# Patient Record
Sex: FEMALE | Race: WHITE | NOT HISPANIC OR LATINO | Employment: OTHER | URBAN - METROPOLITAN AREA
[De-identification: names, ages, dates, MRNs, and addresses within clinical notes are randomized per-mention and may not be internally consistent; named-entity substitution may affect disease eponyms.]

---

## 2017-01-04 ENCOUNTER — LAB REQUISITION (OUTPATIENT)
Dept: LAB | Facility: HOSPITAL | Age: 67
End: 2017-01-04

## 2017-01-04 DIAGNOSIS — R56.9 CONVULSIONS (HCC): ICD-10-CM

## 2017-01-04 DIAGNOSIS — Z79.899 OTHER LONG TERM (CURRENT) DRUG THERAPY: ICD-10-CM

## 2017-01-04 PROCEDURE — 36415 COLL VENOUS BLD VENIPUNCTURE: CPT | Performed by: FAMILY MEDICINE

## 2017-01-04 PROCEDURE — 80177 DRUG SCRN QUAN LEVETIRACETAM: CPT | Performed by: FAMILY MEDICINE

## 2017-01-06 LAB — LEVETIRACETAM SERPL-MCNC: 19.3 UG/ML (ref 10–40)

## 2017-01-12 ENCOUNTER — TELEPHONE (OUTPATIENT)
Dept: NEUROLOGY | Facility: CLINIC | Age: 67
End: 2017-01-12

## 2017-01-12 DIAGNOSIS — G40.209 PARTIAL SYMPTOMATIC EPILEPSY WITH COMPLEX PARTIAL SEIZURES, NOT INTRACTABLE, WITHOUT STATUS EPILEPTICUS (HCC): Primary | ICD-10-CM

## 2017-01-12 NOTE — TELEPHONE ENCOUNTER
Patients insurance will not pay for Keppra XR, wanting to see about switching to immediate release       #[phone] Vee  Ext [phone]

## 2017-01-13 NOTE — TELEPHONE ENCOUNTER
Okay then discontinue the Keppra xr order and changed to a new medicine    Keppra 500 mg: One at bedtime for 4 nights then one twice a day

## 2017-01-16 RX ORDER — LEVETIRACETAM 500 MG/1
500 TABLET ORAL 2 TIMES DAILY
Qty: 90 TABLET | Refills: 3 | Status: SHIPPED | OUTPATIENT
Start: 2017-01-16 | End: 2017-02-20

## 2017-01-16 NOTE — TELEPHONE ENCOUNTER
levETIRAcetam (KEPPRA) 500 MG tablet  Take 1 tablet by mouth 2 (Two) Times a Day. One tablet po qhs for 4 nights to start medication, then 1 tablet po bid.   Normal, Disp-90 tablet, R-3

## 2017-02-08 ENCOUNTER — OFFICE (OUTPATIENT)
Dept: URBAN - METROPOLITAN AREA CLINIC 6 | Facility: CLINIC | Age: 67
End: 2017-02-08

## 2017-02-08 VITALS
RESPIRATION RATE: 22 BRPM | WEIGHT: 140 LBS | HEART RATE: 76 BPM | DIASTOLIC BLOOD PRESSURE: 66 MMHG | HEIGHT: 65 IN | SYSTOLIC BLOOD PRESSURE: 116 MMHG

## 2017-02-08 DIAGNOSIS — K31.1 ADULT HYPERTROPHIC PYLORIC STENOSIS: ICD-10-CM

## 2017-02-08 DIAGNOSIS — K43.2 INCISIONAL HERNIA WITHOUT OBSTRUCTION OR GANGRENE: ICD-10-CM

## 2017-02-08 PROCEDURE — 99213 OFFICE O/P EST LOW 20 MIN: CPT | Performed by: INTERNAL MEDICINE

## 2017-02-20 ENCOUNTER — OFFICE VISIT (OUTPATIENT)
Dept: SURGERY | Facility: CLINIC | Age: 67
End: 2017-02-20

## 2017-02-20 VITALS
WEIGHT: 144 LBS | HEART RATE: 86 BPM | TEMPERATURE: 97.7 F | BODY MASS INDEX: 25.52 KG/M2 | HEIGHT: 63 IN | OXYGEN SATURATION: 95 % | SYSTOLIC BLOOD PRESSURE: 158 MMHG | RESPIRATION RATE: 16 BRPM | DIASTOLIC BLOOD PRESSURE: 98 MMHG

## 2017-02-20 DIAGNOSIS — K43.2 INCISIONAL HERNIA, WITHOUT OBSTRUCTION OR GANGRENE: ICD-10-CM

## 2017-02-20 DIAGNOSIS — R10.84 GENERALIZED ABDOMINAL PAIN: Primary | ICD-10-CM

## 2017-02-20 DIAGNOSIS — R11.0 NAUSEA: ICD-10-CM

## 2017-02-20 PROCEDURE — 99203 OFFICE O/P NEW LOW 30 MIN: CPT | Performed by: SURGERY

## 2017-02-20 RX ORDER — LEVETIRACETAM 500 MG/1
500 TABLET ORAL 2 TIMES DAILY
COMMUNITY

## 2017-02-20 RX ORDER — PHENOL 1.4 %
600 AEROSOL, SPRAY (ML) MUCOUS MEMBRANE
COMMUNITY

## 2017-02-20 RX ORDER — PANTOPRAZOLE SODIUM 40 MG/1
40 TABLET, DELAYED RELEASE ORAL 2 TIMES DAILY
COMMUNITY

## 2017-02-20 RX ORDER — BUSPIRONE HYDROCHLORIDE 10 MG/1
10 TABLET ORAL 2 TIMES DAILY
COMMUNITY

## 2017-02-20 RX ORDER — ATORVASTATIN CALCIUM 20 MG/1
20 TABLET, FILM COATED ORAL NIGHTLY
COMMUNITY

## 2017-02-20 RX ORDER — PAROXETINE HYDROCHLORIDE 40 MG/1
40 TABLET, FILM COATED ORAL EVERY MORNING
COMMUNITY
End: 2021-01-12 | Stop reason: SDDI

## 2017-02-20 RX ORDER — PHENYTOIN SODIUM 100 MG/1
CAPSULE, EXTENDED RELEASE ORAL 2 TIMES DAILY
COMMUNITY
End: 2017-08-10

## 2017-02-20 RX ORDER — BRIMONIDINE TARTRATE AND TIMOLOL MALEATE 2; 5 MG/ML; MG/ML
1 SOLUTION OPHTHALMIC EVERY 12 HOURS
COMMUNITY

## 2017-02-20 RX ORDER — LEVOTHYROXINE SODIUM 0.05 MG/1
50 TABLET ORAL DAILY
COMMUNITY

## 2017-02-20 RX ORDER — GUAIFENESIN 600 MG/1
1200 TABLET, EXTENDED RELEASE ORAL 2 TIMES DAILY PRN
COMMUNITY

## 2017-02-20 RX ORDER — CARVEDILOL 6.25 MG/1
6.25 TABLET ORAL 2 TIMES DAILY WITH MEALS
COMMUNITY

## 2017-02-20 RX ORDER — QUETIAPINE FUMARATE 100 MG/1
100 TABLET, FILM COATED ORAL NIGHTLY
COMMUNITY
End: 2021-01-12 | Stop reason: SDDI

## 2017-02-20 RX ORDER — ACETAMINOPHEN 500 MG
1000 TABLET ORAL EVERY 6 HOURS PRN
COMMUNITY

## 2017-02-20 RX ORDER — SOLIFENACIN SUCCINATE 10 MG/1
10 TABLET, FILM COATED ORAL DAILY
COMMUNITY
End: 2021-01-12 | Stop reason: SDDI

## 2017-02-20 RX ORDER — CETIRIZINE HYDROCHLORIDE 10 MG/1
10 TABLET ORAL DAILY
COMMUNITY

## 2017-02-20 NOTE — PROGRESS NOTES
PATIENT INFORMATION  Caitlyn Camacho   - 1950    CHIEF COMPLAINT  INCISIONAL HERNIA, PT STATES IT IS PAINFUL  Referral from Dr. Khang Merino    HISTORY OF PRESENT ILLNESS  HPI  Patient is a very pleasant 66-year-old female who is a resident of [facility].  History was obtained from review of chart, patient's nurse and Dr. Merino.  She has a chronic history of epilepsy but has been seizure free for many years.  She follows with Dr. Leavitt neurology.  She also has history of anxiety disorder, major depressive disorder and dementia without behavioral disturbance.  She was referred to me for an incisional hernia -- of unknown duration, she has been at [facility] for the last 2-3 months and the staff has noted it since then.  Patient does very clearly complain of pain.  She gestures to me that when she lies down this hernia is reducible.  According to the nursing staff she has had ongoing pain associated with the hernia.  There has been no complaints of nausea, vomiting, change in bowel movements, melena or hematochezia.  She does also follow with GI.  Her past surgical history is significant for surgery for peptic ulcer disease, gastrojejunostomy, hysterectomy.   I have reviewed some of her old records including labs obtained in 2017 that is an elevation of alkaline phosphatase.  Upon talking to nursing staff there was concern for possible cholecystitis but no further workup was done.  Patient does not complain of any epigastric or right upper quadrant abdominal pain.    REVIEW OF SYSTEMS  Review of Systems  Unobtainable    ACTIVE PROBLEMS  Patient Active Problem List    Diagnosis   • Adverse drug effect [T88.7XXA]   • Partial symptomatic epilepsy with complex partial seizures, not intractable, without status epilepticus [G40.209]         PAST MEDICAL HISTORY  Past Medical History   Diagnosis Date   • Anxiety    • Dysphagia    • Hypercholesteremia    • Hypothyroidism    • Major  depression    • OCD (obsessive compulsive disorder)    • Osteopenia    • Seizure disorder          SURGICAL HISTORY  Past Surgical History   Procedure Laterality Date   • Cataract extraction     • Hysterectomy           FAMILY HISTORY  Family History   Problem Relation Age of Onset   • Family history unknown: Yes         SOCIAL HISTORY  Social History     Occupational History   • Not on file.     Social History Main Topics   • Smoking status: Never Smoker   • Smokeless tobacco: Not on file   • Alcohol use No   • Drug use: Not on file   • Sexual activity: Not on file         CURRENT MEDICATIONS    Current Outpatient Prescriptions:   •  acetaminophen (TYLENOL) 500 MG tablet, Take 500 mg by mouth Every 6 (Six) Hours As Needed for mild pain (1-3)., Disp: , Rfl:   •  Albuterol (VENTOLIN IN), Inhale., Disp: , Rfl:   •  atorvastatin (LIPITOR) 20 MG tablet, Take 20 mg by mouth Daily., Disp: , Rfl:   •  brimonidine-timolol (COMBIGAN) 0.2-0.5 % ophthalmic solution, Every 12 (Twelve) Hours., Disp: , Rfl:   •  busPIRone (BUSPAR) 10 MG tablet, Take 10 mg by mouth 3 (Three) Times a Day., Disp: , Rfl:   •  calcium carbonate (OS-KATY) 600 MG tablet, Take 600 mg by mouth Daily., Disp: , Rfl:   •  carvedilol (COREG) 6.25 MG tablet, Take 6.25 mg by mouth 2 (Two) Times a Day With Meals., Disp: , Rfl:   •  cetirizine (zyrTEC) 10 MG tablet, Take 10 mg by mouth Daily., Disp: , Rfl:   •  denosumab (PROLIA) 60 MG/ML solution syringe, Inject  under the skin 1 (One) Time., Disp: , Rfl:   •  Dextromethorphan-Menthol (DELSYM COUGH RELIEF MT), Apply  to the mouth or throat As Needed., Disp: , Rfl:   •  Ergocalciferol (VITAMIN D2 PO), Take  by mouth., Disp: , Rfl:   •  fluticasone-salmeterol (ADVAIR) 500-50 MCG/DOSE DISKUS, Inhale 2 (Two) Times a Day., Disp: , Rfl:   •  guaiFENesin (MUCINEX) 600 MG 12 hr tablet, Take 1,200 mg by mouth 2 (Two) Times a Day., Disp: , Rfl:   •  levETIRAcetam (KEPPRA) 500 MG tablet, Take 500 mg by mouth 2 (Two) Times a  "Day., Disp: , Rfl:   •  levothyroxine (SYNTHROID, LEVOTHROID) 50 MCG tablet, Take 50 mcg by mouth Daily., Disp: , Rfl:   •  Multiple Vitamins-Minerals (MULTIVITAMIN ADULT PO), Take  by mouth., Disp: , Rfl:   •  pantoprazole (PROTONIX) 40 MG EC tablet, Take 40 mg by mouth Daily., Disp: , Rfl:   •  PARoxetine (PAXIL) 40 MG tablet, Take 40 mg by mouth Every Morning., Disp: , Rfl:   •  phenytoin (DILANTIN) 100 MG ER capsule, Take  by mouth 2 (Two) Times a Day., Disp: , Rfl:   •  Probiotic Product (PROBIOTIC ADVANCED PO), Take  by mouth., Disp: , Rfl:   •  QUEtiapine (SEROquel) 100 MG tablet, Take 100 mg by mouth Every Night., Disp: , Rfl:   •  solifenacin (VESICARE) 10 MG tablet, Take 10 mg by mouth Daily., Disp: , Rfl:     ALLERGIES  Codeine    VITALS  Vitals:    02/20/17 0857   BP: 158/98   Pulse: 86   Resp: 16   Temp: 97.7 °F (36.5 °C)   SpO2: 95%   Weight: 144 lb (65.3 kg)   Height: 63\" (160 cm)       LAST RESULTS   Lab Requisition on 01/04/2017   Component Date Value Ref Range Status   • Levetiracetam 01/04/2017 19.3  10.0 - 40.0 ug/mL Final     No results found.    PHYSICAL EXAM  Physical Exam   Constitutional: She appears well-developed and well-nourished.   Eyes: No scleral icterus.   Cardiovascular: Normal rate, regular rhythm and normal heart sounds.    Pulmonary/Chest: Breath sounds normal.   Abdominal:   Soft, nondistended, nontender, reducible incisional hernia.  Fascial defect 5 cm x 5 cm in greatest dimension.   Musculoskeletal: She exhibits no edema.   Neurological: She is alert.   Skin: Skin is warm and dry.   Nursing note and vitals reviewed.      ASSESSMENT    Incisional hernia  Abdominal pain    Will obtain CT scan abdomen and pelvis with oral and IV contrast.  We'll obtain labs i.e. CBC CMP    Diagnoses and all orders for this visit:    Generalized abdominal pain  -     CT Abdomen Pelvis With Contrast  -     CBC & Differential  -     Comprehensive Metabolic Panel    Nausea  -     CT Abdomen Pelvis " With Contrast  -     CBC & Differential  -     Comprehensive Metabolic Panel    Incisional hernia, without obstruction or gangrene  -     CT Abdomen Pelvis With Contrast  -     CBC & Differential  -     Comprehensive Metabolic Panel    Other orders  -     Ergocalciferol (VITAMIN D2 PO); Take  by mouth.  -     Probiotic Product (PROBIOTIC ADVANCED PO); Take  by mouth.  -     cetirizine (zyrTEC) 10 MG tablet; Take 10 mg by mouth Daily.  -     levothyroxine (SYNTHROID, LEVOTHROID) 50 MCG tablet; Take 50 mcg by mouth Daily.  -     Multiple Vitamins-Minerals (MULTIVITAMIN ADULT PO); Take  by mouth.  -     pantoprazole (PROTONIX) 40 MG EC tablet; Take 40 mg by mouth Daily.  -     PARoxetine (PAXIL) 40 MG tablet; Take 40 mg by mouth Every Morning.  -     phenytoin (DILANTIN) 100 MG ER capsule; Take  by mouth 2 (Two) Times a Day.  -     denosumab (PROLIA) 60 MG/ML solution syringe; Inject  under the skin 1 (One) Time.  -     QUEtiapine (SEROquel) 100 MG tablet; Take 100 mg by mouth Every Night.  -     solifenacin (VESICARE) 10 MG tablet; Take 10 mg by mouth Daily.  -     acetaminophen (TYLENOL) 500 MG tablet; Take 500 mg by mouth Every 6 (Six) Hours As Needed for mild pain (1-3).  -     Albuterol (VENTOLIN IN); Inhale.  -     Dextromethorphan-Menthol (DELSYM COUGH RELIEF MT); Apply  to the mouth or throat As Needed.  -     guaiFENesin (MUCINEX) 600 MG 12 hr tablet; Take 1,200 mg by mouth 2 (Two) Times a Day.  -     atorvastatin (LIPITOR) 20 MG tablet; Take 20 mg by mouth Daily.  -     busPIRone (BUSPAR) 10 MG tablet; Take 10 mg by mouth 3 (Three) Times a Day.  -     calcium carbonate (OS-KATY) 600 MG tablet; Take 600 mg by mouth Daily.  -     carvedilol (COREG) 6.25 MG tablet; Take 6.25 mg by mouth 2 (Two) Times a Day With Meals.  -     brimonidine-timolol (COMBIGAN) 0.2-0.5 % ophthalmic solution; Every 12 (Twelve) Hours.  -     fluticasone-salmeterol (ADVAIR) 500-50 MCG/DOSE DISKUS; Inhale 2 (Two) Times a Day.  -      levETIRAcetam (KEPPRA) 500 MG tablet; Take 500 mg by mouth 2 (Two) Times a Day.          PLAN  Warning signs and symptoms of hernia incarceration/strangulation discussed with her caregiver and nursing staff.  They are to call my office immediately or come to the nearest emergency room if Caitlyn would've experience those symptoms.  We'll follow-up as soon as testing is completed.  Patient's caregivers and nursing staff for advised to call the office sooner should she have worsening symptoms or go to the nearest emergency room.    Plan of care was discussed with Dr. Merino we have the phone.

## 2017-02-22 ENCOUNTER — OUTSIDE FACILITY SERVICE (OUTPATIENT)
Dept: NEUROLOGY | Facility: CLINIC | Age: 67
End: 2017-02-22

## 2017-03-01 ENCOUNTER — LAB REQUISITION (OUTPATIENT)
Dept: LAB | Facility: HOSPITAL | Age: 67
End: 2017-03-01

## 2017-03-01 DIAGNOSIS — R53.83 OTHER FATIGUE: ICD-10-CM

## 2017-03-01 DIAGNOSIS — R93.3 ABNORMAL FINDINGS ON DIAGNOSTIC IMAGING OF OTHER PARTS OF DIGESTIVE TRACT: ICD-10-CM

## 2017-03-01 DIAGNOSIS — D72.819 DECREASED WHITE BLOOD CELL COUNT: ICD-10-CM

## 2017-03-01 DIAGNOSIS — Z79.899 OTHER LONG TERM (CURRENT) DRUG THERAPY: ICD-10-CM

## 2017-03-01 LAB
ALBUMIN SERPL-MCNC: 3.2 G/DL (ref 3.5–5.2)
ALBUMIN/GLOB SERPL: 0.9 G/DL
ALP SERPL-CCNC: 105 U/L (ref 40–129)
ALT SERPL W P-5'-P-CCNC: 11 U/L (ref 5–33)
ANION GAP SERPL CALCULATED.3IONS-SCNC: 10.6 MMOL/L
AST SERPL-CCNC: 13 U/L (ref 5–32)
BASOPHILS # BLD AUTO: 0.11 10*3/MM3 (ref 0–0.2)
BASOPHILS NFR BLD AUTO: 0.9 % (ref 0–2)
BILIRUB SERPL-MCNC: <0.2 MG/DL (ref 0.2–1.2)
BUN BLD-MCNC: 23 MG/DL (ref 8–23)
BUN/CREAT SERPL: 39.7 (ref 7–25)
CALCIUM SPEC-SCNC: 9.4 MG/DL (ref 8.8–10.5)
CHLORIDE SERPL-SCNC: 100 MMOL/L (ref 98–107)
CO2 SERPL-SCNC: 28.4 MMOL/L (ref 22–29)
CREAT BLD-MCNC: 0.58 MG/DL (ref 0.57–1)
DEPRECATED RDW RBC AUTO: 47.7 FL (ref 37–54)
EOSINOPHIL # BLD AUTO: 0.35 10*3/MM3 (ref 0.1–0.3)
EOSINOPHIL NFR BLD AUTO: 2.9 % (ref 0–4)
ERYTHROCYTE [DISTWIDTH] IN BLOOD BY AUTOMATED COUNT: 14.8 % (ref 11.5–14.5)
GFR SERPL CREATININE-BSD FRML MDRD: 104 ML/MIN/1.73
GLOBULIN UR ELPH-MCNC: 3.6 GM/DL
GLUCOSE BLD-MCNC: 91 MG/DL (ref 65–99)
HCT VFR BLD AUTO: 36.7 % (ref 37–47)
HGB BLD-MCNC: 11.5 G/DL (ref 12–16)
IMM GRANULOCYTES # BLD: 0.07 10*3/MM3 (ref 0–0.03)
IMM GRANULOCYTES NFR BLD: 0.6 % (ref 0–0.5)
LYMPHOCYTES # BLD AUTO: 3.91 10*3/MM3 (ref 0.6–4.8)
LYMPHOCYTES NFR BLD AUTO: 32.2 % (ref 20–45)
MCH RBC QN AUTO: 27.6 PG (ref 27–31)
MCHC RBC AUTO-ENTMCNC: 31.3 G/DL (ref 31–37)
MCV RBC AUTO: 88 FL (ref 81–99)
MONOCYTES # BLD AUTO: 1.04 10*3/MM3 (ref 0–1)
MONOCYTES NFR BLD AUTO: 8.6 % (ref 3–8)
NEUTROPHILS # BLD AUTO: 6.67 10*3/MM3 (ref 1.5–8.3)
NEUTROPHILS NFR BLD AUTO: 54.8 % (ref 45–70)
NRBC BLD MANUAL-RTO: 0 /100 WBC (ref 0–0)
PLATELET # BLD AUTO: 331 10*3/MM3 (ref 140–500)
PMV BLD AUTO: 10.3 FL (ref 7.4–10.4)
POTASSIUM BLD-SCNC: 4.7 MMOL/L (ref 3.5–5.2)
PROT SERPL-MCNC: 6.8 G/DL (ref 6–8.5)
RBC # BLD AUTO: 4.17 10*6/MM3 (ref 4.2–5.4)
SODIUM BLD-SCNC: 139 MMOL/L (ref 136–145)
WBC NRBC COR # BLD: 12.15 10*3/MM3 (ref 4.8–10.8)

## 2017-03-01 PROCEDURE — 80053 COMPREHEN METABOLIC PANEL: CPT | Performed by: FAMILY MEDICINE

## 2017-03-01 PROCEDURE — 36415 COLL VENOUS BLD VENIPUNCTURE: CPT | Performed by: FAMILY MEDICINE

## 2017-03-01 PROCEDURE — 85025 COMPLETE CBC W/AUTO DIFF WBC: CPT | Performed by: FAMILY MEDICINE

## 2017-03-03 ENCOUNTER — HOSPITAL ENCOUNTER (OUTPATIENT)
Dept: CT IMAGING | Facility: HOSPITAL | Age: 67
Discharge: HOME | End: 2017-03-03
Attending: SURGERY | Admitting: SURGERY

## 2017-03-03 PROCEDURE — 74177 CT ABD & PELVIS W/CONTRAST: CPT

## 2017-03-03 PROCEDURE — 0 IOPAMIDOL PER 1 ML: Performed by: SURGERY

## 2017-03-03 RX ADMIN — IOPAMIDOL 100 ML: 755 INJECTION, SOLUTION INTRAVENOUS at 14:17

## 2017-03-07 ENCOUNTER — TELEPHONE (OUTPATIENT)
Dept: SURGERY | Facility: CLINIC | Age: 67
End: 2017-03-07

## 2017-03-07 NOTE — TELEPHONE ENCOUNTER
FYI: Caitlyn's nurse called stating they did a CXR on Caitlyn and they had found some scarring, they are going to schedule a Pulmonology visit to get clearance. She is still coming in to f/u with Dr. Ivy tomorrow.

## 2017-03-08 ENCOUNTER — LAB REQUISITION (OUTPATIENT)
Dept: LAB | Facility: HOSPITAL | Age: 67
End: 2017-03-08

## 2017-03-08 ENCOUNTER — OFFICE VISIT (OUTPATIENT)
Dept: SURGERY | Facility: CLINIC | Age: 67
End: 2017-03-08

## 2017-03-08 VITALS
BODY MASS INDEX: 25.52 KG/M2 | SYSTOLIC BLOOD PRESSURE: 150 MMHG | HEIGHT: 63 IN | DIASTOLIC BLOOD PRESSURE: 92 MMHG | WEIGHT: 144 LBS

## 2017-03-08 DIAGNOSIS — R10.10 PAIN OF UPPER ABDOMEN: Primary | ICD-10-CM

## 2017-03-08 DIAGNOSIS — R79.89 OTHER SPECIFIED ABNORMAL FINDINGS OF BLOOD CHEMISTRY: ICD-10-CM

## 2017-03-08 DIAGNOSIS — K80.50 CHOLEDOCHOLITHIASIS: ICD-10-CM

## 2017-03-08 DIAGNOSIS — R53.83 OTHER FATIGUE: ICD-10-CM

## 2017-03-08 DIAGNOSIS — Z98.0 HX OF BYPASS GASTROJEJUNOSTOMY: ICD-10-CM

## 2017-03-08 DIAGNOSIS — D72.819 DECREASED WHITE BLOOD CELL COUNT: ICD-10-CM

## 2017-03-08 DIAGNOSIS — Z79.899 OTHER LONG TERM (CURRENT) DRUG THERAPY: ICD-10-CM

## 2017-03-08 LAB
BASOPHILS # BLD AUTO: 0.1 10*3/MM3 (ref 0–0.2)
BASOPHILS NFR BLD AUTO: 0.7 % (ref 0–2)
DEPRECATED RDW RBC AUTO: 47.3 FL (ref 37–54)
EOSINOPHIL # BLD AUTO: 0.3 10*3/MM3 (ref 0.1–0.3)
EOSINOPHIL NFR BLD AUTO: 2 % (ref 0–4)
ERYTHROCYTE [DISTWIDTH] IN BLOOD BY AUTOMATED COUNT: 14.7 % (ref 11.5–14.5)
FERRITIN SERPL-MCNC: 13.17 NG/ML (ref 13–150)
HCT VFR BLD AUTO: 36.1 % (ref 37–47)
HGB BLD-MCNC: 11.5 G/DL (ref 12–16)
IMM GRANULOCYTES # BLD: 0.05 10*3/MM3 (ref 0–0.03)
IMM GRANULOCYTES NFR BLD: 0.3 % (ref 0–0.5)
LYMPHOCYTES # BLD AUTO: 3.44 10*3/MM3 (ref 0.6–4.8)
LYMPHOCYTES NFR BLD AUTO: 23.2 % (ref 20–45)
MCH RBC QN AUTO: 28 PG (ref 27–31)
MCHC RBC AUTO-ENTMCNC: 31.9 G/DL (ref 31–37)
MCV RBC AUTO: 87.8 FL (ref 81–99)
MONOCYTES # BLD AUTO: 1.2 10*3/MM3 (ref 0–1)
MONOCYTES NFR BLD AUTO: 8.1 % (ref 3–8)
NEUTROPHILS # BLD AUTO: 9.72 10*3/MM3 (ref 1.5–8.3)
NEUTROPHILS NFR BLD AUTO: 65.7 % (ref 45–70)
NRBC BLD MANUAL-RTO: 0 /100 WBC (ref 0–0)
PLATELET # BLD AUTO: 334 10*3/MM3 (ref 140–500)
PMV BLD AUTO: 10.3 FL (ref 7.4–10.4)
RBC # BLD AUTO: 4.11 10*6/MM3 (ref 4.2–5.4)
WBC NRBC COR # BLD: 14.81 10*3/MM3 (ref 4.8–10.8)

## 2017-03-08 PROCEDURE — 82728 ASSAY OF FERRITIN: CPT | Performed by: FAMILY MEDICINE

## 2017-03-08 PROCEDURE — 99213 OFFICE O/P EST LOW 20 MIN: CPT | Performed by: SURGERY

## 2017-03-08 PROCEDURE — 85025 COMPLETE CBC W/AUTO DIFF WBC: CPT | Performed by: FAMILY MEDICINE

## 2017-03-08 PROCEDURE — 36415 COLL VENOUS BLD VENIPUNCTURE: CPT | Performed by: FAMILY MEDICINE

## 2017-03-08 RX ORDER — CEPHALEXIN 500 MG/1
500 CAPSULE ORAL 3 TIMES DAILY
Qty: 21 CAPSULE | Refills: 0 | Status: SHIPPED | OUTPATIENT
Start: 2017-03-08 | End: 2017-03-15

## 2017-03-08 NOTE — PROGRESS NOTES
PATIENT INFORMATION  Caitlyn Camacho   - 1950    CHIEF COMPLAINT  Chief Complaint   Patient presents with   • Follow-up   F/U HERNIA, CT DONE, PT HAD CXR DONE AT [place] WHICH SHOWED SCARRING ON THE LUNGS, SHE IS SCHEDULED FOR PULMONARY CLEARANCE ON     HISTORY OF PRESENT ILLNESS  HPI  Patient presents to the office today for follow-up.  She had CT scan abdomen and pelvis done.  According to the [place] staff that is accompanying the patient she is complaining of abdominal pain this is mostly at the right upper quadrant and her midline abdominal hernia.  There have been no reports of nausea, vomiting or change in bowel movements.  CT scan abdomen and pelvis revealed multiple issues at this time cholelithiasis, mild gallbladder wall thickening, choledocholithiasis and multiple ventral incisional hernias - fat-containing but no evidence of bowel incarceration or strangulation.  She did have CBC and CMP done on 3/1/17 is here to [facility].  Her LFTs are normal.  White cell, was up at 12,000.  On review of CT scan there appears to be a gastrojejunostomy.  A bandage on it had surgery for peptic ulcer disease several years ago.  No records are available at this time.    REVIEW OF SYSTEMS  Review of Systems      ACTIVE PROBLEMS  Patient Active Problem List    Diagnosis   • Adverse drug effect [T88.7XXA]   • Partial symptomatic epilepsy with complex partial seizures, not intractable, without status epilepticus [G40.209]         PAST MEDICAL HISTORY  Past Medical History   Diagnosis Date   • Anxiety    • Dysphagia    • Hypercholesteremia    • Hypothyroidism    • Major depression    • OCD (obsessive compulsive disorder)    • Osteopenia    • Seizure disorder          SURGICAL HISTORY  Past Surgical History   Procedure Laterality Date   • Cataract extraction     • Hysterectomy           FAMILY HISTORY  Family History   Problem Relation Age of Onset   • Family history unknown: Yes         SOCIAL  HISTORY  Social History     Occupational History   • Not on file.     Social History Main Topics   • Smoking status: Never Smoker   • Smokeless tobacco: Not on file   • Alcohol use No   • Drug use: Not on file   • Sexual activity: Not on file         CURRENT MEDICATIONS    Current Outpatient Prescriptions:   •  acetaminophen (TYLENOL) 500 MG tablet, Take 500 mg by mouth Every 6 (Six) Hours As Needed for mild pain (1-3)., Disp: , Rfl:   •  Albuterol (VENTOLIN IN), Inhale., Disp: , Rfl:   •  atorvastatin (LIPITOR) 20 MG tablet, Take 20 mg by mouth Daily., Disp: , Rfl:   •  brimonidine-timolol (COMBIGAN) 0.2-0.5 % ophthalmic solution, Every 12 (Twelve) Hours., Disp: , Rfl:   •  busPIRone (BUSPAR) 10 MG tablet, Take 10 mg by mouth 3 (Three) Times a Day., Disp: , Rfl:   •  calcium carbonate (OS-KATY) 600 MG tablet, Take 600 mg by mouth Daily., Disp: , Rfl:   •  carvedilol (COREG) 6.25 MG tablet, Take 6.25 mg by mouth 2 (Two) Times a Day With Meals., Disp: , Rfl:   •  cephalexin (KEFLEX) 500 MG capsule, Take 1 capsule by mouth 3 (Three) Times a Day for 7 days., Disp: 21 capsule, Rfl: 0  •  cetirizine (zyrTEC) 10 MG tablet, Take 10 mg by mouth Daily., Disp: , Rfl:   •  denosumab (PROLIA) 60 MG/ML solution syringe, Inject  under the skin 1 (One) Time., Disp: , Rfl:   •  Dextromethorphan-Menthol (DELSYM COUGH RELIEF MT), Apply  to the mouth or throat As Needed., Disp: , Rfl:   •  Ergocalciferol (VITAMIN D2 PO), Take  by mouth., Disp: , Rfl:   •  fluticasone-salmeterol (ADVAIR) 500-50 MCG/DOSE DISKUS, Inhale 2 (Two) Times a Day., Disp: , Rfl:   •  guaiFENesin (MUCINEX) 600 MG 12 hr tablet, Take 1,200 mg by mouth 2 (Two) Times a Day., Disp: , Rfl:   •  levETIRAcetam (KEPPRA) 500 MG tablet, Take 500 mg by mouth 2 (Two) Times a Day., Disp: , Rfl:   •  levothyroxine (SYNTHROID, LEVOTHROID) 50 MCG tablet, Take 50 mcg by mouth Daily., Disp: , Rfl:   •  Multiple Vitamins-Minerals (MULTIVITAMIN ADULT PO), Take  by mouth., Disp: , Rfl:  "  •  pantoprazole (PROTONIX) 40 MG EC tablet, Take 40 mg by mouth Daily., Disp: , Rfl:   •  PARoxetine (PAXIL) 40 MG tablet, Take 40 mg by mouth Every Morning., Disp: , Rfl:   •  phenytoin (DILANTIN) 100 MG ER capsule, Take  by mouth 2 (Two) Times a Day., Disp: , Rfl:   •  Probiotic Product (PROBIOTIC ADVANCED PO), Take  by mouth., Disp: , Rfl:   •  QUEtiapine (SEROquel) 100 MG tablet, Take 100 mg by mouth Every Night., Disp: , Rfl:   •  solifenacin (VESICARE) 10 MG tablet, Take 10 mg by mouth Daily., Disp: , Rfl:     ALLERGIES  Codeine    VITALS  Vitals:    03/08/17 0837   BP: 150/92   Weight: 144 lb (65.3 kg)   Height: 63\" (160 cm)       LAST RESULTS   Lab Requisition on 03/08/2017   Component Date Value Ref Range Status   • Ferritin 03/08/2017 13.17  13.00 - 150.00 ng/mL Final   • WBC 03/08/2017 14.81* 4.80 - 10.80 10*3/mm3 Final   • RBC 03/08/2017 4.11* 4.20 - 5.40 10*6/mm3 Final   • Hemoglobin 03/08/2017 11.5* 12.0 - 16.0 g/dL Final   • Hematocrit 03/08/2017 36.1* 37.0 - 47.0 % Final   • MCV 03/08/2017 87.8  81.0 - 99.0 fL Final   • MCH 03/08/2017 28.0  27.0 - 31.0 pg Final   • MCHC 03/08/2017 31.9  31.0 - 37.0 g/dL Final   • RDW 03/08/2017 14.7* 11.5 - 14.5 % Final   • RDW-SD 03/08/2017 47.3  37.0 - 54.0 fl Final   • MPV 03/08/2017 10.3  7.4 - 10.4 fL Final   • Platelets 03/08/2017 334  140 - 500 10*3/mm3 Final   • Neutrophil % 03/08/2017 65.7  45.0 - 70.0 % Final   • Lymphocyte % 03/08/2017 23.2  20.0 - 45.0 % Final   • Monocyte % 03/08/2017 8.1* 3.0 - 8.0 % Final   • Eosinophil % 03/08/2017 2.0  0.0 - 4.0 % Final   • Basophil % 03/08/2017 0.7  0.0 - 2.0 % Final   • Immature Grans % 03/08/2017 0.3  0.0 - 0.5 % Final   • Neutrophils, Absolute 03/08/2017 9.72* 1.50 - 8.30 10*3/mm3 Final   • Lymphocytes, Absolute 03/08/2017 3.44  0.60 - 4.80 10*3/mm3 Final   • Monocytes, Absolute 03/08/2017 1.20* 0.00 - 1.00 10*3/mm3 Final   • Eosinophils, Absolute 03/08/2017 0.30  0.10 - 0.30 10*3/mm3 Final   • Basophils, " Absolute 03/08/2017 0.10  0.00 - 0.20 10*3/mm3 Final   • Immature Grans, Absolute 03/08/2017 0.05* 0.00 - 0.03 10*3/mm3 Final   • nRBC 03/08/2017 0.0  0.0 - 0.0 /100 WBC Final     Ct Abdomen Pelvis With Contrast    Result Date: 3/3/2017  Narrative: CT ABDOMEN AND PELVIS WITH CONTRAST, 03/03/2017:  HISTORY: 66-year-old female [facility] resident with clinical evidence of a ventral incisional hernia. Past history of gastrojejunostomy surgery for peptic ulcer disease. Hysterectomy.  TECHNIQUE: CT examination of the abdomen and pelvis with oral and IV contrast. Radiation dose reduction techniques were utilized, including automated exposure control and exposure modulation based on body size.  ABDOMEN FINDINGS: There is a large, broad-based midline supraumbilical hernia containing omental fat. There are least two small hernias along the left side margin of the main hernia sac. There are also two additional small left upper quadrant hernias to the left of midline above the level of the main hernia sac, each containing abdominal fat. None of the identified hernias contain bowel or fluid.  Multiple large gallstones are present within a mildly thick-walled gallbladder. There is also evidence of a small stone in the distal common bile duct above the ampulla. There is very little dilatation of the intrahepatic or extrahepatic bile ducts at this time. The pancreas is normal. Liver and spleen are normal in size and appearance.  Mildly distended, fluid-filled stomach. Small bowel and colon are normal in caliber. Both kidneys are negative with no evidence of urinary obstruction. Normal caliber abdominal aorta.  PELVIS FINDINGS: Hysterectomy. Bladder and rectum are negative. No inguinal hernia.  Limited lung base images show mild patchy infiltrate and/or scarring in the left posterior lung base. Moderate cardiomegaly. Scoliosis.      Impression: 1. Cholelithiasis. Gallbladder wall thickening. 2. Small calculus in the distal  common bile duct, but very little bile duct dilatation at this time. 3. Multifocal upper abdominal wall hernias containing abdominal fat as described. 4. Infiltrate or scarring in the left lung base. Cardiomegaly. Scoliosis.  This report was finalized on 3/3/2017 2:59 PM by Dr. Bean Garcia MD.        PHYSICAL EXAM  Physical Exam   Constitutional: She is oriented to person, place, and time. She appears well-developed and well-nourished.   HENT:   Head: Normocephalic and atraumatic.   Cardiovascular: Normal rate, regular rhythm and normal heart sounds.    Pulmonary/Chest: Breath sounds normal.   Abdominal:   Soft nondistended nontender positive bowel sounds in all 4 quadrants.  Large reducible ventral incisional hernia   Musculoskeletal: She exhibits no edema.   Neurological: She is alert and oriented to person, place, and time.   Skin: Skin is warm and dry.   Nursing note and vitals reviewed.      ASSESSMENT  Cholelithiasis-possible gallbladder wall inflammation -  cholecystitis  Choledocholithiasis  Multiple ventral and incisional hernias  Probable gastrojejunostomy for peptic ulcer surgery several years ago    We'll obtain a upper GI barium swallow to better delineate the upper GI anatomy.  She will need an ERCP.  If she does have a gastrojejunostomy she will need to see Dr. Dsouza for the ERCP.  I will arrange for referral to see Dr. Roshan Dsouza.  We'll start Keflex empirically.  Keflex 500 mg by mouth 4 times a day ×7 days was e- scribed    She is also getting pulmonary and cardiac workup and clearance as per Dr. Merino.    PLAN  Follow-up after testing and consultation with Dr. Dsouza.  [facility] staff was advised to call the office sooner should the patient have worsening symptoms or go to the nearest emergency room.  I also called patient's PCP Dr. Merino and discussed the case with him.

## 2017-03-14 ENCOUNTER — APPOINTMENT (OUTPATIENT)
Dept: GENERAL RADIOLOGY | Facility: HOSPITAL | Age: 67
End: 2017-03-14
Attending: SURGERY

## 2017-03-15 ENCOUNTER — HOSPITAL ENCOUNTER (OUTPATIENT)
Dept: GENERAL RADIOLOGY | Facility: HOSPITAL | Age: 67
Discharge: HOME | End: 2017-03-15
Attending: FAMILY MEDICINE | Admitting: FAMILY MEDICINE

## 2017-03-15 ENCOUNTER — TRANSCRIBE ORDERS (OUTPATIENT)
Dept: ADMINISTRATIVE | Facility: HOSPITAL | Age: 67
End: 2017-03-15

## 2017-03-15 ENCOUNTER — LAB REQUISITION (OUTPATIENT)
Dept: LAB | Facility: HOSPITAL | Age: 67
End: 2017-03-15

## 2017-03-15 DIAGNOSIS — R06.02 SHORTNESS OF BREATH: ICD-10-CM

## 2017-03-15 DIAGNOSIS — R09.89 CHEST CONGESTION: Primary | ICD-10-CM

## 2017-03-15 DIAGNOSIS — E53.8 DEFICIENCY OF OTHER SPECIFIED B GROUP VITAMINS: ICD-10-CM

## 2017-03-15 DIAGNOSIS — R79.89 OTHER SPECIFIED ABNORMAL FINDINGS OF BLOOD CHEMISTRY: ICD-10-CM

## 2017-03-15 DIAGNOSIS — Z79.899 OTHER LONG TERM (CURRENT) DRUG THERAPY: ICD-10-CM

## 2017-03-15 DIAGNOSIS — R09.89 CHEST CONGESTION: ICD-10-CM

## 2017-03-15 LAB
BASOPHILS # BLD AUTO: 0.09 10*3/MM3 (ref 0–0.2)
BASOPHILS NFR BLD AUTO: 0.9 % (ref 0–2)
DEPRECATED RDW RBC AUTO: 45.9 FL (ref 37–54)
EOSINOPHIL # BLD AUTO: 0.34 10*3/MM3 (ref 0.1–0.3)
EOSINOPHIL NFR BLD AUTO: 3.3 % (ref 0–4)
ERYTHROCYTE [DISTWIDTH] IN BLOOD BY AUTOMATED COUNT: 14.4 % (ref 11.5–14.5)
FOLATE SERPL-MCNC: >20 NG/ML (ref 4.78–24.2)
HCT VFR BLD AUTO: 35.4 % (ref 37–47)
HGB BLD-MCNC: 11.5 G/DL (ref 12–16)
IMM GRANULOCYTES # BLD: 0.09 10*3/MM3 (ref 0–0.03)
IMM GRANULOCYTES NFR BLD: 0.9 % (ref 0–0.5)
LYMPHOCYTES # BLD AUTO: 2.99 10*3/MM3 (ref 0.6–4.8)
LYMPHOCYTES NFR BLD AUTO: 28.7 % (ref 20–45)
MCH RBC QN AUTO: 28.5 PG (ref 27–31)
MCHC RBC AUTO-ENTMCNC: 32.5 G/DL (ref 31–37)
MCV RBC AUTO: 87.6 FL (ref 81–99)
MONOCYTES # BLD AUTO: 1.43 10*3/MM3 (ref 0–1)
MONOCYTES NFR BLD AUTO: 13.7 % (ref 3–8)
NEUTROPHILS # BLD AUTO: 5.49 10*3/MM3 (ref 1.5–8.3)
NEUTROPHILS NFR BLD AUTO: 52.5 % (ref 45–70)
NRBC BLD MANUAL-RTO: 0 /100 WBC (ref 0–0)
PLATELET # BLD AUTO: 317 10*3/MM3 (ref 140–500)
PMV BLD AUTO: 10.6 FL (ref 7.4–10.4)
RBC # BLD AUTO: 4.04 10*6/MM3 (ref 4.2–5.4)
VIT B12 BLD-MCNC: 629 PG/ML (ref 211–946)
WBC NRBC COR # BLD: 10.43 10*3/MM3 (ref 4.8–10.8)

## 2017-03-15 PROCEDURE — 36415 COLL VENOUS BLD VENIPUNCTURE: CPT | Performed by: FAMILY MEDICINE

## 2017-03-15 PROCEDURE — 82607 VITAMIN B-12: CPT | Performed by: FAMILY MEDICINE

## 2017-03-15 PROCEDURE — 71020 HC CHEST PA AND LATERAL: CPT

## 2017-03-15 PROCEDURE — 82746 ASSAY OF FOLIC ACID SERUM: CPT | Performed by: FAMILY MEDICINE

## 2017-03-15 PROCEDURE — 85025 COMPLETE CBC W/AUTO DIFF WBC: CPT | Performed by: FAMILY MEDICINE

## 2017-03-20 ENCOUNTER — APPOINTMENT (OUTPATIENT)
Dept: GENERAL RADIOLOGY | Facility: HOSPITAL | Age: 67
End: 2017-03-20
Attending: SURGERY

## 2017-03-21 ENCOUNTER — TELEPHONE (OUTPATIENT)
Dept: SURGERY | Facility: CLINIC | Age: 67
End: 2017-03-21

## 2017-03-21 NOTE — TELEPHONE ENCOUNTER
Spoke with Dr. Roshan Dsouza.  Patient had an attempt at ERCP on 3/17/17.  Due to acute cardiovascular decompensation procedure had to be terminated she was then admitted to [facility] and has had further cardiac workup.  She did have another attempt at ERCP yesterday 3/20/17.  They were unable to get to the ampulla/common bile duct due to the patient's anatomy. Case has been discussed with her surgeon Dr. Roshan Dsouza and plan is for patient to undergo open cholecystectomy and open common bile duct exploration at that facility.  She is extremely high risk and per discussion with the anesthesia team ( Dr. Gregg) here at [place] it is felt that it is in the patient's best interest that her care be at a tertiary care facility.     Patient's Guardian Fantasma Lindquist was contacted by me as well as Dr. Dsouza.  Case was discussed with her.  She understands and is agreeable.

## 2017-03-22 ENCOUNTER — APPOINTMENT (OUTPATIENT)
Dept: GENERAL RADIOLOGY | Facility: HOSPITAL | Age: 67
End: 2017-03-22
Attending: SURGERY

## 2017-03-23 ENCOUNTER — TELEPHONE (OUTPATIENT)
Dept: SURGERY | Facility: CLINIC | Age: 67
End: 2017-03-23

## 2017-03-23 NOTE — TELEPHONE ENCOUNTER
KARLIE FROM [facility] CALLED STATING PT IS ADMITTED AT [facility] AND IS SCHEDULED FOR AN ERCP. SHE WAS WONDERING IF IT IS NECESSARY FOR HER TO GET THE UPPER GI SCAN DONE SINCE SHE IS DOING THE ERCP? KARLIE WANTED TO KNOW IF DR. SHRESTHA HAS SPOKEN WITH DR. HERNANDEZ? WOULD LIKE FURTHER RECOMMENDATIONS IN REGARDS TO CHOLECYSTECTOMY AND HERNIA REPAIR.

## 2017-03-23 NOTE — TELEPHONE ENCOUNTER
Patient is admitted at [facility].  She is scheduled to have her cholecystectomy and open common bile duct exploration tomorrow by Dr. Dsouza.  Please look at my telephone encounter notes in Epic in the patient's chart, regarding the patient a  nd my communications with Dr. Dsouza.  I'm fully aware of what is going on with the patient's condition.  We can cancel the upper GI barium swallow.  Once she is discharged from [facility] the patient will need to follow-up with me for her hernia that will be then fixed in the future.  Please inform the [place] staff.

## 2017-03-27 ENCOUNTER — APPOINTMENT (OUTPATIENT)
Dept: GENERAL RADIOLOGY | Facility: HOSPITAL | Age: 67
End: 2017-03-27
Attending: SURGERY

## 2017-04-05 ENCOUNTER — LAB REQUISITION (OUTPATIENT)
Dept: LAB | Facility: HOSPITAL | Age: 67
End: 2017-04-05

## 2017-04-05 DIAGNOSIS — Z79.899 OTHER LONG TERM (CURRENT) DRUG THERAPY: ICD-10-CM

## 2017-04-05 DIAGNOSIS — R56.9 CONVULSIONS (HCC): ICD-10-CM

## 2017-04-05 DIAGNOSIS — R73.09 OTHER ABNORMAL GLUCOSE: ICD-10-CM

## 2017-04-05 DIAGNOSIS — E55.9 VITAMIN D DEFICIENCY: ICD-10-CM

## 2017-04-05 DIAGNOSIS — D72.819 DECREASED WHITE BLOOD CELL COUNT: ICD-10-CM

## 2017-04-05 LAB
25(OH)D3 SERPL-MCNC: 21.2 NG/ML
ALBUMIN SERPL-MCNC: 2.8 G/DL (ref 3.5–5.2)
ALBUMIN/GLOB SERPL: 0.9 G/DL
ALP SERPL-CCNC: 78 U/L (ref 40–129)
ALT SERPL W P-5'-P-CCNC: 14 U/L (ref 5–33)
ANION GAP SERPL CALCULATED.3IONS-SCNC: 8.1 MMOL/L
AST SERPL-CCNC: 16 U/L (ref 5–32)
BASOPHILS # BLD AUTO: 0.07 10*3/MM3 (ref 0–0.2)
BASOPHILS NFR BLD AUTO: 0.8 % (ref 0–2)
BILIRUB SERPL-MCNC: <0.2 MG/DL (ref 0.2–1.2)
BUN BLD-MCNC: 15 MG/DL (ref 8–23)
BUN/CREAT SERPL: 21.7 (ref 7–25)
CALCIUM SPEC-SCNC: 8.8 MG/DL (ref 8.8–10.5)
CHLORIDE SERPL-SCNC: 100 MMOL/L (ref 98–107)
CHOLEST SERPL-MCNC: 126 MG/DL (ref 0–200)
CO2 SERPL-SCNC: 31.9 MMOL/L (ref 22–29)
CREAT BLD-MCNC: 0.69 MG/DL (ref 0.57–1)
DEPRECATED RDW RBC AUTO: 44.1 FL (ref 37–54)
EOSINOPHIL # BLD AUTO: 0.38 10*3/MM3 (ref 0.1–0.3)
EOSINOPHIL NFR BLD AUTO: 4.6 % (ref 0–4)
ERYTHROCYTE [DISTWIDTH] IN BLOOD BY AUTOMATED COUNT: 14.2 % (ref 11.5–14.5)
GFR SERPL CREATININE-BSD FRML MDRD: 85 ML/MIN/1.73
GLOBULIN UR ELPH-MCNC: 3.2 GM/DL
GLUCOSE BLD-MCNC: 88 MG/DL (ref 65–99)
HBA1C MFR BLD: 5.5 % (ref 4.8–5.6)
HCT VFR BLD AUTO: 33.9 % (ref 37–47)
HDLC SERPL-MCNC: 43 MG/DL (ref 40–60)
HGB BLD-MCNC: 10.8 G/DL (ref 12–16)
IMM GRANULOCYTES # BLD: 0.06 10*3/MM3 (ref 0–0.03)
IMM GRANULOCYTES NFR BLD: 0.7 % (ref 0–0.5)
LDLC SERPL CALC-MCNC: 61 MG/DL (ref 0–100)
LDLC/HDLC SERPL: 1.41 {RATIO}
LYMPHOCYTES # BLD AUTO: 3.5 10*3/MM3 (ref 0.6–4.8)
LYMPHOCYTES NFR BLD AUTO: 42.1 % (ref 20–45)
MCH RBC QN AUTO: 27.2 PG (ref 27–31)
MCHC RBC AUTO-ENTMCNC: 31.9 G/DL (ref 31–37)
MCV RBC AUTO: 85.4 FL (ref 81–99)
MONOCYTES # BLD AUTO: 0.73 10*3/MM3 (ref 0–1)
MONOCYTES NFR BLD AUTO: 8.8 % (ref 3–8)
NEUTROPHILS # BLD AUTO: 3.57 10*3/MM3 (ref 1.5–8.3)
NEUTROPHILS NFR BLD AUTO: 43 % (ref 45–70)
NRBC BLD MANUAL-RTO: 0 /100 WBC (ref 0–0)
PLATELET # BLD AUTO: 363 10*3/MM3 (ref 140–500)
PMV BLD AUTO: 10.2 FL (ref 7.4–10.4)
POTASSIUM BLD-SCNC: 4.4 MMOL/L (ref 3.5–5.2)
PROT SERPL-MCNC: 6 G/DL (ref 6–8.5)
RBC # BLD AUTO: 3.97 10*6/MM3 (ref 4.2–5.4)
SODIUM BLD-SCNC: 140 MMOL/L (ref 136–145)
T3FREE SERPL-MCNC: 2.36 PG/ML (ref 2–4.4)
T4 FREE SERPL-MCNC: 1.4 NG/DL (ref 0.93–1.7)
TRIGL SERPL-MCNC: 112 MG/DL (ref 0–150)
TSH SERPL DL<=0.05 MIU/L-ACNC: 3.16 MIU/ML (ref 0.27–4.2)
VLDLC SERPL-MCNC: 22.4 MG/DL (ref 7–27)
WBC NRBC COR # BLD: 8.31 10*3/MM3 (ref 4.8–10.8)

## 2017-04-05 PROCEDURE — 84481 FREE ASSAY (FT-3): CPT | Performed by: FAMILY MEDICINE

## 2017-04-05 PROCEDURE — 82306 VITAMIN D 25 HYDROXY: CPT | Performed by: FAMILY MEDICINE

## 2017-04-05 PROCEDURE — 80061 LIPID PANEL: CPT | Performed by: FAMILY MEDICINE

## 2017-04-05 PROCEDURE — 85025 COMPLETE CBC W/AUTO DIFF WBC: CPT | Performed by: FAMILY MEDICINE

## 2017-04-05 PROCEDURE — 83036 HEMOGLOBIN GLYCOSYLATED A1C: CPT | Performed by: FAMILY MEDICINE

## 2017-04-05 PROCEDURE — 80177 DRUG SCRN QUAN LEVETIRACETAM: CPT | Performed by: FAMILY MEDICINE

## 2017-04-05 PROCEDURE — 84439 ASSAY OF FREE THYROXINE: CPT | Performed by: FAMILY MEDICINE

## 2017-04-05 PROCEDURE — 36415 COLL VENOUS BLD VENIPUNCTURE: CPT | Performed by: FAMILY MEDICINE

## 2017-04-05 PROCEDURE — 80053 COMPREHEN METABOLIC PANEL: CPT | Performed by: FAMILY MEDICINE

## 2017-04-05 PROCEDURE — 84443 ASSAY THYROID STIM HORMONE: CPT | Performed by: FAMILY MEDICINE

## 2017-04-07 LAB — LEVETIRACETAM SERPL-MCNC: 17.1 UG/ML (ref 10–40)

## 2017-04-11 ENCOUNTER — TRANSCRIBE ORDERS (OUTPATIENT)
Dept: PULMONOLOGY | Facility: HOSPITAL | Age: 67
End: 2017-04-11

## 2017-04-11 DIAGNOSIS — R91.8 PULMONARY INFILTRATE: Primary | ICD-10-CM

## 2017-04-12 ENCOUNTER — OFFICE (OUTPATIENT)
Dept: URBAN - METROPOLITAN AREA CLINIC 75 | Facility: CLINIC | Age: 67
End: 2017-04-12

## 2017-04-12 VITALS
SYSTOLIC BLOOD PRESSURE: 116 MMHG | DIASTOLIC BLOOD PRESSURE: 68 MMHG | HEIGHT: 65 IN | HEART RATE: 74 BPM | RESPIRATION RATE: 16 BRPM | WEIGHT: 139 LBS

## 2017-04-12 DIAGNOSIS — K31.1 ADULT HYPERTROPHIC PYLORIC STENOSIS: ICD-10-CM

## 2017-04-12 DIAGNOSIS — K43.2 INCISIONAL HERNIA WITHOUT OBSTRUCTION OR GANGRENE: ICD-10-CM

## 2017-04-12 DIAGNOSIS — K59.1 FUNCTIONAL DIARRHEA: ICD-10-CM

## 2017-04-12 PROCEDURE — 99214 OFFICE O/P EST MOD 30 MIN: CPT | Performed by: INTERNAL MEDICINE

## 2017-04-21 ENCOUNTER — HOSPITAL ENCOUNTER (OUTPATIENT)
Dept: CT IMAGING | Facility: HOSPITAL | Age: 67
Discharge: HOME | End: 2017-04-21
Attending: INTERNAL MEDICINE | Admitting: INTERNAL MEDICINE

## 2017-04-21 ENCOUNTER — APPOINTMENT (OUTPATIENT)
Dept: CT IMAGING | Facility: HOSPITAL | Age: 67
End: 2017-04-21
Attending: INTERNAL MEDICINE

## 2017-04-21 DIAGNOSIS — R91.8 PULMONARY INFILTRATE: ICD-10-CM

## 2017-04-21 PROCEDURE — 71250 CT THORAX DX C-: CPT

## 2017-05-10 ENCOUNTER — LAB REQUISITION (OUTPATIENT)
Dept: LAB | Facility: HOSPITAL | Age: 67
End: 2017-05-10

## 2017-05-10 DIAGNOSIS — D72.819 DECREASED WHITE BLOOD CELL COUNT: ICD-10-CM

## 2017-05-10 DIAGNOSIS — E61.1 IRON DEFICIENCY: ICD-10-CM

## 2017-05-10 DIAGNOSIS — E53.8 DEFICIENCY OF OTHER SPECIFIED B GROUP VITAMINS: ICD-10-CM

## 2017-05-10 DIAGNOSIS — Z79.899 OTHER LONG TERM (CURRENT) DRUG THERAPY: ICD-10-CM

## 2017-05-10 LAB
BASOPHILS # BLD AUTO: 0.11 10*3/MM3 (ref 0–0.2)
BASOPHILS NFR BLD AUTO: 1.1 % (ref 0–2)
DEPRECATED RDW RBC AUTO: 46.5 FL (ref 37–54)
EOSINOPHIL # BLD AUTO: 0.38 10*3/MM3 (ref 0.1–0.3)
EOSINOPHIL NFR BLD AUTO: 3.7 % (ref 0–4)
ERYTHROCYTE [DISTWIDTH] IN BLOOD BY AUTOMATED COUNT: 15.4 % (ref 11.5–14.5)
FERRITIN SERPL-MCNC: 32.39 NG/ML (ref 13–150)
HCT VFR BLD AUTO: 35.7 % (ref 37–47)
HGB BLD-MCNC: 11.3 G/DL (ref 12–16)
IMM GRANULOCYTES # BLD: 0.12 10*3/MM3 (ref 0–0.03)
IMM GRANULOCYTES NFR BLD: 1.2 % (ref 0–0.5)
LYMPHOCYTES # BLD AUTO: 3.94 10*3/MM3 (ref 0.6–4.8)
LYMPHOCYTES NFR BLD AUTO: 38.1 % (ref 20–45)
MCH RBC QN AUTO: 26.7 PG (ref 27–31)
MCHC RBC AUTO-ENTMCNC: 31.7 G/DL (ref 31–37)
MCV RBC AUTO: 84.2 FL (ref 81–99)
MONOCYTES # BLD AUTO: 0.98 10*3/MM3 (ref 0–1)
MONOCYTES NFR BLD AUTO: 9.5 % (ref 3–8)
NEUTROPHILS # BLD AUTO: 4.82 10*3/MM3 (ref 1.5–8.3)
NEUTROPHILS NFR BLD AUTO: 46.4 % (ref 45–70)
NRBC BLD MANUAL-RTO: 0 /100 WBC (ref 0–0)
PLATELET # BLD AUTO: 360 10*3/MM3 (ref 140–500)
PMV BLD AUTO: 10.3 FL (ref 7.4–10.4)
RBC # BLD AUTO: 4.24 10*6/MM3 (ref 4.2–5.4)
WBC NRBC COR # BLD: 10.35 10*3/MM3 (ref 4.8–10.8)

## 2017-05-10 PROCEDURE — 36415 COLL VENOUS BLD VENIPUNCTURE: CPT | Performed by: FAMILY MEDICINE

## 2017-05-10 PROCEDURE — 82728 ASSAY OF FERRITIN: CPT | Performed by: FAMILY MEDICINE

## 2017-05-10 PROCEDURE — 85025 COMPLETE CBC W/AUTO DIFF WBC: CPT | Performed by: FAMILY MEDICINE

## 2017-05-19 ENCOUNTER — OFFICE VISIT (OUTPATIENT)
Dept: SURGERY | Facility: CLINIC | Age: 67
End: 2017-05-19

## 2017-05-19 VITALS
BODY MASS INDEX: 25.69 KG/M2 | SYSTOLIC BLOOD PRESSURE: 128 MMHG | DIASTOLIC BLOOD PRESSURE: 62 MMHG | WEIGHT: 145 LBS | HEIGHT: 63 IN

## 2017-05-19 DIAGNOSIS — K43.2 INCISIONAL HERNIA, WITHOUT OBSTRUCTION OR GANGRENE: ICD-10-CM

## 2017-05-19 DIAGNOSIS — R10.10 PAIN OF UPPER ABDOMEN: Primary | ICD-10-CM

## 2017-05-19 PROBLEM — G40.909 EPILEPSY: Status: ACTIVE | Noted: 2017-05-19

## 2017-05-19 PROBLEM — R09.02 HYPOXIA: Status: ACTIVE | Noted: 2017-03-16

## 2017-05-19 PROBLEM — I49.9 CARDIAC ARRHYTHMIA: Status: ACTIVE | Noted: 2017-03-16

## 2017-05-19 PROBLEM — E87.70 HYPERVOLEMIA: Status: ACTIVE | Noted: 2017-03-16

## 2017-05-19 PROCEDURE — 99213 OFFICE O/P EST LOW 20 MIN: CPT | Performed by: SURGERY

## 2017-06-02 ENCOUNTER — TELEPHONE (OUTPATIENT)
Dept: FAMILY MEDICINE CLINIC | Facility: CLINIC | Age: 67
End: 2017-06-02

## 2017-06-02 ENCOUNTER — HOSPITAL ENCOUNTER (OUTPATIENT)
Dept: CT IMAGING | Facility: HOSPITAL | Age: 67
Discharge: HOME | End: 2017-06-02
Attending: SURGERY | Admitting: SURGERY

## 2017-06-02 ENCOUNTER — TELEPHONE (OUTPATIENT)
Dept: SURGERY | Facility: CLINIC | Age: 67
End: 2017-06-02

## 2017-06-02 DIAGNOSIS — R10.10 PAIN OF UPPER ABDOMEN: Primary | ICD-10-CM

## 2017-06-02 DIAGNOSIS — R93.5 ABNORMAL ABDOMINAL CT SCAN: ICD-10-CM

## 2017-06-02 PROCEDURE — 74177 CT ABD & PELVIS W/CONTRAST: CPT

## 2017-06-02 PROCEDURE — 0 IOPAMIDOL PER 1 ML: Performed by: SURGERY

## 2017-06-02 RX ADMIN — IOPAMIDOL 100 ML: 755 INJECTION, SOLUTION INTRAVENOUS at 08:39

## 2017-06-02 NOTE — TELEPHONE ENCOUNTER
Reviewed CT Scan Abdomen and Pelvis - and spoke with Dionne Manley NP (covering for Dr. Merino) and the RN at [facility] - Will check MRCP, CBC, CMP.    Orders were faxed to [facility]    Dr. Merino will order CT chest if indicated.

## 2017-06-02 NOTE — TELEPHONE ENCOUNTER
Dr. Merino and I are concerned about her lung findings on this CT abd/pelvis.  Dr Ivy needs clearance to do her hernia repair.          IMPRESSION:  Impression:      1. Mild intrahepatic and extrahepatic biliary dilatation is similar to  the prior study. Question of a choledocholithiasis. Consider an  MRCP/ERCP to further evaluate.  2. Interval cholecystectomy.  3. Complex supraumbilical anterior abdominal wall hernias are unchanged  from the prior study. No complicating features.  4. Mucous plugging in the left lower lobe bronchus with some tree-in-bud  nodularity in the left lower lobe. This has appearance most typical for  a postobstructive pneumonia.      This report was finalized on 6/2/2017 9:44 AM by Dr. Geovani Khalil MD.        This is what CT chest was in April, 2017     IMPRESSION:  1. Resolution of left lower lobe airspace disease since 03/03/2017.  Scattered mild and somewhat dependent atelectatic changes are present  within both lungs without consolidation.  2. Thoracolumbar scoliosis.  3. T-tube drainage catheter in the CBD.  4. Stable cardiomegaly.      This report was finalized on 4/21/2017 3:13 PM by Dr. Aliza Collins MD.          We are worried.  ??if you want to see her or do you want another CT chest non-contrast??  She is one of my [place] residents    Thanks,  Dionne LANE

## 2017-06-07 ENCOUNTER — LAB REQUISITION (OUTPATIENT)
Dept: LAB | Facility: HOSPITAL | Age: 67
End: 2017-06-07

## 2017-06-07 DIAGNOSIS — Z79.899 OTHER LONG TERM (CURRENT) DRUG THERAPY: ICD-10-CM

## 2017-06-07 DIAGNOSIS — D72.819 DECREASED WHITE BLOOD CELL COUNT: ICD-10-CM

## 2017-06-07 DIAGNOSIS — R79.89 OTHER SPECIFIED ABNORMAL FINDINGS OF BLOOD CHEMISTRY: ICD-10-CM

## 2017-06-07 LAB
ALBUMIN SERPL-MCNC: 3.4 G/DL (ref 3.5–5.2)
ALBUMIN/GLOB SERPL: 1.1 G/DL
ALP SERPL-CCNC: 81 U/L (ref 40–129)
ALT SERPL W P-5'-P-CCNC: 12 U/L (ref 5–33)
ANION GAP SERPL CALCULATED.3IONS-SCNC: 10.7 MMOL/L
AST SERPL-CCNC: 17 U/L (ref 5–32)
BASOPHILS # BLD AUTO: 0.11 10*3/MM3 (ref 0–0.2)
BASOPHILS NFR BLD AUTO: 1 % (ref 0–2)
BILIRUB SERPL-MCNC: 0.2 MG/DL (ref 0.2–1.2)
BUN BLD-MCNC: 22 MG/DL (ref 8–23)
BUN/CREAT SERPL: 40 (ref 7–25)
CALCIUM SPEC-SCNC: 9.5 MG/DL (ref 8.8–10.5)
CHLORIDE SERPL-SCNC: 101 MMOL/L (ref 98–107)
CO2 SERPL-SCNC: 28.3 MMOL/L (ref 22–29)
CREAT BLD-MCNC: 0.55 MG/DL (ref 0.57–1)
DEPRECATED RDW RBC AUTO: 47.8 FL (ref 37–54)
EOSINOPHIL # BLD AUTO: 0.37 10*3/MM3 (ref 0.1–0.3)
EOSINOPHIL NFR BLD AUTO: 3.4 % (ref 0–4)
ERYTHROCYTE [DISTWIDTH] IN BLOOD BY AUTOMATED COUNT: 16 % (ref 11.5–14.5)
GFR SERPL CREATININE-BSD FRML MDRD: 111 ML/MIN/1.73
GLOBULIN UR ELPH-MCNC: 3 GM/DL
GLUCOSE BLD-MCNC: 77 MG/DL (ref 65–99)
HCT VFR BLD AUTO: 37.8 % (ref 37–47)
HGB BLD-MCNC: 12.5 G/DL (ref 12–16)
IMM GRANULOCYTES # BLD: 0.04 10*3/MM3 (ref 0–0.03)
IMM GRANULOCYTES NFR BLD: 0.4 % (ref 0–0.5)
LYMPHOCYTES # BLD AUTO: 3.47 10*3/MM3 (ref 0.6–4.8)
LYMPHOCYTES NFR BLD AUTO: 31.5 % (ref 20–45)
MCH RBC QN AUTO: 27.2 PG (ref 27–31)
MCHC RBC AUTO-ENTMCNC: 33.1 G/DL (ref 31–37)
MCV RBC AUTO: 82.2 FL (ref 81–99)
MONOCYTES # BLD AUTO: 1.05 10*3/MM3 (ref 0–1)
MONOCYTES NFR BLD AUTO: 9.5 % (ref 3–8)
NEUTROPHILS # BLD AUTO: 5.97 10*3/MM3 (ref 1.5–8.3)
NEUTROPHILS NFR BLD AUTO: 54.2 % (ref 45–70)
NRBC BLD MANUAL-RTO: 0 /100 WBC (ref 0–0)
PLATELET # BLD AUTO: 255 10*3/MM3 (ref 140–500)
PMV BLD AUTO: 10.9 FL (ref 7.4–10.4)
POTASSIUM BLD-SCNC: 4.5 MMOL/L (ref 3.5–5.2)
PROT SERPL-MCNC: 6.4 G/DL (ref 6–8.5)
RBC # BLD AUTO: 4.6 10*6/MM3 (ref 4.2–5.4)
SODIUM BLD-SCNC: 140 MMOL/L (ref 136–145)
WBC NRBC COR # BLD: 11.01 10*3/MM3 (ref 4.8–10.8)

## 2017-06-07 PROCEDURE — 36415 COLL VENOUS BLD VENIPUNCTURE: CPT | Performed by: FAMILY MEDICINE

## 2017-06-07 PROCEDURE — 80053 COMPREHEN METABOLIC PANEL: CPT | Performed by: FAMILY MEDICINE

## 2017-06-07 PROCEDURE — 85025 COMPLETE CBC W/AUTO DIFF WBC: CPT | Performed by: FAMILY MEDICINE

## 2017-06-14 ENCOUNTER — TELEPHONE (OUTPATIENT)
Dept: SURGERY | Facility: CLINIC | Age: 67
End: 2017-06-14

## 2017-06-21 ENCOUNTER — HOSPITAL ENCOUNTER (OUTPATIENT)
Dept: MRI IMAGING | Facility: HOSPITAL | Age: 67
Discharge: HOME | End: 2017-06-21
Attending: SURGERY | Admitting: SURGERY

## 2017-06-21 PROCEDURE — 74183 MRI ABD W/O CNTR FLWD CNTR: CPT

## 2017-06-21 PROCEDURE — A9577 INJ MULTIHANCE: HCPCS | Performed by: SURGERY

## 2017-06-21 PROCEDURE — 0 GADOBENATE DIMEGLUMINE 529 MG/ML SOLUTION: Performed by: SURGERY

## 2017-06-21 RX ADMIN — GADOBENATE DIMEGLUMINE 13 ML: 529 INJECTION, SOLUTION INTRAVENOUS at 08:40

## 2017-06-23 ENCOUNTER — OFFICE VISIT (OUTPATIENT)
Dept: SURGERY | Facility: CLINIC | Age: 67
End: 2017-06-23

## 2017-06-23 VITALS
BODY MASS INDEX: 26.58 KG/M2 | WEIGHT: 150 LBS | SYSTOLIC BLOOD PRESSURE: 132 MMHG | HEIGHT: 63 IN | DIASTOLIC BLOOD PRESSURE: 82 MMHG

## 2017-06-23 DIAGNOSIS — K43.2 RECURRENT INCISIONAL HERNIA: Primary | ICD-10-CM

## 2017-06-23 PROCEDURE — 99213 OFFICE O/P EST LOW 20 MIN: CPT | Performed by: SURGERY

## 2017-06-23 NOTE — PROGRESS NOTES
"    PATIENT INFORMATION  Caitlyn Camacho   - 1950    CHIEF COMPLAINT  Chief Complaint   Patient presents with   • Follow-up   F/U, MRI/CT DONE, PT IS W/O COMPLAINTS    HISTORY OF PRESENT ILLNESS  HPI  Patient was as the office today for follow-up.  She is accompanied by the [facility] [employer] staff.  She did undergo a follow-up MRCP and labs.  MRCP was reviewed and discussed with her hospital radiologist  there is no evidence of choledocholithiasis.  Her LFTs were normal and nonobstructive pattern.  According to the [facility] [employer] staff patient continues to \"pick on\" the recurrent  incisional hernia scar.  There is a smaller circumflex that is healing well.  There is no overlying erythema or drainage.  They are keeping it protected with adhesive dressing.  Hernia still reducible.  There are no reports of nausea vomiting change in bowel movements or any other symptoms referral blood 2 hernia incarceration or strangulation.  I have reviewed her MRCP as well as her previous CT scan.  And I have also discussed her case with Dr. Roshan Dsouza.  She is status post open cholecystectomy, common bile duct exploration and T-tube placement after extraction off for CBD stones by Dr. 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]   • Stricture of duodenum [K31.5]   • Duodenal ulcer [K26.9]   • Hypothyroidism [E03.9]   • Arthritis [M19.90]   • Dementia [F03.90]   • Depression [F32.9]   • 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, " H32]   • Hyperlipidemia [E78.5]   • Mental retardation [F79]   • Osteopenia [M85.80]   • Vitamin D deficiency [E55.9]         PAST MEDICAL HISTORY  Past Medical History:   Diagnosis Date   • Anxiety    • Dysphagia    • Hypercholesteremia    • Hypothyroidism    • Major depression    • OCD (obsessive compulsive disorder)    • Osteopenia    • Seizure disorder          SURGICAL HISTORY  Past Surgical History:   Procedure Laterality Date   • CATARACT EXTRACTION     • HYSTERECTOMY           FAMILY HISTORY  Family History   Problem Relation Age of Onset   • Family history unknown: Yes         SOCIAL HISTORY  Social History     Occupational History   • Not on file.     Social History Main Topics   • Smoking status: Never Smoker   • Smokeless tobacco: Not on file   • Alcohol use No   • Drug use: Not on file   • Sexual activity: Not on file         CURRENT MEDICATIONS    Current Outpatient Prescriptions:   •  acetaminophen (TYLENOL) 500 MG tablet, Take 500 mg by mouth Every 6 (Six) Hours As Needed for mild pain (1-3)., Disp: , Rfl:   •  Albuterol (VENTOLIN IN), Inhale., Disp: , Rfl:   •  atorvastatin (LIPITOR) 20 MG tablet, Take 20 mg by mouth Daily., Disp: , Rfl:   •  brimonidine-timolol (COMBIGAN) 0.2-0.5 % ophthalmic solution, Every 12 (Twelve) Hours., Disp: , Rfl:   •  busPIRone (BUSPAR) 10 MG tablet, Take 10 mg by mouth 3 (Three) Times a Day., Disp: , Rfl:   •  calcium carbonate (OS-KATY) 600 MG tablet, Take 600 mg by mouth Daily., Disp: , Rfl:   •  carvedilol (COREG) 6.25 MG tablet, Take 6.25 mg by mouth 2 (Two) Times a Day With Meals., Disp: , Rfl:   •  cetirizine (zyrTEC) 10 MG tablet, Take 10 mg by mouth Daily., Disp: , Rfl:   •  denosumab (PROLIA) 60 MG/ML solution syringe, Inject  under the skin 1 (One) Time., Disp: , Rfl:   •  Dextromethorphan-Menthol (DELSYM COUGH RELIEF MT), Apply  to the mouth or throat As Needed., Disp: , Rfl:   •  Ergocalciferol (VITAMIN D2 PO), Take  by mouth., Disp: , Rfl:   •   "fluticasone-salmeterol (ADVAIR) 500-50 MCG/DOSE DISKUS, Inhale 2 (Two) Times a Day., Disp: , Rfl:   •  guaiFENesin (MUCINEX) 600 MG 12 hr tablet, Take 1,200 mg by mouth 2 (Two) Times a Day., Disp: , Rfl:   •  levETIRAcetam (KEPPRA) 500 MG tablet, Take 500 mg by mouth 2 (Two) Times a Day., Disp: , Rfl:   •  levothyroxine (SYNTHROID, LEVOTHROID) 50 MCG tablet, Take 50 mcg by mouth Daily., Disp: , Rfl:   •  Multiple Vitamins-Minerals (MULTIVITAMIN ADULT PO), Take  by mouth., Disp: , Rfl:   •  pantoprazole (PROTONIX) 40 MG EC tablet, Take 40 mg by mouth Daily., Disp: , Rfl:   •  PARoxetine (PAXIL) 40 MG tablet, Take 40 mg by mouth Every Morning., Disp: , Rfl:   •  phenytoin (DILANTIN) 100 MG ER capsule, Take  by mouth 2 (Two) Times a Day., Disp: , Rfl:   •  Probiotic Product (PROBIOTIC ADVANCED PO), Take  by mouth., Disp: , Rfl:   •  QUEtiapine (SEROquel) 100 MG tablet, Take 100 mg by mouth Every Night., Disp: , Rfl:   •  solifenacin (VESICARE) 10 MG tablet, Take 10 mg by mouth Daily., Disp: , Rfl:     ALLERGIES  Codeine    VITALS  Vitals:    06/23/17 1549   BP: 132/82   Weight: 150 lb (68 kg)   Height: 63\" (160 cm)       LAST RESULTS   Lab Requisition on 06/07/2017   Component Date Value Ref Range Status   • Glucose 06/07/2017 77  65 - 99 mg/dL Final   • BUN 06/07/2017 22  8 - 23 mg/dL Final   • Creatinine 06/07/2017 0.55* 0.57 - 1.00 mg/dL Final   • Sodium 06/07/2017 140  136 - 145 mmol/L Final   • Potassium 06/07/2017 4.5  3.5 - 5.2 mmol/L Final   • Chloride 06/07/2017 101  98 - 107 mmol/L Final   • CO2 06/07/2017 28.3  22.0 - 29.0 mmol/L Final   • Calcium 06/07/2017 9.5  8.8 - 10.5 mg/dL Final   • Total Protein 06/07/2017 6.4  6.0 - 8.5 g/dL Final   • Albumin 06/07/2017 3.40* 3.50 - 5.20 g/dL Final   • ALT (SGPT) 06/07/2017 12  5 - 33 U/L Final   • AST (SGOT) 06/07/2017 17  5 - 32 U/L Final   • Alkaline Phosphatase 06/07/2017 81  40 - 129 U/L Final   • Total Bilirubin 06/07/2017 0.2  0.2 - 1.2 mg/dL Final   • eGFR " Non  Amer 06/07/2017 111  >60 mL/min/1.73 Final   • Globulin 06/07/2017 3.0  gm/dL Final   • A/G Ratio 06/07/2017 1.1  g/dL Final   • BUN/Creatinine Ratio 06/07/2017 40.0* 7.0 - 25.0 Final   • Anion Gap 06/07/2017 10.7  mmol/L Final   • WBC 06/07/2017 11.01* 4.80 - 10.80 10*3/mm3 Final   • RBC 06/07/2017 4.60  4.20 - 5.40 10*6/mm3 Final   • Hemoglobin 06/07/2017 12.5  12.0 - 16.0 g/dL Final   • Hematocrit 06/07/2017 37.8  37.0 - 47.0 % Final   • MCV 06/07/2017 82.2  81.0 - 99.0 fL Final   • MCH 06/07/2017 27.2  27.0 - 31.0 pg Final   • MCHC 06/07/2017 33.1  31.0 - 37.0 g/dL Final   • RDW 06/07/2017 16.0* 11.5 - 14.5 % Final   • RDW-SD 06/07/2017 47.8  37.0 - 54.0 fl Final   • MPV 06/07/2017 10.9* 7.4 - 10.4 fL Final   • Platelets 06/07/2017 255  140 - 500 10*3/mm3 Final   • Neutrophil % 06/07/2017 54.2  45.0 - 70.0 % Final   • Lymphocyte % 06/07/2017 31.5  20.0 - 45.0 % Final   • Monocyte % 06/07/2017 9.5* 3.0 - 8.0 % Final   • Eosinophil % 06/07/2017 3.4  0.0 - 4.0 % Final   • Basophil % 06/07/2017 1.0  0.0 - 2.0 % Final   • Immature Grans % 06/07/2017 0.4  0.0 - 0.5 % Final   • Neutrophils, Absolute 06/07/2017 5.97  1.50 - 8.30 10*3/mm3 Final   • Lymphocytes, Absolute 06/07/2017 3.47  0.60 - 4.80 10*3/mm3 Final   • Monocytes, Absolute 06/07/2017 1.05* 0.00 - 1.00 10*3/mm3 Final   • Eosinophils, Absolute 06/07/2017 0.37* 0.10 - 0.30 10*3/mm3 Final   • Basophils, Absolute 06/07/2017 0.11  0.00 - 0.20 10*3/mm3 Final   • Immature Grans, Absolute 06/07/2017 0.04* 0.00 - 0.03 10*3/mm3 Final   • nRBC 06/07/2017 0.0  0.0 - 0.0 /100 WBC Final     Ct Abdomen Pelvis W Contrast    Result Date: 6/2/2017  Narrative: INDICATION: Anterior abdominal wall pain and palpable lump. Open cholecystectomy with T-tube placement on 03/17/2017..  TECHNIQUE: CT of the abdomen and pelvis with p.o. and IV contrast. Coronal and sagittal reconstructions were obtained.  Radiation dose reduction techniques were utilized, including automated  exposure control and exposure modulation based on body size.  COMPARISON: CT chest 04/21/2017 and CT abdomen 03/03/2017..  FINDINGS: Abdomen: There is mucus plugging within the left lower lobe bronchus. Minimal tree-in-bud nodularity in the left lower lobe may represent a postobstructive pneumonia. Please correlate with clinical symptoms.  Several anterior abdominal wall hernias are unchanged from the prior study. These midline hernias are supraumbilical hernias. These contains omental fat and a few loops of small bowel. No complicating features identified.  The liver enhances normally. There is mild intrahepatic biliary dilatation. The extrahepatic bile duct is at the upper limits of normal measuring 7-8 mm in diameter. There is a questionable filling defect within the common bile duct. Patient does have a history of choledocholithiasis.  There is a small cystic lesion the uncinate processes the pancreas measuring 0.8 cm. This is likely a benign pseudocyst, however can be further evaluated. There is a small low-attenuation nodule in the right adrenal gland this had an appearance of a benign adenoma on the noncontrasted chest CT.  The gallbladder is surgically absent.  The stomach is distended. The bowel is not dilated.  The appendix is normal. There is occasional colonic diverticula.  Pelvis: No pelvic mass or free pelvic fluid.  No enlarged pelvic or inguinal lymph nodes..  No acute osseous abnormalities. There is S-shaped scoliosis of the thoracolumbar spine. There is interval right upper quadrant incision, however no hernia in the incision site.      Impression: Impression:  1. Mild intrahepatic and extrahepatic biliary dilatation is similar to the prior study. Question of a choledocholithiasis. Consider an MRCP/ERCP to further evaluate. 2. Interval cholecystectomy. 3. Complex supraumbilical anterior abdominal wall hernias are unchanged from the prior study. No complicating features. 4. Mucous plugging in the  left lower lobe bronchus with some tree-in-bud nodularity in the left lower lobe. This has appearance most typical for a postobstructive pneumonia.  This report was finalized on 6/2/2017 9:44 AM by Dr. Geovani Khalil MD.      Mri Abdomen W Wo Contrast Mrcp    Result Date: 6/21/2017  Narrative: MRI ABDOMEN WITH CONTRAST, INCLUDING MRCP, 06/21/2017:  HISTORY: 67-year-old female [facility] resident status post open cholecystectomy, common bile duct exploration and removal of common bile duct stones with T-tube placement at an outside facility March 2017. T-tube removed May 2017. Small calcification noted near the hepatic duct confluence on CT examination here this month.  TECHNIQUE: MRI examination of the abdomen was performed before and after gadolinium contrast administration (13 cc MultiHance), including multiphase postcontrast images. MRCP sequences were also obtained. The patient was unable to suspend respiration, and some of the sequences are degraded by respiratory motion artifact.  COMPARISON: *  CT abdomen, 06/02/2017. *  CT abdomen/pelvis, preoperative, 03/03/2017.  FINDINGS: The gallbladder is surgically absent. There is mild intrahepatic and extrahepatic bile duct dilatation to the level of the ampulla, and the pancreatic duct is diffusely prominent, though not significantly dilated. Common hepatic duct and mid common bile duct each measure about 9 mm. No filling defect is present within the bile ducts to suggest bile duct stone. The small calcification noted on previous CT appears to lie outside the bile ducts.  No fluid collection or additional abnormality in the gallbladder fossa. Liver and spleen are normal in size and appearance.  Three tiny benign-appearing pancreatic cysts are present. The largest in the midportion of pancreatic head measures about 9 mm, and another in the upper pancreatic head measures two or 3 mm. Distal pancreatic tail cyst measures 6 mm.      Impression: 1. Postop changes  cholecystectomy. 2. Mild intrahepatic and extra hepatic bile duct dilatation to the level of the ampulla. No bile duct stone is demonstrated. 3. Three tiny benign pancreatic cysts.  This report was finalized on 6/21/2017 11:31 AM by Dr. Bean Garcia MD.        PHYSICAL EXAM  Physical Exam   Constitutional: She appears well-developed and well-nourished.   Cardiovascular: Normal rate, regular rhythm and normal heart sounds.    Pulmonary/Chest: Breath sounds normal.   Abdominal:   Soft, moderately obese, large reducible recurrent incisional hernia.  Small all ulcer with no purulent drainage no erythema no abscess.  Protective dressing in place.  Well-healed right subcostal incision.   Musculoskeletal: She exhibits no edema.   Neurological: She is alert.   Skin: Skin is warm and dry.   Nursing note and vitals reviewed.      ASSESSMENT  Recurrent  incisional hernia - large but reducible    It appears that the hernia is definitely larger.    Skin ulcers ×1. Local wound care was discussed with the nursing staff at [facility] and recommendations provided.    It is critical that those open ulcers on the scar be healed prior to any hernia repair as her hernia is large enough that she will require a prosthetic mesh/implant.  I did call [facility] nursing staff and gave him wound care instructions.  I have also advised him to keep the area covered and protected so she does not traumatize it.    She will need pulmonary and cardiac evaluation and clearance prior to surgery.  This was also discussed with the [facility] nursing staff.  They will arrange this through their facility.      PLAN  Follow-up after clearance is obtained and once cleared will  proceed with open recurrent incisional hernia repair.   [facility] staff was advised to call the office sooner should she have worsening symptoms or go to the nearest emergency room.

## 2017-07-14 ENCOUNTER — TELEPHONE (OUTPATIENT)
Dept: SURGERY | Facility: CLINIC | Age: 67
End: 2017-07-14

## 2017-07-14 NOTE — TELEPHONE ENCOUNTER
Received cardiac and pulmonary clearance.  Would like to schedule on 8/15/17.  Will need info and orders faxed to [phone].

## 2017-07-19 ENCOUNTER — TRANSCRIBE ORDERS (OUTPATIENT)
Dept: ADMINISTRATIVE | Facility: HOSPITAL | Age: 67
End: 2017-07-19

## 2017-07-19 ENCOUNTER — TELEPHONE (OUTPATIENT)
Dept: SURGERY | Facility: CLINIC | Age: 67
End: 2017-07-19

## 2017-07-19 ENCOUNTER — PREP FOR SURGERY (OUTPATIENT)
Dept: OTHER | Facility: HOSPITAL | Age: 67
End: 2017-07-19

## 2017-07-19 DIAGNOSIS — R79.1 ABNORMAL COAGULATION PROFILE: ICD-10-CM

## 2017-07-19 DIAGNOSIS — K43.2 RECURRENT VENTRAL INCISIONAL HERNIA: Primary | ICD-10-CM

## 2017-07-19 DIAGNOSIS — Z13.9 SCREENING: Primary | ICD-10-CM

## 2017-07-19 NOTE — TELEPHONE ENCOUNTER
Spoke with patient's MPOA Graciela Lindquist.  I have discussed with her the surgical options as far as repair of recurrent incisional hernia versus nonsurgical options. Pros and cons/ risks and benefits have been discussed with her in detail. I have discussed with her the procedure, risks, benefits, complications including but not limited to risk of bleeding, hematoma formation, seroma formation, risk of mesh infection or migration requiring additional procedures, bowel obstruction, intra-abdominal abscess as well as cardiopulmonary complications.I had a detailed discussion with her and all questions answered.  Ms. Lindquist understands and has given verbal informed consent.  I did discuss with her that we will admit Caitlyn postoperatively for postoperative care.

## 2017-07-19 NOTE — H&P
"Office Visit     2017  [facility] GENERAL SURGERY       Progress Notes           PATIENT INFORMATION  Caitlyn Camacho   - 1950     CHIEF COMPLAINT      Chief Complaint   Patient presents with   • Follow-up   F/U, MRI/CT DONE, PT IS W/O COMPLAINTS     HISTORY OF PRESENT ILLNESS  HPI  Patient was as the office today for follow-up.  She is accompanied by the [place] staff.  She did undergo a follow-up MRCP and labs.  MRCP was reviewed and discussed with her hospital radiologist  there is no evidence of choledocholithiasis.  Her LFTs were normal and nonobstructive pattern.  According to the [place] staff patient continues to \"pick on\" the recurrent  incisional hernia scar.  There is a smaller circumflex that is healing well.  There is no overlying erythema or drainage.  They are keeping it protected with adhesive dressing.  Hernia still reducible.  There are no reports of nausea vomiting change in bowel movements or any other symptoms referral blood 2 hernia incarceration or strangulation.  I have reviewed her MRCP as well as her previous CT scan.  And I have also discussed her case with Dr. Roshan Dsouza.  She is status post open cholecystectomy, common bile duct exploration and T-tube placement after extraction off for CBD stones by Dr. 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]   • Stricture of duodenum [K31.5]   • Duodenal ulcer [K26.9]   • Hypothyroidism " [E03.9]   • Arthritis [M19.90]   • Dementia [F03.90]   • Depression [F32.9]   • 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, 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]   • Hyperlipidemia [E78.5]   • Mental retardation [F79]   • Osteopenia [M85.80]   • Vitamin D deficiency [E55.9]            PAST MEDICAL HISTORY   Medical History         Past Medical History:   Diagnosis Date   • Anxiety     • Dysphagia     • Hypercholesteremia     • Hypothyroidism     • Major depression     • OCD (obsessive compulsive disorder)     • Osteopenia     • Seizure disorder                 SURGICAL HISTORY   Surgical History          Past Surgical History:   Procedure Laterality Date   • CATARACT EXTRACTION       • HYSTERECTOMY                   FAMILY HISTORY        Family History   Problem Relation Age of Onset   • Family history unknown: Yes            SOCIAL HISTORY  Social History          Occupational History   • Not on file.           Social History Main Topics   • Smoking status: Never Smoker   • Smokeless tobacco: Not on file   • Alcohol use No   • Drug use: Not on file   • Sexual activity: Not on file            CURRENT MEDICATIONS     Current Outpatient Prescriptions:   •  acetaminophen (TYLENOL) 500 MG tablet, Take 500 mg by mouth Every 6 (Six) Hours As Needed for mild pain (1-3)., Disp: , Rfl:   •  Albuterol (VENTOLIN IN), Inhale., Disp: , Rfl:   •  atorvastatin (LIPITOR) 20 MG tablet, Take 20 mg by mouth Daily., Disp: , Rfl:   •  brimonidine-timolol (COMBIGAN) 0.2-0.5 % ophthalmic solution, Every 12 (Twelve) Hours., Disp: , Rfl:   •  busPIRone (BUSPAR) 10 MG tablet, Take 10 mg by mouth 3 (Three) Times a Day., Disp: , Rfl:   •  calcium carbonate (OS-KATY) 600 MG tablet, Take 600 mg by mouth Daily., Disp: , Rfl:   •  carvedilol (COREG) 6.25 MG tablet, Take 6.25 mg by mouth 2 (Two) Times a Day With Meals., Disp: , Rfl:   •  cetirizine (zyrTEC) 10 MG tablet, Take 10 mg by mouth Daily., Disp: , Rfl:   •  denosumab (PROLIA) 60 MG/ML solution syringe, Inject  under the skin 1  "(One) Time., Disp: , Rfl:   •  Dextromethorphan-Menthol (DELSYM COUGH RELIEF MT), Apply  to the mouth or throat As Needed., Disp: , Rfl:   •  Ergocalciferol (VITAMIN D2 PO), Take  by mouth., Disp: , Rfl:   •  fluticasone-salmeterol (ADVAIR) 500-50 MCG/DOSE DISKUS, Inhale 2 (Two) Times a Day., Disp: , Rfl:   •  guaiFENesin (MUCINEX) 600 MG 12 hr tablet, Take 1,200 mg by mouth 2 (Two) Times a Day., Disp: , Rfl:   •  levETIRAcetam (KEPPRA) 500 MG tablet, Take 500 mg by mouth 2 (Two) Times a Day., Disp: , Rfl:   •  levothyroxine (SYNTHROID, LEVOTHROID) 50 MCG tablet, Take 50 mcg by mouth Daily., Disp: , Rfl:   •  Multiple Vitamins-Minerals (MULTIVITAMIN ADULT PO), Take  by mouth., Disp: , Rfl:   •  pantoprazole (PROTONIX) 40 MG EC tablet, Take 40 mg by mouth Daily., Disp: , Rfl:   •  PARoxetine (PAXIL) 40 MG tablet, Take 40 mg by mouth Every Morning., Disp: , Rfl:   •  phenytoin (DILANTIN) 100 MG ER capsule, Take  by mouth 2 (Two) Times a Day., Disp: , Rfl:   •  Probiotic Product (PROBIOTIC ADVANCED PO), Take  by mouth., Disp: , Rfl:   •  QUEtiapine (SEROquel) 100 MG tablet, Take 100 mg by mouth Every Night., Disp: , Rfl:   •  solifenacin (VESICARE) 10 MG tablet, Take 10 mg by mouth Daily., Disp: , Rfl:      ALLERGIES  Codeine     VITALS   Vitals        Vitals:     06/23/17 1549   BP: 132/82   Weight: 150 lb (68 kg)   Height: 63\" (160 cm)            LAST RESULTS                             Lab Requisition on 06/07/2017   Component Date Value Ref Range Status   • Glucose 06/07/2017 77  65 - 99 mg/dL Final   • BUN 06/07/2017 22  8 - 23 mg/dL Final   • Creatinine 06/07/2017 0.55* 0.57 - 1.00 mg/dL Final   • Sodium 06/07/2017 140  136 - 145 mmol/L Final   • Potassium 06/07/2017 4.5  3.5 - 5.2 mmol/L Final   • Chloride 06/07/2017 101  98 - 107 mmol/L Final   • CO2 06/07/2017 28.3  22.0 - 29.0 mmol/L Final   • Calcium 06/07/2017 9.5  8.8 - 10.5 mg/dL Final   • Total Protein 06/07/2017 6.4  6.0 - 8.5 g/dL Final   • Albumin " 06/07/2017 3.40* 3.50 - 5.20 g/dL Final   • ALT (SGPT) 06/07/2017 12  5 - 33 U/L Final   • AST (SGOT) 06/07/2017 17  5 - 32 U/L Final   • Alkaline Phosphatase 06/07/2017 81  40 - 129 U/L Final   • Total Bilirubin 06/07/2017 0.2  0.2 - 1.2 mg/dL Final   • eGFR Non African Amer 06/07/2017 111  >60 mL/min/1.73 Final   • Globulin 06/07/2017 3.0  gm/dL Final   • A/G Ratio 06/07/2017 1.1  g/dL Final   • BUN/Creatinine Ratio 06/07/2017 40.0* 7.0 - 25.0 Final   • Anion Gap 06/07/2017 10.7  mmol/L Final   • WBC 06/07/2017 11.01* 4.80 - 10.80 10*3/mm3 Final   • RBC 06/07/2017 4.60  4.20 - 5.40 10*6/mm3 Final   • Hemoglobin 06/07/2017 12.5  12.0 - 16.0 g/dL Final   • Hematocrit 06/07/2017 37.8  37.0 - 47.0 % Final   • MCV 06/07/2017 82.2  81.0 - 99.0 fL Final   • MCH 06/07/2017 27.2  27.0 - 31.0 pg Final   • MCHC 06/07/2017 33.1  31.0 - 37.0 g/dL Final   • RDW 06/07/2017 16.0* 11.5 - 14.5 % Final   • RDW-SD 06/07/2017 47.8  37.0 - 54.0 fl Final   • MPV 06/07/2017 10.9* 7.4 - 10.4 fL Final   • Platelets 06/07/2017 255  140 - 500 10*3/mm3 Final   • Neutrophil % 06/07/2017 54.2  45.0 - 70.0 % Final   • Lymphocyte % 06/07/2017 31.5  20.0 - 45.0 % Final   • Monocyte % 06/07/2017 9.5* 3.0 - 8.0 % Final   • Eosinophil % 06/07/2017 3.4  0.0 - 4.0 % Final   • Basophil % 06/07/2017 1.0  0.0 - 2.0 % Final   • Immature Grans % 06/07/2017 0.4  0.0 - 0.5 % Final   • Neutrophils, Absolute 06/07/2017 5.97  1.50 - 8.30 10*3/mm3 Final   • Lymphocytes, Absolute 06/07/2017 3.47  0.60 - 4.80 10*3/mm3 Final   • Monocytes, Absolute 06/07/2017 1.05* 0.00 - 1.00 10*3/mm3 Final   • Eosinophils, Absolute 06/07/2017 0.37* 0.10 - 0.30 10*3/mm3 Final   • Basophils, Absolute 06/07/2017 0.11  0.00 - 0.20 10*3/mm3 Final   • Immature Grans, Absolute 06/07/2017 0.04* 0.00 - 0.03 10*3/mm3 Final   • nRBC 06/07/2017 0.0  0.0 - 0.0 /100 WBC Final      Ct Abdomen Pelvis W Contrast     Result Date: 6/2/2017  Narrative: INDICATION: Anterior abdominal wall pain and  palpable lump. Open cholecystectomy with T-tube placement on 03/17/2017..  TECHNIQUE: CT of the abdomen and pelvis with p.o. and IV contrast. Coronal and sagittal reconstructions were obtained.  Radiation dose reduction techniques were utilized, including automated exposure control and exposure modulation based on body size.  COMPARISON: CT chest 04/21/2017 and CT abdomen 03/03/2017..  FINDINGS: Abdomen: There is mucus plugging within the left lower lobe bronchus. Minimal tree-in-bud nodularity in the left lower lobe may represent a postobstructive pneumonia. Please correlate with clinical symptoms.  Several anterior abdominal wall hernias are unchanged from the prior study. These midline hernias are supraumbilical hernias. These contains omental fat and a few loops of small bowel. No complicating features identified.  The liver enhances normally. There is mild intrahepatic biliary dilatation. The extrahepatic bile duct is at the upper limits of normal measuring 7-8 mm in diameter. There is a questionable filling defect within the common bile duct. Patient does have a history of choledocholithiasis.  There is a small cystic lesion the uncinate processes the pancreas measuring 0.8 cm. This is likely a benign pseudocyst, however can be further evaluated. There is a small low-attenuation nodule in the right adrenal gland this had an appearance of a benign adenoma on the noncontrasted chest CT.  The gallbladder is surgically absent.  The stomach is distended. The bowel is not dilated.  The appendix is normal. There is occasional colonic diverticula.  Pelvis: No pelvic mass or free pelvic fluid.  No enlarged pelvic or inguinal lymph nodes..  No acute osseous abnormalities. There is S-shaped scoliosis of the thoracolumbar spine. There is interval right upper quadrant incision, however no hernia in the incision site.       Impression: Impression:  1. Mild intrahepatic and extrahepatic biliary dilatation is similar to the  prior study. Question of a choledocholithiasis. Consider an MRCP/ERCP to further evaluate. 2. Interval cholecystectomy. 3. Complex supraumbilical anterior abdominal wall hernias are unchanged from the prior study. No complicating features. 4. Mucous plugging in the left lower lobe bronchus with some tree-in-bud nodularity in the left lower lobe. This has appearance most typical for a postobstructive pneumonia.  This report was finalized on 6/2/2017 9:44 AM by Dr. Geovani Khalil MD.       Mri Abdomen W Wo Contrast Mrcp     Result Date: 6/21/2017  Narrative: MRI ABDOMEN WITH CONTRAST, INCLUDING MRCP, 06/21/2017:  HISTORY: 67-year-old female [facility] resident status post open cholecystectomy, common bile duct exploration and removal of common bile duct stones with T-tube placement at an outside facility March 2017. T-tube removed May 2017. Small calcification noted near the hepatic duct confluence on CT examination here this month.  TECHNIQUE: MRI examination of the abdomen was performed before and after gadolinium contrast administration (13 cc MultiHance), including multiphase postcontrast images. MRCP sequences were also obtained. The patient was unable to suspend respiration, and some of the sequences are degraded by respiratory motion artifact.  COMPARISON: *  CT abdomen, 06/02/2017. *  CT abdomen/pelvis, preoperative, 03/03/2017.  FINDINGS: The gallbladder is surgically absent. There is mild intrahepatic and extrahepatic bile duct dilatation to the level of the ampulla, and the pancreatic duct is diffusely prominent, though not significantly dilated. Common hepatic duct and mid common bile duct each measure about 9 mm. No filling defect is present within the bile ducts to suggest bile duct stone. The small calcification noted on previous CT appears to lie outside the bile ducts.  No fluid collection or additional abnormality in the gallbladder fossa. Liver and spleen are normal in size and appearance.   Three tiny benign-appearing pancreatic cysts are present. The largest in the midportion of pancreatic head measures about 9 mm, and another in the upper pancreatic head measures two or 3 mm. Distal pancreatic tail cyst measures 6 mm.       Impression: 1. Postop changes cholecystectomy. 2. Mild intrahepatic and extra hepatic bile duct dilatation to the level of the ampulla. No bile duct stone is demonstrated. 3. Three tiny benign pancreatic cysts.  This report was finalized on 6/21/2017 11:31 AM by Dr. Bean Garcia MD.          PHYSICAL EXAM  Physical Exam   Constitutional: She appears well-developed and well-nourished.   Cardiovascular: Normal rate, regular rhythm and normal heart sounds.    Pulmonary/Chest: Breath sounds normal.   Abdominal:   Soft, moderately obese, large reducible recurrent incisional hernia.  Small all ulcer with no purulent drainage no erythema no abscess.  Protective dressing in place.  Well-healed right subcostal incision.   Musculoskeletal: She exhibits no edema.   Neurological: She is alert.   Skin: Skin is warm and dry.   Nursing note and vitals reviewed.        ASSESSMENT  Recurrent  incisional hernia - large but reducible     It appears that the hernia is definitely larger.     Skin ulcers ×1. Local wound care was discussed with the nursing staff at [facility] and recommendations provided.     It is critical that those open ulcers on the scar be healed prior to any hernia repair as her hernia is large enough that she will require a prosthetic mesh/implant.  I did call [facility] nursing staff and gave him wound care instructions.  I have also advised him to keep the area covered and protected so she does not traumatize it.     She will need pulmonary and cardiac evaluation and clearance prior to surgery.  This was also discussed with the [facility] nursing staff.  They will arrange this through their facility.        PLAN  Follow-up after clearance is obtained and once cleared will   "proceed with open recurrent incisional hernia repair.   [facility] staff was advised to call the office sooner should she have worsening symptoms or go to the nearest emergency room.                                             Instructions              Additional Documentation   Vitals:     /82     Ht 63\" (160 cm)     Wt 150 lb (68 kg)     BMI 26.57 kg/m2     BSA 1.71 m2                Communications        Letter sent to Khang Merino MD   Orders Placed     None   Medication Changes       None      Medication List   Visit Diagnoses        Recurrent incisional hernia      Problem List    Current Medications      Disp Start End   acetaminophen (TYLENOL) 500 MG tablet      Sig - Route: Take 500 mg by mouth Every 6 (Six) Hours As Needed for mild pain (1-3). - Oral   Class: Historical Med   Albuterol (VENTOLIN IN)      Sig - Route: Inhale. - Inhalation   Class: Historical Med   atorvastatin (LIPITOR) 20 MG tablet      Sig - Route: Take 20 mg by mouth Daily. - Oral   Class: Historical Med   brimonidine-timolol (COMBIGAN) 0.2-0.5 % ophthalmic solution      Sig: Every 12 (Twelve) Hours.   Class: Historical Med   busPIRone (BUSPAR) 10 MG tablet      Sig - Route: Take 10 mg by mouth 3 (Three) Times a Day. - Oral   Class: Historical Med   calcium carbonate (OS-KATY) 600 MG tablet      Sig - Route: Take 600 mg by mouth Daily. - Oral   Class: Historical Med   carvedilol (COREG) 6.25 MG tablet      Sig - Route: Take 6.25 mg by mouth 2 (Two) Times a Day With Meals. - Oral   Class: Historical Med   cetirizine (zyrTEC) 10 MG tablet      Sig - Route: Take 10 mg by mouth Daily. - Oral   Class: Historical Med   denosumab (PROLIA) 60 MG/ML solution syringe      Sig - Route: Inject  under the skin 1 (One) Time. - Subcutaneous   Class: Historical Med   Dextromethorphan-Menthol (DELSYM COUGH RELIEF MT)      Sig - Route: Apply  to the mouth or throat As Needed. - Mouth/Throat   Class: Historical Med   Ergocalciferol (VITAMIN D2 PO)   "    Sig - Route: Take  by mouth. - Oral   Class: Historical Med   fluticasone-salmeterol (ADVAIR) 500-50 MCG/DOSE DISKUS      Sig - Route: Inhale 2 (Two) Times a Day. - Inhalation   Class: Historical Med   guaiFENesin (MUCINEX) 600 MG 12 hr tablet      Sig - Route: Take 1,200 mg by mouth 2 (Two) Times a Day. - Oral   Class: Historical Med   levETIRAcetam (KEPPRA) 500 MG tablet      Sig - Route: Take 500 mg by mouth 2 (Two) Times a Day. - Oral   Class: Historical Med   levothyroxine (SYNTHROID, LEVOTHROID) 50 MCG tablet      Sig - Route: Take 50 mcg by mouth Daily. - Oral   Class: Historical Med   Multiple Vitamins-Minerals (MULTIVITAMIN ADULT PO)      Sig - Route: Take  by mouth. - Oral   Class: Historical Med   pantoprazole (PROTONIX) 40 MG EC tablet      Sig - Route: Take 40 mg by mouth Daily. - Oral   Class: Historical Med   PARoxetine (PAXIL) 40 MG tablet      Sig - Route: Take 40 mg by mouth Every Morning. - Oral   Class: Historical Med   phenytoin (DILANTIN) 100 MG ER capsule      Sig - Route: Take  by mouth 2 (Two) Times a Day. - Oral   Class: Historical Med   Probiotic Product (PROBIOTIC ADVANCED PO)      Sig - Route: Take  by mouth. - Oral   Class: Historical Med   QUEtiapine (SEROquel) 100 MG tablet      Sig - Route: Take 100 mg by mouth Every Night. - Oral   Class: Historical Med   solifenacin (VESICARE) 10 MG tablet      Sig - Route: Take 10 mg by mouth Daily. - Oral   Class: Historical Med

## 2017-07-20 PROBLEM — K43.2 RECURRENT VENTRAL INCISIONAL HERNIA: Status: ACTIVE | Noted: 2017-07-20

## 2017-07-20 RX ORDER — CHLORHEXIDINE GLUCONATE 4 G/100ML
SOLUTION TOPICAL DAILY PRN
Qty: 118 ML | Refills: 0 | Status: SHIPPED | OUTPATIENT
Start: 2017-07-20 | End: 2017-08-10

## 2017-07-20 NOTE — TELEPHONE ENCOUNTER
Surgery has been scheduled on 8/15/17. Patient is to arrive at [facility] at 9:00am. NPO after midnight. Testing to be done prior to surgery. Orders have been faxed. Along with prescription and instructions for surgical soap.

## 2017-07-21 ENCOUNTER — HOSPITAL ENCOUNTER (EMERGENCY)
Facility: HOSPITAL | Age: 67
Discharge: HOME | End: 2017-07-21
Attending: EMERGENCY MEDICINE | Admitting: EMERGENCY MEDICINE

## 2017-07-21 VITALS
TEMPERATURE: 99.3 F | BODY MASS INDEX: 26.58 KG/M2 | WEIGHT: 150 LBS | SYSTOLIC BLOOD PRESSURE: 122 MMHG | RESPIRATION RATE: 16 BRPM | DIASTOLIC BLOOD PRESSURE: 77 MMHG | HEIGHT: 63 IN | OXYGEN SATURATION: 95 % | HEART RATE: 78 BPM

## 2017-07-21 DIAGNOSIS — S00.31XA NASAL ABRASION, INITIAL ENCOUNTER: Primary | ICD-10-CM

## 2017-07-21 DIAGNOSIS — S00.33XA NASAL CONTUSION: ICD-10-CM

## 2017-07-21 DIAGNOSIS — S00.531A CONTUSION OF VERMILION BORDER OF UPPER LIP, INITIAL ENCOUNTER: ICD-10-CM

## 2017-07-21 PROCEDURE — 99283 EMERGENCY DEPT VISIT LOW MDM: CPT

## 2017-07-21 PROCEDURE — 99282 EMERGENCY DEPT VISIT SF MDM: CPT | Performed by: EMERGENCY MEDICINE

## 2017-07-22 NOTE — ED PROVIDER NOTES
Subjective     History provided by:  Patient and caregiver    History of Present Illness    · Chief complaint: Fall    · Location: Injury to the nose and upper lip    · Quality/Severity: Discomfort in the tip of the nose and in the upper lip    · Timing/Onset: The fall occurred at 2:30 this afternoon    · Modifying Factors: The discomfort has since subsided    · Associated symptoms: She denies any loose teeth.  She denies any injuries other than the face.  There was no loss of consciousness.    · Narrative: The patient is a 67-year-old white female who is a resident of [facility] who tripped over her own feet and fell striking her face on the floor.  The patient complained of discomfort in her nose in her upper lip and her teeth initially, but states now only she has discomfort in her upper lip and her nose.  There was no loss of consciousness the patient is acting her normal self per caretaker.    ED Triage Vitals   Temp Heart Rate Resp BP SpO2   07/21/17 1545 07/21/17 1545 07/21/17 1545 07/21/17 1545 07/21/17 1545   99.3 °F (37.4 °C) 78 16 122/77 95 %      Temp src Heart Rate Source Patient Position BP Location FiO2 (%)   07/21/17 1545 -- -- -- --   Oral           Review of Systems   Constitutional: Negative for fever.   HENT: Negative for congestion, facial swelling, trouble swallowing and voice change.    Respiratory: Negative for shortness of breath.    Cardiovascular: Negative for chest pain.   Gastrointestinal: Negative for abdominal pain.   Genitourinary: Negative for flank pain and pelvic pain.   Musculoskeletal: Negative for back pain, gait problem and neck pain.   Neurological: Negative for dizziness, seizures, weakness, numbness and headaches.   Hematological: Negative for adenopathy.   Psychiatric/Behavioral: Negative for behavioral problems and confusion.       Past Medical History:   Diagnosis Date   • Anxiety    • Dysphagia    • Hypercholesteremia    • Hypothyroidism    • Major depression    •  OCD (obsessive compulsive disorder)    • Osteopenia    • Seizure disorder        Allergies   Allergen Reactions   • Codeine        Past Surgical History:   Procedure Laterality Date   • CATARACT EXTRACTION     • HYSTERECTOMY         Family History   Problem Relation Age of Onset   • Family history unknown: Yes       Social History     Social History   • Marital status: Unknown     Spouse name: N/A   • Number of children: N/A   • Years of education: N/A     Social History Main Topics   • Smoking status: Never Smoker   • Smokeless tobacco: None   • Alcohol use No   • Drug use: None   • Sexual activity: Not Asked     Other Topics Concern   • None     Social History Narrative           Objective   Physical Exam   Constitutional: She appears well-developed and well-nourished. No distress.   HENT:   Head: Normocephalic and atraumatic.   Mouth/Throat: Oropharynx is clear and moist. No oropharyngeal exudate.   The patient has an abrasion on the right lateral aspect of the nose.  He is a scant amount of dried blood in the right nostril, and there is no septal hematoma.  There is no tenderness to palpation on the nasal spine.  No deformity of the nasal spine.  There is no loose teeth or chipped teeth noted on exam.  There is no intraoral lacerations.  She has a mild amount of tenderness over her upper lip soft tissue.   Eyes: Conjunctivae and EOM are normal. Pupils are equal, round, and reactive to light. Right eye exhibits no discharge. Left eye exhibits no discharge. No scleral icterus.   Neck: Normal range of motion. Neck supple. No JVD present. No thyromegaly present.   Cardiovascular: Normal rate, regular rhythm and normal heart sounds.    No murmur heard.  Pulmonary/Chest: Effort normal and breath sounds normal. She has no wheezes. She has no rales. She exhibits no tenderness.   Abdominal: Soft. Bowel sounds are normal. She exhibits no distension. There is no tenderness.   Musculoskeletal: Normal range of motion. She  exhibits no edema, tenderness or deformity.   Lymphadenopathy:     She has no cervical adenopathy.   Neurological: She is alert. No cranial nerve deficit. Coordination normal.   No focal motor sensory deficit.  Mentation is baseline and consistent with tonic mental disability   Skin: Skin is warm and dry. No rash noted. She is not diaphoretic.   Psychiatric: She has a normal mood and affect. Her behavior is normal. Thought content normal.   Nursing note and vitals reviewed.      Procedures         ED Course  ED Course                  MDM  Number of Diagnoses or Management Options  Contusion of vermilion border of upper lip, initial encounter: new and does not require workup  Nasal abrasion, initial encounter: new and does not require workup  Nasal contusion: new and does not require workup     Amount and/or Complexity of Data Reviewed  Obtain history from someone other than the patient: yes    Risk of Complications, Morbidity, and/or Mortality  Presenting problems: moderate  Diagnostic procedures: minimal  Management options: low    Patient Progress  Patient progress: stable      Final diagnoses:   Nasal abrasion, initial encounter   Nasal contusion   Contusion of vermilion border of upper lip, initial encounter           Labs Reviewed - No data to display  No orders to display          Medication List      Notice     No changes were made to your prescriptions during this visit.             Myke Casanova MD  07/21/17 0868

## 2017-07-26 ENCOUNTER — HOSPITAL ENCOUNTER (OUTPATIENT)
Dept: MAMMOGRAPHY | Facility: HOSPITAL | Age: 67
Discharge: HOME | End: 2017-07-26
Attending: FAMILY MEDICINE | Admitting: FAMILY MEDICINE

## 2017-07-26 DIAGNOSIS — Z13.9 SCREENING: ICD-10-CM

## 2017-07-26 PROCEDURE — G0202 SCR MAMMO BI INCL CAD: HCPCS

## 2017-08-02 ENCOUNTER — LAB REQUISITION (OUTPATIENT)
Dept: LAB | Facility: HOSPITAL | Age: 67
End: 2017-08-02

## 2017-08-02 DIAGNOSIS — Z79.899 OTHER LONG TERM (CURRENT) DRUG THERAPY: ICD-10-CM

## 2017-08-02 DIAGNOSIS — Z01.812 ENCOUNTER FOR PREPROCEDURAL LABORATORY EXAMINATION: ICD-10-CM

## 2017-08-02 PROCEDURE — 87081 CULTURE SCREEN ONLY: CPT | Performed by: FAMILY MEDICINE

## 2017-08-04 LAB — MRSA SPEC QL CULT: NORMAL

## 2017-08-08 ENCOUNTER — TRANSCRIBE ORDERS (OUTPATIENT)
Dept: ADMINISTRATIVE | Facility: HOSPITAL | Age: 67
End: 2017-08-08

## 2017-08-08 ENCOUNTER — LAB REQUISITION (OUTPATIENT)
Dept: LAB | Facility: HOSPITAL | Age: 67
End: 2017-08-08

## 2017-08-08 DIAGNOSIS — Z01.818 PREOP TESTING: Primary | ICD-10-CM

## 2017-08-08 DIAGNOSIS — R32 URINARY INCONTINENCE: ICD-10-CM

## 2017-08-08 LAB
BACTERIA UR QL AUTO: ABNORMAL /HPF
BILIRUB UR QL STRIP: NEGATIVE
CLARITY UR: CLEAR
COLOR UR: YELLOW
GLUCOSE UR STRIP-MCNC: NEGATIVE MG/DL
HGB UR QL STRIP.AUTO: NEGATIVE
HYALINE CASTS UR QL AUTO: ABNORMAL /LPF
KETONES UR QL STRIP: NEGATIVE
LEUKOCYTE ESTERASE UR QL STRIP.AUTO: ABNORMAL
NITRITE UR QL STRIP: NEGATIVE
PH UR STRIP.AUTO: <=5 [PH] (ref 4.5–8)
PROT UR QL STRIP: NEGATIVE
RBC # UR: ABNORMAL /HPF
REF LAB TEST METHOD: ABNORMAL
SP GR UR STRIP: 1.01 (ref 1–1.03)
SQUAMOUS #/AREA URNS HPF: ABNORMAL /HPF
UROBILINOGEN UR QL STRIP: ABNORMAL
WBC UR QL AUTO: ABNORMAL /HPF

## 2017-08-08 PROCEDURE — 81003 URINALYSIS AUTO W/O SCOPE: CPT | Performed by: SURGERY

## 2017-08-08 PROCEDURE — 87086 URINE CULTURE/COLONY COUNT: CPT | Performed by: SURGERY

## 2017-08-08 PROCEDURE — 81001 URINALYSIS AUTO W/SCOPE: CPT | Performed by: SURGERY

## 2017-08-09 ENCOUNTER — HOSPITAL ENCOUNTER (OUTPATIENT)
Dept: GENERAL RADIOLOGY | Facility: HOSPITAL | Age: 67
Discharge: HOME | End: 2017-08-09
Attending: SURGERY

## 2017-08-09 ENCOUNTER — LAB REQUISITION (OUTPATIENT)
Dept: LAB | Facility: HOSPITAL | Age: 67
End: 2017-08-09

## 2017-08-09 ENCOUNTER — OFFICE (OUTPATIENT)
Dept: URBAN - METROPOLITAN AREA CLINIC 6 | Facility: CLINIC | Age: 67
End: 2017-08-09

## 2017-08-09 ENCOUNTER — HOSPITAL ENCOUNTER (OUTPATIENT)
Dept: CARDIOLOGY | Facility: HOSPITAL | Age: 67
Discharge: HOME | End: 2017-08-09
Attending: SURGERY | Admitting: SURGERY

## 2017-08-09 VITALS
HEIGHT: 65 IN | DIASTOLIC BLOOD PRESSURE: 76 MMHG | RESPIRATION RATE: 16 BRPM | HEART RATE: 74 BPM | TEMPERATURE: 97 F | WEIGHT: 153 LBS | SYSTOLIC BLOOD PRESSURE: 118 MMHG

## 2017-08-09 DIAGNOSIS — D72.819 DECREASED WHITE BLOOD CELL COUNT: ICD-10-CM

## 2017-08-09 DIAGNOSIS — Z01.818 PREOP TESTING: ICD-10-CM

## 2017-08-09 DIAGNOSIS — E78.9 DISORDER OF LIPOPROTEIN METABOLISM: ICD-10-CM

## 2017-08-09 DIAGNOSIS — R73.09 OTHER ABNORMAL GLUCOSE: ICD-10-CM

## 2017-08-09 DIAGNOSIS — R32 URINARY INCONTINENCE: ICD-10-CM

## 2017-08-09 DIAGNOSIS — K43.2 RECURRENT VENTRAL INCISIONAL HERNIA: ICD-10-CM

## 2017-08-09 DIAGNOSIS — R56.9 CONVULSIONS (HCC): ICD-10-CM

## 2017-08-09 DIAGNOSIS — R79.89 OTHER SPECIFIED ABNORMAL FINDINGS OF BLOOD CHEMISTRY: ICD-10-CM

## 2017-08-09 DIAGNOSIS — Z13.0 ENCOUNTER FOR SCREENING FOR DISEASES OF THE BLOOD AND BLOOD-FORMING ORGANS AND CERTAIN DISORDERS INVOLVING THE IMMUNE MECHANISM: ICD-10-CM

## 2017-08-09 DIAGNOSIS — K43.2 INCISIONAL HERNIA WITHOUT OBSTRUCTION OR GANGRENE: ICD-10-CM

## 2017-08-09 DIAGNOSIS — Z79.899 OTHER LONG TERM (CURRENT) DRUG THERAPY: ICD-10-CM

## 2017-08-09 DIAGNOSIS — E03.9 HYPOTHYROIDISM: ICD-10-CM

## 2017-08-09 DIAGNOSIS — E55.9 VITAMIN D DEFICIENCY: ICD-10-CM

## 2017-08-09 LAB
25(OH)D3 SERPL-MCNC: 23.9 NG/ML
ALBUMIN SERPL-MCNC: 3.2 G/DL (ref 3.5–5.2)
ALBUMIN/GLOB SERPL: 1.1 G/DL
ALP SERPL-CCNC: 83 U/L (ref 40–129)
ALT SERPL W P-5'-P-CCNC: 24 U/L (ref 5–33)
ANION GAP SERPL CALCULATED.3IONS-SCNC: 11.2 MMOL/L
APTT PPP: 31.7 SECONDS (ref 24.3–38.1)
AST SERPL-CCNC: 16 U/L (ref 5–32)
BASOPHILS # BLD AUTO: 0.09 10*3/MM3 (ref 0–0.2)
BASOPHILS NFR BLD AUTO: 0.6 % (ref 0–2)
BILIRUB SERPL-MCNC: <0.2 MG/DL (ref 0.2–1.2)
BUN BLD-MCNC: 27 MG/DL (ref 8–23)
BUN/CREAT SERPL: 39.7 (ref 7–25)
CALCIUM SPEC-SCNC: 8.6 MG/DL (ref 8.8–10.5)
CHLORIDE SERPL-SCNC: 101 MMOL/L (ref 98–107)
CHOLEST SERPL-MCNC: 104 MG/DL (ref 0–200)
CO2 SERPL-SCNC: 27.8 MMOL/L (ref 22–29)
CREAT BLD-MCNC: 0.68 MG/DL (ref 0.57–1)
DEPRECATED RDW RBC AUTO: 51.7 FL (ref 37–54)
EOSINOPHIL # BLD AUTO: 0.35 10*3/MM3 (ref 0.1–0.3)
EOSINOPHIL NFR BLD AUTO: 2.5 % (ref 0–4)
ERYTHROCYTE [DISTWIDTH] IN BLOOD BY AUTOMATED COUNT: 16.3 % (ref 11.5–14.5)
GFR SERPL CREATININE-BSD FRML MDRD: 87 ML/MIN/1.73
GLOBULIN UR ELPH-MCNC: 2.8 GM/DL
GLUCOSE BLD-MCNC: 120 MG/DL (ref 65–99)
HBA1C MFR BLD: 5.6 % (ref 4.8–5.6)
HCT VFR BLD AUTO: 37.2 % (ref 37–47)
HDLC SERPL-MCNC: 51 MG/DL (ref 40–60)
HGB BLD-MCNC: 11.9 G/DL (ref 12–16)
IMM GRANULOCYTES # BLD: 0.06 10*3/MM3 (ref 0–0.03)
IMM GRANULOCYTES NFR BLD: 0.4 % (ref 0–0.5)
INR PPP: 0.99 (ref 0.9–1.1)
LDLC SERPL CALC-MCNC: 42 MG/DL (ref 0–100)
LDLC/HDLC SERPL: 0.82 {RATIO}
LYMPHOCYTES # BLD AUTO: 4.1 10*3/MM3 (ref 0.6–4.8)
LYMPHOCYTES NFR BLD AUTO: 29.4 % (ref 20–45)
MCH RBC QN AUTO: 27.6 PG (ref 27–31)
MCHC RBC AUTO-ENTMCNC: 32 G/DL (ref 31–37)
MCV RBC AUTO: 86.3 FL (ref 81–99)
MONOCYTES # BLD AUTO: 1.31 10*3/MM3 (ref 0–1)
MONOCYTES NFR BLD AUTO: 9.4 % (ref 3–8)
NEUTROPHILS # BLD AUTO: 8.02 10*3/MM3 (ref 1.5–8.3)
NEUTROPHILS NFR BLD AUTO: 57.7 % (ref 45–70)
NRBC BLD MANUAL-RTO: 0 /100 WBC (ref 0–0)
PHENYTOIN SERPL-MCNC: <0.8 MCG/ML (ref 10–20)
PLATELET # BLD AUTO: 223 10*3/MM3 (ref 140–500)
PMV BLD AUTO: 10.5 FL (ref 7.4–10.4)
POTASSIUM BLD-SCNC: 4.1 MMOL/L (ref 3.5–5.2)
PROT SERPL-MCNC: 6 G/DL (ref 6–8.5)
PROTHROMBIN TIME: 13.1 SECONDS (ref 12.1–15)
RBC # BLD AUTO: 4.31 10*6/MM3 (ref 4.2–5.4)
SODIUM BLD-SCNC: 140 MMOL/L (ref 136–145)
T4 FREE SERPL-MCNC: 1.41 NG/DL (ref 0.93–1.7)
TRIGL SERPL-MCNC: 57 MG/DL (ref 0–150)
TSH SERPL DL<=0.05 MIU/L-ACNC: 3.48 MIU/ML (ref 0.27–4.2)
VLDLC SERPL-MCNC: 11.4 MG/DL (ref 7–27)
WBC NRBC COR # BLD: 13.93 10*3/MM3 (ref 4.8–10.8)

## 2017-08-09 PROCEDURE — 99212 OFFICE O/P EST SF 10 MIN: CPT | Performed by: INTERNAL MEDICINE

## 2017-08-09 PROCEDURE — 84443 ASSAY THYROID STIM HORMONE: CPT | Performed by: FAMILY MEDICINE

## 2017-08-09 PROCEDURE — 80061 LIPID PANEL: CPT | Performed by: FAMILY MEDICINE

## 2017-08-09 PROCEDURE — 83036 HEMOGLOBIN GLYCOSYLATED A1C: CPT | Performed by: FAMILY MEDICINE

## 2017-08-09 PROCEDURE — 80177 DRUG SCRN QUAN LEVETIRACETAM: CPT | Performed by: FAMILY MEDICINE

## 2017-08-09 PROCEDURE — 71020 HC CHEST PA AND LATERAL: CPT

## 2017-08-09 PROCEDURE — 93010 ELECTROCARDIOGRAM REPORT: CPT | Performed by: INTERNAL MEDICINE

## 2017-08-09 PROCEDURE — 80053 COMPREHEN METABOLIC PANEL: CPT | Performed by: FAMILY MEDICINE

## 2017-08-09 PROCEDURE — 93005 ELECTROCARDIOGRAM TRACING: CPT | Performed by: SURGERY

## 2017-08-09 PROCEDURE — 36415 COLL VENOUS BLD VENIPUNCTURE: CPT | Performed by: FAMILY MEDICINE

## 2017-08-09 PROCEDURE — 82306 VITAMIN D 25 HYDROXY: CPT | Performed by: FAMILY MEDICINE

## 2017-08-09 PROCEDURE — 80185 ASSAY OF PHENYTOIN TOTAL: CPT | Performed by: FAMILY MEDICINE

## 2017-08-09 PROCEDURE — 84439 ASSAY OF FREE THYROXINE: CPT | Performed by: FAMILY MEDICINE

## 2017-08-09 PROCEDURE — 85610 PROTHROMBIN TIME: CPT | Performed by: FAMILY MEDICINE

## 2017-08-09 PROCEDURE — 85025 COMPLETE CBC W/AUTO DIFF WBC: CPT | Performed by: FAMILY MEDICINE

## 2017-08-09 PROCEDURE — 85730 THROMBOPLASTIN TIME PARTIAL: CPT | Performed by: FAMILY MEDICINE

## 2017-08-10 LAB — BACTERIA SPEC AEROBE CULT: NO GROWTH

## 2017-08-10 RX ORDER — MONTELUKAST SODIUM 10 MG/1
10 TABLET ORAL NIGHTLY
COMMUNITY

## 2017-08-10 RX ORDER — DEXTROMETHORPHAN POLISTIREX 30 MG/5ML
10 SUSPENSION ORAL 2 TIMES DAILY PRN
COMMUNITY

## 2017-08-10 RX ORDER — FERROUS SULFATE 325(65) MG
325 TABLET ORAL
COMMUNITY
End: 2021-01-12 | Stop reason: SDDI

## 2017-08-10 RX ORDER — SODIUM PHOSPHATE,MONO-DIBASIC 19G-7G/118
3 ENEMA (ML) RECTAL DAILY
COMMUNITY

## 2017-08-10 RX ORDER — DOCUSATE SODIUM 100 MG/1
100 CAPSULE, LIQUID FILLED ORAL 2 TIMES DAILY
COMMUNITY

## 2017-08-10 RX ORDER — FLUTICASONE PROPIONATE 50 MCG
2 SPRAY, SUSPENSION (ML) NASAL DAILY
COMMUNITY

## 2017-08-10 RX ORDER — POLYETHYLENE GLYCOL 3350 17 G/17G
17 POWDER, FOR SOLUTION ORAL DAILY
COMMUNITY
End: 2021-01-12 | Stop reason: SDDI

## 2017-08-10 NOTE — PAT
History updated by records.  Pre-op tests completed, transfer forms faxed to facility. Cardiac and Pulmonary clearance in media tab.

## 2017-08-14 ENCOUNTER — ANESTHESIA EVENT (OUTPATIENT)
Dept: PERIOP | Facility: HOSPITAL | Age: 67
End: 2017-08-14

## 2017-08-14 LAB — LEVETIRACETAM SERPL-MCNC: 29.8 UG/ML (ref 10–40)

## 2017-08-15 ENCOUNTER — HOSPITAL ENCOUNTER (OUTPATIENT)
Facility: HOSPITAL | Age: 67
Setting detail: OBSERVATION
Discharge: HOME | End: 2017-08-22
Attending: SURGERY | Admitting: SURGERY

## 2017-08-15 ENCOUNTER — ANESTHESIA (OUTPATIENT)
Dept: PERIOP | Facility: HOSPITAL | Age: 67
End: 2017-08-15

## 2017-08-15 DIAGNOSIS — K43.2 RECURRENT VENTRAL INCISIONAL HERNIA: ICD-10-CM

## 2017-08-15 PROCEDURE — 99225 PR SBSQ OBSERVATION CARE/DAY 25 MINUTES: CPT | Performed by: INTERNAL MEDICINE

## 2017-08-15 PROCEDURE — 25010000002 SUCCINYLCHOLINE PER 20 MG: Performed by: NURSE ANESTHETIST, CERTIFIED REGISTERED

## 2017-08-15 PROCEDURE — 49568 PR IMPLANT MESH HERNIA REPAIR/DEBRIDEMENT CLOSURE: CPT | Performed by: SURGERY

## 2017-08-15 PROCEDURE — C1781 MESH (IMPLANTABLE): HCPCS | Performed by: SURGERY

## 2017-08-15 PROCEDURE — 99024 POSTOP FOLLOW-UP VISIT: CPT | Performed by: SURGERY

## 2017-08-15 PROCEDURE — G0378 HOSPITAL OBSERVATION PER HR: HCPCS

## 2017-08-15 PROCEDURE — 25010000002 FENTANYL CITRATE (PF) 100 MCG/2ML SOLUTION: Performed by: NURSE ANESTHETIST, CERTIFIED REGISTERED

## 2017-08-15 PROCEDURE — 49565 PR REPAIR RECURR INCIS HERNIA,REDUC: CPT | Performed by: SURGERY

## 2017-08-15 PROCEDURE — 25010000002 DEXAMETHASONE PER 1 MG: Performed by: ANESTHESIOLOGY

## 2017-08-15 PROCEDURE — 25010000002 PHENYLEPHRINE PER 1 ML: Performed by: NURSE ANESTHETIST, CERTIFIED REGISTERED

## 2017-08-15 PROCEDURE — 25010000002 MIDAZOLAM PER 1 MG: Performed by: ANESTHESIOLOGY

## 2017-08-15 PROCEDURE — 25010000002 ONDANSETRON PER 1 MG: Performed by: ANESTHESIOLOGY

## 2017-08-15 PROCEDURE — 25010000002 NEOSTIGMINE PER 0.5 MG: Performed by: NURSE ANESTHETIST, CERTIFIED REGISTERED

## 2017-08-15 PROCEDURE — 25010000002 HYDROMORPHONE PER 4 MG: Performed by: NURSE ANESTHETIST, CERTIFIED REGISTERED

## 2017-08-15 PROCEDURE — 25010000002 PROPOFOL 10 MG/ML EMULSION: Performed by: NURSE ANESTHETIST, CERTIFIED REGISTERED

## 2017-08-15 PROCEDURE — 25010000003 CEFAZOLIN PER 500 MG: Performed by: SURGERY

## 2017-08-15 DEVICE — BARD SOFT MESH
Type: IMPLANTABLE DEVICE | Site: ABDOMEN | Status: FUNCTIONAL
Brand: BARD SOFT MESH

## 2017-08-15 DEVICE — SEALANT WND FIBRIN TISSEEL VAPOR/HEAT/PREFIL/SYR 10ML: Type: IMPLANTABLE DEVICE | Site: ABDOMEN | Status: FUNCTIONAL

## 2017-08-15 RX ORDER — LIDOCAINE HYDROCHLORIDE 10 MG/ML
INJECTION, SOLUTION EPIDURAL; INFILTRATION; INTRACAUDAL; PERINEURAL
Status: DISPENSED
Start: 2017-08-15 | End: 2017-08-15

## 2017-08-15 RX ORDER — QUETIAPINE FUMARATE 100 MG/1
100 TABLET, FILM COATED ORAL NIGHTLY
Status: DISCONTINUED | OUTPATIENT
Start: 2017-08-15 | End: 2017-08-22 | Stop reason: HOSPADM

## 2017-08-15 RX ORDER — MIDAZOLAM HYDROCHLORIDE 1 MG/ML
1 INJECTION INTRAMUSCULAR; INTRAVENOUS
Status: DISCONTINUED | OUTPATIENT
Start: 2017-08-15 | End: 2017-08-15

## 2017-08-15 RX ORDER — NUTRITIONAL SUPPLEMENT
PACKET (EA) ORAL 2 TIMES DAILY
COMMUNITY

## 2017-08-15 RX ORDER — PANTOPRAZOLE SODIUM 40 MG/1
40 TABLET, DELAYED RELEASE ORAL 2 TIMES DAILY
Status: DISCONTINUED | OUTPATIENT
Start: 2017-08-15 | End: 2017-08-22 | Stop reason: HOSPADM

## 2017-08-15 RX ORDER — PROPOFOL 10 MG/ML
VIAL (ML) INTRAVENOUS AS NEEDED
Status: DISCONTINUED | OUTPATIENT
Start: 2017-08-15 | End: 2017-08-15 | Stop reason: SURG

## 2017-08-15 RX ORDER — CARVEDILOL 6.25 MG/1
6.25 TABLET ORAL 2 TIMES DAILY WITH MEALS
Status: DISCONTINUED | OUTPATIENT
Start: 2017-08-15 | End: 2017-08-22 | Stop reason: HOSPADM

## 2017-08-15 RX ORDER — ONDANSETRON 2 MG/ML
4 INJECTION INTRAMUSCULAR; INTRAVENOUS EVERY 6 HOURS PRN
Status: DISCONTINUED | OUTPATIENT
Start: 2017-08-15 | End: 2017-08-22 | Stop reason: HOSPADM

## 2017-08-15 RX ORDER — PROMETHAZINE HYDROCHLORIDE 25 MG/1
25 TABLET ORAL ONCE AS NEEDED
Status: DISCONTINUED | OUTPATIENT
Start: 2017-08-15 | End: 2017-08-15 | Stop reason: HOSPADM

## 2017-08-15 RX ORDER — BRIMONIDINE TARTRATE AND TIMOLOL MALEATE 2; 5 MG/ML; MG/ML
1 SOLUTION OPHTHALMIC EVERY 12 HOURS
Status: DISCONTINUED | OUTPATIENT
Start: 2017-08-15 | End: 2017-08-15 | Stop reason: CLARIF

## 2017-08-15 RX ORDER — POLYETHYLENE GLYCOL 3350 17 G/17G
17 POWDER, FOR SOLUTION ORAL DAILY
Status: DISCONTINUED | OUTPATIENT
Start: 2017-08-15 | End: 2017-08-22 | Stop reason: HOSPADM

## 2017-08-15 RX ORDER — FAMOTIDINE 20 MG/1
20 TABLET, FILM COATED ORAL EVERY 12 HOURS SCHEDULED
Status: DISCONTINUED | OUTPATIENT
Start: 2017-08-15 | End: 2017-08-15

## 2017-08-15 RX ORDER — SODIUM CHLORIDE, SODIUM LACTATE, POTASSIUM CHLORIDE, CALCIUM CHLORIDE 600; 310; 30; 20 MG/100ML; MG/100ML; MG/100ML; MG/100ML
9 INJECTION, SOLUTION INTRAVENOUS CONTINUOUS
Status: DISCONTINUED | OUTPATIENT
Start: 2017-08-15 | End: 2017-08-15

## 2017-08-15 RX ORDER — GLYCOPYRROLATE 0.2 MG/ML
0.2 INJECTION INTRAMUSCULAR; INTRAVENOUS
Status: DISCONTINUED | OUTPATIENT
Start: 2017-08-15 | End: 2017-08-15

## 2017-08-15 RX ORDER — HYDROMORPHONE HCL 110MG/55ML
PATIENT CONTROLLED ANALGESIA SYRINGE INTRAVENOUS AS NEEDED
Status: DISCONTINUED | OUTPATIENT
Start: 2017-08-15 | End: 2017-08-15

## 2017-08-15 RX ORDER — LIDOCAINE HYDROCHLORIDE 10 MG/ML
0.5 INJECTION, SOLUTION EPIDURAL; INFILTRATION; INTRACAUDAL; PERINEURAL ONCE AS NEEDED
Status: DISCONTINUED | OUTPATIENT
Start: 2017-08-15 | End: 2017-08-15

## 2017-08-15 RX ORDER — MONTELUKAST SODIUM 10 MG/1
10 TABLET ORAL NIGHTLY
Status: DISCONTINUED | OUTPATIENT
Start: 2017-08-15 | End: 2017-08-22 | Stop reason: HOSPADM

## 2017-08-15 RX ORDER — DOCUSATE SODIUM 100 MG/1
100 CAPSULE, LIQUID FILLED ORAL 2 TIMES DAILY
Status: DISCONTINUED | OUTPATIENT
Start: 2017-08-15 | End: 2017-08-22 | Stop reason: HOSPADM

## 2017-08-15 RX ORDER — PROMETHAZINE HYDROCHLORIDE 25 MG/1
25 SUPPOSITORY RECTAL ONCE AS NEEDED
Status: DISCONTINUED | OUTPATIENT
Start: 2017-08-15 | End: 2017-08-15 | Stop reason: HOSPADM

## 2017-08-15 RX ORDER — FAMOTIDINE 10 MG/ML
20 INJECTION, SOLUTION INTRAVENOUS EVERY 12 HOURS SCHEDULED
Status: DISCONTINUED | OUTPATIENT
Start: 2017-08-15 | End: 2017-08-15

## 2017-08-15 RX ORDER — ONDANSETRON 2 MG/ML
4 INJECTION INTRAMUSCULAR; INTRAVENOUS ONCE AS NEEDED
Status: DISCONTINUED | OUTPATIENT
Start: 2017-08-15 | End: 2017-08-15 | Stop reason: HOSPADM

## 2017-08-15 RX ORDER — LIDOCAINE HYDROCHLORIDE 20 MG/ML
INJECTION, SOLUTION INFILTRATION; PERINEURAL AS NEEDED
Status: DISCONTINUED | OUTPATIENT
Start: 2017-08-15 | End: 2017-08-15 | Stop reason: SURG

## 2017-08-15 RX ORDER — DEXAMETHASONE SODIUM PHOSPHATE 4 MG/ML
8 INJECTION, SOLUTION INTRA-ARTICULAR; INTRALESIONAL; INTRAMUSCULAR; INTRAVENOUS; SOFT TISSUE ONCE
Status: COMPLETED | OUTPATIENT
Start: 2017-08-15 | End: 2017-08-15

## 2017-08-15 RX ORDER — PROMETHAZINE HYDROCHLORIDE 25 MG/ML
6.25 INJECTION, SOLUTION INTRAMUSCULAR; INTRAVENOUS ONCE AS NEEDED
Status: DISCONTINUED | OUTPATIENT
Start: 2017-08-15 | End: 2017-08-15 | Stop reason: HOSPADM

## 2017-08-15 RX ORDER — ONDANSETRON 4 MG/1
4 TABLET, ORALLY DISINTEGRATING ORAL EVERY 6 HOURS PRN
Status: DISCONTINUED | OUTPATIENT
Start: 2017-08-15 | End: 2017-08-22 | Stop reason: HOSPADM

## 2017-08-15 RX ORDER — TIMOLOL MALEATE 5 MG/ML
1 SOLUTION/ DROPS OPHTHALMIC EVERY 12 HOURS SCHEDULED
Status: DISCONTINUED | OUTPATIENT
Start: 2017-08-15 | End: 2017-08-22 | Stop reason: HOSPADM

## 2017-08-15 RX ORDER — CETIRIZINE HYDROCHLORIDE 10 MG/1
10 TABLET ORAL DAILY
Status: DISCONTINUED | OUTPATIENT
Start: 2017-08-15 | End: 2017-08-22 | Stop reason: HOSPADM

## 2017-08-15 RX ORDER — BRIMONIDINE TARTRATE 2 MG/ML
1 SOLUTION/ DROPS OPHTHALMIC EVERY 12 HOURS SCHEDULED
Status: DISCONTINUED | OUTPATIENT
Start: 2017-08-15 | End: 2017-08-22 | Stop reason: HOSPADM

## 2017-08-15 RX ORDER — BUSPIRONE HYDROCHLORIDE 5 MG/1
10 TABLET ORAL 2 TIMES DAILY
Status: DISCONTINUED | OUTPATIENT
Start: 2017-08-15 | End: 2017-08-22 | Stop reason: HOSPADM

## 2017-08-15 RX ORDER — HYDROMORPHONE HCL 110MG/55ML
PATIENT CONTROLLED ANALGESIA SYRINGE INTRAVENOUS AS NEEDED
Status: DISCONTINUED | OUTPATIENT
Start: 2017-08-15 | End: 2017-08-15 | Stop reason: SURG

## 2017-08-15 RX ORDER — MIDAZOLAM HYDROCHLORIDE 1 MG/ML
2 INJECTION INTRAMUSCULAR; INTRAVENOUS
Status: DISCONTINUED | OUTPATIENT
Start: 2017-08-15 | End: 2017-08-15

## 2017-08-15 RX ORDER — OXYCODONE HYDROCHLORIDE AND ACETAMINOPHEN 5; 325 MG/1; MG/1
1 TABLET ORAL EVERY 4 HOURS PRN
Status: DISCONTINUED | OUTPATIENT
Start: 2017-08-15 | End: 2017-08-22 | Stop reason: HOSPADM

## 2017-08-15 RX ORDER — ONDANSETRON 4 MG/1
4 TABLET, FILM COATED ORAL EVERY 6 HOURS PRN
Status: DISCONTINUED | OUTPATIENT
Start: 2017-08-15 | End: 2017-08-22 | Stop reason: HOSPADM

## 2017-08-15 RX ORDER — ACETAMINOPHEN 500 MG
500 TABLET ORAL 4 TIMES DAILY PRN
Status: DISCONTINUED | OUTPATIENT
Start: 2017-08-15 | End: 2017-08-22 | Stop reason: HOSPADM

## 2017-08-15 RX ORDER — SUCCINYLCHOLINE CHLORIDE 20 MG/ML
INJECTION INTRAMUSCULAR; INTRAVENOUS AS NEEDED
Status: DISCONTINUED | OUTPATIENT
Start: 2017-08-15 | End: 2017-08-15 | Stop reason: SURG

## 2017-08-15 RX ORDER — SODIUM CHLORIDE 0.9 % (FLUSH) 0.9 %
1-10 SYRINGE (ML) INJECTION AS NEEDED
Status: DISCONTINUED | OUTPATIENT
Start: 2017-08-15 | End: 2017-08-15

## 2017-08-15 RX ORDER — PAROXETINE HYDROCHLORIDE 20 MG/1
40 TABLET, FILM COATED ORAL EVERY MORNING
Status: DISCONTINUED | OUTPATIENT
Start: 2017-08-16 | End: 2017-08-22 | Stop reason: HOSPADM

## 2017-08-15 RX ORDER — MEPERIDINE HYDROCHLORIDE 25 MG/ML
12.5 INJECTION INTRAMUSCULAR; INTRAVENOUS; SUBCUTANEOUS
Status: DISCONTINUED | OUTPATIENT
Start: 2017-08-15 | End: 2017-08-15 | Stop reason: HOSPADM

## 2017-08-15 RX ORDER — ONDANSETRON 2 MG/ML
4 INJECTION INTRAMUSCULAR; INTRAVENOUS ONCE AS NEEDED
Status: COMPLETED | OUTPATIENT
Start: 2017-08-15 | End: 2017-08-15

## 2017-08-15 RX ORDER — NALOXONE HCL 0.4 MG/ML
0.1 VIAL (ML) INJECTION
Status: DISCONTINUED | OUTPATIENT
Start: 2017-08-15 | End: 2017-08-22 | Stop reason: HOSPADM

## 2017-08-15 RX ORDER — ROCURONIUM BROMIDE 10 MG/ML
INJECTION, SOLUTION INTRAVENOUS AS NEEDED
Status: DISCONTINUED | OUTPATIENT
Start: 2017-08-15 | End: 2017-08-15 | Stop reason: SURG

## 2017-08-15 RX ORDER — L.ACID,PARA/B.BIFIDUM/S.THERM 8B CELL
1 CAPSULE ORAL DAILY
Status: DISCONTINUED | OUTPATIENT
Start: 2017-08-15 | End: 2017-08-22 | Stop reason: HOSPADM

## 2017-08-15 RX ORDER — SODIUM CHLORIDE, SODIUM LACTATE, POTASSIUM CHLORIDE, CALCIUM CHLORIDE 600; 310; 30; 20 MG/100ML; MG/100ML; MG/100ML; MG/100ML
100 INJECTION, SOLUTION INTRAVENOUS CONTINUOUS
Status: DISCONTINUED | OUTPATIENT
Start: 2017-08-15 | End: 2017-08-18

## 2017-08-15 RX ORDER — MAGNESIUM HYDROXIDE 1200 MG/15ML
LIQUID ORAL AS NEEDED
Status: DISCONTINUED | OUTPATIENT
Start: 2017-08-15 | End: 2017-08-15 | Stop reason: HOSPADM

## 2017-08-15 RX ORDER — FLUTICASONE PROPIONATE 50 MCG
2 SPRAY, SUSPENSION (ML) NASAL DAILY
Status: DISCONTINUED | OUTPATIENT
Start: 2017-08-15 | End: 2017-08-22 | Stop reason: HOSPADM

## 2017-08-15 RX ORDER — DEXTROMETHORPHAN POLISTIREX 30 MG/5ML
10 SUSPENSION ORAL 2 TIMES DAILY PRN
Status: DISCONTINUED | OUTPATIENT
Start: 2017-08-15 | End: 2017-08-22 | Stop reason: HOSPADM

## 2017-08-15 RX ORDER — LEVETIRACETAM 500 MG/1
500 TABLET ORAL 2 TIMES DAILY
Status: DISCONTINUED | OUTPATIENT
Start: 2017-08-15 | End: 2017-08-22 | Stop reason: HOSPADM

## 2017-08-15 RX ORDER — ATORVASTATIN CALCIUM 20 MG/1
20 TABLET, FILM COATED ORAL NIGHTLY
Status: DISCONTINUED | OUTPATIENT
Start: 2017-08-15 | End: 2017-08-22 | Stop reason: HOSPADM

## 2017-08-15 RX ORDER — FENTANYL CITRATE 50 UG/ML
INJECTION, SOLUTION INTRAMUSCULAR; INTRAVENOUS AS NEEDED
Status: DISCONTINUED | OUTPATIENT
Start: 2017-08-15 | End: 2017-08-15 | Stop reason: SURG

## 2017-08-15 RX ORDER — EPHEDRINE SULFATE 50 MG/ML
INJECTION, SOLUTION INTRAVENOUS AS NEEDED
Status: DISCONTINUED | OUTPATIENT
Start: 2017-08-15 | End: 2017-08-15 | Stop reason: SURG

## 2017-08-15 RX ORDER — MORPHINE SULFATE 10 MG/ML
2 INJECTION INTRAMUSCULAR; INTRAVENOUS; SUBCUTANEOUS
Status: DISCONTINUED | OUTPATIENT
Start: 2017-08-15 | End: 2017-08-15 | Stop reason: HOSPADM

## 2017-08-15 RX ORDER — LEVOTHYROXINE SODIUM 0.05 MG/1
50 TABLET ORAL DAILY
Status: DISCONTINUED | OUTPATIENT
Start: 2017-08-15 | End: 2017-08-22 | Stop reason: HOSPADM

## 2017-08-15 RX ADMIN — LIDOCAINE HYDROCHLORIDE 100 MG: 20 INJECTION, SOLUTION INFILTRATION; PERINEURAL at 11:42

## 2017-08-15 RX ADMIN — EPHEDRINE SULFATE 10 MG: 50 INJECTION INTRAMUSCULAR; INTRAVENOUS; SUBCUTANEOUS at 11:49

## 2017-08-15 RX ADMIN — CEFAZOLIN SODIUM 2 G: 2 SOLUTION INTRAVENOUS at 20:58

## 2017-08-15 RX ADMIN — FAMOTIDINE 20 MG: 10 INJECTION INTRAVENOUS at 11:13

## 2017-08-15 RX ADMIN — NEOSTIGMINE METHYLSULFATE 4 MG: 1 INJECTION, SOLUTION INTRAMUSCULAR; INTRAVENOUS; SUBCUTANEOUS at 13:34

## 2017-08-15 RX ADMIN — SODIUM CHLORIDE, POTASSIUM CHLORIDE, SODIUM LACTATE AND CALCIUM CHLORIDE: 600; 310; 30; 20 INJECTION, SOLUTION INTRAVENOUS at 13:45

## 2017-08-15 RX ADMIN — ROCURONIUM BROMIDE 30 MG: 10 INJECTION INTRAVENOUS at 11:42

## 2017-08-15 RX ADMIN — EPHEDRINE SULFATE 10 MG: 50 INJECTION INTRAMUSCULAR; INTRAVENOUS; SUBCUTANEOUS at 11:53

## 2017-08-15 RX ADMIN — PROPOFOL 120 MG: 10 INJECTION, EMULSION INTRAVENOUS at 11:42

## 2017-08-15 RX ADMIN — PHENYLEPHRINE HYDROCHLORIDE 200 MCG: 10 INJECTION INTRAVENOUS at 11:52

## 2017-08-15 RX ADMIN — SUCCINYLCHOLINE CHLORIDE 100 MG: 20 INJECTION, SOLUTION INTRAMUSCULAR; INTRAVENOUS at 11:42

## 2017-08-15 RX ADMIN — DEXAMETHASONE SODIUM PHOSPHATE 8 MG: 4 INJECTION, SOLUTION INTRAMUSCULAR; INTRAVENOUS at 11:14

## 2017-08-15 RX ADMIN — ATORVASTATIN CALCIUM 20 MG: 20 TABLET, FILM COATED ORAL at 21:24

## 2017-08-15 RX ADMIN — DOCUSATE SODIUM 100 MG: 100 CAPSULE, LIQUID FILLED ORAL at 16:59

## 2017-08-15 RX ADMIN — MIDAZOLAM HYDROCHLORIDE 1 MG: 1 INJECTION, SOLUTION INTRAMUSCULAR; INTRAVENOUS at 11:27

## 2017-08-15 RX ADMIN — LEVETIRACETAM 500 MG: 500 TABLET, FILM COATED ORAL at 16:59

## 2017-08-15 RX ADMIN — ONDANSETRON 4 MG: 2 INJECTION, SOLUTION INTRAMUSCULAR; INTRAVENOUS at 11:13

## 2017-08-15 RX ADMIN — CEFAZOLIN SODIUM 2 G: 2 SOLUTION INTRAVENOUS at 11:36

## 2017-08-15 RX ADMIN — BRIMONIDINE TARTRATE 1 DROP: 2 SOLUTION/ DROPS OPHTHALMIC at 17:00

## 2017-08-15 RX ADMIN — CARVEDILOL 6.25 MG: 6.25 TABLET, FILM COATED ORAL at 16:59

## 2017-08-15 RX ADMIN — PANTOPRAZOLE SODIUM 40 MG: 40 TABLET, DELAYED RELEASE ORAL at 18:30

## 2017-08-15 RX ADMIN — GLYCOPYRROLATE 0.2 MG: 0.2 INJECTION INTRAMUSCULAR; INTRAVENOUS at 11:13

## 2017-08-15 RX ADMIN — PHENYLEPHRINE HYDROCHLORIDE 100 MCG: 10 INJECTION INTRAVENOUS at 12:06

## 2017-08-15 RX ADMIN — TIMOLOL MALEATE 1 DROP: 5 SOLUTION OPHTHALMIC at 17:00

## 2017-08-15 RX ADMIN — BUSPIRONE HYDROCHLORIDE 10 MG: 5 TABLET ORAL at 16:59

## 2017-08-15 RX ADMIN — SODIUM CHLORIDE, POTASSIUM CHLORIDE, SODIUM LACTATE AND CALCIUM CHLORIDE 9 ML/HR: 600; 310; 30; 20 INJECTION, SOLUTION INTRAVENOUS at 11:14

## 2017-08-15 RX ADMIN — HYDROMORPHONE HYDROCHLORIDE 0.5 MG: 2 INJECTION, SOLUTION INTRAMUSCULAR; INTRAVENOUS; SUBCUTANEOUS at 11:42

## 2017-08-15 RX ADMIN — LEVOTHYROXINE SODIUM 50 MCG: 50 TABLET ORAL at 16:59

## 2017-08-15 RX ADMIN — FENTANYL CITRATE 50 MCG: 50 INJECTION, SOLUTION INTRAMUSCULAR; INTRAVENOUS at 12:11

## 2017-08-15 RX ADMIN — QUETIAPINE FUMARATE 100 MG: 100 TABLET, FILM COATED ORAL at 21:24

## 2017-08-15 RX ADMIN — MONTELUKAST SODIUM 10 MG: 10 TABLET, FILM COATED ORAL at 21:24

## 2017-08-15 RX ADMIN — GLYCOPYRROLATE 0.4 MG: 0.2 INJECTION INTRAMUSCULAR; INTRAVENOUS at 13:34

## 2017-08-15 NOTE — PLAN OF CARE
Problem: Patient Care Overview (Adult)  Goal: Adult Individualization and Mutuality  Outcome: Ongoing (interventions implemented as appropriate)    08/15/17 1017   Individualization   Patient Specific Preferences goes by francisco

## 2017-08-15 NOTE — CONSULTS
Patient Identification:  NAME:  Caitlyn Camacho  Age:  67 y.o.   Sex:  female   :  1950   MRN:  [id number]       Chief complaint: She does not have one, reason for consult seizures  History of present illness:  This patient is a 67-year-old right-handed white female history of hypothyroidism macular degeneration, arthritis ptosis seizure disorder long-standing and comes to the hospital for hernia repair she has done very well has undergone the surgery and is now coming out of the effects of anesthesia I'm being called to see her for her history of seizures and use of Keppra.  I'm not able to get really any other history other than further chart no family is present she is awake enough to follow a few commands and falls back to sleep duration of her seizure disorder appears to be long-standing location is not known quality by report history of convulsions that were tonic clonic in type modifying factors almost certainly the Keppra 500 mg by mouth twice a day which is what she states he now and her nurse thinks she'll be able to swallow very soon      Past medical history:  Past Medical History:   Diagnosis Date   • Anemia due to blood loss    • Anxiety    • Arthritis    • Duodenal stricture    • Duodenal ulcer    • Dysphagia    • Gallstones    • H/O convulsions    • Hypercholesteremia    • Hypothyroidism    • Major depression    • Moderate intellectual disability    • OCD (obsessive compulsive disorder)    • Ocular histoplasmosis     w/macular scars   • Osteopenia    • Ptosis    • Pure hyperglyceridemia    • S/P bypass gastrojejunostomy    • Seasonal allergies    • Seizure disorder    • Unspecified dementia without behavioral disturbance    • Ventral hernia     sched repair   • Vitamin D deficiency        Past surgical history:  Past Surgical History:   Procedure Laterality Date   • CATARACT EXTRACTION Bilateral    • CHOLECYSTECTOMY OPEN  2017   • COLONOSCOPY  2009   • ENDOSCOPY  2017   •  GASTROJEJUNOSTOMY  08/2016   • GASTROSTOMY      TEJINDER   • HYSTERECTOMY         Allergies:  Codeine    Home medications:  Prescriptions Prior to Admission   Medication Sig Dispense Refill Last Dose   • atorvastatin (LIPITOR) 20 MG tablet Take 20 mg by mouth Every Night.   8/14/2017 at 2000   • brimonidine-timolol (COMBIGAN) 0.2-0.5 % ophthalmic solution Administer 1 drop to both eyes Every 12 (Twelve) Hours.   8/15/2017 at 0715   • busPIRone (BUSPAR) 10 MG tablet Take 10 mg by mouth 2 (Two) Times a Day.   8/15/2017 at 0715   • calcium carbonate (OS-KATY) 600 MG tablet Take 600 mg by mouth 3 (Three) Times a Day With Meals.   8/14/2017 at 2000   • carvedilol (COREG) 6.25 MG tablet Take 6.25 mg by mouth 2 (Two) Times a Day With Meals.   8/15/2017 at 0715   • cetirizine (zyrTEC) 10 MG tablet Take 10 mg by mouth Daily.   8/15/2017 at 0800   • docusate sodium (COLACE) 100 MG capsule Take 100 mg by mouth 2 (Two) Times a Day.   8/14/2017 at 2000   • Ergocalciferol (VITAMIN D2 PO) Take 50,000 Units by mouth Every 7 (Seven) Days.   8/2/2017 at 0800   • ferrous sulfate 325 (65 FE) MG tablet Take 325 mg by mouth Daily With Breakfast.   8/8/2017   • fluticasone (FLONASE) 50 MCG/ACT nasal spray 2 sprays into each nostril Daily.   8/15/2017 at 0715   • glucosamine-chondroitin 500-400 MG capsule capsule Take 3 capsules by mouth Daily.   8/14/2017 at 0800   • levETIRAcetam (KEPPRA) 500 MG tablet Take 500 mg by mouth 2 (Two) Times a Day.   8/15/2017 at 0715   • levothyroxine (SYNTHROID, LEVOTHROID) 50 MCG tablet Take 50 mcg by mouth Daily.   8/15/2017 at 0600   • montelukast (SINGULAIR) 10 MG tablet Take 10 mg by mouth Every Night.   8/14/2017 at 2000   • Multiple Vitamins-Minerals (MULTIVITAMIN ADULT PO) Take 1 tablet by mouth Daily.   8/8/2017   • Nutritional Supplements (RESOURCE ARGINAID) pack Take  by mouth 2 (Two) Times a Day.   8/14/2017 at 2000   • pantoprazole (PROTONIX) 40 MG EC tablet Take 40 mg by mouth 2 (Two) Times a Day.    8/14/2017 at 2000   • PARoxetine (PAXIL) 40 MG tablet Take 40 mg by mouth Every Morning.   8/15/2017 at 0715   • polyethylene glycol (MIRALAX) packet Take 17 g by mouth Daily.   8/14/2017 at 0800   • Probiotic Product (PROBIOTIC DAILY PO) Take 1 capsule by mouth Daily.   8/14/2017 at 2000   • QUEtiapine (SEROquel) 100 MG tablet Take 100 mg by mouth Every Night.   8/14/2017 at 2000   • solifenacin (VESICARE) 10 MG tablet Take 10 mg by mouth Daily.   8/14/2017 at 0800   • acetaminophen (TYLENOL) 500 MG tablet Take 1,000 mg by mouth Every 6 (Six) Hours As Needed for Mild Pain (1-3) or Fever.   Unknown at Unknown time   • Albuterol (VENTOLIN IN) Inhale 2 puffs Every 4 (Four) Hours As Needed (wheezing/coughing/sob).   Unknown at Unknown time   • denosumab (PROLIA) 60 MG/ML solution syringe Inject 60 mg under the skin Take As Directed. Every 6 months, due December   More than a month at Unknown time   • dextromethorphan polistirex ER (DELSYM) 30 MG/5ML Suspension Extended Release oral suspension Take 10 mL by mouth 2 (Two) Times a Day As Needed (cough).   Unknown at Unknown time   • guaiFENesin (MUCINEX) 600 MG 12 hr tablet Take 1,200 mg by mouth 2 (Two) Times a Day As Needed for Congestion.   Unknown at Unknown time   • magnesium hydroxide (MILK OF MAGNESIA) 400 MG/5ML suspension Take 30 mL by mouth As Needed for Constipation (no bm x 3 days).   Unknown at Unknown time        Hospital medications:    atorvastatin 20 mg Oral Nightly   timolol 1 drop Both Eyes Q12H   And      brimonidine 1 drop Both Eyes Q12H   busPIRone 10 mg Oral BID   carvedilol 6.25 mg Oral BID With Meals   ceFAZolin 2 g Intravenous Q8H   cetirizine 10 mg Oral Daily   docusate sodium 100 mg Oral BID   [START ON 8/16/2017] enoxaparin 40 mg Subcutaneous Daily   fluticasone 2 spray Nasal Daily   lactobacillus acidophilus 1 capsule Oral Daily   levETIRAcetam 500 mg Oral BID   levothyroxine 50 mcg Oral Daily   montelukast 10 mg Oral Nightly   pantoprazole  40 mg Oral BID   [START ON 8/16/2017] PARoxetine 40 mg Oral QAM   polyethylene glycol 17 g Oral Daily   QUEtiapine 100 mg Oral Nightly       lactated ringers 100 mL/hr     •  acetaminophen  •  dextromethorphan polistirex ER  •  HYDROmorphone **AND** naloxone  •  magnesium hydroxide  •  ondansetron **OR** ondansetron ODT **OR** ondansetron  •  oxyCODONE-acetaminophen    Family history:  Family History   Problem Relation Age of Onset   • Family history unknown: Yes       Social history:  Social History   Substance Use Topics   • Smoking status: Never Smoker   • Smokeless tobacco: Never Used   • Alcohol use No       Review of systems:    She is too drowsy to tell me any of this I have reviewed the chart family is present    Objective:  Vitals Ranges:   Temp:  [97.5 °F (36.4 °C)-98.5 °F (36.9 °C)] 98.5 °F (36.9 °C)  Heart Rate:  [56-68] 68  Resp:  [15-18] 18  BP: (106-131)/(66-83) 129/78      Physical Exam:  She is lethargic but is had surgery within the last couple hours and is waking up fund of knowledge poor attention span and concentration fair recent and remote memory poor language function and some dysarthria without a aphasia elderly appearing in no distress but pretty drowsy right greater than left ptosis degree appears to be related to her drowsiness pupils to constricting to 1-1/2 eyes are conjugate face symmetrical tongue midline and this is about all she could give me without falling asleep motor moves all 4 extremities 4 out of 5 no atrophy fasciculations there is bradykinesia but no rigidity or resting tremor reflexes absent throughout toes definitely downgoing bilaterally sensation coordination station and gait was completely impossible for her to follow heart regular without murmur neck supple without bruits extremities no clubbing cyanosis or edema visual acuity she fell asleep before she can count the number of fingers I showed her.  She is not able to answer questions orientation so she just falls  back asleep    Results review:   I reviewed the patient's new clinical results.    Data review:  Lab Results (last 24 hours)     Procedure Component Value Units Date/Time    Tissue Pathology Exam [[id number]] Collected:  08/15/17 1217    Specimen:  Tissue from Abdominal Wall, Tissue from Abdominal Wall Updated:  08/15/17 1626           Imaging:  Imaging Results (last 24 hours)     ** No results found for the last 24 hours. **             Assessment and Plan:     Principal Problem:    Recurrent ventral incisional hernia    Patient has the appropriate degree of metabolic encephalopathy following successful surgery.  She has a history of seizures and is currently seizure free her nurse assures me that this patient will be able to take her 500 mg Keppra by mouth twice a day and therefore I will not change her over to an IV form at this time.  Based upon how she looks I would not recommend further neurologic workup at this time but would continue the current dose of Keppra indefinitely.  Thanks      Lance Pablo MD  08/15/17  4:46 PM

## 2017-08-15 NOTE — H&P (VIEW-ONLY)
"Office Visit     2017  [facility] GENERAL SURGERY       Progress Notes           PATIENT INFORMATION  Caitlyn Camacho   - 1950     CHIEF COMPLAINT      Chief Complaint   Patient presents with   • Follow-up   F/U, MRI/CT DONE, PT IS W/O COMPLAINTS     HISTORY OF PRESENT ILLNESS  HPI  Patient was as the office today for follow-up.  She is accompanied by the [facility] staff.  She did undergo a follow-up MRCP and labs.  MRCP was reviewed and discussed with her hospital radiologist  there is no evidence of choledocholithiasis.  Her LFTs were normal and nonobstructive pattern.  According to the [facility] staff patient continues to \"pick on\" the recurrent  incisional hernia scar.  There is a smaller circumflex that is healing well.  There is no overlying erythema or drainage.  They are keeping it protected with adhesive dressing.  Hernia still reducible.  There are no reports of nausea vomiting change in bowel movements or any other symptoms referral blood 2 hernia incarceration or strangulation.  I have reviewed her MRCP as well as her previous CT scan.  And I have also discussed her case with Dr. Roshan Dsouza.  She is status post open cholecystectomy, common bile duct exploration and T-tube placement after extraction off for CBD stones by Dr. 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]   • Stricture of duodenum [K31.5]   • Duodenal ulcer [K26.9]   • Hypothyroidism " [E03.9]   • Arthritis [M19.90]   • Dementia [F03.90]   • Depression [F32.9]   • 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, 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]   • Hyperlipidemia [E78.5]   • Mental retardation [F79]   • Osteopenia [M85.80]   • Vitamin D deficiency [E55.9]            PAST MEDICAL HISTORY   Medical History         Past Medical History:   Diagnosis Date   • Anxiety     • Dysphagia     • Hypercholesteremia     • Hypothyroidism     • Major depression     • OCD (obsessive compulsive disorder)     • Osteopenia     • Seizure disorder                 SURGICAL HISTORY   Surgical History          Past Surgical History:   Procedure Laterality Date   • CATARACT EXTRACTION       • HYSTERECTOMY                   FAMILY HISTORY        Family History   Problem Relation Age of Onset   • Family history unknown: Yes            SOCIAL HISTORY  Social History          Occupational History   • Not on file.           Social History Main Topics   • Smoking status: Never Smoker   • Smokeless tobacco: Not on file   • Alcohol use No   • Drug use: Not on file   • Sexual activity: Not on file            CURRENT MEDICATIONS     Current Outpatient Prescriptions:   •  acetaminophen (TYLENOL) 500 MG tablet, Take 500 mg by mouth Every 6 (Six) Hours As Needed for mild pain (1-3)., Disp: , Rfl:   •  Albuterol (VENTOLIN IN), Inhale., Disp: , Rfl:   •  atorvastatin (LIPITOR) 20 MG tablet, Take 20 mg by mouth Daily., Disp: , Rfl:   •  brimonidine-timolol (COMBIGAN) 0.2-0.5 % ophthalmic solution, Every 12 (Twelve) Hours., Disp: , Rfl:   •  busPIRone (BUSPAR) 10 MG tablet, Take 10 mg by mouth 3 (Three) Times a Day., Disp: , Rfl:   •  calcium carbonate (OS-KATY) 600 MG tablet, Take 600 mg by mouth Daily., Disp: , Rfl:   •  carvedilol (COREG) 6.25 MG tablet, Take 6.25 mg by mouth 2 (Two) Times a Day With Meals., Disp: , Rfl:   •  cetirizine (zyrTEC) 10 MG tablet, Take 10 mg by mouth Daily., Disp: , Rfl:   •  denosumab (PROLIA) 60 MG/ML solution syringe, Inject  under the skin 1  "(One) Time., Disp: , Rfl:   •  Dextromethorphan-Menthol (DELSYM COUGH RELIEF MT), Apply  to the mouth or throat As Needed., Disp: , Rfl:   •  Ergocalciferol (VITAMIN D2 PO), Take  by mouth., Disp: , Rfl:   •  fluticasone-salmeterol (ADVAIR) 500-50 MCG/DOSE DISKUS, Inhale 2 (Two) Times a Day., Disp: , Rfl:   •  guaiFENesin (MUCINEX) 600 MG 12 hr tablet, Take 1,200 mg by mouth 2 (Two) Times a Day., Disp: , Rfl:   •  levETIRAcetam (KEPPRA) 500 MG tablet, Take 500 mg by mouth 2 (Two) Times a Day., Disp: , Rfl:   •  levothyroxine (SYNTHROID, LEVOTHROID) 50 MCG tablet, Take 50 mcg by mouth Daily., Disp: , Rfl:   •  Multiple Vitamins-Minerals (MULTIVITAMIN ADULT PO), Take  by mouth., Disp: , Rfl:   •  pantoprazole (PROTONIX) 40 MG EC tablet, Take 40 mg by mouth Daily., Disp: , Rfl:   •  PARoxetine (PAXIL) 40 MG tablet, Take 40 mg by mouth Every Morning., Disp: , Rfl:   •  phenytoin (DILANTIN) 100 MG ER capsule, Take  by mouth 2 (Two) Times a Day., Disp: , Rfl:   •  Probiotic Product (PROBIOTIC ADVANCED PO), Take  by mouth., Disp: , Rfl:   •  QUEtiapine (SEROquel) 100 MG tablet, Take 100 mg by mouth Every Night., Disp: , Rfl:   •  solifenacin (VESICARE) 10 MG tablet, Take 10 mg by mouth Daily., Disp: , Rfl:      ALLERGIES  Codeine     VITALS   Vitals        Vitals:     06/23/17 1549   BP: 132/82   Weight: 150 lb (68 kg)   Height: 63\" (160 cm)            LAST RESULTS                             Lab Requisition on 06/07/2017   Component Date Value Ref Range Status   • Glucose 06/07/2017 77  65 - 99 mg/dL Final   • BUN 06/07/2017 22  8 - 23 mg/dL Final   • Creatinine 06/07/2017 0.55* 0.57 - 1.00 mg/dL Final   • Sodium 06/07/2017 140  136 - 145 mmol/L Final   • Potassium 06/07/2017 4.5  3.5 - 5.2 mmol/L Final   • Chloride 06/07/2017 101  98 - 107 mmol/L Final   • CO2 06/07/2017 28.3  22.0 - 29.0 mmol/L Final   • Calcium 06/07/2017 9.5  8.8 - 10.5 mg/dL Final   • Total Protein 06/07/2017 6.4  6.0 - 8.5 g/dL Final   • Albumin " 06/07/2017 3.40* 3.50 - 5.20 g/dL Final   • ALT (SGPT) 06/07/2017 12  5 - 33 U/L Final   • AST (SGOT) 06/07/2017 17  5 - 32 U/L Final   • Alkaline Phosphatase 06/07/2017 81  40 - 129 U/L Final   • Total Bilirubin 06/07/2017 0.2  0.2 - 1.2 mg/dL Final   • eGFR Non African Amer 06/07/2017 111  >60 mL/min/1.73 Final   • Globulin 06/07/2017 3.0  gm/dL Final   • A/G Ratio 06/07/2017 1.1  g/dL Final   • BUN/Creatinine Ratio 06/07/2017 40.0* 7.0 - 25.0 Final   • Anion Gap 06/07/2017 10.7  mmol/L Final   • WBC 06/07/2017 11.01* 4.80 - 10.80 10*3/mm3 Final   • RBC 06/07/2017 4.60  4.20 - 5.40 10*6/mm3 Final   • Hemoglobin 06/07/2017 12.5  12.0 - 16.0 g/dL Final   • Hematocrit 06/07/2017 37.8  37.0 - 47.0 % Final   • MCV 06/07/2017 82.2  81.0 - 99.0 fL Final   • MCH 06/07/2017 27.2  27.0 - 31.0 pg Final   • MCHC 06/07/2017 33.1  31.0 - 37.0 g/dL Final   • RDW 06/07/2017 16.0* 11.5 - 14.5 % Final   • RDW-SD 06/07/2017 47.8  37.0 - 54.0 fl Final   • MPV 06/07/2017 10.9* 7.4 - 10.4 fL Final   • Platelets 06/07/2017 255  140 - 500 10*3/mm3 Final   • Neutrophil % 06/07/2017 54.2  45.0 - 70.0 % Final   • Lymphocyte % 06/07/2017 31.5  20.0 - 45.0 % Final   • Monocyte % 06/07/2017 9.5* 3.0 - 8.0 % Final   • Eosinophil % 06/07/2017 3.4  0.0 - 4.0 % Final   • Basophil % 06/07/2017 1.0  0.0 - 2.0 % Final   • Immature Grans % 06/07/2017 0.4  0.0 - 0.5 % Final   • Neutrophils, Absolute 06/07/2017 5.97  1.50 - 8.30 10*3/mm3 Final   • Lymphocytes, Absolute 06/07/2017 3.47  0.60 - 4.80 10*3/mm3 Final   • Monocytes, Absolute 06/07/2017 1.05* 0.00 - 1.00 10*3/mm3 Final   • Eosinophils, Absolute 06/07/2017 0.37* 0.10 - 0.30 10*3/mm3 Final   • Basophils, Absolute 06/07/2017 0.11  0.00 - 0.20 10*3/mm3 Final   • Immature Grans, Absolute 06/07/2017 0.04* 0.00 - 0.03 10*3/mm3 Final   • nRBC 06/07/2017 0.0  0.0 - 0.0 /100 WBC Final      Ct Abdomen Pelvis W Contrast     Result Date: 6/2/2017  Narrative: INDICATION: Anterior abdominal wall pain and  palpable lump. Open cholecystectomy with T-tube placement on 03/17/2017..  TECHNIQUE: CT of the abdomen and pelvis with p.o. and IV contrast. Coronal and sagittal reconstructions were obtained.  Radiation dose reduction techniques were utilized, including automated exposure control and exposure modulation based on body size.  COMPARISON: CT chest 04/21/2017 and CT abdomen 03/03/2017..  FINDINGS: Abdomen: There is mucus plugging within the left lower lobe bronchus. Minimal tree-in-bud nodularity in the left lower lobe may represent a postobstructive pneumonia. Please correlate with clinical symptoms.  Several anterior abdominal wall hernias are unchanged from the prior study. These midline hernias are supraumbilical hernias. These contains omental fat and a few loops of small bowel. No complicating features identified.  The liver enhances normally. There is mild intrahepatic biliary dilatation. The extrahepatic bile duct is at the upper limits of normal measuring 7-8 mm in diameter. There is a questionable filling defect within the common bile duct. Patient does have a history of choledocholithiasis.  There is a small cystic lesion the uncinate processes the pancreas measuring 0.8 cm. This is likely a benign pseudocyst, however can be further evaluated. There is a small low-attenuation nodule in the right adrenal gland this had an appearance of a benign adenoma on the noncontrasted chest CT.  The gallbladder is surgically absent.  The stomach is distended. The bowel is not dilated.  The appendix is normal. There is occasional colonic diverticula.  Pelvis: No pelvic mass or free pelvic fluid.  No enlarged pelvic or inguinal lymph nodes..  No acute osseous abnormalities. There is S-shaped scoliosis of the thoracolumbar spine. There is interval right upper quadrant incision, however no hernia in the incision site.       Impression: Impression:  1. Mild intrahepatic and extrahepatic biliary dilatation is similar to the  prior study. Question of a choledocholithiasis. Consider an MRCP/ERCP to further evaluate. 2. Interval cholecystectomy. 3. Complex supraumbilical anterior abdominal wall hernias are unchanged from the prior study. No complicating features. 4. Mucous plugging in the left lower lobe bronchus with some tree-in-bud nodularity in the left lower lobe. This has appearance most typical for a postobstructive pneumonia.  This report was finalized on 6/2/2017 9:44 AM by Dr. Geovani Khalil MD.       Mri Abdomen W Wo Contrast Mrcp     Result Date: 6/21/2017  Narrative: MRI ABDOMEN WITH CONTRAST, INCLUDING MRCP, 06/21/2017:  HISTORY: 67-year-old female [facility], [facility] resident status post open cholecystectomy, common bile duct exploration and removal of common bile duct stones with T-tube placement at an outside facility March 2017. T-tube removed May 2017. Small calcification noted near the hepatic duct confluence on CT examination here this month.  TECHNIQUE: MRI examination of the abdomen was performed before and after gadolinium contrast administration (13 cc MultiHance), including multiphase postcontrast images. MRCP sequences were also obtained. The patient was unable to suspend respiration, and some of the sequences are degraded by respiratory motion artifact.  COMPARISON: *  CT abdomen, 06/02/2017. *  CT abdomen/pelvis, preoperative, 03/03/2017.  FINDINGS: The gallbladder is surgically absent. There is mild intrahepatic and extrahepatic bile duct dilatation to the level of the ampulla, and the pancreatic duct is diffusely prominent, though not significantly dilated. Common hepatic duct and mid common bile duct each measure about 9 mm. No filling defect is present within the bile ducts to suggest bile duct stone. The small calcification noted on previous CT appears to lie outside the bile ducts.  No fluid collection or additional abnormality in the gallbladder fossa. Liver and spleen are normal in size and appearance.   Three tiny benign-appearing pancreatic cysts are present. The largest in the midportion of pancreatic head measures about 9 mm, and another in the upper pancreatic head measures two or 3 mm. Distal pancreatic tail cyst measures 6 mm.       Impression: 1. Postop changes cholecystectomy. 2. Mild intrahepatic and extra hepatic bile duct dilatation to the level of the ampulla. No bile duct stone is demonstrated. 3. Three tiny benign pancreatic cysts.  This report was finalized on 6/21/2017 11:31 AM by Dr. Bean Garcia MD.          PHYSICAL EXAM  Physical Exam   Constitutional: She appears well-developed and well-nourished.   Cardiovascular: Normal rate, regular rhythm and normal heart sounds.    Pulmonary/Chest: Breath sounds normal.   Abdominal:   Soft, moderately obese, large reducible recurrent incisional hernia.  Small all ulcer with no purulent drainage no erythema no abscess.  Protective dressing in place.  Well-healed right subcostal incision.   Musculoskeletal: She exhibits no edema.   Neurological: She is alert.   Skin: Skin is warm and dry.   Nursing note and vitals reviewed.        ASSESSMENT  Recurrent  incisional hernia - large but reducible     It appears that the hernia is definitely larger.     Skin ulcers ×1. Local wound care was discussed with the nursing staff at [facility] and recommendations provided.     It is critical that those open ulcers on the scar be healed prior to any hernia repair as her hernia is large enough that she will require a prosthetic mesh/implant.  I did call [facility] nursing staff and gave him wound care instructions.  I have also advised him to keep the area covered and protected so she does not traumatize it.     She will need pulmonary and cardiac evaluation and clearance prior to surgery.  This was also discussed with the [facility] nursing staff.  They will arrange this through their facility.        PLAN  Follow-up after clearance is obtained and once cleared will   "proceed with open recurrent incisional hernia repair.   [facility] staff was advised to call the office sooner should she have worsening symptoms or go to the nearest emergency room.                                             Instructions              Additional Documentation   Vitals:     /82     Ht 63\" (160 cm)     Wt 150 lb (68 kg)     BMI 26.57 kg/m2     BSA 1.71 m2                Communications        Letter sent to Khang Merino MD   Orders Placed     None   Medication Changes       None      Medication List   Visit Diagnoses        Recurrent incisional hernia      Problem List    Current Medications      Disp Start End   acetaminophen (TYLENOL) 500 MG tablet      Sig - Route: Take 500 mg by mouth Every 6 (Six) Hours As Needed for mild pain (1-3). - Oral   Class: Historical Med   Albuterol (VENTOLIN IN)      Sig - Route: Inhale. - Inhalation   Class: Historical Med   atorvastatin (LIPITOR) 20 MG tablet      Sig - Route: Take 20 mg by mouth Daily. - Oral   Class: Historical Med   brimonidine-timolol (COMBIGAN) 0.2-0.5 % ophthalmic solution      Sig: Every 12 (Twelve) Hours.   Class: Historical Med   busPIRone (BUSPAR) 10 MG tablet      Sig - Route: Take 10 mg by mouth 3 (Three) Times a Day. - Oral   Class: Historical Med   calcium carbonate (OS-KATY) 600 MG tablet      Sig - Route: Take 600 mg by mouth Daily. - Oral   Class: Historical Med   carvedilol (COREG) 6.25 MG tablet      Sig - Route: Take 6.25 mg by mouth 2 (Two) Times a Day With Meals. - Oral   Class: Historical Med   cetirizine (zyrTEC) 10 MG tablet      Sig - Route: Take 10 mg by mouth Daily. - Oral   Class: Historical Med   denosumab (PROLIA) 60 MG/ML solution syringe      Sig - Route: Inject  under the skin 1 (One) Time. - Subcutaneous   Class: Historical Med   Dextromethorphan-Menthol (DELSYM COUGH RELIEF MT)      Sig - Route: Apply  to the mouth or throat As Needed. - Mouth/Throat   Class: Historical Med   Ergocalciferol (VITAMIN D2 PO)   "    Sig - Route: Take  by mouth. - Oral   Class: Historical Med   fluticasone-salmeterol (ADVAIR) 500-50 MCG/DOSE DISKUS      Sig - Route: Inhale 2 (Two) Times a Day. - Inhalation   Class: Historical Med   guaiFENesin (MUCINEX) 600 MG 12 hr tablet      Sig - Route: Take 1,200 mg by mouth 2 (Two) Times a Day. - Oral   Class: Historical Med   levETIRAcetam (KEPPRA) 500 MG tablet      Sig - Route: Take 500 mg by mouth 2 (Two) Times a Day. - Oral   Class: Historical Med   levothyroxine (SYNTHROID, LEVOTHROID) 50 MCG tablet      Sig - Route: Take 50 mcg by mouth Daily. - Oral   Class: Historical Med   Multiple Vitamins-Minerals (MULTIVITAMIN ADULT PO)      Sig - Route: Take  by mouth. - Oral   Class: Historical Med   pantoprazole (PROTONIX) 40 MG EC tablet      Sig - Route: Take 40 mg by mouth Daily. - Oral   Class: Historical Med   PARoxetine (PAXIL) 40 MG tablet      Sig - Route: Take 40 mg by mouth Every Morning. - Oral   Class: Historical Med   phenytoin (DILANTIN) 100 MG ER capsule      Sig - Route: Take  by mouth 2 (Two) Times a Day. - Oral   Class: Historical Med   Probiotic Product (PROBIOTIC ADVANCED PO)      Sig - Route: Take  by mouth. - Oral   Class: Historical Med   QUEtiapine (SEROquel) 100 MG tablet      Sig - Route: Take 100 mg by mouth Every Night. - Oral   Class: Historical Med   solifenacin (VESICARE) 10 MG tablet      Sig - Route: Take 10 mg by mouth Daily. - Oral   Class: Historical Med

## 2017-08-15 NOTE — PLAN OF CARE
Problem: Perioperative Period (Adult)  Goal: Signs and Symptoms of Listed Potential Problems Will be Absent or Manageable (Perioperative Period)  Outcome: Ongoing (interventions implemented as appropriate)    08/15/17 1017 08/15/17 1404   Perioperative Period   Problems Assessed (Perioperative Period) all --    Problems Present (Perioperative Period) --  none

## 2017-08-15 NOTE — OP NOTE
Date of procedure: 8/15/17    Preoperative diagnosis: Incisional hernia  Postoperative diagnosis: Recurrent incisional hernia and intra-abdominal adhesions  Procedure: Lysis of adhesions requiring 60 minutes of operative time and repair of recurrent incisional hernia with onlay mesh placement    Surgeon: Donna Ivy M.D.  Assistant: Cuco Cuellar M.D.  Anesthesia: Gen. with endotracheal intubation  Anesthesia provider: Marixa Kwan CRNA  IVF: Per anesthesia record  Estimated blood loss: 25 mL  Specimens: Old scar and hernia sac to pathology  Drains: 10 [place] Darryl-Crane drain in subcutaneous flap  Prosthetic implants: Bard soft mesh lot number [id number]  Condition: Stable  Complications: None    Intraoperative findings: Multiple fascial defects-Kenyan cheese appearance.  Extensive lysis of adhesions had to be performed to free up the omentum and small bowel adhesions from the hernia sac.  Please note that old hernia mesh was encountered in the epigastric/upper abdomen.  The multiple fascial defects were mostly incorporated into one larger defect, the fascia was mobilized laterally and in a tension-free manner was able to be approximated followed then by placement of a large Bard soft mesh as an onlay.    Indications for procedure: Patient is a 67-year-old female referred to general surgery with the aforementioned complaints.  Patient is a resident of [facility] no previous history of previous hernia repair available /noted however she had a large incisional hernia from a previous upper laparotomy scar.  She underwent further workup including preoperative cardiac and pulmonary clearance and the case was discussed with multiple consultants, primary care physician and with her medical power of  surgical versus nonsurgical options were all discussed including pros and cons and risks and benefits.  The medical power of  understood the procedure, risks, benefits, complications including but not limited  to risk of bleeding, infection, hematoma formation, seroma formation, risk of intra-abdominal abscess, risk of recurrence, risk of injury to intra-abdominal organs including bowel injury and, occasions associated with anesthetic.  She gave informed consent.    Description of the procedure: Patient was identified by me in the preoperative area brought to the operating room suite and laid supine in the operating room table.  SCDs were applied bilateral lower extremities.  After induction of general anesthesia endotracheal intubation was performed.  Abdomen was prepped and draped in usual sterile surgical fashion.  Please note patient did receive preoperative antibiotics and a timeout was performed.  The incisional hernia was through her previous upper midline laparotomy scar.  She also had a previous G-tube and a right subcostal open cholecystectomy scar.  The 10 surgical blade was used and an elliptical excision was performed and the old scar was excised and sent off for pathology.  Immediately after excising the scar subcutaneous tissue and subsequently hernia sac was encountered.  The hernia sac was then gently dissected off of the overlying subcutaneous tissue and I actually entered the hernia sac in an atraumatic manner and was able to then identify the actual larger fascial defect.  At this point entry into the intra-abdominal cavity was obtained.  What then ensued was an extensive lysis of adhesions freeing up the intestinal and omental adhesions from the hernia sac - total time spent in lysis of adhesions was 60 minutes of operative time.  Most of the adhesions were taken down sharply using the Metzenbaum scissors as well as electrocautery when indicated.  The vascular pedicles were all ligated with 0 silk Vicryl ties.  Once lysis of adhesions was done the larger defect was completely freed up off all the adhesions and all contents were easily reduced.  At this point then a further evaluation revealed that the  patient had 2 smaller defects to the right of the midline, 2 smaller defects inferiorly at the umbilicus and 2 additional smaller defects at her previous G-tube site in the left upper quadrant.  Each one of these fascial defects was evaluated hernia sacs were excised from here and the intervening fascial bridge was excised so as to incorporate all this within the larger fascial defect.  The 2 fascial defects associated with the G-tube site however would very far off and it would compromise more fascia so that point was were left alone and then the primarily repaired with #1 Prolene.  Once this was done I was also able to then free up the fascia laterally on both sides.  This was done to a greater extent on the left side and on the right side it was done partially due to the patient's previous right subcostal incision.  At this point enough advancement was obtained on the fascia that we were able to close the fascial defect/primarily repair and closed the fascia in a tension-free manner.  Also I was able to free up the overlying subcutaneous tissue on both sides.  At this point then it was felt best to proceed with a primary repair followed by placement of the Bard soft mesh in an onlay manner.  Keeping the actual defect in mind we needed a 21 cm long by 16 cm wide piece of mesh.  The largest Bard soft mesh was taken and cut to fit that size.  The fascial edges were freshened up, all hernia sac had been excised and sent off for pathology.  #1 Prolene was used to primarily repair the 2 small fascial defects associated with the G-tube which was to the left of the midline.  And then the fascia was closed/approximated in the midline using #1 Prolene suture ×2.  The sutures were placed at the superior and inferior and and the knots were tied in the middle,   thereby the fascia was primarily closed.  The Bard soft mesh was taken measuring 21 cm in length by 16 cm in width.  2 anchors/tacking sutures were placed at the 6  and 9 o' clock positions with 0 Ethibond.  The Tisseel was nebulized and then sprayed over the fascia after which point the mesh was then laid over it.  A total of 10 mL's was used once this was done and the mesh laid over providing coverage of greater than 4 cm in every dimension.  Additionally sutures at 12 and 3 o'clock position were also placed.  Once this was done  the onlay mesh was completely placed and the repair was deemed to be adequate.  At this point then we placed a 10 Kyrgyz flat Darryl-Rcane drain.  The drain was exited through a separate stab incision along the right lateral abdomen and sutured to the skin with 2-0 nylon.  Please note that the umbilicus that had to be taken off during the lysis of adhesions and in order to obtain enough flap coverage was then tacked back on to the fascia with 3-0 Vicryl.  Subcutaneous flaps were then approximated with 2-0 Vicryl suture ×2 in a running fashion.  And skin edges were approximated with sterile skin staples.  The wounds were cleansed and dried, sterile dressings were applied along with an abdominal binder.    Patient was awakened, extubated and taken to recovery room in stable condition.  Sponge, instrument, needle counts were all correct at the end of the procedure ×2.  Patient tolerated the procedure well.

## 2017-08-15 NOTE — PLAN OF CARE
Problem: Patient Care Overview (Adult)  Goal: Plan of Care Review    08/15/17 1404   Patient Care Overview   Progress improving   Outcome Evaluation   Outcome Summary/Follow up Plan vss, waiting for floor

## 2017-08-15 NOTE — ANESTHESIA PROCEDURE NOTES
Airway  Urgency: elective    Airway not difficult    General Information and Staff    Patient location during procedure: OR  CRNA: NILE MORRIS    Indications and Patient Condition  Indications for airway management: airway protection    Preoxygenated: yes  MILS maintained throughout  Mask difficulty assessment: 1 - vent by mask    Final Airway Details  Final airway type: endotracheal airway      Successful airway: ETT  Cuffed: yes   Successful intubation technique: direct laryngoscopy  Facilitating devices/methods: intubating stylet  Endotracheal tube insertion site: oral  Blade: Live  Blade size: #3  ETT size: 7.0 mm  Cormack-Lehane Classification: grade IIa - partial view of glottis  Placement verified by: chest auscultation and capnometry   Measured from: lips  ETT to lips (cm): 20  Number of attempts at approach: 1    Additional Comments  Off centered glottis

## 2017-08-15 NOTE — INTERVAL H&P NOTE
H&P updated. The patient was examined and the following changes are noted:  Please note this is a correction patient has a ventral incisional hernia.  This is not a recurrent hernia.

## 2017-08-15 NOTE — ANESTHESIA PREPROCEDURE EVALUATION
Anesthesia Evaluation     NPO Solid Status: > 8 hours  NPO Liquid Status: > 8 hours     Airway   Dental      Pulmonary - negative pulmonary ROS     ROS comment: Cough with some sputum production  Cardiovascular     Patient on routine beta blocker and Beta blocker given within 24 hours of surgery    (+) hyperlipidemia  Dysrhythmias: caregiver unaware.    ROS comment: Normal Echocardiogram 3/17  SINUS RHYTHM  LEFT AXIS DEVIATION  RSR' IN V1 OR V2, PROBABLY NORMAL VARIANT  NO SIGNIFICANT CHANGE FROM PREVIOUS ECG  Electronically Signed by:  Cb Gomez 10-Aug-2017 09:05:18  Date and Time of Study: 2017-08-09 13:43:40    Specimen Collected: 08/09/17  1:43 PM Last Resulted: 08/10/17  9:08 AM          Neuro/Psych  (+) seizures (care giver denies), psychiatric history (OCD, mod MR) Depression, dementia,    GI/Hepatic/Renal/Endo    (+) obesity,  hypothyroidism,     Musculoskeletal     Abdominal    Substance History - negative use     OB/GYN negative ob/gyn ROS         Other   (+) arthritis (hands)                               Anesthesia Plan    ASA 3     general   (Discussed with health care provider as patient with limitations)  intravenous induction   Anesthetic plan and risks discussed with patient and other.

## 2017-08-15 NOTE — PERIOPERATIVE NURSING NOTE
LOREE TATUM, GUARDIAN, [phone] POLA LAWS, NURSE AIDE, [phone]  CHRIS CASILLAS NURSE, [phone]

## 2017-08-15 NOTE — ANESTHESIA POSTPROCEDURE EVALUATION
Patient: Caitlyn Camacho    Procedure Summary     Date Anesthesia Start Anesthesia Stop Room / Location    08/15/17 8898 1405 [facility] OR 1 / [facility]       Procedure Diagnosis Surgeon Provider    Lysis of adhesion and repair of recurrent incisional hernia with onlay mesh placement  (N/A Abdomen) Recurrent ventral incisional hernia; Peritoneal adhesions  (Recurrent ventral incisional hernia [K43.2]) MD Marixa Krause CRNA          Anesthesia Type: general  Last vitals  BP   119/81 (08/15/17 1450)    Temp   97.7 °F (36.5 °C) (08/15/17 1355)    Pulse   64 (08/15/17 1450)   Resp   15 (08/15/17 1450)    SpO2   95 % (08/15/17 1450)      Post Anesthesia Care and Evaluation    Patient location during evaluation: PACU  Patient participation: complete - patient participated  Level of consciousness: awake and alert  Pain score: 0  Pain management: adequate  Airway patency: patent  Anesthetic complications: No anesthetic complications  PONV Status: noneRespiratory status: acceptable  Hydration status: acceptable

## 2017-08-15 NOTE — PLAN OF CARE
Problem: Patient Care Overview (Adult)  Goal: Plan of Care Review  Outcome: Ongoing (interventions implemented as appropriate)    08/15/17 1017 08/15/17 1404   Coping/Psychosocial Response Interventions   Plan Of Care Reviewed With patient;healthcare surrogate --    Patient Care Overview   Progress --  improving   Outcome Evaluation   Outcome Summary/Follow up Plan --  vss, waiting for procedure

## 2017-08-15 NOTE — CONSULTS
HOSPITALIST SERVICES  @ [facility], [place]            [facility] Hospitalist Team    CONSULTATION NOTE      Patient Care Team:  Khang Merino MD as PCP - General (Family Medicine)    CHIEF COMPLAINT:     Patient has hx of recurrent incisional hernia and had Lysis of adhesions requiring 60 minutes of operative time and repair of recurrent incisional hernia with onlay mesh placement: Done today       HISTORY OF PRESENT ILLNESS:       Ms. Melani Camacho is a 67 year old  female with multiple medical problems as listed below. She was having recurrent incisional hernia and had Lysis of adhesions requiring 60 minutes of operative time and repair of recurrent incisional hernia with onlay mesh placement: Done today by Dr Ivy. The hospitalist service was consulted for the medical management of the patient. Patient is back from Recovery room. Does not appear to be in any acute distress. Denies any sx of chest pain or shortness of breath or n/v. Postoperative pain is under control.        Past Medical History:   Diagnosis Date   • Anemia due to blood loss    • Anxiety    • Arthritis    • Duodenal stricture    • Duodenal ulcer    • Dysphagia    • Gallstones    • H/O convulsions    • Hypercholesteremia    • Hypothyroidism    • Major depression    • Moderate intellectual disability    • OCD (obsessive compulsive disorder)    • Ocular histoplasmosis     w/macular scars   • Osteopenia    • Ptosis    • Pure hyperglyceridemia    • S/P bypass gastrojejunostomy    • Seasonal allergies    • Seizure disorder    • Unspecified dementia without behavioral disturbance    • Ventral hernia     sched repair   • Vitamin D deficiency      Past Surgical History:   Procedure Laterality Date   • CATARACT EXTRACTION Bilateral 2016   • CHOLECYSTECTOMY OPEN  03/2017   • COLONOSCOPY  05/2009   • ENDOSCOPY  03/2017   • GASTROJEJUNOSTOMY  08/2016   • GASTROSTOMY      TEJINDER   • HYSTERECTOMY       Family History    Problem Relation Age of Onset   • Family history unknown: Yes     Social History   Substance Use Topics   • Smoking status: Never Smoker   • Smokeless tobacco: Never Used   • Alcohol use No     Prescriptions Prior to Admission   Medication Sig Dispense Refill Last Dose   • atorvastatin (LIPITOR) 20 MG tablet Take 20 mg by mouth Every Night.   8/14/2017 at 2000   • brimonidine-timolol (COMBIGAN) 0.2-0.5 % ophthalmic solution Administer 1 drop to both eyes Every 12 (Twelve) Hours.   8/15/2017 at 0715   • busPIRone (BUSPAR) 10 MG tablet Take 10 mg by mouth 2 (Two) Times a Day.   8/15/2017 at 0715   • calcium carbonate (OS-KATY) 600 MG tablet Take 600 mg by mouth 3 (Three) Times a Day With Meals.   8/14/2017 at 2000   • carvedilol (COREG) 6.25 MG tablet Take 6.25 mg by mouth 2 (Two) Times a Day With Meals.   8/15/2017 at 0715   • cetirizine (zyrTEC) 10 MG tablet Take 10 mg by mouth Daily.   8/15/2017 at 0800   • docusate sodium (COLACE) 100 MG capsule Take 100 mg by mouth 2 (Two) Times a Day.   8/14/2017 at 2000   • Ergocalciferol (VITAMIN D2 PO) Take 50,000 Units by mouth Every 7 (Seven) Days.   8/2/2017 at 0800   • ferrous sulfate 325 (65 FE) MG tablet Take 325 mg by mouth Daily With Breakfast.   8/8/2017   • fluticasone (FLONASE) 50 MCG/ACT nasal spray 2 sprays into each nostril Daily.   8/15/2017 at 0715   • glucosamine-chondroitin 500-400 MG capsule capsule Take 3 capsules by mouth Daily.   8/14/2017 at 0800   • levETIRAcetam (KEPPRA) 500 MG tablet Take 500 mg by mouth 2 (Two) Times a Day.   8/15/2017 at 0715   • levothyroxine (SYNTHROID, LEVOTHROID) 50 MCG tablet Take 50 mcg by mouth Daily.   8/15/2017 at 0600   • montelukast (SINGULAIR) 10 MG tablet Take 10 mg by mouth Every Night.   8/14/2017 at 2000   • Multiple Vitamins-Minerals (MULTIVITAMIN ADULT PO) Take 1 tablet by mouth Daily.   8/8/2017   • Nutritional Supplements (RESOURCE ARGINAID) pack Take  by mouth 2 (Two) Times a Day.   8/14/2017 at 2000   •  "pantoprazole (PROTONIX) 40 MG EC tablet Take 40 mg by mouth 2 (Two) Times a Day.   8/14/2017 at 2000   • PARoxetine (PAXIL) 40 MG tablet Take 40 mg by mouth Every Morning.   8/15/2017 at 0715   • polyethylene glycol (MIRALAX) packet Take 17 g by mouth Daily.   8/14/2017 at 0800   • Probiotic Product (PROBIOTIC DAILY PO) Take 1 capsule by mouth Daily.   8/14/2017 at 2000   • QUEtiapine (SEROquel) 100 MG tablet Take 100 mg by mouth Every Night.   8/14/2017 at 2000   • solifenacin (VESICARE) 10 MG tablet Take 10 mg by mouth Daily.   8/14/2017 at 0800   • acetaminophen (TYLENOL) 500 MG tablet Take 1,000 mg by mouth Every 6 (Six) Hours As Needed for Mild Pain (1-3) or Fever.   Unknown at Unknown time   • Albuterol (VENTOLIN IN) Inhale 2 puffs Every 4 (Four) Hours As Needed (wheezing/coughing/sob).   Unknown at Unknown time   • denosumab (PROLIA) 60 MG/ML solution syringe Inject 60 mg under the skin Take As Directed. Every 6 months, due December   More than a month at Unknown time   • dextromethorphan polistirex ER (DELSYM) 30 MG/5ML Suspension Extended Release oral suspension Take 10 mL by mouth 2 (Two) Times a Day As Needed (cough).   Unknown at Unknown time   • guaiFENesin (MUCINEX) 600 MG 12 hr tablet Take 1,200 mg by mouth 2 (Two) Times a Day As Needed for Congestion.   Unknown at Unknown time   • magnesium hydroxide (MILK OF MAGNESIA) 400 MG/5ML suspension Take 30 mL by mouth As Needed for Constipation (no bm x 3 days).   Unknown at Unknown time     Allergies:  Codeine    REVIEW OF SYSTEMS:  Please see the above history of present illness for pertinent positives and negatives.  The remainder of the patient's systems have been reviewed and are negative.     Vital Signs  Temp:  [97.5 °F (36.4 °C)-98.3 °F (36.8 °C)] 97.8 °F (36.6 °C)  Heart Rate:  [56-68] 68  Resp:  [15-18] 18  BP: (106-131)/(66-83) 119/76    Flowsheet Rows         First Filed Value    Admission Height  62\" (157.5 cm) Documented at 08/10/2017 1251    " Admission Weight  153 lb 11.2 oz (69.7 kg) Documented at 08/10/2017 1251           Physical Exam:    PHYSICAL EXAMINATION:    VITAL SIGNS: As per Nurse's notes    GENERAL APPEARANCE: The patient is a well developed, well nourished, , in no acute distress without complaints of shortness of breath, dyspnea or acute pain. Lips and nail beds are pink.    HEENT: Normocephalic, atraumatic. PERRL. The sclerae anicteric and conjunctivae pink and moist. Not cooperative for exam    NECK: Supple and symmetric. No masses. No thyromegaly. No tenderness. Trachea central. No carotid bruits. No evidence of JVD.    LYMPH NODES: No palpable lymphadenopathy.    SKIN: Warm, dry and intact. No rash or lesions or wounds or patechiae.    CHEST: Normal AP diameter and normal contour without any kyphoscoliosis.    LUNGS: Clear to auscultation bilaterally. Respiratory expansion equal bilaterally. No wheezes/rales/crackles/rubs.    CARDIOVASCULAR: RRR. S1, S2 normal without murmur/gallop/rub. No S3, S4.     ABDOMEN: S/P Lysis of adhesions requiring 60 minutes of operative time and repair of recurrent incisional hernia with onlay mesh placement    RECTAL: Not indicated.     EXTREMITIES:  No evidence of cyanosis, clubbing, or edema.     MUSCULOSKELETAL: Muscle strength and tone normal.    PSYCHIATRY: No evidence of acute anxiety, depression, panic attacks or hallucinations.    MENTAL STATUS EXAMINATION: Patient is not cooperative for examination      NEUROLOGIC: Patient is not cooperative for examination.                  Results Review:    I reviewed the patient's new clinical results.  Lab Results (most recent)     Procedure Component Value Units Date/Time    Tissue Pathology Exam [[id number]] Collected:  08/15/17 1217    Specimen:  Tissue from Abdominal Wall, Tissue from Abdominal Wall Updated:  08/15/17 1626          Imaging Results (most recent)     None        reviewed    ECG/EMG Results (most recent)     None         reviewed    Assessment/Plan     ASSESSMENT AND PLAN:       SUMMARY:    ?   PROPHYLAXIS:   -Oxygen Saturation: As per Nurses' notes.   -DVT Prophylaxis: On Inj. Lovenox   -Gastritis Prophylaxis: Pantoprazole    -Constipation Prophylaxis: colace 100 mg po BID   -Immunizations: N/A  -Smoking/ Nicotine issues: N/A      NUTRITION AND FLUIDS:  -Diet/ Nutrition: Clear Liquids    -Fluid Status/Electrolytes:  mL/Hr      THERAPEUTIC:   ALLERGIES: Codeine   ANTIBIOTICS: as per Dr Ivy   PAIN MANAGEMENT: Inj Dilaudid and Percocet   INSULIN THERAPY: N/A    CHEST PAIN: N/A    NEBULIZER TREATMENT: N/A    ANXIETY: Buspar    DEPRESSION: Paxil and Seroquel    INSOMNIA: N/A            PLAN:    Labs and diagnostic tests reviewed: Last Labs from 8/9/2017    Diagnostic tests reviewed:    CXR  IMPRESSION:  No new cardiopulmonary findings.      This report was finalized on 8/9/2017 2:12 PM by Dr. Geovani Khalil MD.      EKG  SINUS RHYTHM  LEFT AXIS DEVIATION  RSR' IN V1 OR V2, PROBABLY NORMAL VARIANT  NO SIGNIFICANT CHANGE FROM PREVIOUS ECG  Electronically Signed by:  Cb Gomez 10-Aug-2017 09:05:18  Date and Time of Study: 2017-08-09 13:43:40        Patient is clinically and hemodynamically stable    Any new recommendations: As per Dr Ivy    New Labs ordered: CBC and CMP in AM    New diagnostic tests ordered: As per Dr Ivy    Any changes in medications: N/A    To continue current management and supportive care    Pain management issues: Inj Dilaudid and Percocet    Discharge planning issues: N/A    Will follow patient closely    Nothing new to add for right now            DIAGNOSES:    PRIMARY DIAGNOSES:      Hypothyroidism: On replacement. TSH was 3.480 on 8/9/2017    Anxiety: On Buspar:    Major Depression: On Seroquel, Paxil    HLD: On Atorvastatin    OCD: On Seraquel    GERD: On Protonix    Allergic Rhinitis: On Cetirizine and Flonase    Moderate Intellectual Disability:    Seizure Disorder: On Keppra    Vitamin D  deficiency: On replacement    Ptosis: Hx noted    Hx of Duodenal ulcer and stricture: Hx noted. Not an active issue    Constipation: On Docusate and Miralax    Glaucoma: On Timoptic and Alphagan eyedrops    Hx of Histoplasmosis Retina: Noted    DJD: Nx noted    Anemia: Hx noted: Lst Hb 11.9    S/P Lysis of adhesions requiring 60 minutes of operative time and repair of recurrent incisional hernia with onlay mesh placement: Done today by Dr Ivy    DVT Prophylaxis: On Lovenox and SCDs        ?     SECONDARY DIAGNOSES:  ?     As above        SURGICAL DIAGNOSES:      As per Problem List                  I discussed the patients findings and my recommendations with patient but patient has difficulty with comprehension and communication.     You Welsh MD  08/15/17  4:31 PM          You Welsh M.D., FACP  Internal Medicine/ Hospitalist        Time:

## 2017-08-16 ENCOUNTER — APPOINTMENT (OUTPATIENT)
Dept: GENERAL RADIOLOGY | Facility: HOSPITAL | Age: 67
End: 2017-08-16

## 2017-08-16 LAB
ANION GAP SERPL CALCULATED.3IONS-SCNC: 11.1 MMOL/L
BASOPHILS # BLD AUTO: 0.08 10*3/MM3 (ref 0–0.2)
BASOPHILS NFR BLD AUTO: 0.4 % (ref 0–2)
BUN BLD-MCNC: 25 MG/DL (ref 8–23)
BUN/CREAT SERPL: 36.8 (ref 7–25)
CALCIUM SPEC-SCNC: 8.8 MG/DL (ref 8.8–10.5)
CHLORIDE SERPL-SCNC: 100 MMOL/L (ref 98–107)
CO2 SERPL-SCNC: 27.9 MMOL/L (ref 22–29)
CREAT BLD-MCNC: 0.68 MG/DL (ref 0.57–1)
DEPRECATED RDW RBC AUTO: 50 FL (ref 37–54)
EOSINOPHIL # BLD AUTO: 0 10*3/MM3 (ref 0.1–0.3)
EOSINOPHIL NFR BLD AUTO: 0 % (ref 0–4)
ERYTHROCYTE [DISTWIDTH] IN BLOOD BY AUTOMATED COUNT: 16 % (ref 11.5–14.5)
GFR SERPL CREATININE-BSD FRML MDRD: 87 ML/MIN/1.73
GLUCOSE BLD-MCNC: 142 MG/DL (ref 65–99)
HCT VFR BLD AUTO: 41.1 % (ref 37–47)
HGB BLD-MCNC: 13.4 G/DL (ref 12–16)
IMM GRANULOCYTES # BLD: 0.14 10*3/MM3 (ref 0–0.03)
IMM GRANULOCYTES NFR BLD: 0.7 % (ref 0–0.5)
LYMPHOCYTES # BLD AUTO: 1.94 10*3/MM3 (ref 0.6–4.8)
LYMPHOCYTES NFR BLD AUTO: 9.3 % (ref 20–45)
MCH RBC QN AUTO: 27.7 PG (ref 27–31)
MCHC RBC AUTO-ENTMCNC: 32.6 G/DL (ref 31–37)
MCV RBC AUTO: 85.1 FL (ref 81–99)
MONOCYTES # BLD AUTO: 1.64 10*3/MM3 (ref 0–1)
MONOCYTES NFR BLD AUTO: 7.9 % (ref 3–8)
NEUTROPHILS # BLD AUTO: 17.09 10*3/MM3 (ref 1.5–8.3)
NEUTROPHILS NFR BLD AUTO: 81.7 % (ref 45–70)
NRBC BLD MANUAL-RTO: 0 /100 WBC (ref 0–0)
PLATELET # BLD AUTO: 318 10*3/MM3 (ref 140–500)
PMV BLD AUTO: 10.8 FL (ref 7.4–10.4)
POTASSIUM BLD-SCNC: 4.7 MMOL/L (ref 3.5–5.2)
RBC # BLD AUTO: 4.83 10*6/MM3 (ref 4.2–5.4)
SODIUM BLD-SCNC: 139 MMOL/L (ref 136–145)
WBC NRBC COR # BLD: 20.89 10*3/MM3 (ref 4.8–10.8)

## 2017-08-16 PROCEDURE — 99024 POSTOP FOLLOW-UP VISIT: CPT | Performed by: SURGERY

## 2017-08-16 PROCEDURE — 25010000002 ONDANSETRON PER 1 MG: Performed by: SURGERY

## 2017-08-16 PROCEDURE — 97162 PT EVAL MOD COMPLEX 30 MIN: CPT

## 2017-08-16 PROCEDURE — 94799 UNLISTED PULMONARY SVC/PX: CPT

## 2017-08-16 PROCEDURE — 71020 HC CHEST PA AND LATERAL: CPT

## 2017-08-16 PROCEDURE — 85025 COMPLETE CBC W/AUTO DIFF WBC: CPT | Performed by: SURGERY

## 2017-08-16 PROCEDURE — 74020 HC XR ABDOMEN FLAT & UPRIGHT: CPT

## 2017-08-16 PROCEDURE — 25010000003 CEFAZOLIN PER 500 MG: Performed by: SURGERY

## 2017-08-16 PROCEDURE — 80048 BASIC METABOLIC PNL TOTAL CA: CPT | Performed by: SURGERY

## 2017-08-16 PROCEDURE — G8980 MOBILITY D/C STATUS: HCPCS

## 2017-08-16 PROCEDURE — G8978 MOBILITY CURRENT STATUS: HCPCS

## 2017-08-16 PROCEDURE — G0378 HOSPITAL OBSERVATION PER HR: HCPCS

## 2017-08-16 PROCEDURE — G8979 MOBILITY GOAL STATUS: HCPCS

## 2017-08-16 PROCEDURE — 99225 PR SBSQ OBSERVATION CARE/DAY 25 MINUTES: CPT | Performed by: INTERNAL MEDICINE

## 2017-08-16 PROCEDURE — 25010000002 ENOXAPARIN PER 10 MG: Performed by: SURGERY

## 2017-08-16 RX ORDER — BISACODYL 10 MG
10 SUPPOSITORY, RECTAL RECTAL DAILY
Status: DISCONTINUED | OUTPATIENT
Start: 2017-08-16 | End: 2017-08-18

## 2017-08-16 RX ADMIN — PANTOPRAZOLE SODIUM 40 MG: 40 TABLET, DELAYED RELEASE ORAL at 13:42

## 2017-08-16 RX ADMIN — CEFAZOLIN SODIUM 2 G: 2 SOLUTION INTRAVENOUS at 05:01

## 2017-08-16 RX ADMIN — DOCUSATE SODIUM 100 MG: 100 CAPSULE, LIQUID FILLED ORAL at 13:42

## 2017-08-16 RX ADMIN — LEVETIRACETAM 500 MG: 500 TABLET, FILM COATED ORAL at 13:41

## 2017-08-16 RX ADMIN — CETIRIZINE HYDROCHLORIDE 10 MG: 10 TABLET, FILM COATED ORAL at 13:42

## 2017-08-16 RX ADMIN — MONTELUKAST SODIUM 10 MG: 10 TABLET, FILM COATED ORAL at 22:26

## 2017-08-16 RX ADMIN — ENOXAPARIN SODIUM 40 MG: 40 INJECTION SUBCUTANEOUS at 17:30

## 2017-08-16 RX ADMIN — ATORVASTATIN CALCIUM 20 MG: 20 TABLET, FILM COATED ORAL at 22:23

## 2017-08-16 RX ADMIN — CEFAZOLIN SODIUM 2 G: 2 SOLUTION INTRAVENOUS at 22:29

## 2017-08-16 RX ADMIN — TIMOLOL MALEATE 1 DROP: 5 SOLUTION OPHTHALMIC at 09:15

## 2017-08-16 RX ADMIN — POLYETHYLENE GLYCOL 3350 17 G: 17 POWDER, FOR SOLUTION ORAL at 13:42

## 2017-08-16 RX ADMIN — BISACODYL 10 MG: 10 SUPPOSITORY RECTAL at 18:37

## 2017-08-16 RX ADMIN — ONDANSETRON 4 MG: 2 INJECTION, SOLUTION INTRAMUSCULAR; INTRAVENOUS at 06:49

## 2017-08-16 RX ADMIN — PANTOPRAZOLE SODIUM 40 MG: 40 TABLET, DELAYED RELEASE ORAL at 22:26

## 2017-08-16 RX ADMIN — BRIMONIDINE TARTRATE 1 DROP: 2 SOLUTION/ DROPS OPHTHALMIC at 22:26

## 2017-08-16 RX ADMIN — TIMOLOL MALEATE 1 DROP: 5 SOLUTION OPHTHALMIC at 22:27

## 2017-08-16 RX ADMIN — CARVEDILOL 6.25 MG: 6.25 TABLET, FILM COATED ORAL at 22:25

## 2017-08-16 RX ADMIN — LEVETIRACETAM 500 MG: 500 TABLET, FILM COATED ORAL at 22:25

## 2017-08-16 RX ADMIN — FLUTICASONE PROPIONATE 2 SPRAY: 50 SPRAY, METERED NASAL at 09:15

## 2017-08-16 RX ADMIN — BUSPIRONE HYDROCHLORIDE 10 MG: 5 TABLET ORAL at 13:42

## 2017-08-16 RX ADMIN — BRIMONIDINE TARTRATE 1 DROP: 2 SOLUTION/ DROPS OPHTHALMIC at 09:16

## 2017-08-16 RX ADMIN — DOCUSATE SODIUM 100 MG: 100 CAPSULE, LIQUID FILLED ORAL at 17:30

## 2017-08-16 RX ADMIN — Medication 1 CAPSULE: at 13:41

## 2017-08-16 RX ADMIN — CEFAZOLIN SODIUM 2 G: 2 SOLUTION INTRAVENOUS at 15:51

## 2017-08-16 RX ADMIN — LEVOTHYROXINE SODIUM 50 MCG: 50 TABLET ORAL at 13:41

## 2017-08-16 RX ADMIN — BUSPIRONE HYDROCHLORIDE 10 MG: 5 TABLET ORAL at 22:23

## 2017-08-16 RX ADMIN — PAROXETINE HYDROCHLORIDE 40 MG: 20 TABLET, FILM COATED ORAL at 15:27

## 2017-08-16 RX ADMIN — ONDANSETRON 4 MG: 2 INJECTION, SOLUTION INTRAMUSCULAR; INTRAVENOUS at 02:28

## 2017-08-16 RX ADMIN — QUETIAPINE FUMARATE 100 MG: 100 TABLET, FILM COATED ORAL at 22:26

## 2017-08-16 RX ADMIN — CARVEDILOL 6.25 MG: 6.25 TABLET, FILM COATED ORAL at 15:27

## 2017-08-16 NOTE — PLAN OF CARE
Problem: Patient Care Overview (Adult)  Goal: Plan of Care Review  Outcome: Ongoing (interventions implemented as appropriate)    08/16/17 0311   Coping/Psychosocial Response Interventions   Plan Of Care Reviewed With patient   Patient Care Overview   Progress progress toward functional goals as expected   Outcome Evaluation   Outcome Summary/Follow up Plan VSS, has not taken pain medication, episode of emesis (yellow/green), surgeon made aware, not taking in a lot po at this point,       Goal: Adult Individualization and Mutuality  Outcome: Ongoing (interventions implemented as appropriate)  Goal: Discharge Needs Assessment  Outcome: Ongoing (interventions implemented as appropriate)    Problem: Perioperative Period (Adult)  Goal: Signs and Symptoms of Listed Potential Problems Will be Absent or Manageable (Perioperative Period)  Outcome: Ongoing (interventions implemented as appropriate)

## 2017-08-16 NOTE — PLAN OF CARE
Problem: Patient Care Overview (Adult)  Goal: Plan of Care Review  Outcome: Ongoing (interventions implemented as appropriate)    08/16/17 1010   Coping/Psychosocial Response Interventions   Plan Of Care Reviewed With patient   Outcome Evaluation   Outcome Summary/Follow up Plan Physical therapy evaluation attempted. Patient is resident of [facility] and overall poor historian regarding PLOF information. Patient with frequent periods of closing eyes and refusing to answer questions or participate in assessment. patient attempted left sided rolling in bed with max assist before abruptly returning to supine and refusing to attempt out of bed mobility. At this time, patient is not appropriate for skilled physical therapy in this environment, as patient does not demonstrate active participation in mobility. Due to patient's cognitive impairments, patient will likely be more willing to participate with mobility tasks when in familiar enviornment with familiar staff. No further PT recommendations at this time.

## 2017-08-16 NOTE — PROGRESS NOTES
Gen. surgery postop    Resting comfortably.  Offers no complaints.  Tolerating clear liquids.  Vital signs stable and afebrile  Abdomen: Soft, nondistended, mild incisional tenderness, HANSEL with serous sanguinous output abdominal binder in place.    Assessment and plan: Stable postop

## 2017-08-16 NOTE — PROGRESS NOTES
"    HOSPITALIST SERVICES  @ [facility], [place]            [facility] HOSPITALIST TEAM   PROGRESS NOTE      Patient Care Team:  Khang Merino MD as PCP - General (Family Medicine)        Chief Complaint:        Recurrent incisional hernia and had Lysis of adhesions and repair of recurrent incisional hernia with onlay mesh placement. Done yesterday by Dr Ivy.       Subjective:      Ms. Melani Camacho is a 67 year old  female with multiple medical problems as listed below. She was having recurrent incisional hernia and had Lysis of adhesions requiring 60 minutes of operative time and repair of recurrent incisional hernia with onlay mesh placement: Done today by Dr Ivy. The hospitalist service was consulted for the medical management of the patient.     Interval History and ROS:     Patient States she is fine and pain is under control. Nothing is bothering her  Patient Complaints: No new complaints  Patient Denies:  Any sx of chest pain shortness of breath or n/v  History taken from: Patient and family      Objective    Vital Signs  Temp:  [97.5 °F (36.4 °C)-99.3 °F (37.4 °C)] 99.3 °F (37.4 °C)  Heart Rate:  [60-85] 83  Resp:  [15-19] 18  BP: (101-131)/(66-82) 110/68    Flowsheet Rows         First Filed Value    Admission Height  62\" (157.5 cm) Documented at 08/10/2017 1251    Admission Weight  153 lb 11.2 oz (69.7 kg) Documented at 08/10/2017 1251              PHYSICAL EXAMINATION:    VS: As above    Gen: pt alert, comfortably sitting in chair, somewhat sleepy, having no pain or difficulty breathing   HEENT: Normocephalic. PERRL, no scleral icterus.     CV: RRR, S1+ and S2+. No murmur, rubs or gallops appreciated.   Pul: Clear to auscultation, no rales or rhonchi or rubs.    Abdm: S/P Lysis of adhesions requiring 60 minutes of operative time and repair of recurrent incisional hernia with onlay mesh placement.    Extr: no edema, cyanosis or clubbing    MSK: Muscle tone and muscle " strength are unremarkable    Neuro: pt is alert but sleepy. No focal neurologic deficits    Skin: Warm and dry. Not diaphoretic             Results Review:     I reviewed the patient's new clinical results.    Lab Results (last 24 hours)     Procedure Component Value Units Date/Time    Tissue Pathology Exam [[id number]] Collected:  08/15/17 1217    Specimen:  Tissue from Abdominal Wall, Tissue from Abdominal Wall Updated:  08/15/17 1626    CBC & Differential [[id number]] Collected:  08/16/17 0443    Specimen:  Blood Updated:  08/16/17 0507    Narrative:       The following orders were created for panel order CBC & Differential.  Procedure                               Abnormality         Status                     ---------                               -----------         ------                     CBC Auto Differential[[id number]]        Abnormal            Final result                 Please view results for these tests on the individual orders.    CBC Auto Differential [[id number]]  (Abnormal) Collected:  08/16/17 0443    Specimen:  Blood Updated:  08/16/17 0507     WBC 20.89 (H) 10*3/mm3      RBC 4.83 10*6/mm3      Hemoglobin 13.4 g/dL      Hematocrit 41.1 %      MCV 85.1 fL      MCH 27.7 pg      MCHC 32.6 g/dL      RDW 16.0 (H) %      RDW-SD 50.0 fl      MPV 10.8 (H) fL      Platelets 318 10*3/mm3      Neutrophil % 81.7 (H) %      Lymphocyte % 9.3 (L) %      Monocyte % 7.9 %      Eosinophil % 0.0 %      Basophil % 0.4 %      Immature Grans % 0.7 (H) %      Neutrophils, Absolute 17.09 (H) 10*3/mm3      Lymphocytes, Absolute 1.94 10*3/mm3      Monocytes, Absolute 1.64 (H) 10*3/mm3      Eosinophils, Absolute 0.00 (L) 10*3/mm3      Basophils, Absolute 0.08 10*3/mm3      Immature Grans, Absolute 0.14 (H) 10*3/mm3      nRBC 0.0 /100 WBC     Basic Metabolic Panel [[id number]]  (Abnormal) Collected:  08/16/17 0444    Specimen:  Blood Updated:  08/16/17 0516     Glucose 142 (H) mg/dL      BUN 25 (H) mg/dL      Creatinine  0.68 mg/dL      Sodium 139 mmol/L      Potassium 4.7 mmol/L      Chloride 100 mmol/L      CO2 27.9 mmol/L      Calcium 8.8 mg/dL      eGFR Non African Amer 87 mL/min/1.73      BUN/Creatinine Ratio 36.8 (H)     Anion Gap 11.1 mmol/L     Narrative:       GFR Normal >60  Chronic Kidney Disease <60  Kidney Failure <15          Imaging Results (last 24 hours)     Procedure Component Value Units Date/Time    XR Abdomen Flat & Upright [[id number]] Collected:  08/16/17 0952     Updated:  08/16/17 0957    Narrative:       ABDOMEN SERIES, 08/16/2017:     HISTORY:   67-year-old female one day postop ventral hernia repair. Postoperative  generalized abdomen pain.     TECHNIQUE:  Flat and upright abdomen series.     FINDINGS:  Surgical staples and a surgical drain superimposed over the abdominal  midline. No free intraperitoneal air.     No visible bowel dilatation to suggest obstruction. Large volume stool  is present throughout the entire colon.       Impression:       1. Large volume stool throughout the colon.  2. Bowel gas pattern within normal limits. No free air.  3. Postop changes as noted.     This report was finalized on 8/16/2017 9:54 AM by Dr. Bean Garcia MD.       XR Chest PA & Lateral [[id number]] Collected:  08/16/17 0954     Updated:  08/16/17 0959    Narrative:       CHEST X-RAY, 08/16/2017:     HISTORY:   67-year-old female one day postop ventral hernia repair. Postoperative  generalized abdomen pain. Cough and shortness of air.     TECHNIQUE:  PA and lateral upright chest series.     COMPARISON:  *  Chest x-ray, 03/15/2017.     FINDINGS:  Chronic scarring and atelectasis in the lung bases. No visible acute  pulmonary infiltrate or pleural effusion. Stable cardiomegaly. Severe  scoliosis. No change since the prior exam.       Impression:       1. Chronic scarring and atelectasis in the lung bases, stable since  03/15/2017.  2. Stable cardiomegaly.  3. Scoliosis.     This report was finalized on 8/16/2017  9:57 AM by Dr. Bean Garcia MD.             Xray reviewed personally by physician.      ECG not reviewed personally by physician  ECG/EMG Results (most recent)     None          Medication Review:   I have reviewed the patient's current medication list    Current Facility-Administered Medications:   •  acetaminophen (TYLENOL) tablet 500 mg, 500 mg, Oral, 4x Daily PRN, Donna Ivy MD  •  atorvastatin (LIPITOR) tablet 20 mg, 20 mg, Oral, Nightly, Donna Ivy MD, 20 mg at 08/15/17 2124  •  timolol (TIMOPTIC) 0.5 % ophthalmic solution 1 drop, 1 drop, Both Eyes, Q12H, 1 drop at 08/16/17 0915 **AND** brimonidine (ALPHAGAN) 0.2 % ophthalmic solution 1 drop, 1 drop, Both Eyes, Q12H, Donna Ivy MD, 1 drop at 08/16/17 0916  •  busPIRone (BUSPAR) tablet 10 mg, 10 mg, Oral, BID, Donna Ivy MD, 10 mg at 08/16/17 1342  •  carvedilol (COREG) tablet 6.25 mg, 6.25 mg, Oral, BID With Meals, Donna Ivy MD, 6.25 mg at 08/15/17 1659  •  ceFAZolin (ANCEF) IVPB (duplex) 2 g, 2 g, Intravenous, Q8H, Donna Ivy MD  •  cetirizine (zyrTEC) tablet 10 mg, 10 mg, Oral, Daily, Donna Ivy MD, 10 mg at 08/16/17 1342  •  dextromethorphan polistirex ER (DELSYM) 30 MG/5ML oral suspension 60 mg, 10 mL, Oral, BID PRN, Donna Ivy MD  •  docusate sodium (COLACE) capsule 100 mg, 100 mg, Oral, BID, Donna Ivy MD, 100 mg at 08/16/17 1342  •  enoxaparin (LOVENOX) syringe 40 mg, 40 mg, Subcutaneous, Daily, Donna Ivy MD  •  fluticasone (FLONASE) 50 MCG/ACT nasal spray 2 spray, 2 spray, Nasal, Daily, Donna Ivy MD, 2 spray at 08/16/17 0915  •  HYDROmorphone (DILAUDID) injection 0.5 mg, 0.5 mg, Intravenous, Q2H PRN **AND** naloxone (NARCAN) injection 0.1 mg, 0.1 mg, Intravenous, Q5 Min PRN, Donna Ivy MD  •  lactated ringers infusion, 100 mL/hr, Intravenous, Continuous, Donna Ivy MD, Last Rate: 100 mL/hr at 08/15/17 1656, 100 mL/hr at 08/15/17 1656  •  lactobacillus  acidophilus (RISAQUAD) capsule 1 capsule, 1 capsule, Oral, Daily, Donna Ivy MD, 1 capsule at 08/16/17 1341  •  levETIRAcetam (KEPPRA) tablet 500 mg, 500 mg, Oral, BID, Donna Ivy MD, 500 mg at 08/16/17 1341  •  levothyroxine (SYNTHROID, LEVOTHROID) tablet 50 mcg, 50 mcg, Oral, Daily, Donna Ivy MD, 50 mcg at 08/16/17 1341  •  magnesium hydroxide (MILK OF MAGNESIA) 400 MG/5ML suspension 30 mL, 30 mL, Oral, PRN, Donna Ivy MD  •  montelukast (SINGULAIR) tablet 10 mg, 10 mg, Oral, Nightly, oDnna Ivy MD, 10 mg at 08/15/17 2124  •  ondansetron (ZOFRAN) tablet 4 mg, 4 mg, Oral, Q6H PRN **OR** ondansetron ODT (ZOFRAN-ODT) disintegrating tablet 4 mg, 4 mg, Oral, Q6H PRN **OR** ondansetron (ZOFRAN) injection 4 mg, 4 mg, Intravenous, Q6H PRN, Donna Ivy MD, 4 mg at 08/16/17 0649  •  oxyCODONE-acetaminophen (PERCOCET) 5-325 MG per tablet 1 tablet, 1 tablet, Oral, Q4H PRN, Donna Ivy MD  •  pantoprazole (PROTONIX) EC tablet 40 mg, 40 mg, Oral, BID, Donna Ivy MD, 40 mg at 08/16/17 1342  •  PARoxetine (PAXIL) tablet 40 mg, 40 mg, Oral, QAM, Donna Ivy MD  •  polyethylene glycol (MIRALAX) powder 17 g, 17 g, Oral, Daily, Donna Ivy MD, 17 g at 08/16/17 1342  •  QUEtiapine (SEROquel) tablet 100 mg, 100 mg, Oral, Nightly, Donna Ivy MD, 100 mg at 08/15/17 2124      Assessment/Plan       ASSESSMENT AND PLAN:       SUMMARY:    ?   PROPHYLAXIS:   -DVT Prophylaxis: On Inj. Lovenox and SCDs  -[place] Catheter: Not indicated at this time    NUTRITION AND FLUIDS:  -Diet/ Nutrition: Clear Liquid    -Fluid Status/Electrolytes:  mL/Hr      SOCIAL ISSUES:    -Behavioral/ Agitation Issues: NONE   -Social Issues: Lives at home with her family.       THERAPEUTIC:    -ANTIBIOTICS: N/A   -PAIN MANAGEMENT: Inj Dilaudid and Percocet              PLAN:    -Labs and diagnostic tests reviewed: Gluc 142, Na 139, K 4.7, Creat 0.68, WBC 20.89, Hb 13.4, Plt 318, 81.7 N, 9.3  L    -Diagnostic tests reviewed:      CXR  IMPRESSION:  1. Chronic scarring and atelectasis in the lung bases, stable since  03/15/2017.  2. Stable cardiomegaly.  3. Scoliosis.      This report was finalized on 8/16/2017 9:57 AM by Dr. Bean Garcia MD.      Flat Plate Abdomen  IMPRESSION:  1. Large volume stool throughout the colon.  2. Bowel gas pattern within normal limits. No free air.  3. Postop changes as noted.      This report was finalized on 8/16/2017 9:54 AM by Dr. Bean Garcia MD.      -Any new recommendations: To continue current management     -New Labs ordered: As per Dr Ivy    -New diagnostic tests ordered: As per Dr Ivy    -Any changes in medications: N/A    -Discharge planning issues: Patient should be able to go back home once ready for discharge    -Placement issues: N/A    -Patient is clinically and hemodynamically stable    -To continue current management and supportive care    -Will follow patient closely    -Nothing new to add for right now        DIAGNOSES:     PRIMARY DIAGNOSES:        Marked Leukocytosis: WBC is 20.89 today that is up from 13.93. Patient's temp is 99.3. Patient is still on preop abx. Will follow clinically.     Constipation: Large volume of stool in colon. Patient on Docusate and Dulcolax supp    Hypothyroidism: On replacement. TSH was 3.480 on 8/9/2017     Anxiety: On Buspar     Major Depression: On Seroquel, Paxil     HLD: On Atorvastatin     OCD: On Seraquel     GERD: On Protonix     Allergic Rhinitis: On Cetirizine and Flonase     Moderate Intellectual Disability: Noted. As per hx. Nothing acute.     Seizure Disorder: On Keppra     Vitamin D deficiency: On replacement     Ptosis: Hx noted     Hx of Duodenal ulcer and stricture: Hx noted. Not an active issue     Constipation: On Docusate and Miralax     Glaucoma: On Timoptic and Alphagan eyedrops     Hx of Histoplasmosis Retina: Noted     DJD: Nx noted     Anemia: Hx noted: Lst Hb 13.4     S/P Lysis of  adhesions requiring 60 minutes of operative time and repair of recurrent incisional hernia with onlay mesh placement: Done today by Dr Ivy     DVT Prophylaxis: On Lovenox and SCDs                  SECONDARY DIAGNOSES:         As above           SURGICAL DIAGNOSES:        As per Problem List             Plan for disposition: Patient should be able to go home once ready for discharge    You Welsh MD  08/16/17  2:18 PM            You Welsh M.D., FACP  Internal Medicine/ Hospitalist          Time:

## 2017-08-16 NOTE — PROGRESS NOTES
Patient: Caitlyn Camacho  Procedure(s):  Lysis of adhesion and repair of recurrent incisional hernia with onlay mesh placement   Anesthesia type: [unfilled]    Patient location: [facility]  Last vitals:   Vitals:    08/16/17 1107   BP: 110/68   Pulse: 83   Resp: 18   Temp: 99.3 °F (37.4 °C)   SpO2: 96%     Level of consciousness: awake, alert and oriented    Post-anesthesia pain: adequate analgesia  Airway patency: patent  Respiratory: unassisted  Cardiovascular: stable and blood pressure at baseline  Hydration: euvolemic    Anesthetic complications: no

## 2017-08-16 NOTE — NURSING NOTE
Continued Stay Note  JAMES Oneal     Patient Name: Caitlyn Camacho  MRN: [id number]  Today's Date: 8/16/2017    Admit Date: 8/15/2017          Discharge Plan       08/16/17 1642    Case Management/Social Work Plan    Plan plan return to [facility] when medically stable    Additional Comments Spoke with bella Sandoval, informed of change to observation status via phone - she verbalizes understanding.Confirms plan to return to [facility] when patient is medically stable Will continue to follow.      08/16/17 1511    Case Management/Social Work Plan    Additional Comments Call placed to Bella Sandoval, to update IMM and verify plan to return to [facility] at [place]. Voice message left to return call.              Discharge Codes     None            Neeraj Marquez RN

## 2017-08-16 NOTE — PLAN OF CARE
Problem: Patient Care Overview (Adult)  Goal: Discharge Needs Assessment  Outcome: Ongoing (interventions implemented as appropriate)    08/16/17 1325   Discharge Needs Assessment   Concerns To Be Addressed no discharge needs identified   Readmission Within The Last 30 Days no previous admission in last 30 days   Discharge Planning Comments Patient sitting up in [place]. Spoke with [facility] sitter, Dariana Cordova, at bedside. She informs patient is able to ambulate alone, often staff provdes stand by assist for safety. She states the patient is [place] and currently does not have her hearing aids in. She states the patient uses no DME at [facility]. Will continue to follow for any dc needs.   Living Environment   Transportation Available car   Self-Care   Equipment Currently Used at Home none

## 2017-08-16 NOTE — NURSING NOTE
Discharge Planning Assessment  JAMES Oneal     Patient Name: Caitlyn Camacho  MRN: [id number]  Today's Date: 8/16/2017    Admit Date: 8/15/2017          Discharge Needs Assessment       08/16/17 1325    Living Environment    Lives With facility resident   [facility]    Living Arrangements residential facility    Home Accessibility no concerns    Transportation Available car    Living Environment    Provides Primary Care For no one    Able to Return to Prior Living Arrangements yes    Discharge Needs Assessment    Concerns To Be Addressed no discharge needs identified    Readmission Within The Last 30 Days no previous admission in last 30 days    Equipment Currently Used at Home none    Discharge Planning Comments Patient sitting up in recliner, dozing. Spoke with [facility] Dariana anderson, at bedside. She informs patient is able to ambulate alone, often staff  provdes stand by assist for safety. She states the patient is [place] and currently does not have her hearing aids in. She states the patient uses no DME at [facility]. Will continue to follow for any dc needs.            Discharge Plan       08/16/17 1331    Case Management/Social Work Plan    Plan plan return to [facility] when medically stable    Additional Comments Patient sitting up in recliner, dozing. Spoke with [facility] Dariana anderson, at bedside. She informs patient is able to ambulate alone, often staff  provides stand by assist for safety. She states the patient is [place] and currently does not have her hearing aids in. She states the patient uses no DME at [facility]. Will continue to follow for any dc needs        Discharge Placement     No information found                Demographic Summary       08/16/17 1323    Referral Information    Admission Type inpatient    Arrived From other (see comments)   [facility] resident    Referral Source admission list    Reason For Consult discharge planning     Record Reviewed medical record    Primary Care Physician Information    Name Khang Merino MD            Functional Status     None            Psychosocial     None            Abuse/Neglect     None            Legal     None            Substance Abuse     None            Patient Forms     None          Neeraj Marquez RN

## 2017-08-16 NOTE — THERAPY DISCHARGE NOTE
Acute Care - Physical Therapy Initial Eval/Discharge  JAMES Oneal     Patient Name: Caitlyn Camacho  : 1950  MRN: [id number]  Today's Date: 2017   Onset of Illness/Injury or Date of Surgery Date: 08/15/17  Date of Referral to PT: 08/15/17  Referring Physician: Dr zavala      Admit Date: 8/15/2017    Visit Dx:    ICD-10-CM ICD-9-CM   1. Recurrent ventral incisional hernia K43.2 553.21     Patient Active Problem List   Diagnosis   • Adverse drug effect   • Partial symptomatic epilepsy with complex partial seizures, not intractable, without status epilepticus   • Cardiac arrhythmia   • Arthritis   • Dementia   • Depression   • Stricture of duodenum   • Duodenal ulcer   • Epilepsy   • Histoplasmosis with retinitis   • Hyperlipidemia   • Hypothyroidism   • Hypoxia   • Nutritional disorder   • Mental retardation   • Osteopenia   • History of intestinal bypass   • Vitamin D deficiency   • Hypervolemia   • Recurrent ventral incisional hernia     Past Medical History:   Diagnosis Date   • Anemia due to blood loss    • Anxiety    • Arthritis    • Duodenal stricture    • Duodenal ulcer    • Dysphagia    • Gallstones    • H/O convulsions    • Hypercholesteremia    • Hypothyroidism    • Major depression    • Moderate intellectual disability    • OCD (obsessive compulsive disorder)    • Ocular histoplasmosis     w/macular scars   • Osteopenia    • Ptosis    • Pure hyperglyceridemia    • S/P bypass gastrojejunostomy    • Seasonal allergies    • Seizure disorder    • Unspecified dementia without behavioral disturbance    • Ventral hernia     sched repair   • Vitamin D deficiency      Past Surgical History:   Procedure Laterality Date   • CATARACT EXTRACTION Bilateral    • CHOLECYSTECTOMY OPEN  2017   • COLONOSCOPY  2009   • ENDOSCOPY  2017   • GASTROJEJUNOSTOMY  2016   • GASTROSTOMY      TEJINDER   • HYSTERECTOMY            PT ASSESSMENT (last 72 hours)      PT Evaluation       17 0850        Document  "Type evaluation  -JW    Subjective Information --   pt c/o \"belly hurting\"  -JW    Patient Effort, Rehab Treatment poor  -JW       Patient Profile Review yes  -JW    Onset of Illness/Injury or Date of Surgery Date 08/15/17  -JW    Referring Physician Dr zavala  -JW    General Observations pt supine, O2 in place  -JW    Pertinent History Of Current Problem pt with worsening abdominal pain, s/p hernir repair.  -JW    Precautions/Limitations fall precautions   cognitive impairment  -JW    Prior Level of Function --   pt unable to give prior level of function information  -JW    Equipment Currently Used at Home     Benefits Reviewed patient:;improve function;increase independence;increase strength;increase balance  -JW    Barriers to Rehab cognitive status  -JW       Lives With facility resident   resident of [facility]  -JW    Living Arrangements residential facility  -JW    Home Accessibility no concerns  -JW    Living Environment Comment pt is poor historian regarding PLOF.  No staff from [facility] available to answer questions at time of evaluation.  Patient is resident of [facility]  -JW       Date of Referral to PT 08/15/17  -JW    Patient/Family Goals Statement pt unable to state goals  -JW    Criteria for Skilled Therapeutic Interventions Met does not meet criteria for skilled intervention  -       Pain Assessment --   c/o abdominal pain, does not rate  -JW       Current Cognitive/Communication Assessment impaired   difficult to fully assess  -    Orientation Status --   pt responds to name, does not answer questions  -JW    Follows Commands/Answers Questions unable/difficult to assess  -JW    Personal Safety unable/difficult to assess  -JW       General ROM other (see comments)   pt does not cooperate for ROM testing  -JW       General MMT Assessment other (see comments)   pt does not participate in formal MMT  -JW       Bed Mobility, Assistive Device bed rails;head of bed elevated  -JW    " Bed Mobility, Comment attempted left side rolling in bed, max assist required to roll to left side.  Patient with difficulty following commands and refused out of bed activity.  patient does not complete supine to sit transfer and returns to supine and closes eyes.  -JW       Transfer, Comment pt does not attempt at this time  -JW       Sensory Impairment --   pt unable to follow formal sensory testing  -JW       Pre-Treatment Position in bed  -JW    Post Treatment Position bed  -JW    In Bed supine;notified nsg;call light within reach;encouraged to call for assist  -JW            User Key  (r) = Recorded By, (t) = Taken By, (c) = Cosigned By    Initials Name Provider Type    AK Isaias Zuniga, RN Registered Nurse    VERITO Griggs PT Physical Therapist    TIM Jackson RN Registered Nurse          Physical Therapy Education     Title: PT OT SLP Therapies (Resolved)     Topic: Physical Therapy (Resolved)     Point: Mobility training (Resolved)    Learning Progress Summary    Learner Readiness Method Response Comment Documented by Status   Patient Acceptance E NL,RT   08/16/17 1009 Active                      User Key     Initials Effective Dates Name Provider Type Discipline     12/01/15 -  Aaliyah Griggs PT Physical Therapist PT                PT Recommendation and Plan  Anticipated Discharge Disposition: other (see comments) ([facility], [facility])  PT Frequency: evaluation only  Plan of Care Review  Plan Of Care Reviewed With: patient  Outcome Summary/Follow up Plan: Physical therapy evaluation attempted.  Patient is resident of [facility] and overall poor historian regarding PLOF information.  Patient with frequent periods of closing eyes and refusing to answer questions or participate in assessment.  patient attempted left sided rolling in bed with max assist before abruptly returning to supine and refusing to attempt out of bed mobility.  At this time, patient is not appropriate for skilled  physical therapy in this environment, as patient does not demonstrate active participation in mobility.  Due to patient's cognitive impairments, patient will likely be more willing to participate with mobility tasks when in familiar enviornment with familiar staff.  No further PT recommendations at this time.              Outcome Measures       08/16/17 0850          How much help from another person do you currently need...    Turning from your back to your side while in flat bed without using bedrails? 2  -JW      Moving from lying on back to sitting on the side of a flat bed without bedrails? 1  -JW      Moving to and from a bed to a chair (including a wheelchair)? 1  -JW      Standing up from a chair using your arms (e.g., wheelchair, bedside chair)? 1  -JW      Climbing 3-5 steps with a railing? 1  -JW      To walk in hospital room? 1  -JW      AM-PAC 6 Clicks Score 7  -      Functional Assessment    Outcome Measure Options AM-PAC 6 Clicks Basic Mobility (PT)  -        User Key  (r) = Recorded By, (t) = Taken By, (c) = Cosigned By    Initials Name Provider Type    VERITO Griggs PT Physical Therapist           Time Calculation:         PT Charges       08/16/17 1015          Time Calculation    Start Time 0850  -      Stop Time 0900  -      Time Calculation (min) 10 min  -      PT Received On 08/16/17  -        User Key  (r) = Recorded By, (t) = Taken By, (c) = Cosigned By    Initials Name Provider Type    VERITO Griggs PT Physical Therapist          Therapy Charges for Today     Code Description Service Date Service Provider Modifiers Qty    37859601350 HC PT EVAL MOD COMPLEXITY 1 8/16/2017 Aaliyah Griggs PT GP 1          PT G-Codes  Outcome Measure Options: AM-PAC 6 Clicks Basic Mobility (PT)    PT Discharge Summary  Anticipated Discharge Disposition: other (see comments) ([facility])    Aaliyah Griggs PT  8/16/2017

## 2017-08-16 NOTE — PROGRESS NOTES
Much more alert today swallowing the Keppra at her appropriate dose.  I will sign off in follow-up when necessary thanks  Lance Pablo M.D.

## 2017-08-16 NOTE — PROGRESS NOTES
Gen Surg Postop Progress Note       Subjective: 2 episodes of nausea vomiting overnight.  Patient is a poor historian.  Denies abdominal pain but clearly on exam does have some incisional tenderness.  Denies passing flatus or having a bowel movement.  Hasn't gotten out of bed.      Objective:    Vital Signs  Temp:  [97.5 °F (36.4 °C)-98.7 °F (37.1 °C)] 98.2 °F (36.8 °C)  Heart Rate:  [56-85] 84  Resp:  [15-19] 18  BP: (101-131)/(66-83) 117/78  Body mass index is 27.4 kg/(m^2).    Intake/Output Summary (Last 24 hours) at 08/16/17 0834  Last data filed at 08/16/17 0650   Gross per 24 hour   Intake              900 ml   Output              515 ml   Net              385 ml     HANSEL output serosanguineous       Physical Exam:   General: patient awake, alert and cooperative   Cardiovascular: regular rhythm and rate, no murmurs auscultated   Pulm: clear to auscultation bilaterally, regular and unlabored   Abdomen: soft, slightly distended, mild incisional tenderness that's appropriate, positive bowel sounds in all 4 quadrants, incision clean dry intact.   Extremities: no rash or edema   Neurologic: Normal mood and behavior     Results Review:     I reviewed the patient's new clinical results.        Results from last 7 days  Lab Units 08/16/17  0443   WBC 10*3/mm3 20.89*   HEMOGLOBIN g/dL 13.4   HEMATOCRIT % 41.1   PLATELETS 10*3/mm3 318       Results from last 7 days  Lab Units 08/16/17  0444   SODIUM mmol/L 139   POTASSIUM mmol/L 4.7   CHLORIDE mmol/L 100   CO2 mmol/L 27.9   BUN mg/dL 25*   CREATININE mg/dL 0.68   CALCIUM mg/dL 8.8   GLUCOSE mg/dL 142*         PT/INR:  No results found for: PROTIME/No results found for: INR    Calcium Calcium   Date Value Ref Range Status   08/16/2017 8.8 8.8 - 10.5 mg/dL Final      Magnesium  AST  ALT  Bilirubin, Total  AlkPhos  Albumin    Amylase  Lipase    Radiology: No results found for: MG  No components found for: AST.*  No components found for: ALT.*  No components found for:  BILIRUBIN, TOTAL.*    No components found for: ALKPHOS.*  No components found for: ALBUMIN.*      No components found for: AMYLASE.*  No components found for: LIPASE.*      Imaging Results (most recent)     None             Assessment/Plan   Status post lysis of adhesions and recurrent incisional hernia repair  Hemodynamically stable and afebrile  Suspect postoperative nausea vomiting.  She also has a history of gastroparesis  Will not advance diet at this time.  We will check flat and upright x-ray  Continue IV fluids    Leukocytosis noted likely secondary to stress response  Continue perioperative antibiotics    Needs to be out of bed ambulating in the halls.  PT has been consulted    Lovenox and SCDs for VTE prophylaxis  Plan of care discussed with nursing staff and hospitalist          Donna Ivy MD  08/16/17  8:34 AM    Time:

## 2017-08-17 LAB
ANION GAP SERPL CALCULATED.3IONS-SCNC: 10.5 MMOL/L
BASOPHILS # BLD AUTO: 0.05 10*3/MM3 (ref 0–0.2)
BASOPHILS NFR BLD AUTO: 0.3 % (ref 0–2)
BUN BLD-MCNC: 19 MG/DL (ref 8–23)
BUN/CREAT SERPL: 25.3 (ref 7–25)
CALCIUM SPEC-SCNC: 8.5 MG/DL (ref 8.8–10.5)
CHLORIDE SERPL-SCNC: 100 MMOL/L (ref 98–107)
CO2 SERPL-SCNC: 30.5 MMOL/L (ref 22–29)
CREAT BLD-MCNC: 0.75 MG/DL (ref 0.57–1)
DEPRECATED RDW RBC AUTO: 50.7 FL (ref 37–54)
EOSINOPHIL # BLD AUTO: 0.19 10*3/MM3 (ref 0.1–0.3)
EOSINOPHIL NFR BLD AUTO: 1.3 % (ref 0–4)
ERYTHROCYTE [DISTWIDTH] IN BLOOD BY AUTOMATED COUNT: 16.2 % (ref 11.5–14.5)
GFR SERPL CREATININE-BSD FRML MDRD: 77 ML/MIN/1.73
GLUCOSE BLD-MCNC: 111 MG/DL (ref 65–99)
HCT VFR BLD AUTO: 37.7 % (ref 37–47)
HGB BLD-MCNC: 12 G/DL (ref 12–16)
IMM GRANULOCYTES # BLD: 0.07 10*3/MM3 (ref 0–0.03)
IMM GRANULOCYTES NFR BLD: 0.5 % (ref 0–0.5)
LAB AP CASE REPORT: NORMAL
LYMPHOCYTES # BLD AUTO: 3.73 10*3/MM3 (ref 0.6–4.8)
LYMPHOCYTES NFR BLD AUTO: 24.6 % (ref 20–45)
Lab: NORMAL
MAGNESIUM SERPL-MCNC: 2 MG/DL (ref 1.7–2.5)
MCH RBC QN AUTO: 27.3 PG (ref 27–31)
MCHC RBC AUTO-ENTMCNC: 31.8 G/DL (ref 31–37)
MCV RBC AUTO: 85.7 FL (ref 81–99)
MONOCYTES # BLD AUTO: 1.46 10*3/MM3 (ref 0–1)
MONOCYTES NFR BLD AUTO: 9.6 % (ref 3–8)
NEUTROPHILS # BLD AUTO: 9.68 10*3/MM3 (ref 1.5–8.3)
NEUTROPHILS NFR BLD AUTO: 63.7 % (ref 45–70)
NRBC BLD MANUAL-RTO: 0 /100 WBC (ref 0–0)
PATH REPORT.FINAL DX SPEC: NORMAL
PHOSPHATE SERPL-MCNC: 2.4 MG/DL (ref 2.7–4.5)
PLATELET # BLD AUTO: 290 10*3/MM3 (ref 140–500)
PMV BLD AUTO: 10.3 FL (ref 7.4–10.4)
POTASSIUM BLD-SCNC: 4.1 MMOL/L (ref 3.5–5.2)
RBC # BLD AUTO: 4.4 10*6/MM3 (ref 4.2–5.4)
SODIUM BLD-SCNC: 141 MMOL/L (ref 136–145)
WBC NRBC COR # BLD: 15.18 10*3/MM3 (ref 4.8–10.8)

## 2017-08-17 PROCEDURE — 99024 POSTOP FOLLOW-UP VISIT: CPT | Performed by: SURGERY

## 2017-08-17 PROCEDURE — 83735 ASSAY OF MAGNESIUM: CPT | Performed by: SURGERY

## 2017-08-17 PROCEDURE — 25010000002 ENOXAPARIN PER 10 MG: Performed by: SURGERY

## 2017-08-17 PROCEDURE — 84100 ASSAY OF PHOSPHORUS: CPT | Performed by: SURGERY

## 2017-08-17 PROCEDURE — G0378 HOSPITAL OBSERVATION PER HR: HCPCS

## 2017-08-17 PROCEDURE — 25010000003 CEFAZOLIN PER 500 MG: Performed by: SURGERY

## 2017-08-17 PROCEDURE — 80048 BASIC METABOLIC PNL TOTAL CA: CPT | Performed by: SURGERY

## 2017-08-17 PROCEDURE — 94799 UNLISTED PULMONARY SVC/PX: CPT

## 2017-08-17 PROCEDURE — 85025 COMPLETE CBC W/AUTO DIFF WBC: CPT | Performed by: SURGERY

## 2017-08-17 PROCEDURE — 99224 PR SBSQ OBSERVATION CARE/DAY 15 MINUTES: CPT | Performed by: HOSPITALIST

## 2017-08-17 RX ADMIN — POTASSIUM & SODIUM PHOSPHATES POWDER PACK 280-160-250 MG 1 PACKET: 280-160-250 PACK at 17:51

## 2017-08-17 RX ADMIN — PANTOPRAZOLE SODIUM 40 MG: 40 TABLET, DELAYED RELEASE ORAL at 17:51

## 2017-08-17 RX ADMIN — POLYETHYLENE GLYCOL 3350 17 G: 17 POWDER, FOR SOLUTION ORAL at 08:38

## 2017-08-17 RX ADMIN — MONTELUKAST SODIUM 10 MG: 10 TABLET, FILM COATED ORAL at 21:59

## 2017-08-17 RX ADMIN — DOCUSATE SODIUM 100 MG: 100 CAPSULE, LIQUID FILLED ORAL at 08:36

## 2017-08-17 RX ADMIN — CEFAZOLIN SODIUM 2 G: 2 SOLUTION INTRAVENOUS at 12:33

## 2017-08-17 RX ADMIN — BUSPIRONE HYDROCHLORIDE 10 MG: 5 TABLET ORAL at 17:51

## 2017-08-17 RX ADMIN — FLUTICASONE PROPIONATE 2 SPRAY: 50 SPRAY, METERED NASAL at 08:38

## 2017-08-17 RX ADMIN — QUETIAPINE FUMARATE 100 MG: 100 TABLET, FILM COATED ORAL at 21:59

## 2017-08-17 RX ADMIN — CARVEDILOL 6.25 MG: 6.25 TABLET, FILM COATED ORAL at 17:51

## 2017-08-17 RX ADMIN — DOCUSATE SODIUM 100 MG: 100 CAPSULE, LIQUID FILLED ORAL at 17:52

## 2017-08-17 RX ADMIN — TIMOLOL MALEATE 1 DROP: 5 SOLUTION OPHTHALMIC at 21:59

## 2017-08-17 RX ADMIN — LEVETIRACETAM 500 MG: 500 TABLET, FILM COATED ORAL at 17:52

## 2017-08-17 RX ADMIN — Medication 1 CAPSULE: at 08:36

## 2017-08-17 RX ADMIN — CARVEDILOL 6.25 MG: 6.25 TABLET, FILM COATED ORAL at 08:36

## 2017-08-17 RX ADMIN — POTASSIUM & SODIUM PHOSPHATES POWDER PACK 280-160-250 MG 1 PACKET: 280-160-250 PACK at 08:37

## 2017-08-17 RX ADMIN — BRIMONIDINE TARTRATE 1 DROP: 2 SOLUTION/ DROPS OPHTHALMIC at 21:59

## 2017-08-17 RX ADMIN — CEFAZOLIN SODIUM 2 G: 2 SOLUTION INTRAVENOUS at 22:00

## 2017-08-17 RX ADMIN — BUSPIRONE HYDROCHLORIDE 10 MG: 5 TABLET ORAL at 08:36

## 2017-08-17 RX ADMIN — CETIRIZINE HYDROCHLORIDE 10 MG: 10 TABLET, FILM COATED ORAL at 08:37

## 2017-08-17 RX ADMIN — TIMOLOL MALEATE 1 DROP: 5 SOLUTION OPHTHALMIC at 08:38

## 2017-08-17 RX ADMIN — ENOXAPARIN SODIUM 40 MG: 40 INJECTION SUBCUTANEOUS at 09:00

## 2017-08-17 RX ADMIN — PAROXETINE HYDROCHLORIDE 40 MG: 20 TABLET, FILM COATED ORAL at 06:17

## 2017-08-17 RX ADMIN — ATORVASTATIN CALCIUM 20 MG: 20 TABLET, FILM COATED ORAL at 21:59

## 2017-08-17 RX ADMIN — BRIMONIDINE TARTRATE 1 DROP: 2 SOLUTION/ DROPS OPHTHALMIC at 08:38

## 2017-08-17 RX ADMIN — LEVOTHYROXINE SODIUM 50 MCG: 50 TABLET ORAL at 08:36

## 2017-08-17 RX ADMIN — LEVETIRACETAM 500 MG: 500 TABLET, FILM COATED ORAL at 08:36

## 2017-08-17 RX ADMIN — SODIUM CHLORIDE, POTASSIUM CHLORIDE, SODIUM LACTATE AND CALCIUM CHLORIDE 100 ML/HR: 600; 310; 30; 20 INJECTION, SOLUTION INTRAVENOUS at 01:58

## 2017-08-17 RX ADMIN — PANTOPRAZOLE SODIUM 40 MG: 40 TABLET, DELAYED RELEASE ORAL at 08:36

## 2017-08-17 RX ADMIN — CEFAZOLIN SODIUM 2 G: 2 SOLUTION INTRAVENOUS at 06:14

## 2017-08-17 NOTE — PLAN OF CARE
Problem: Patient Care Overview (Adult)  Goal: Plan of Care Review  Outcome: Ongoing (interventions implemented as appropriate)    08/17/17 3246   Outcome Evaluation   Outcome Summary/Follow up Plan Pt of [place]; POD #1-hernia repair, HANSEL Drain-0 Drainage; Diet-Fulls; Tele-SR; Ambulated in wright w/ walker/gaitbelt, std-by assist; ABX therapy on board; no c/o pain; Chronic C- laxatives, stool softeners on board; K+/Phosphate Therapy 4.1/2.4 BID, monitoring; Tentative D/C-8/18/17

## 2017-08-17 NOTE — PLAN OF CARE
Problem: Patient Care Overview (Adult)  Goal: Plan of Care Review    08/17/17 0520   Coping/Psychosocial Response Interventions   Plan Of Care Reviewed With patient   Patient Care Overview   Progress improving   Outcome Evaluation   Outcome Summary/Follow up Plan Patient has done well this shift. She is POD #1 with no complaints of pain or discomfort. Dsg intact. HANSEL drain still draining well.

## 2017-08-17 NOTE — PROGRESS NOTES
"Hospitalist Team      Patient Care Team:  Khang Merino MD as PCP - General (Family Medicine)        Chief Complaint:  F/U Leukocytosis    Subjective    Interval History and ROS:     Patient denies any pain today.  She denies any N/V. She is afebrile. Patient is very pleasant and is tolerating liquids. No BM but + flatus.      Objective    Vital Signs  Temp:  [98.7 °F (37.1 °C)-99.3 °F (37.4 °C)] 98.7 °F (37.1 °C)  Heart Rate:  [80-91] 91  Resp:  [18] 18  BP: (101-110)/(61-75) 108/61  Oxygen Therapy  SpO2: 91 %  Pulse Oximetry Type: Intermittent  O2 Device: room air (91%)      Flowsheet Rows         First Filed Value    Admission Height  62\" (157.5 cm) Documented at 08/10/2017 1251    Admission Weight  153 lb 11.2 oz (69.7 kg) Documented at 08/10/2017 1251            Physical Exam:  Physical Exam   Constitutional: She appears well-developed and well-nourished. No distress.   Eyes: EOM are normal. Pupils are equal, round, and reactive to light. No scleral icterus.   Neck: Neck supple.   Cardiovascular: Normal rate, regular rhythm and normal heart sounds.  Exam reveals no gallop and no friction rub.    No murmur heard.  Pulmonary/Chest: Effort normal and breath sounds normal. No respiratory distress.   Abdominal:   Dressing and binder in place, slightly hypoactive and tympanic bowel sounds.    Musculoskeletal: She exhibits no edema.   Lymphadenopathy:     She has no cervical adenopathy.   Neurological: She is alert.   Oriented to person, answers questions appropriately.   Skin: Skin is warm and dry. No rash noted.         Results Review:     I reviewed the patient's new clinical results.    Lab Results (last 24 hours)     Procedure Component Value Units Date/Time    CBC & Differential [[id number]] Collected:  08/17/17 0411    Specimen:  Blood Updated:  08/17/17 0445    Narrative:       The following orders were created for panel order CBC & Differential.  Procedure                               Abnormality         " Status                     ---------                               -----------         ------                     CBC Auto Differential[[id number]]        Abnormal            Final result                 Please view results for these tests on the individual orders.    CBC Auto Differential [[id number]]  (Abnormal) Collected:  08/17/17 0411    Specimen:  Blood Updated:  08/17/17 0445     WBC 15.18 (H) 10*3/mm3      RBC 4.40 10*6/mm3      Hemoglobin 12.0 g/dL      Hematocrit 37.7 %      MCV 85.7 fL      MCH 27.3 pg      MCHC 31.8 g/dL      RDW 16.2 (H) %      RDW-SD 50.7 fl      MPV 10.3 fL      Platelets 290 10*3/mm3      Neutrophil % 63.7 %      Lymphocyte % 24.6 %      Monocyte % 9.6 (H) %      Eosinophil % 1.3 %      Basophil % 0.3 %      Immature Grans % 0.5 %      Neutrophils, Absolute 9.68 (H) 10*3/mm3      Lymphocytes, Absolute 3.73 10*3/mm3      Monocytes, Absolute 1.46 (H) 10*3/mm3      Eosinophils, Absolute 0.19 10*3/mm3      Basophils, Absolute 0.05 10*3/mm3      Immature Grans, Absolute 0.07 (H) 10*3/mm3      nRBC 0.0 /100 WBC     Basic Metabolic Panel [[id number]]  (Abnormal) Collected:  08/17/17 0411    Specimen:  Blood Updated:  08/17/17 0538     Glucose 111 (H) mg/dL      BUN 19 mg/dL      Creatinine 0.75 mg/dL      Sodium 141 mmol/L      Potassium 4.1 mmol/L      Chloride 100 mmol/L      CO2 30.5 (H) mmol/L      Calcium 8.5 (L) mg/dL      eGFR Non African Amer 77 mL/min/1.73      BUN/Creatinine Ratio 25.3 (H)     Anion Gap 10.5 mmol/L     Narrative:       GFR Normal >60  Chronic Kidney Disease <60  Kidney Failure <15    Magnesium [[id number]]  (Normal) Collected:  08/17/17 0411    Specimen:  Blood Updated:  08/17/17 0538     Magnesium 2.0 mg/dL     Phosphorus [[id number]]  (Abnormal) Collected:  08/17/17 0411    Specimen:  Blood Updated:  08/17/17 0538     Phosphorus 2.4 (L) mg/dL           Imaging Results (last 24 hours)     Procedure Component Value Units Date/Time    XR Abdomen Flat & Upright  [[id number]] Collected:  08/16/17 0952     Updated:  08/16/17 0957    Narrative:       ABDOMEN SERIES, 08/16/2017:     HISTORY:   67-year-old female one day postop ventral hernia repair. Postoperative  generalized abdomen pain.     TECHNIQUE:  Flat and upright abdomen series.     FINDINGS:  Surgical staples and a surgical drain superimposed over the abdominal  midline. No free intraperitoneal air.     No visible bowel dilatation to suggest obstruction. Large volume stool  is present throughout the entire colon.       Impression:       1. Large volume stool throughout the colon.  2. Bowel gas pattern within normal limits. No free air.  3. Postop changes as noted.     This report was finalized on 8/16/2017 9:54 AM by Dr. Bean Garcia MD.       XR Chest PA & Lateral [[id number]] Collected:  08/16/17 0954     Updated:  08/16/17 0959    Narrative:       CHEST X-RAY, 08/16/2017:     HISTORY:   67-year-old female one day postop ventral hernia repair. Postoperative  generalized abdomen pain. Cough and shortness of air.     TECHNIQUE:  PA and lateral upright chest series.     COMPARISON:  *  Chest x-ray, 03/15/2017.     FINDINGS:  Chronic scarring and atelectasis in the lung bases. No visible acute  pulmonary infiltrate or pleural effusion. Stable cardiomegaly. Severe  scoliosis. No change since the prior exam.       Impression:       1. Chronic scarring and atelectasis in the lung bases, stable since  03/15/2017.  2. Stable cardiomegaly.  3. Scoliosis.     This report was finalized on 8/16/2017 9:57 AM by Dr. Bean Garcia MD.             ECG/EMG Results (most recent)     None          Medication Review:   I have reviewed the patient's current medication list    Current Facility-Administered Medications:   •  acetaminophen (TYLENOL) tablet 500 mg, 500 mg, Oral, 4x Daily PRN, Donna Ivy MD  •  atorvastatin (LIPITOR) tablet 20 mg, 20 mg, Oral, Nightly, Donna Ivy MD, 20 mg at 08/16/17 2223  •  bisacodyl  (DULCOLAX) suppository 10 mg, 10 mg, Rectal, Daily, You Welsh MD, 10 mg at 08/16/17 1837  •  timolol (TIMOPTIC) 0.5 % ophthalmic solution 1 drop, 1 drop, Both Eyes, Q12H, 1 drop at 08/17/17 0838 **AND** brimonidine (ALPHAGAN) 0.2 % ophthalmic solution 1 drop, 1 drop, Both Eyes, Q12H, Donna Ivy MD, 1 drop at 08/17/17 0838  •  busPIRone (BUSPAR) tablet 10 mg, 10 mg, Oral, BID, Donna Ivy MD, 10 mg at 08/17/17 0836  •  carvedilol (COREG) tablet 6.25 mg, 6.25 mg, Oral, BID With Meals, Donna Ivy MD, 6.25 mg at 08/17/17 0836  •  ceFAZolin (ANCEF) IVPB (duplex) 2 g, 2 g, Intravenous, Q8H, Donna Ivy MD, Last Rate: 0 mL/hr at 08/16/17 2235, 2 g at 08/17/17 0614  •  cetirizine (zyrTEC) tablet 10 mg, 10 mg, Oral, Daily, Donna Ivy MD, 10 mg at 08/17/17 0837  •  dextromethorphan polistirex ER (DELSYM) 30 MG/5ML oral suspension 60 mg, 10 mL, Oral, BID PRN, Donna Ivy MD  •  docusate sodium (COLACE) capsule 100 mg, 100 mg, Oral, BID, Donna Ivy MD, 100 mg at 08/17/17 0836  •  enoxaparin (LOVENOX) syringe 40 mg, 40 mg, Subcutaneous, Daily, Donna Ivy MD, 40 mg at 08/16/17 1730  •  fluticasone (FLONASE) 50 MCG/ACT nasal spray 2 spray, 2 spray, Nasal, Daily, Donna Ivy MD, 2 spray at 08/17/17 0838  •  HYDROmorphone (DILAUDID) injection 0.5 mg, 0.5 mg, Intravenous, Q2H PRN **AND** naloxone (NARCAN) injection 0.1 mg, 0.1 mg, Intravenous, Q5 Min PRN, Donna Ivy MD  •  lactated ringers infusion, 100 mL/hr, Intravenous, Continuous, Donna Ivy MD, Last Rate: 100 mL/hr at 08/17/17 0637, 100 mL/hr at 08/17/17 0637  •  lactobacillus acidophilus (RISAQUAD) capsule 1 capsule, 1 capsule, Oral, Daily, Donna Ivy MD, 1 capsule at 08/17/17 0836  •  levETIRAcetam (KEPPRA) tablet 500 mg, 500 mg, Oral, BID, Donna Ivy MD, 500 mg at 08/17/17 0836  •  levothyroxine (SYNTHROID, LEVOTHROID) tablet 50 mcg, 50 mcg, Oral, Daily, Donna Ivy MD, 50 mcg  at 08/17/17 0836  •  magnesium hydroxide (MILK OF MAGNESIA) 400 MG/5ML suspension 30 mL, 30 mL, Oral, PRN, Donna Ivy MD  •  montelukast (SINGULAIR) tablet 10 mg, 10 mg, Oral, Nightly, Donna Ivy MD, 10 mg at 08/16/17 2226  •  ondansetron (ZOFRAN) tablet 4 mg, 4 mg, Oral, Q6H PRN **OR** ondansetron ODT (ZOFRAN-ODT) disintegrating tablet 4 mg, 4 mg, Oral, Q6H PRN **OR** ondansetron (ZOFRAN) injection 4 mg, 4 mg, Intravenous, Q6H PRN, Donna Ivy MD, 4 mg at 08/16/17 0649  •  oxyCODONE-acetaminophen (PERCOCET) 5-325 MG per tablet 1 tablet, 1 tablet, Oral, Q4H PRN, Donna Ivy MD  •  pantoprazole (PROTONIX) EC tablet 40 mg, 40 mg, Oral, BID, Donna Ivy MD, 40 mg at 08/17/17 0836  •  PARoxetine (PAXIL) tablet 40 mg, 40 mg, Oral, QAM, Donna Ivy MD, 40 mg at 08/17/17 0617  •  polyethylene glycol (MIRALAX) powder 17 g, 17 g, Oral, Daily, Donna Ivy MD, 17 g at 08/17/17 0838  •  potassium & sodium phosphates (PHOS-NAK) oral packet, 1 packet, Oral, BID AC, Donna Ivy MD, 1 packet at 08/17/17 0837  •  QUEtiapine (SEROquel) tablet 100 mg, 100 mg, Oral, Nightly, Donna Ivy MD, 100 mg at 08/16/17 2226      Assessment/Plan     1. Leukocytosis: Improving, Suspect reactive. Patient afebrile. On surgical prophylaxis with Ancef. Monitor.      2. Constipation: Large volume of stool in colon on X-ray yesterday. Patient on Docusate, miralax and Dulcolax supp. No BMs recorded. + flatus per nursing.     3. Hypothyroidism: On home replacement. TSH was 3.480 on 8/9/2017.        4. Anxiety, Major Depression and OCD: On home Seroquel, Paxil, Buspar and Seroquel.      5. HLD: On home Atorvastatin      6. GERD and Hx of Duodenal ulcer and stricture: On Protonix, no acute issues here.      7. Allergic Rhinitis: On home Cetirizine and Flonase. No acute issues.      8. Metabolic Encephalopathy with Chronic Moderate Intellectual Disability: Mental status appears at baseline, will confirm  baseline with [place].       9. Seizure Disorder: On home Keppra, no acute issues here.      10. Vitamin D deficiency: On home MVI.    11. Hypophosphatemia: Oral phosphorus replacement ordered by surgery. Monitor.          12. Glaucoma: On Timoptic and Alphagan eyedrops.          13. H/O Anemia: Pre-op patient's Hb was NL, still today remains WNL.  Monitor intermittently. Iron supplementation on hold until ok to resume per surgery.      14. Recurrent incisional hernia S/P Lysis of adhesions and repair of recurrent incisional hernia with onlay mesh placement: Dr Ivy managing. Patient advanced to full liquid diet today, remains on IVFs.       15. DVT Prophylaxis: On Lovenox and SCDs    Plan for disposition: To [facility] when able.    Anny Elizondo MD  08/17/17  9:57 AM

## 2017-08-17 NOTE — PROGRESS NOTES
Gen Surg Postop Progress Note       Subjective: No acute overnight events.  No more nausea vomiting ×24 hours.  Tolerating clear liquids.  According to nursing staff passing flatus but no bowel movement as yet    Has not been very mobile.    X-rays from yesterday were all reviewed    Objective:    Vital Signs  Temp:  [98.7 °F (37.1 °C)-99.3 °F (37.4 °C)] 98.7 °F (37.1 °C)  Heart Rate:  [80-91] 91  Resp:  [18] 18  BP: (101-110)/(61-75) 108/61  Body mass index is 27.4 kg/(m^2).    Intake/Output Summary (Last 24 hours) at 08/17/17 0724  Last data filed at 08/17/17 0614   Gross per 24 hour   Intake             2486 ml   Output              640 ml   Net             1846 ml     HANSEL output mostly serous sanguinous       Physical Exam:   General: patient awake, alert and cooperative   Pulm: clear to auscultation bilaterally but decreased at bases, regular and unlabored   CVS : Regular rate and rhythm   Abdomen: soft, nontender, nondistended; normal bowel sounds, incision clean dry intact   Extremities: no rash or edema   Neurologic: Normal mood and behavior     Results Review:     I reviewed the patient's new clinical results.        Results from last 7 days  Lab Units 08/17/17  0411   WBC 10*3/mm3 15.18*   HEMOGLOBIN g/dL 12.0   HEMATOCRIT % 37.7   PLATELETS 10*3/mm3 290       Results from last 7 days  Lab Units 08/17/17  0411   SODIUM mmol/L 141   POTASSIUM mmol/L 4.1   CHLORIDE mmol/L 100   CO2 mmol/L 30.5*   BUN mg/dL 19   CREATININE mg/dL 0.75   CALCIUM mg/dL 8.5*   GLUCOSE mg/dL 111*         PT/INR:  No results found for: PROTIME/No results found for: INR    Calcium Calcium   Date Value Ref Range Status   08/17/2017 8.5 (L) 8.8 - 10.5 mg/dL Final   08/16/2017 8.8 8.8 - 10.5 mg/dL Final      Magnesium  AST  ALT  Bilirubin, Total  AlkPhos  Albumin    Amylase  Lipase    Radiology: Magnesium   Date Value Ref Range Status   08/17/2017 2.0 1.7 - 2.5 mg/dL Final     No components found for: AST.*  No components found for:  ALT.*  No components found for: BILIRUBIN, TOTAL.*    No components found for: ALKPHOS.*  No components found for: ALBUMIN.*      No components found for: AMYLASE.*  No components found for: LIPASE.*      Imaging Results (most recent)     Procedure Component Value Units Date/Time    XR Abdomen Flat & Upright [[id number]] Collected:  08/16/17 0952     Updated:  08/16/17 0957    Narrative:       ABDOMEN SERIES, 08/16/2017:     HISTORY:   67-year-old female one day postop ventral hernia repair. Postoperative  generalized abdomen pain.     TECHNIQUE:  Flat and upright abdomen series.     FINDINGS:  Surgical staples and a surgical drain superimposed over the abdominal  midline. No free intraperitoneal air.     No visible bowel dilatation to suggest obstruction. Large volume stool  is present throughout the entire colon.       Impression:       1. Large volume stool throughout the colon.  2. Bowel gas pattern within normal limits. No free air.  3. Postop changes as noted.     This report was finalized on 8/16/2017 9:54 AM by Dr. Bean Garcia MD.       XR Chest PA & Lateral [[id number]] Collected:  08/16/17 0954     Updated:  08/16/17 0959    Narrative:       CHEST X-RAY, 08/16/2017:     HISTORY:   67-year-old female one day postop ventral hernia repair. Postoperative  generalized abdomen pain. Cough and shortness of air.     TECHNIQUE:  PA and lateral upright chest series.     COMPARISON:  *  Chest x-ray, 03/15/2017.     FINDINGS:  Chronic scarring and atelectasis in the lung bases. No visible acute  pulmonary infiltrate or pleural effusion. Stable cardiomegaly. Severe  scoliosis. No change since the prior exam.       Impression:       1. Chronic scarring and atelectasis in the lung bases, stable since  03/15/2017.  2. Stable cardiomegaly.  3. Scoliosis.     This report was finalized on 8/16/2017 9:57 AM by Dr. Bean Garcia MD.                Assessment/Plan     Status post lysis of adhesions recurrent  incisional hernia repair  Hemodynamically stable and afebrile  Postoperative nausea vomiting-appears to have resolved  We'll advance to full liquids  Hep-Lock IV fluids with tolerates by mouth well  Leukocytosis-slowly resolving continue IV antibiotics  Continue Lovenox and SCDs for the VTE prophylaxis    Needs aggressive pulmonary toilet   Physical therapy has been consulted  Patient was encouraged to be out of bed and ambulate    Chronic constipation-she is back on her bowel regimen    Hypophosphatemia-will replace    Plan of care discussed with nursing staff and hospitalist        Donna Ivy MD  08/17/17  7:24 AM    Time:

## 2017-08-18 LAB
ANION GAP SERPL CALCULATED.3IONS-SCNC: 8.5 MMOL/L
BASOPHILS # BLD AUTO: 0.05 10*3/MM3 (ref 0–0.2)
BASOPHILS NFR BLD AUTO: 0.4 % (ref 0–2)
BUN BLD-MCNC: 12 MG/DL (ref 8–23)
BUN/CREAT SERPL: 22.2 (ref 7–25)
CALCIUM SPEC-SCNC: 8.3 MG/DL (ref 8.8–10.5)
CHLORIDE SERPL-SCNC: 102 MMOL/L (ref 98–107)
CO2 SERPL-SCNC: 31.5 MMOL/L (ref 22–29)
CREAT BLD-MCNC: 0.54 MG/DL (ref 0.57–1)
DEPRECATED RDW RBC AUTO: 50.4 FL (ref 37–54)
EOSINOPHIL # BLD AUTO: 0.21 10*3/MM3 (ref 0.1–0.3)
EOSINOPHIL NFR BLD AUTO: 1.6 % (ref 0–4)
ERYTHROCYTE [DISTWIDTH] IN BLOOD BY AUTOMATED COUNT: 16.2 % (ref 11.5–14.5)
GFR SERPL CREATININE-BSD FRML MDRD: 113 ML/MIN/1.73
GLUCOSE BLD-MCNC: 105 MG/DL (ref 65–99)
HCT VFR BLD AUTO: 38.2 % (ref 37–47)
HGB BLD-MCNC: 12.1 G/DL (ref 12–16)
IMM GRANULOCYTES # BLD: 0.07 10*3/MM3 (ref 0–0.03)
IMM GRANULOCYTES NFR BLD: 0.5 % (ref 0–0.5)
LYMPHOCYTES # BLD AUTO: 3.11 10*3/MM3 (ref 0.6–4.8)
LYMPHOCYTES NFR BLD AUTO: 23.8 % (ref 20–45)
MAGNESIUM SERPL-MCNC: 2 MG/DL (ref 1.7–2.5)
MCH RBC QN AUTO: 27.3 PG (ref 27–31)
MCHC RBC AUTO-ENTMCNC: 31.7 G/DL (ref 31–37)
MCV RBC AUTO: 86 FL (ref 81–99)
MONOCYTES # BLD AUTO: 1.23 10*3/MM3 (ref 0–1)
MONOCYTES NFR BLD AUTO: 9.4 % (ref 3–8)
NEUTROPHILS # BLD AUTO: 8.37 10*3/MM3 (ref 1.5–8.3)
NEUTROPHILS NFR BLD AUTO: 64.3 % (ref 45–70)
NRBC BLD MANUAL-RTO: 0 /100 WBC (ref 0–0)
PHOSPHATE SERPL-MCNC: 1.8 MG/DL (ref 2.7–4.5)
PHOSPHATE SERPL-MCNC: 2.9 MG/DL (ref 2.7–4.5)
PLATELET # BLD AUTO: 266 10*3/MM3 (ref 140–500)
PMV BLD AUTO: 11 FL (ref 7.4–10.4)
POTASSIUM BLD-SCNC: 4.2 MMOL/L (ref 3.5–5.2)
RBC # BLD AUTO: 4.44 10*6/MM3 (ref 4.2–5.4)
SODIUM BLD-SCNC: 142 MMOL/L (ref 136–145)
WBC NRBC COR # BLD: 13.04 10*3/MM3 (ref 4.8–10.8)

## 2017-08-18 PROCEDURE — 80048 BASIC METABOLIC PNL TOTAL CA: CPT | Performed by: SURGERY

## 2017-08-18 PROCEDURE — 85025 COMPLETE CBC W/AUTO DIFF WBC: CPT | Performed by: SURGERY

## 2017-08-18 PROCEDURE — 99225 PR SBSQ OBSERVATION CARE/DAY 25 MINUTES: CPT | Performed by: HOSPITALIST

## 2017-08-18 PROCEDURE — G0378 HOSPITAL OBSERVATION PER HR: HCPCS

## 2017-08-18 PROCEDURE — 99024 POSTOP FOLLOW-UP VISIT: CPT | Performed by: SURGERY

## 2017-08-18 PROCEDURE — 25010000002 ENOXAPARIN PER 10 MG: Performed by: SURGERY

## 2017-08-18 PROCEDURE — 84100 ASSAY OF PHOSPHORUS: CPT | Performed by: SURGERY

## 2017-08-18 PROCEDURE — 83735 ASSAY OF MAGNESIUM: CPT | Performed by: SURGERY

## 2017-08-18 PROCEDURE — 84100 ASSAY OF PHOSPHORUS: CPT | Performed by: HOSPITALIST

## 2017-08-18 PROCEDURE — 25010000003 CEFAZOLIN PER 500 MG: Performed by: SURGERY

## 2017-08-18 RX ORDER — SENNA AND DOCUSATE SODIUM 50; 8.6 MG/1; MG/1
2 TABLET, FILM COATED ORAL DAILY
Status: DISCONTINUED | OUTPATIENT
Start: 2017-08-18 | End: 2017-08-22 | Stop reason: HOSPADM

## 2017-08-18 RX ORDER — BISACODYL 10 MG
10 SUPPOSITORY, RECTAL RECTAL DAILY PRN
Status: DISCONTINUED | OUTPATIENT
Start: 2017-08-18 | End: 2017-08-22 | Stop reason: HOSPADM

## 2017-08-18 RX ADMIN — BRIMONIDINE TARTRATE 1 DROP: 2 SOLUTION/ DROPS OPHTHALMIC at 20:43

## 2017-08-18 RX ADMIN — LEVETIRACETAM 500 MG: 500 TABLET, FILM COATED ORAL at 17:41

## 2017-08-18 RX ADMIN — BRIMONIDINE TARTRATE 1 DROP: 2 SOLUTION/ DROPS OPHTHALMIC at 10:43

## 2017-08-18 RX ADMIN — FLUTICASONE PROPIONATE 2 SPRAY: 50 SPRAY, METERED NASAL at 10:43

## 2017-08-18 RX ADMIN — POTASSIUM PHOSPHATE, MONOBASIC 500 MG: 500 TABLET, SOLUBLE ORAL at 10:40

## 2017-08-18 RX ADMIN — TIMOLOL MALEATE 1 DROP: 5 SOLUTION OPHTHALMIC at 10:43

## 2017-08-18 RX ADMIN — PAROXETINE HYDROCHLORIDE 40 MG: 20 TABLET, FILM COATED ORAL at 06:30

## 2017-08-18 RX ADMIN — LEVOTHYROXINE SODIUM 50 MCG: 50 TABLET ORAL at 10:43

## 2017-08-18 RX ADMIN — Medication 1 CAPSULE: at 10:43

## 2017-08-18 RX ADMIN — CARVEDILOL 6.25 MG: 6.25 TABLET, FILM COATED ORAL at 17:41

## 2017-08-18 RX ADMIN — POLYETHYLENE GLYCOL 3350 17 G: 17 POWDER, FOR SOLUTION ORAL at 10:39

## 2017-08-18 RX ADMIN — DOCUSATE SODIUM AND SENNOSIDES 2 TABLET: 8.6; 5 TABLET, FILM COATED ORAL at 10:42

## 2017-08-18 RX ADMIN — ENOXAPARIN SODIUM 40 MG: 40 INJECTION SUBCUTANEOUS at 10:44

## 2017-08-18 RX ADMIN — CEFAZOLIN SODIUM 2 G: 2 SOLUTION INTRAVENOUS at 12:38

## 2017-08-18 RX ADMIN — POTASSIUM PHOSPHATE, MONOBASIC 500 MG: 500 TABLET, SOLUBLE ORAL at 17:42

## 2017-08-18 RX ADMIN — MONTELUKAST SODIUM 10 MG: 10 TABLET, FILM COATED ORAL at 20:42

## 2017-08-18 RX ADMIN — CEFAZOLIN SODIUM 2 G: 2 SOLUTION INTRAVENOUS at 20:59

## 2017-08-18 RX ADMIN — PANTOPRAZOLE SODIUM 40 MG: 40 TABLET, DELAYED RELEASE ORAL at 17:41

## 2017-08-18 RX ADMIN — CARVEDILOL 6.25 MG: 6.25 TABLET, FILM COATED ORAL at 10:43

## 2017-08-18 RX ADMIN — LEVETIRACETAM 500 MG: 500 TABLET, FILM COATED ORAL at 10:42

## 2017-08-18 RX ADMIN — PANTOPRAZOLE SODIUM 40 MG: 40 TABLET, DELAYED RELEASE ORAL at 10:42

## 2017-08-18 RX ADMIN — CEFAZOLIN SODIUM 2 G: 2 SOLUTION INTRAVENOUS at 06:29

## 2017-08-18 RX ADMIN — BUSPIRONE HYDROCHLORIDE 10 MG: 5 TABLET ORAL at 17:41

## 2017-08-18 RX ADMIN — BUSPIRONE HYDROCHLORIDE 10 MG: 5 TABLET ORAL at 10:43

## 2017-08-18 RX ADMIN — POTASSIUM PHOSPHATE, MONOBASIC AND POTASSIUM PHOSPHATE, DIBASIC 10 MMOL: 224; 236 INJECTION, SOLUTION INTRAVENOUS at 16:50

## 2017-08-18 RX ADMIN — CETIRIZINE HYDROCHLORIDE 10 MG: 10 TABLET, FILM COATED ORAL at 10:43

## 2017-08-18 RX ADMIN — QUETIAPINE FUMARATE 100 MG: 100 TABLET, FILM COATED ORAL at 20:42

## 2017-08-18 RX ADMIN — DOCUSATE SODIUM 100 MG: 100 CAPSULE, LIQUID FILLED ORAL at 10:42

## 2017-08-18 RX ADMIN — TIMOLOL MALEATE 1 DROP: 5 SOLUTION OPHTHALMIC at 20:43

## 2017-08-18 RX ADMIN — DOCUSATE SODIUM 100 MG: 100 CAPSULE, LIQUID FILLED ORAL at 17:41

## 2017-08-18 RX ADMIN — ATORVASTATIN CALCIUM 20 MG: 20 TABLET, FILM COATED ORAL at 20:42

## 2017-08-18 RX ADMIN — POTASSIUM PHOSPHATE, MONOBASIC 500 MG: 500 TABLET, SOLUBLE ORAL at 12:38

## 2017-08-18 NOTE — PROGRESS NOTES
"Hospitalist Team      Patient Care Team:  Khang Merino MD as PCP - General (Family Medicine)        Chief Complaint: F/U Leukocytosis    Subjective    Interval History and ROS:     Patient notes she is feeling fine today. She is tolerating her diet and denies any CP, SOB or N/V.  She denies any abdominal pain. Still no BM. Diet advanced by surgery today. Patient afebrile.      Objective    Vital Signs  Temp:  [97.9 °F (36.6 °C)-99.1 °F (37.3 °C)] 98 °F (36.7 °C)  Heart Rate:  [79-97] 97  Resp:  [16-18] 16  BP: (110-134)/(68-86) 134/86  Oxygen Therapy  SpO2: 95 %  Pulse Oximetry Type: Intermittent  O2 Device: room air    Flowsheet Rows         First Filed Value    Admission Height  62\" (157.5 cm) Documented at 08/10/2017 1251    Admission Weight  153 lb 11.2 oz (69.7 kg) Documented at 08/10/2017 1251            Physical Exam:  Constitutional: She appears well-developed and well-nourished. No distress.   Eyes: EOM are normal. Pupils are equal, round, and reactive to light. No scleral icterus.   Neck: Neck supple. She has no cervical adenopathy.  Cardiovascular: Normal rate, regular rhythm and normal heart sounds.  Exam reveals no gallop and no friction rub.    No murmur heard.  Pulmonary/Chest: Effort normal and breath sounds normal. No respiratory distress.   Abdominal:   Dressing and binder in place, slightly hypoactive bowel sounds.    Musculoskeletal: She exhibits no edema.   Neurological: She is alert.   Oriented only to person, answers questions appropriately.   Skin: Skin is warm and dry. No rash noted.     Results Review:     I reviewed the patient's new clinical results.    Lab Results (last 24 hours)     Procedure Component Value Units Date/Time    Tissue Pathology Exam [[id number]] Collected:  08/15/17 1217    Specimen:  Tissue from Abdominal Wall, Tissue from Abdominal Wall Updated:  08/17/17 8897     Case Report --     Surgical Pathology Report                         Case: [id number]                     "              Authorizing Provider:  Donna Ivy MD       Collected:           08/15/2017 12:17 PM          Ordering Location:     [facility]   Received:            08/15/2017 04:26 PM                                 OR                                                                           Pathologist:           Gabo Brambila MD                                                      Specimens:   1) - Abdominal Wall, scar tissue                                                                    2) - Abdominal Wall, hernia sac                                                             Final Diagnosis --     Testing performed at outside laboratory. See scanned report.         Embedded Images --    CBC & Differential [[id number]] Collected:  08/18/17 0647    Specimen:  Blood Updated:  08/18/17 0657    Narrative:       The following orders were created for panel order CBC & Differential.  Procedure                               Abnormality         Status                     ---------                               -----------         ------                     CBC Auto Differential[[id number]]        Abnormal            Final result                 Please view results for these tests on the individual orders.    CBC Auto Differential [[id number]]  (Abnormal) Collected:  08/18/17 0647    Specimen:  Blood Updated:  08/18/17 0657     WBC 13.04 (H) 10*3/mm3      RBC 4.44 10*6/mm3      Hemoglobin 12.1 g/dL      Hematocrit 38.2 %      MCV 86.0 fL      MCH 27.3 pg      MCHC 31.7 g/dL      RDW 16.2 (H) %      RDW-SD 50.4 fl      MPV 11.0 (H) fL      Platelets 266 10*3/mm3      Neutrophil % 64.3 %      Lymphocyte % 23.8 %      Monocyte % 9.4 (H) %      Eosinophil % 1.6 %      Basophil % 0.4 %      Immature Grans % 0.5 %      Neutrophils, Absolute 8.37 (H) 10*3/mm3      Lymphocytes, Absolute 3.11 10*3/mm3      Monocytes, Absolute 1.23 (H) 10*3/mm3      Eosinophils, Absolute 0.21 10*3/mm3      Basophils,  Absolute 0.05 10*3/mm3      Immature Grans, Absolute 0.07 (H) 10*3/mm3      nRBC 0.0 /100 WBC     Basic Metabolic Panel [[id number]]  (Abnormal) Collected:  08/18/17 0647    Specimen:  Blood Updated:  08/18/17 0727     Glucose 105 (H) mg/dL      BUN 12 mg/dL      Creatinine 0.54 (L) mg/dL      Sodium 142 mmol/L      Potassium 4.2 mmol/L      Chloride 102 mmol/L      CO2 31.5 (H) mmol/L      Calcium 8.3 (L) mg/dL      eGFR Non African Amer 113 mL/min/1.73      BUN/Creatinine Ratio 22.2     Anion Gap 8.5 mmol/L     Narrative:       GFR Normal >60  Chronic Kidney Disease <60  Kidney Failure <15    Magnesium [[id number]]  (Normal) Collected:  08/18/17 0647    Specimen:  Blood Updated:  08/18/17 0727     Magnesium 2.0 mg/dL     Phosphorus [[id number]]  (Abnormal) Collected:  08/18/17 0647    Specimen:  Blood Updated:  08/18/17 0727     Phosphorus 1.8 (L) mg/dL           Imaging Results (last 24 hours)     ** No results found for the last 24 hours. **          ECG/EMG Results (most recent)     None          Medication Review:   I have reviewed the patient's current medication list    Current Facility-Administered Medications:   •  acetaminophen (TYLENOL) tablet 500 mg, 500 mg, Oral, 4x Daily PRN, Donna Ivy MD  •  atorvastatin (LIPITOR) tablet 20 mg, 20 mg, Oral, Nightly, Donna Ivy MD, 20 mg at 08/17/17 2159  •  bisacodyl (DULCOLAX) suppository 10 mg, 10 mg, Rectal, Daily PRN, Donna Ivy MD  •  timolol (TIMOPTIC) 0.5 % ophthalmic solution 1 drop, 1 drop, Both Eyes, Q12H, 1 drop at 08/17/17 2159 **AND** brimonidine (ALPHAGAN) 0.2 % ophthalmic solution 1 drop, 1 drop, Both Eyes, Q12H, Donna Ivy MD, 1 drop at 08/17/17 2159  •  busPIRone (BUSPAR) tablet 10 mg, 10 mg, Oral, BID, Donna Ivy MD, 10 mg at 08/17/17 1751  •  carvedilol (COREG) tablet 6.25 mg, 6.25 mg, Oral, BID With Meals, Donna Ivy MD, 6.25 mg at 08/17/17 1751  •  ceFAZolin (ANCEF) IVPB (duplex) 2 g, 2 g, Intravenous, Q8H,  Donna Ivy MD, Last Rate: 0 mL/hr at 08/17/17 2230, 2 g at 08/18/17 0629  •  cetirizine (zyrTEC) tablet 10 mg, 10 mg, Oral, Daily, Donna Ivy MD, 10 mg at 08/17/17 0837  •  dextromethorphan polistirex ER (DELSYM) 30 MG/5ML oral suspension 60 mg, 10 mL, Oral, BID PRN, Donna Ivy MD  •  docusate sodium (COLACE) capsule 100 mg, 100 mg, Oral, BID, Donna Ivy MD, 100 mg at 08/17/17 1752  •  enoxaparin (LOVENOX) syringe 40 mg, 40 mg, Subcutaneous, Daily, Donna Ivy MD, 40 mg at 08/17/17 0900  •  fluticasone (FLONASE) 50 MCG/ACT nasal spray 2 spray, 2 spray, Nasal, Daily, Donna Ivy MD, 2 spray at 08/17/17 0838  •  HYDROmorphone (DILAUDID) injection 0.5 mg, 0.5 mg, Intravenous, Q2H PRN **AND** naloxone (NARCAN) injection 0.1 mg, 0.1 mg, Intravenous, Q5 Min PRN, Donna Ivy MD  •  lactobacillus acidophilus (RISAQUAD) capsule 1 capsule, 1 capsule, Oral, Daily, Donna Ivy MD, 1 capsule at 08/17/17 0836  •  levETIRAcetam (KEPPRA) tablet 500 mg, 500 mg, Oral, BID, Donna Ivy MD, 500 mg at 08/17/17 1752  •  levothyroxine (SYNTHROID, LEVOTHROID) tablet 50 mcg, 50 mcg, Oral, Daily, Donna Ivy MD, 50 mcg at 08/17/17 0836  •  magnesium hydroxide (MILK OF MAGNESIA) 400 MG/5ML suspension 30 mL, 30 mL, Oral, PRN, Donna Ivy MD  •  montelukast (SINGULAIR) tablet 10 mg, 10 mg, Oral, Nightly, Donna Ivy MD, 10 mg at 08/17/17 2151  •  ondansetron (ZOFRAN) tablet 4 mg, 4 mg, Oral, Q6H PRN **OR** ondansetron ODT (ZOFRAN-ODT) disintegrating tablet 4 mg, 4 mg, Oral, Q6H PRN **OR** ondansetron (ZOFRAN) injection 4 mg, 4 mg, Intravenous, Q6H PRN, Donna Ivy MD, 4 mg at 08/16/17 0606  •  oxyCODONE-acetaminophen (PERCOCET) 5-325 MG per tablet 1 tablet, 1 tablet, Oral, Q4H PRN, Donna Ivy MD  •  pantoprazole (PROTONIX) EC tablet 40 mg, 40 mg, Oral, BID, Donna Ivy MD, 40 mg at 08/17/17 1752  •  PARoxetine (PAXIL) tablet 40 mg, 40 mg, Oral, QAM,  Donna Ivy MD, 40 mg at 08/18/17 0630  •  polyethylene glycol (MIRALAX) powder 17 g, 17 g, Oral, Daily, Donna Ivy MD, 17 g at 08/17/17 0838  •  potassium phosphate (monobasic) (K-PHOS) tablet 500 mg, 500 mg, Oral, 4x Daily With Meals & Nightly, Donna Ivy MD  •  QUEtiapine (SEROquel) tablet 100 mg, 100 mg, Oral, Nightly, Donna Ivy MD, 100 mg at 08/17/17 0850  •  sennosides-docusate sodium (SENOKOT-S) 8.6-50 MG tablet 2 tablet, 2 tablet, Oral, Daily, Donna Ivy MD      Assessment/Plan     1. Leukocytosis: Continues to Improving, Suspect reactive. Patient afebrile. On surgical prophylaxis with Ancef. Monitor.       2. Constipation: Large volume of stool in colon on X-ray 8/16. Patient on Docusate, miralax and Dulcolax supp. Still no BMs. Surgery following.      3. Hypothyroidism: On home replacement. TSH was 3.480 on 8/9/2017.      4. Anxiety, Major Depression and OCD: On home Seroquel, Paxil, Buspar and Seroquel. No acute issues.       5. HLD: On home Atorvastatin      6. GERD and Hx of Duodenal ulcer and stricture: On Protonix, no acute issues here.      7. Allergic Rhinitis: On home Cetirizine and Flonase. No acute issues.      8. Metabolic Encephalopathy with Chronic Moderate Intellectual Disability: Mental status appears at baseline now.      9. Seizure Disorder: On home Keppra, no acute issues here.      10. Vitamin D deficiency: On home MVI.     11. Hypophosphatemia: Oral phosphorus replacement ordered by surgery. Will add IV replacement as well as phosphorus continues to fall, recheck later this PM. Monitor.          12. Glaucoma: On Timoptic and Alphagan eyedrops.      13. H/O Anemia: Pre-op patient's Hb was NL, slightly low today.  Monitor intermittently. Iron supplementation on hold until ok to resume per surgery.      14. Recurrent incisional hernia S/P Lysis of adhesions and repair of recurrent incisional hernia with onlay mesh placement: Dr Ivy managing. Patient diet  advanced today and IVFs stopped.       15. DVT Prophylaxis: On Lovenox and SCDs       Plan for disposition: To [facility] when able    Anny Elizondo MD  08/18/17  9:55 AM

## 2017-08-18 NOTE — PLAN OF CARE
Problem: Patient Care Overview (Adult)  Goal: Plan of Care Review    08/18/17 0541   Coping/Psychosocial Response Interventions   Plan Of Care Reviewed With patient   Patient Care Overview   Progress improving   Outcome Evaluation   Outcome Summary/Follow up Plan Patient has slept most of the shift. IV patent and infusing antibiotic therapy as ordered. Patient has been incontient of urine throughout the night. She has not had a BM. She denies any pain or discomfort. Plan return to [facility] today is medically stable.

## 2017-08-18 NOTE — PLAN OF CARE
Problem: Patient Care Overview (Adult)  Goal: Plan of Care Review  Outcome: Ongoing (interventions implemented as appropriate)    08/18/17 1512   Coping/Psychosocial Response Interventions   Plan Of Care Reviewed With patient   Patient Care Overview   Progress improving   Outcome Evaluation   Outcome Summary/Follow up Plan Patient has had no complaints of pain. VSS. Dressing on abdomin changed. Scant drainage out of the HANSEL drain. ABX given as ordered. New IV 20g in right wrist. Patient has been confused and attempted multple times today to get up out of bed without assitance. Bed alarm on and frequent rounding done. Patient currently resting in bed.        Goal: Discharge Needs Assessment  Outcome: Ongoing (interventions implemented as appropriate)    Problem: Fall Risk (Adult)  Goal: Identify Related Risk Factors and Signs and Symptoms  Outcome: Ongoing (interventions implemented as appropriate)  Goal: Absence of Falls  Outcome: Ongoing (interventions implemented as appropriate)

## 2017-08-18 NOTE — PROGRESS NOTES
Gen Surg Postop Progress Note       Subjective: No acute overnight events.  According to nursing staff has been tolerating full liquid diet.  There are no reports of nausea or vomiting.  Hasn't had a bowel movement as yet.  Does have history of chronic constipation.      Objective:    Vital Signs  Temp:  [97.9 °F (36.6 °C)-99.1 °F (37.3 °C)] 98 °F (36.7 °C)  Heart Rate:  [79-97] 97  Resp:  [16-18] 16  BP: (110-134)/(68-86) 134/86  Body mass index is 27.4 kg/(m^2).    Intake/Output Summary (Last 24 hours) at 08/18/17 0823  Last data filed at 08/18/17 0629   Gross per 24 hour   Intake             1390 ml   Output              300 ml   Net             1090 ml     HANSEL output  serosanguineous       Physical Exam:   General: patient awake, alert and cooperative   Cardiovascular: regular rhythm and rate, no murmurs auscultated   Pulm: clear to auscultation bilaterally, regular and unlabored   Abdomen: soft, nontender, nondistended; normal bowel sounds, incision clean dry intact   Extremities: no rash or edema   Neurologic: Normal mood and behavior     Results Review:     I reviewed the patient's new clinical results.        Results from last 7 days  Lab Units 08/18/17  0647   WBC 10*3/mm3 13.04*   HEMOGLOBIN g/dL 12.1   HEMATOCRIT % 38.2   PLATELETS 10*3/mm3 266       Results from last 7 days  Lab Units 08/18/17  0647   SODIUM mmol/L 142   POTASSIUM mmol/L 4.2   CHLORIDE mmol/L 102   CO2 mmol/L 31.5*   BUN mg/dL 12   CREATININE mg/dL 0.54*   CALCIUM mg/dL 8.3*   GLUCOSE mg/dL 105*         PT/INR:  No results found for: PROTIME/No results found for: INR    Calcium Calcium   Date Value Ref Range Status   08/18/2017 8.3 (L) 8.8 - 10.5 mg/dL Final   08/17/2017 8.5 (L) 8.8 - 10.5 mg/dL Final   08/16/2017 8.8 8.8 - 10.5 mg/dL Final      Magnesium  AST  ALT  Bilirubin, Total  AlkPhos  Albumin    Amylase  Lipase    Radiology: Magnesium   Date Value Ref Range Status   08/18/2017 2.0 1.7 - 2.5 mg/dL Final   08/17/2017 2.0 1.7 - 2.5  mg/dL Final     No components found for: AST.*  No components found for: ALT.*  No components found for: BILIRUBIN, TOTAL.*    No components found for: ALKPHOS.*  No components found for: ALBUMIN.*      No components found for: AMYLASE.*  No components found for: LIPASE.*      Imaging Results (most recent)     Procedure Component Value Units Date/Time    XR Abdomen Flat & Upright [[id number]] Collected:  08/16/17 0952     Updated:  08/16/17 0957    Narrative:       ABDOMEN SERIES, 08/16/2017:     HISTORY:   67-year-old female one day postop ventral hernia repair. Postoperative  generalized abdomen pain.     TECHNIQUE:  Flat and upright abdomen series.     FINDINGS:  Surgical staples and a surgical drain superimposed over the abdominal  midline. No free intraperitoneal air.     No visible bowel dilatation to suggest obstruction. Large volume stool  is present throughout the entire colon.       Impression:       1. Large volume stool throughout the colon.  2. Bowel gas pattern within normal limits. No free air.  3. Postop changes as noted.     This report was finalized on 8/16/2017 9:54 AM by Dr. Bean Garcia MD.       XR Chest PA & Lateral [[id number]] Collected:  08/16/17 0954     Updated:  08/16/17 0959    Narrative:       CHEST X-RAY, 08/16/2017:     HISTORY:   67-year-old female one day postop ventral hernia repair. Postoperative  generalized abdomen pain. Cough and shortness of air.     TECHNIQUE:  PA and lateral upright chest series.     COMPARISON:  *  Chest x-ray, 03/15/2017.     FINDINGS:  Chronic scarring and atelectasis in the lung bases. No visible acute  pulmonary infiltrate or pleural effusion. Stable cardiomegaly. Severe  scoliosis. No change since the prior exam.       Impression:       1. Chronic scarring and atelectasis in the lung bases, stable since  03/15/2017.  2. Stable cardiomegaly.  3. Scoliosis.     This report was finalized on 8/16/2017 9:57 AM by Dr. Bean Garcia MD.                 Assessment/Plan     Doing well  Hemodynamically stable and afebrile  Will advance diet  Continue bowel regimen  Hypophosphatemia-will replace    Leukocytosis-resolving continue IV antibiotics  Continue to encourage ambulation and pulmonary toilet  Lovenox and SCD for VTE prophylaxis    Plan of care discussed with nursing staff and hospitalist      Donna Ivy MD  08/18/17  8:23 AM    Time:

## 2017-08-19 LAB
ANION GAP SERPL CALCULATED.3IONS-SCNC: 8 MMOL/L
BASOPHILS # BLD AUTO: 0.07 10*3/MM3 (ref 0–0.2)
BASOPHILS NFR BLD AUTO: 0.5 % (ref 0–2)
BUN BLD-MCNC: 10 MG/DL (ref 8–23)
BUN/CREAT SERPL: 17.9 (ref 7–25)
CALCIUM SPEC-SCNC: 8.6 MG/DL (ref 8.8–10.5)
CHLORIDE SERPL-SCNC: 102 MMOL/L (ref 98–107)
CO2 SERPL-SCNC: 34 MMOL/L (ref 22–29)
CREAT BLD-MCNC: 0.56 MG/DL (ref 0.57–1)
DEPRECATED RDW RBC AUTO: 51.3 FL (ref 37–54)
EOSINOPHIL # BLD AUTO: 0.4 10*3/MM3 (ref 0.1–0.3)
EOSINOPHIL NFR BLD AUTO: 2.8 % (ref 0–4)
ERYTHROCYTE [DISTWIDTH] IN BLOOD BY AUTOMATED COUNT: 16.1 % (ref 11.5–14.5)
GFR SERPL CREATININE-BSD FRML MDRD: 108 ML/MIN/1.73
GLUCOSE BLD-MCNC: 87 MG/DL (ref 65–99)
HCT VFR BLD AUTO: 36.9 % (ref 37–47)
HGB BLD-MCNC: 11.6 G/DL (ref 12–16)
IMM GRANULOCYTES # BLD: 0.05 10*3/MM3 (ref 0–0.03)
IMM GRANULOCYTES NFR BLD: 0.3 % (ref 0–0.5)
LYMPHOCYTES # BLD AUTO: 4.05 10*3/MM3 (ref 0.6–4.8)
LYMPHOCYTES NFR BLD AUTO: 28.2 % (ref 20–45)
MAGNESIUM SERPL-MCNC: 2 MG/DL (ref 1.7–2.5)
MCH RBC QN AUTO: 27.3 PG (ref 27–31)
MCHC RBC AUTO-ENTMCNC: 31.4 G/DL (ref 31–37)
MCV RBC AUTO: 86.8 FL (ref 81–99)
MONOCYTES # BLD AUTO: 1.41 10*3/MM3 (ref 0–1)
MONOCYTES NFR BLD AUTO: 9.8 % (ref 3–8)
NEUTROPHILS # BLD AUTO: 8.4 10*3/MM3 (ref 1.5–8.3)
NEUTROPHILS NFR BLD AUTO: 58.4 % (ref 45–70)
NRBC BLD MANUAL-RTO: 0 /100 WBC (ref 0–0)
PHOSPHATE SERPL-MCNC: 2.4 MG/DL (ref 2.7–4.5)
PLATELET # BLD AUTO: 261 10*3/MM3 (ref 140–500)
PMV BLD AUTO: 10.7 FL (ref 7.4–10.4)
POTASSIUM BLD-SCNC: 4.5 MMOL/L (ref 3.5–5.2)
RBC # BLD AUTO: 4.25 10*6/MM3 (ref 4.2–5.4)
SODIUM BLD-SCNC: 144 MMOL/L (ref 136–145)
WBC NRBC COR # BLD: 14.38 10*3/MM3 (ref 4.8–10.8)

## 2017-08-19 PROCEDURE — 99024 POSTOP FOLLOW-UP VISIT: CPT | Performed by: SURGERY

## 2017-08-19 PROCEDURE — 83735 ASSAY OF MAGNESIUM: CPT | Performed by: SURGERY

## 2017-08-19 PROCEDURE — 25010000003 CEFAZOLIN PER 500 MG: Performed by: SURGERY

## 2017-08-19 PROCEDURE — 94799 UNLISTED PULMONARY SVC/PX: CPT

## 2017-08-19 PROCEDURE — 84100 ASSAY OF PHOSPHORUS: CPT | Performed by: SURGERY

## 2017-08-19 PROCEDURE — 25010000002 ENOXAPARIN PER 10 MG: Performed by: SURGERY

## 2017-08-19 PROCEDURE — 99225 PR SBSQ OBSERVATION CARE/DAY 25 MINUTES: CPT | Performed by: HOSPITALIST

## 2017-08-19 PROCEDURE — 80048 BASIC METABOLIC PNL TOTAL CA: CPT | Performed by: SURGERY

## 2017-08-19 PROCEDURE — 85025 COMPLETE CBC W/AUTO DIFF WBC: CPT | Performed by: SURGERY

## 2017-08-19 PROCEDURE — G0378 HOSPITAL OBSERVATION PER HR: HCPCS

## 2017-08-19 RX ADMIN — POTASSIUM PHOSPHATE, MONOBASIC 500 MG: 500 TABLET, SOLUBLE ORAL at 21:36

## 2017-08-19 RX ADMIN — Medication 1 CAPSULE: at 10:14

## 2017-08-19 RX ADMIN — LEVETIRACETAM 500 MG: 500 TABLET, FILM COATED ORAL at 17:58

## 2017-08-19 RX ADMIN — ATORVASTATIN CALCIUM 20 MG: 20 TABLET, FILM COATED ORAL at 21:33

## 2017-08-19 RX ADMIN — POLYETHYLENE GLYCOL 3350 17 G: 17 POWDER, FOR SOLUTION ORAL at 09:13

## 2017-08-19 RX ADMIN — CARVEDILOL 6.25 MG: 6.25 TABLET, FILM COATED ORAL at 09:14

## 2017-08-19 RX ADMIN — POTASSIUM PHOSPHATE, MONOBASIC 500 MG: 500 TABLET, SOLUBLE ORAL at 17:58

## 2017-08-19 RX ADMIN — CETIRIZINE HYDROCHLORIDE 10 MG: 10 TABLET, FILM COATED ORAL at 10:14

## 2017-08-19 RX ADMIN — MONTELUKAST SODIUM 10 MG: 10 TABLET, FILM COATED ORAL at 21:33

## 2017-08-19 RX ADMIN — CEFAZOLIN SODIUM 2 G: 2 SOLUTION INTRAVENOUS at 21:33

## 2017-08-19 RX ADMIN — CEFAZOLIN SODIUM 2 G: 2 SOLUTION INTRAVENOUS at 12:45

## 2017-08-19 RX ADMIN — LEVOTHYROXINE SODIUM 50 MCG: 50 TABLET ORAL at 09:14

## 2017-08-19 RX ADMIN — FLUTICASONE PROPIONATE 2 SPRAY: 50 SPRAY, METERED NASAL at 09:13

## 2017-08-19 RX ADMIN — BRIMONIDINE TARTRATE 1 DROP: 2 SOLUTION/ DROPS OPHTHALMIC at 21:34

## 2017-08-19 RX ADMIN — QUETIAPINE FUMARATE 100 MG: 100 TABLET, FILM COATED ORAL at 21:33

## 2017-08-19 RX ADMIN — DOCUSATE SODIUM AND SENNOSIDES 2 TABLET: 8.6; 5 TABLET, FILM COATED ORAL at 09:14

## 2017-08-19 RX ADMIN — PANTOPRAZOLE SODIUM 40 MG: 40 TABLET, DELAYED RELEASE ORAL at 09:14

## 2017-08-19 RX ADMIN — ENOXAPARIN SODIUM 40 MG: 40 INJECTION SUBCUTANEOUS at 09:13

## 2017-08-19 RX ADMIN — POTASSIUM PHOSPHATE, MONOBASIC 500 MG: 500 TABLET, SOLUBLE ORAL at 12:45

## 2017-08-19 RX ADMIN — BUSPIRONE HYDROCHLORIDE 10 MG: 5 TABLET ORAL at 09:14

## 2017-08-19 RX ADMIN — BUSPIRONE HYDROCHLORIDE 10 MG: 5 TABLET ORAL at 17:58

## 2017-08-19 RX ADMIN — CEFAZOLIN SODIUM 2 G: 2 SOLUTION INTRAVENOUS at 04:13

## 2017-08-19 RX ADMIN — BRIMONIDINE TARTRATE 1 DROP: 2 SOLUTION/ DROPS OPHTHALMIC at 09:13

## 2017-08-19 RX ADMIN — TIMOLOL MALEATE 1 DROP: 5 SOLUTION OPHTHALMIC at 21:34

## 2017-08-19 RX ADMIN — CARVEDILOL 6.25 MG: 6.25 TABLET, FILM COATED ORAL at 17:58

## 2017-08-19 RX ADMIN — LEVETIRACETAM 500 MG: 500 TABLET, FILM COATED ORAL at 09:14

## 2017-08-19 RX ADMIN — PANTOPRAZOLE SODIUM 40 MG: 40 TABLET, DELAYED RELEASE ORAL at 17:58

## 2017-08-19 RX ADMIN — DOCUSATE SODIUM 100 MG: 100 CAPSULE, LIQUID FILLED ORAL at 17:58

## 2017-08-19 RX ADMIN — DOCUSATE SODIUM 100 MG: 100 CAPSULE, LIQUID FILLED ORAL at 09:14

## 2017-08-19 RX ADMIN — PAROXETINE HYDROCHLORIDE 40 MG: 20 TABLET, FILM COATED ORAL at 09:14

## 2017-08-19 RX ADMIN — TIMOLOL MALEATE 1 DROP: 5 SOLUTION OPHTHALMIC at 09:13

## 2017-08-19 NOTE — PROGRESS NOTES
Surgery Progress Note   Chief Complaint:  Status post incisional hernia repair    Subjective     Interval History:     Caitlyn is feeling good.  She is tolerating her diet without nausea and vomiting and she has no complaints.      Objective     Vital Signs  Temp:  [97.3 °F (36.3 °C)-98.1 °F (36.7 °C)] 97.6 °F (36.4 °C)  Heart Rate:  [81-91] 81  Resp:  [18-20] 18  BP: (117-131)/(72-76) 117/72  Body mass index is 27.4 kg/(m^2).    Intake/Output Summary (Last 24 hours) at 08/19/17 1013  Last data filed at 08/19/17 0640   Gross per 24 hour   Intake              600 ml   Output              340 ml   Net              260 ml             Physical Exam:   General: patient awake, alert and cooperative   Cardiovascular: regular rhythm and rate   Pulm: clear to auscultation bilaterally   Abdomen: soft, normal bowel sounds HANSEL with scan drainage   Extremities: no rash or edema   Neurologic: Normal mood and behavior     Results Review:     I reviewed the patient's new clinical results.      WBC No results found for: WBCS   HGB Hemoglobin   Date Value Ref Range Status   08/19/2017 11.6 (L) 12.0 - 16.0 g/dL Final   08/18/2017 12.1 12.0 - 16.0 g/dL Final   08/17/2017 12.0 12.0 - 16.0 g/dL Final      HCT Hematocrit   Date Value Ref Range Status   08/19/2017 36.9 (L) 37.0 - 47.0 % Final   08/18/2017 38.2 37.0 - 47.0 % Final   08/17/2017 37.7 37.0 - 47.0 % Final      Platlets No results found for: LABPLAT     PT/INR:  No results found for: PROTIME/No results found for: INR    Sodium Sodium   Date Value Ref Range Status   08/19/2017 144 136 - 145 mmol/L Final   08/18/2017 142 136 - 145 mmol/L Final   08/17/2017 141 136 - 145 mmol/L Final      Potassium Potassium   Date Value Ref Range Status   08/19/2017 4.5 3.5 - 5.2 mmol/L Final   08/18/2017 4.2 3.5 - 5.2 mmol/L Final   08/17/2017 4.1 3.5 - 5.2 mmol/L Final      Chloride Chloride   Date Value Ref Range Status   08/19/2017 102 98 - 107 mmol/L Final   08/18/2017 102 98 - 107  mmol/L Final   08/17/2017 100 98 - 107 mmol/L Final      Bicarbonate No results found for: PLASMABICARB   BUN BUN   Date Value Ref Range Status   08/19/2017 10 8 - 23 mg/dL Final   08/18/2017 12 8 - 23 mg/dL Final   08/17/2017 19 8 - 23 mg/dL Final      Creatinine Creatinine   Date Value Ref Range Status   08/19/2017 0.56 (L) 0.57 - 1.00 mg/dL Final   08/18/2017 0.54 (L) 0.57 - 1.00 mg/dL Final   08/17/2017 0.75 0.57 - 1.00 mg/dL Final      Calcium Calcium   Date Value Ref Range Status   08/19/2017 8.6 (L) 8.8 - 10.5 mg/dL Final   08/18/2017 8.3 (L) 8.8 - 10.5 mg/dL Final   08/17/2017 8.5 (L) 8.8 - 10.5 mg/dL Final      Magnesium  AST  ALT  Bilirubin, Total  AlkPhos  Albumin    Amylase  Lipase    Radiology: Magnesium   Date Value Ref Range Status   08/19/2017 2.0 1.7 - 2.5 mg/dL Final   08/18/2017 2.0 1.7 - 2.5 mg/dL Final   08/17/2017 2.0 1.7 - 2.5 mg/dL Final     No components found for: AST.*  No components found for: ALT.*  No components found for: BILIRUBIN, TOTAL.*    No components found for: ALKPHOS.*  No components found for: ALBUMIN.*      No components found for: AMYLASE.*  No components found for: LIPASE.*            Imaging Results (most recent)     Procedure Component Value Units Date/Time    XR Abdomen Flat & Upright [[id number]] Collected:  08/16/17 0952     Updated:  08/16/17 0957    Narrative:       ABDOMEN SERIES, 08/16/2017:     HISTORY:   67-year-old female one day postop ventral hernia repair. Postoperative  generalized abdomen pain.     TECHNIQUE:  Flat and upright abdomen series.     FINDINGS:  Surgical staples and a surgical drain superimposed over the abdominal  midline. No free intraperitoneal air.     No visible bowel dilatation to suggest obstruction. Large volume stool  is present throughout the entire colon.       Impression:       1. Large volume stool throughout the colon.  2. Bowel gas pattern within normal limits. No free air.  3. Postop changes as noted.     This report was  finalized on 8/16/2017 9:54 AM by Dr. Bean Garcia MD.       XR Chest PA & Lateral [[id number]] Collected:  08/16/17 0954     Updated:  08/16/17 0959    Narrative:       CHEST X-RAY, 08/16/2017:     HISTORY:   67-year-old female one day postop ventral hernia repair. Postoperative  generalized abdomen pain. Cough and shortness of air.     TECHNIQUE:  PA and lateral upright chest series.     COMPARISON:  *  Chest x-ray, 03/15/2017.     FINDINGS:  Chronic scarring and atelectasis in the lung bases. No visible acute  pulmonary infiltrate or pleural effusion. Stable cardiomegaly. Severe  scoliosis. No change since the prior exam.       Impression:       1. Chronic scarring and atelectasis in the lung bases, stable since  03/15/2017.  2. Stable cardiomegaly.  3. Scoliosis.     This report was finalized on 8/16/2017 9:57 AM by Dr. Bean Garcia MD.                       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

## 2017-08-19 NOTE — NURSING NOTE
Discharge Planning Assessment   Yaa Gonzalez     Patient Name: Caitlyn Camacho  MRN: [id number]  Today's Date: 8/19/2017    Admit Date: 8/15/2017          Discharge Needs Assessment     None              Discharge Plan       08/19/17 1254    Case Management/Social Work Plan    Additional Comments Patient will have phosphorous replacement today and be discharged likely Sunday 8/20/17 back to [facility].         Discharge Placement     No information found                Demographic Summary     None            Functional Status     None            Psychosocial     None            Abuse/Neglect     None            Legal     None            Substance Abuse     None            Patient Forms     None          Gustavo Cardozo RN

## 2017-08-19 NOTE — PLAN OF CARE
Problem: Patient Care Overview (Adult)  Goal: Plan of Care Review  Outcome: Ongoing (interventions implemented as appropriate)    08/19/17 1702   Coping/Psychosocial Response Interventions   Plan Of Care Reviewed With patient   Patient Care Overview   Progress improving   Outcome Evaluation   Outcome Summary/Follow up Plan Patient doing well today.Has ambulated in hallway. Dressing changed. Appetite fair. Bed alarm on and frequent rounding is done. VSS. Luke drain had 10 ml out.        Goal: Discharge Needs Assessment  Outcome: Ongoing (interventions implemented as appropriate)    Problem: Fall Risk (Adult)  Goal: Identify Related Risk Factors and Signs and Symptoms  Outcome: Ongoing (interventions implemented as appropriate)  Goal: Absence of Falls  Outcome: Ongoing (interventions implemented as appropriate)

## 2017-08-19 NOTE — PLAN OF CARE
Problem: Patient Care Overview (Adult)  Goal: Discharge Needs Assessment  Outcome: Ongoing (interventions implemented as appropriate)    08/16/17 1325 08/17/17 0520 08/18/17 1512   Discharge Needs Assessment   Concerns To Be Addressed --  --  no discharge needs identified   Readmission Within The Last 30 Days no previous admission in last 30 days --  --    Equipment Needed After Discharge --  none --    Discharge Disposition --  psychiatric hospital;long-term care --    Discharge Planning Comments --  --  --    Current Health   Anticipated Changes Related to Illness --  none --    Self-Care   Equipment Currently Used at Home --  none --    Living Environment   Transportation Available --  car;family or friend will provide --      08/19/17 1255   Discharge Needs Assessment   Concerns To Be Addressed --    Readmission Within The Last 30 Days --    Equipment Needed After Discharge --    Discharge Disposition --    Discharge Planning Comments Patient will have phosphorous replacement today and be discharged likely Sunday 8/20/17 back to [facility].    Current Health   Anticipated Changes Related to Illness --    Self-Care   Equipment Currently Used at Home --    Living Environment   Transportation Available --

## 2017-08-20 LAB
ANION GAP SERPL CALCULATED.3IONS-SCNC: 8.8 MMOL/L
BASOPHILS # BLD AUTO: 0.08 10*3/MM3 (ref 0–0.2)
BASOPHILS NFR BLD AUTO: 0.6 % (ref 0–2)
BUN BLD-MCNC: 10 MG/DL (ref 8–23)
BUN/CREAT SERPL: 15.9 (ref 7–25)
CALCIUM SPEC-SCNC: 8.6 MG/DL (ref 8.8–10.5)
CHLORIDE SERPL-SCNC: 100 MMOL/L (ref 98–107)
CO2 SERPL-SCNC: 31.2 MMOL/L (ref 22–29)
CREAT BLD-MCNC: 0.63 MG/DL (ref 0.57–1)
DEPRECATED RDW RBC AUTO: 50.3 FL (ref 37–54)
EOSINOPHIL # BLD AUTO: 0.48 10*3/MM3 (ref 0.1–0.3)
EOSINOPHIL NFR BLD AUTO: 3.8 % (ref 0–4)
ERYTHROCYTE [DISTWIDTH] IN BLOOD BY AUTOMATED COUNT: 16.1 % (ref 11.5–14.5)
GFR SERPL CREATININE-BSD FRML MDRD: 95 ML/MIN/1.73
GLUCOSE BLD-MCNC: 104 MG/DL (ref 65–99)
HCT VFR BLD AUTO: 37.2 % (ref 37–47)
HGB BLD-MCNC: 11.8 G/DL (ref 12–16)
IMM GRANULOCYTES # BLD: 0.08 10*3/MM3 (ref 0–0.03)
IMM GRANULOCYTES NFR BLD: 0.6 % (ref 0–0.5)
LYMPHOCYTES # BLD AUTO: 3.53 10*3/MM3 (ref 0.6–4.8)
LYMPHOCYTES NFR BLD AUTO: 27.9 % (ref 20–45)
MCH RBC QN AUTO: 27.4 PG (ref 27–31)
MCHC RBC AUTO-ENTMCNC: 31.7 G/DL (ref 31–37)
MCV RBC AUTO: 86.5 FL (ref 81–99)
MONOCYTES # BLD AUTO: 1.05 10*3/MM3 (ref 0–1)
MONOCYTES NFR BLD AUTO: 8.3 % (ref 3–8)
NEUTROPHILS # BLD AUTO: 7.41 10*3/MM3 (ref 1.5–8.3)
NEUTROPHILS NFR BLD AUTO: 58.8 % (ref 45–70)
NRBC BLD MANUAL-RTO: 0 /100 WBC (ref 0–0)
PHOSPHATE SERPL-MCNC: 3.2 MG/DL (ref 2.7–4.5)
PLATELET # BLD AUTO: 287 10*3/MM3 (ref 140–500)
PMV BLD AUTO: 10.9 FL (ref 7.4–10.4)
POTASSIUM BLD-SCNC: 4.6 MMOL/L (ref 3.5–5.2)
RBC # BLD AUTO: 4.3 10*6/MM3 (ref 4.2–5.4)
SODIUM BLD-SCNC: 140 MMOL/L (ref 136–145)
WBC NRBC COR # BLD: 12.63 10*3/MM3 (ref 4.8–10.8)

## 2017-08-20 PROCEDURE — 85025 COMPLETE CBC W/AUTO DIFF WBC: CPT | Performed by: SURGERY

## 2017-08-20 PROCEDURE — 84100 ASSAY OF PHOSPHORUS: CPT | Performed by: SURGERY

## 2017-08-20 PROCEDURE — 80048 BASIC METABOLIC PNL TOTAL CA: CPT | Performed by: SURGERY

## 2017-08-20 PROCEDURE — 25010000002 ENOXAPARIN PER 10 MG: Performed by: SURGERY

## 2017-08-20 PROCEDURE — G0378 HOSPITAL OBSERVATION PER HR: HCPCS

## 2017-08-20 PROCEDURE — 99024 POSTOP FOLLOW-UP VISIT: CPT | Performed by: SURGERY

## 2017-08-20 PROCEDURE — 25010000003 CEFAZOLIN PER 500 MG: Performed by: SURGERY

## 2017-08-20 PROCEDURE — 94799 UNLISTED PULMONARY SVC/PX: CPT

## 2017-08-20 PROCEDURE — 99224 PR SBSQ OBSERVATION CARE/DAY 15 MINUTES: CPT | Performed by: HOSPITALIST

## 2017-08-20 RX ORDER — FERROUS SULFATE TAB EC 324 MG (65 MG FE EQUIVALENT) 324 (65 FE) MG
324 TABLET DELAYED RESPONSE ORAL
Status: DISCONTINUED | OUTPATIENT
Start: 2017-08-20 | End: 2017-08-22 | Stop reason: HOSPADM

## 2017-08-20 RX ADMIN — DOCUSATE SODIUM 100 MG: 100 CAPSULE, LIQUID FILLED ORAL at 08:52

## 2017-08-20 RX ADMIN — TIMOLOL MALEATE 1 DROP: 5 SOLUTION OPHTHALMIC at 11:27

## 2017-08-20 RX ADMIN — CEFAZOLIN SODIUM 2 G: 2 SOLUTION INTRAVENOUS at 12:30

## 2017-08-20 RX ADMIN — POTASSIUM PHOSPHATE, MONOBASIC 500 MG: 500 TABLET, SOLUBLE ORAL at 08:52

## 2017-08-20 RX ADMIN — MONTELUKAST SODIUM 10 MG: 10 TABLET, FILM COATED ORAL at 21:59

## 2017-08-20 RX ADMIN — BRIMONIDINE TARTRATE 1 DROP: 2 SOLUTION/ DROPS OPHTHALMIC at 11:27

## 2017-08-20 RX ADMIN — POTASSIUM PHOSPHATE, MONOBASIC 500 MG: 500 TABLET, SOLUBLE ORAL at 11:30

## 2017-08-20 RX ADMIN — BRIMONIDINE TARTRATE 1 DROP: 2 SOLUTION/ DROPS OPHTHALMIC at 21:59

## 2017-08-20 RX ADMIN — ENOXAPARIN SODIUM 40 MG: 40 INJECTION SUBCUTANEOUS at 08:52

## 2017-08-20 RX ADMIN — DOCUSATE SODIUM AND SENNOSIDES 2 TABLET: 8.6; 5 TABLET, FILM COATED ORAL at 17:41

## 2017-08-20 RX ADMIN — CEFAZOLIN SODIUM 2 G: 2 SOLUTION INTRAVENOUS at 06:12

## 2017-08-20 RX ADMIN — CARVEDILOL 6.25 MG: 6.25 TABLET, FILM COATED ORAL at 17:41

## 2017-08-20 RX ADMIN — TIMOLOL MALEATE 1 DROP: 5 SOLUTION OPHTHALMIC at 21:59

## 2017-08-20 RX ADMIN — CETIRIZINE HYDROCHLORIDE 10 MG: 10 TABLET, FILM COATED ORAL at 08:52

## 2017-08-20 RX ADMIN — CARVEDILOL 6.25 MG: 6.25 TABLET, FILM COATED ORAL at 08:52

## 2017-08-20 RX ADMIN — BUSPIRONE HYDROCHLORIDE 10 MG: 5 TABLET ORAL at 08:51

## 2017-08-20 RX ADMIN — ATORVASTATIN CALCIUM 20 MG: 20 TABLET, FILM COATED ORAL at 21:59

## 2017-08-20 RX ADMIN — LEVETIRACETAM 500 MG: 500 TABLET, FILM COATED ORAL at 17:40

## 2017-08-20 RX ADMIN — LEVOTHYROXINE SODIUM 50 MCG: 50 TABLET ORAL at 08:52

## 2017-08-20 RX ADMIN — PANTOPRAZOLE SODIUM 40 MG: 40 TABLET, DELAYED RELEASE ORAL at 17:41

## 2017-08-20 RX ADMIN — PAROXETINE HYDROCHLORIDE 40 MG: 20 TABLET, FILM COATED ORAL at 06:12

## 2017-08-20 RX ADMIN — BUSPIRONE HYDROCHLORIDE 10 MG: 5 TABLET ORAL at 17:41

## 2017-08-20 RX ADMIN — CEFAZOLIN SODIUM 2 G: 2 SOLUTION INTRAVENOUS at 21:59

## 2017-08-20 RX ADMIN — QUETIAPINE FUMARATE 100 MG: 100 TABLET, FILM COATED ORAL at 21:59

## 2017-08-20 RX ADMIN — DOCUSATE SODIUM 100 MG: 100 CAPSULE, LIQUID FILLED ORAL at 18:00

## 2017-08-20 RX ADMIN — POTASSIUM PHOSPHATE, MONOBASIC 500 MG: 500 TABLET, SOLUBLE ORAL at 17:40

## 2017-08-20 RX ADMIN — POTASSIUM PHOSPHATE, MONOBASIC 500 MG: 500 TABLET, SOLUBLE ORAL at 21:59

## 2017-08-20 RX ADMIN — LEVETIRACETAM 500 MG: 500 TABLET, FILM COATED ORAL at 08:52

## 2017-08-20 RX ADMIN — POLYETHYLENE GLYCOL 3350 17 G: 17 POWDER, FOR SOLUTION ORAL at 08:51

## 2017-08-20 RX ADMIN — FERROUS SULFATE TAB EC 324 MG (65 MG FE EQUIVALENT) 324 MG: 324 (65 FE) TABLET DELAYED RESPONSE at 08:52

## 2017-08-20 RX ADMIN — DOCUSATE SODIUM AND SENNOSIDES 2 TABLET: 8.6; 5 TABLET, FILM COATED ORAL at 11:27

## 2017-08-20 RX ADMIN — PANTOPRAZOLE SODIUM 40 MG: 40 TABLET, DELAYED RELEASE ORAL at 08:52

## 2017-08-20 RX ADMIN — Medication 1 CAPSULE: at 08:52

## 2017-08-20 RX ADMIN — FLUTICASONE PROPIONATE 2 SPRAY: 50 SPRAY, METERED NASAL at 08:52

## 2017-08-20 NOTE — NURSING NOTE
8/20/17 @ 1145- SPOKE WITH VINNY ( NURSE FROM [facility] ) AND NOTIFIED HER PATIENT WOULD NOT BE RETURNING TO THEIR FACILITY TODAY- I UPDATED HER ON ORDER FOR  AM LABS AND AWAIT DC ORDER WHEN DRS ROUND IN AM  AND REVIEW  LAB RESULTS AND PATIENT STATUS

## 2017-08-20 NOTE — PROGRESS NOTES
Surgery Progress Note   Chief Complaint:  Postop hernia repair    Subjective     Interval History:     Caitlyn is doing well without complaints      Objective     Vital Signs  Temp:  [97.2 °F (36.2 °C)-98.8 °F (37.1 °C)] 97.2 °F (36.2 °C)  Heart Rate:  [72-88] 72  Resp:  [18] 18  BP: (102-124)/(68-82) 114/82  Body mass index is 27.4 kg/(m^2).    Intake/Output Summary (Last 24 hours) at 08/20/17 0939  Last data filed at 08/20/17 0612   Gross per 24 hour   Intake              600 ml   Output               50 ml   Net              550 ml             Physical Exam:   General: patient awake, alert and cooperative   Abdomen: soft with excellent bowel sounds   Extremities: no rash or edema   Neurologic: Normal mood and behavior     Results Review:     I reviewed the patient's new clinical results.      WBC No results found for: WBCS   HGB Hemoglobin   Date Value Ref Range Status   08/20/2017 11.8 (L) 12.0 - 16.0 g/dL Final   08/19/2017 11.6 (L) 12.0 - 16.0 g/dL Final   08/18/2017 12.1 12.0 - 16.0 g/dL Final      HCT Hematocrit   Date Value Ref Range Status   08/20/2017 37.2 37.0 - 47.0 % Final   08/19/2017 36.9 (L) 37.0 - 47.0 % Final   08/18/2017 38.2 37.0 - 47.0 % Final      Platlets No results found for: LABPLAT     PT/INR:  No results found for: PROTIME/No results found for: INR    Sodium Sodium   Date Value Ref Range Status   08/20/2017 140 136 - 145 mmol/L Final   08/19/2017 144 136 - 145 mmol/L Final   08/18/2017 142 136 - 145 mmol/L Final      Potassium Potassium   Date Value Ref Range Status   08/20/2017 4.6 3.5 - 5.2 mmol/L Final   08/19/2017 4.5 3.5 - 5.2 mmol/L Final   08/18/2017 4.2 3.5 - 5.2 mmol/L Final      Chloride Chloride   Date Value Ref Range Status   08/20/2017 100 98 - 107 mmol/L Final   08/19/2017 102 98 - 107 mmol/L Final   08/18/2017 102 98 - 107 mmol/L Final      Bicarbonate No results found for: PLASMABICARB   BUN BUN   Date Value Ref Range Status   08/20/2017 10 8 - 23 mg/dL Final    08/19/2017 10 8 - 23 mg/dL Final   08/18/2017 12 8 - 23 mg/dL Final      Creatinine Creatinine   Date Value Ref Range Status   08/20/2017 0.63 0.57 - 1.00 mg/dL Final   08/19/2017 0.56 (L) 0.57 - 1.00 mg/dL Final   08/18/2017 0.54 (L) 0.57 - 1.00 mg/dL Final      Calcium Calcium   Date Value Ref Range Status   08/20/2017 8.6 (L) 8.8 - 10.5 mg/dL Final   08/19/2017 8.6 (L) 8.8 - 10.5 mg/dL Final   08/18/2017 8.3 (L) 8.8 - 10.5 mg/dL Final      Magnesium  AST  ALT  Bilirubin, Total  AlkPhos  Albumin    Amylase  Lipase    Radiology: Magnesium   Date Value Ref Range Status   08/19/2017 2.0 1.7 - 2.5 mg/dL Final   08/18/2017 2.0 1.7 - 2.5 mg/dL Final     No components found for: AST.*  No components found for: ALT.*  No components found for: BILIRUBIN, TOTAL.*    No components found for: ALKPHOS.*  No components found for: ALBUMIN.*      No components found for: AMYLASE.*  No components found for: LIPASE.*            Imaging Results (most recent)     Procedure Component Value Units Date/Time    XR Abdomen Flat & Upright [[id number]] Collected:  08/16/17 0952     Updated:  08/16/17 0957    Narrative:       ABDOMEN SERIES, 08/16/2017:     HISTORY:   67-year-old female one day postop ventral hernia repair. Postoperative  generalized abdomen pain.     TECHNIQUE:  Flat and upright abdomen series.     FINDINGS:  Surgical staples and a surgical drain superimposed over the abdominal  midline. No free intraperitoneal air.     No visible bowel dilatation to suggest obstruction. Large volume stool  is present throughout the entire colon.       Impression:       1. Large volume stool throughout the colon.  2. Bowel gas pattern within normal limits. No free air.  3. Postop changes as noted.     This report was finalized on 8/16/2017 9:54 AM by Dr. Bean Garcia MD.       XR Chest PA & Lateral [[id number]] Collected:  08/16/17 0954     Updated:  08/16/17 0959    Narrative:       CHEST X-RAY, 08/16/2017:     HISTORY:   67-year-old  female one day postop ventral hernia repair. Postoperative  generalized abdomen pain. Cough and shortness of air.     TECHNIQUE:  PA and lateral upright chest series.     COMPARISON:  *  Chest x-ray, 03/15/2017.     FINDINGS:  Chronic scarring and atelectasis in the lung bases. No visible acute  pulmonary infiltrate or pleural effusion. Stable cardiomegaly. Severe  scoliosis. No change since the prior exam.       Impression:       1. Chronic scarring and atelectasis in the lung bases, stable since  03/15/2017.  2. Stable cardiomegaly.  3. Scoliosis.     This report was finalized on 8/16/2017 9:57 AM by Dr. Bean Garcia MD.                       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

## 2017-08-20 NOTE — PROGRESS NOTES
"Hospitalist Team      Patient Care Team:  Khang Merino MD as PCP - General (Family Medicine)        Chief Complaint:  Follow-up Multiple Medical Problems    Subjective    No acute events overnight, however, did slide out of her chair to the floor this afternoon.  Did not strike her head.  No other events noted.  +BM.  Tolerating PO.    Objective    Vital Signs  Temp:  [97.2 °F (36.2 °C)-98.8 °F (37.1 °C)] 97.8 °F (36.6 °C)  Heart Rate:  [70-88] 75  Resp:  [16-18] 16  BP: ()/(58-82) 90/65  Oxygen Therapy  SpO2: 94 %  Pulse Oximetry Type: Intermittent  O2 Device: room air  Flow (L/min): 1  EtCO2 (mm Hg) (Respiratory Monitoring): 42}    Flowsheet Rows         First Filed Value    Admission Height  62\" (157.5 cm) Documented at 08/10/2017 1251    Admission Weight  153 lb 11.2 oz (69.7 kg) Documented at 08/10/2017 1251          Physical Exam:    General Appearance:    Alert, cooperative, in no acute distress   Lungs:     Clear to auscultation,respirations regular, even and                  unlabored    Heart:    Regular rhythm and normal rate, normal S1 and S2, no            murmur, no gallop, no rub, no click   Abdomen:     Binder in place; Minial HANSEL drain output   Rectal:     Deferred   Extremities:   Moves all extremities well, no edema, no cyanosis, no             redness   Pulses:   Pulses palpable and equal bilaterally   Skin:   No bleeding, bruising or rash   Lymph nodes:   No palpable adenopathy   Neurologic:   Cranial nerves 2 - 12 grossly intact, sensation intact, DTR       present and equal bilaterally         Results Review:     I reviewed the patient's new clinical results.  Lab Results (last 24 hours)     Procedure Component Value Units Date/Time    CBC & Differential [[id number]] Collected:  08/20/17 0334    Specimen:  Blood Updated:  08/20/17 4469    Narrative:       The following orders were created for panel order CBC & Differential.  Procedure                               Abnormality         " Status                     ---------                               -----------         ------                     CBC Auto Differential[[id number]]        Abnormal            Final result                 Please view results for these tests on the individual orders.    CBC Auto Differential [[id number]]  (Abnormal) Collected:  08/20/17 0334    Specimen:  Blood Updated:  08/20/17 0453     WBC 12.63 (H) 10*3/mm3      RBC 4.30 10*6/mm3      Hemoglobin 11.8 (L) g/dL      Hematocrit 37.2 %      MCV 86.5 fL      MCH 27.4 pg      MCHC 31.7 g/dL      RDW 16.1 (H) %      RDW-SD 50.3 fl      MPV 10.9 (H) fL      Platelets 287 10*3/mm3      Neutrophil % 58.8 %      Lymphocyte % 27.9 %      Monocyte % 8.3 (H) %      Eosinophil % 3.8 %      Basophil % 0.6 %      Immature Grans % 0.6 (H) %      Neutrophils, Absolute 7.41 10*3/mm3      Lymphocytes, Absolute 3.53 10*3/mm3      Monocytes, Absolute 1.05 (H) 10*3/mm3      Eosinophils, Absolute 0.48 (H) 10*3/mm3      Basophils, Absolute 0.08 10*3/mm3      Immature Grans, Absolute 0.08 (H) 10*3/mm3      nRBC 0.0 /100 WBC     Basic Metabolic Panel [[id number]]  (Abnormal) Collected:  08/20/17 0334    Specimen:  Blood Updated:  08/20/17 0517     Glucose 104 (H) mg/dL      BUN 10 mg/dL      Creatinine 0.63 mg/dL      Sodium 140 mmol/L      Potassium 4.6 mmol/L      Chloride 100 mmol/L      CO2 31.2 (H) mmol/L      Calcium 8.6 (L) mg/dL      eGFR Non African Amer 95 mL/min/1.73      BUN/Creatinine Ratio 15.9     Anion Gap 8.8 mmol/L     Narrative:       GFR Normal >60  Chronic Kidney Disease <60  Kidney Failure <15    Phosphorus [[id number]]  (Normal) Collected:  08/20/17 0334    Specimen:  Blood Updated:  08/20/17 0526     Phosphorus 3.2 mg/dL           Imaging Results (last 24 hours)     ** No results found for the last 24 hours. **          Medication Review:   I have reviewed the patient's current medication list    Current Facility-Administered Medications:   •  acetaminophen (TYLENOL)  tablet 500 mg, 500 mg, Oral, 4x Daily PRN, Donna Ivy MD  •  atorvastatin (LIPITOR) tablet 20 mg, 20 mg, Oral, Nightly, Donna Ivy MD, 20 mg at 08/19/17 2133  •  bisacodyl (DULCOLAX) suppository 10 mg, 10 mg, Rectal, Daily PRN, Donna Ivy MD  •  timolol (TIMOPTIC) 0.5 % ophthalmic solution 1 drop, 1 drop, Both Eyes, Q12H, 1 drop at 08/20/17 1127 **AND** brimonidine (ALPHAGAN) 0.2 % ophthalmic solution 1 drop, 1 drop, Both Eyes, Q12H, Donna Ivy MD, 1 drop at 08/20/17 1127  •  busPIRone (BUSPAR) tablet 10 mg, 10 mg, Oral, BID, Donna Ivy MD, 10 mg at 08/20/17 0851  •  carvedilol (COREG) tablet 6.25 mg, 6.25 mg, Oral, BID With Meals, Donna Ivy MD, 6.25 mg at 08/20/17 0852  •  ceFAZolin (ANCEF) IVPB (duplex) 2 g, 2 g, Intravenous, Q8H, Donna Ivy MD, Stopped at 08/20/17 1512  •  cetirizine (zyrTEC) tablet 10 mg, 10 mg, Oral, Daily, Donna Ivy MD, 10 mg at 08/20/17 0852  •  dextromethorphan polistirex ER (DELSYM) 30 MG/5ML oral suspension 60 mg, 10 mL, Oral, BID PRN, Donna Ivy MD  •  docusate sodium (COLACE) capsule 100 mg, 100 mg, Oral, BID, Donna Ivy MD, 100 mg at 08/20/17 0852  •  enoxaparin (LOVENOX) syringe 40 mg, 40 mg, Subcutaneous, Daily, Donna Ivy MD, 40 mg at 08/20/17 0852  •  ferrous sulfate EC tablet 324 mg, 324 mg, Oral, Daily With Breakfast, Tino Perez MD, 324 mg at 08/20/17 0852  •  fluticasone (FLONASE) 50 MCG/ACT nasal spray 2 spray, 2 spray, Nasal, Daily, Donna Ivy MD, 2 spray at 08/20/17 0852  •  HYDROmorphone (DILAUDID) injection 0.5 mg, 0.5 mg, Intravenous, Q2H PRN **AND** naloxone (NARCAN) injection 0.1 mg, 0.1 mg, Intravenous, Q5 Min PRN, Donna Ivy MD  •  lactobacillus acidophilus (RISAQUAD) capsule 1 capsule, 1 capsule, Oral, Daily, Donna Ivy MD, 1 capsule at 08/20/17 0852  •  levETIRAcetam (KEPPRA) tablet 500 mg, 500 mg, Oral, BID, Donna Ivy MD, 500 mg at 08/20/17 0852  •   levothyroxine (SYNTHROID, LEVOTHROID) tablet 50 mcg, 50 mcg, Oral, Daily, Donna Ivy MD, 50 mcg at 08/20/17 0852  •  magnesium hydroxide (MILK OF MAGNESIA) 400 MG/5ML suspension 30 mL, 30 mL, Oral, PRN, Donna Ivy MD  •  montelukast (SINGULAIR) tablet 10 mg, 10 mg, Oral, Nightly, Donna Ivy MD, 10 mg at 08/19/17 2133  •  ondansetron (ZOFRAN) tablet 4 mg, 4 mg, Oral, Q6H PRN **OR** ondansetron ODT (ZOFRAN-ODT) disintegrating tablet 4 mg, 4 mg, Oral, Q6H PRN **OR** ondansetron (ZOFRAN) injection 4 mg, 4 mg, Intravenous, Q6H PRN, Donna Ivy MD, 4 mg at 08/16/17 0649  •  oxyCODONE-acetaminophen (PERCOCET) 5-325 MG per tablet 1 tablet, 1 tablet, Oral, Q4H PRN, Donna Ivy MD  •  pantoprazole (PROTONIX) EC tablet 40 mg, 40 mg, Oral, BID, Donna Ivy MD, 40 mg at 08/20/17 0852  •  PARoxetine (PAXIL) tablet 40 mg, 40 mg, Oral, QAM, Donna Ivy MD, 40 mg at 08/20/17 0612  •  polyethylene glycol (MIRALAX) powder 17 g, 17 g, Oral, Daily, Donna Ivy MD, 17 g at 08/20/17 0851  •  potassium phosphate (monobasic) (K-PHOS) tablet 500 mg, 500 mg, Oral, 4x Daily With Meals & Nightly, Valarie Cm DO, 500 mg at 08/20/17 1130  •  QUEtiapine (SEROquel) tablet 100 mg, 100 mg, Oral, Nightly, Donna Ivy MD, 100 mg at 08/19/17 2133  •  sennosides-docusate sodium (SENOKOT-S) 8.6-50 MG tablet 2 tablet, 2 tablet, Oral, Daily, Donna Ivy MD, 2 tablet at 08/20/17 1127      Impression/Recommendations:    1.  Hypothyroidism:  Continue replacement.      2. Anxiety, Major Depression and OCD:  Continue home regimen.      3.  Dyslipidemia:  On statin.      4.  GERD and Hx of Duodenal ulcer and stricture:  No issues with diet.  Continue regimen.      5.  Allergic Rhinitis:  No acute issues.      6.  Chronic Moderate Intellectual Disability:  No change from yesterday.  Pleasant.        7.  Seizure D/O:  Seizure free.      8.  Vitamin D Deficiency:  On Ergocalciferol according to  med list.  Resume at discharge.      9.  Glaucoma:  Continue current regimen.      10.  Anemia:  Iron resumed yesterday.    Plan for disposition: As per Surgery.  Will sign-off.  Please call with questions.    Tino Perez MD  08/20/17  4:11 PM

## 2017-08-20 NOTE — PROGRESS NOTES
"Hospitalist Team      Patient Care Team:  Khang Merino MD as PCP - General (Family Medicine)        Chief Complaint:  Follow-up multiple medical problems    Subjective    No acute events overnight.  Ms. Camacho notes she feels fine and would like a \"Mountain Dew\".      Objective    Vital Signs  Temp:  [97.2 °F (36.2 °C)-98.1 °F (36.7 °C)] 98.1 °F (36.7 °C)  Heart Rate:  [81-88] 82  Resp:  [18] 18  BP: (102-128)/(68-76) 115/68  Oxygen Therapy  SpO2: 96 %  Pulse Oximetry Type: Intermittent  O2 Device: room air  Flow (L/min): 1  EtCO2 (mm Hg) (Respiratory Monitoring): 42}  Flowsheet Rows         First Filed Value    Admission Height  62\" (157.5 cm) Documented at 08/10/2017 1251    Admission Weight  153 lb 11.2 oz (69.7 kg) Documented at 08/10/2017 1251          Physical Exam:    General Appearance:    Alert, cooperative, in no acute distress   Lungs:     Clear to auscultation,respirations regular, even and                  unlabored    Heart:    Regular rhythm and normal rate, normal S1 and S2, no            murmur, no gallop, no rub, no click   Abdomen:     Binder in place   Extremities:   Moves all extremities well, no edema, no cyanosis   Pulses:   Pulses palpable and equal bilaterally   Neurologic:   Cranial nerves 2 - 12 grossly intact         Results Review:     I reviewed the patient's new clinical results.  Lab Results (last 24 hours)     Procedure Component Value Units Date/Time    Phosphorus [[id number]]  (Normal) Collected:  08/18/17 2015    Specimen:  Blood Updated:  08/18/17 2047     Phosphorus 2.9 mg/dL     CBC & Differential [[id number]] Collected:  08/19/17 0357    Specimen:  Blood Updated:  08/19/17 5925    Narrative:       The following orders were created for panel order CBC & Differential.  Procedure                               Abnormality         Status                     ---------                               -----------         ------                     CBC Auto Differential[[id number]]        " Abnormal            Final result                 Please view results for these tests on the individual orders.    CBC Auto Differential [[id number]]  (Abnormal) Collected:  08/19/17 0357    Specimen:  Blood Updated:  08/19/17 0505     WBC 14.38 (H) 10*3/mm3      RBC 4.25 10*6/mm3      Hemoglobin 11.6 (L) g/dL      Hematocrit 36.9 (L) %      MCV 86.8 fL      MCH 27.3 pg      MCHC 31.4 g/dL      RDW 16.1 (H) %      RDW-SD 51.3 fl      MPV 10.7 (H) fL      Platelets 261 10*3/mm3      Neutrophil % 58.4 %      Lymphocyte % 28.2 %      Monocyte % 9.8 (H) %      Eosinophil % 2.8 %      Basophil % 0.5 %      Immature Grans % 0.3 %      Neutrophils, Absolute 8.40 (H) 10*3/mm3      Lymphocytes, Absolute 4.05 10*3/mm3      Monocytes, Absolute 1.41 (H) 10*3/mm3      Eosinophils, Absolute 0.40 (H) 10*3/mm3      Basophils, Absolute 0.07 10*3/mm3      Immature Grans, Absolute 0.05 (H) 10*3/mm3      nRBC 0.0 /100 WBC     Magnesium [[id number]]  (Normal) Collected:  08/19/17 0357    Specimen:  Blood Updated:  08/19/17 0538     Magnesium 2.0 mg/dL     Phosphorus [[id number]]  (Abnormal) Collected:  08/19/17 0357    Specimen:  Blood Updated:  08/19/17 0538     Phosphorus 2.4 (L) mg/dL     Basic Metabolic Panel [[id number]]  (Abnormal) Collected:  08/19/17 0357    Specimen:  Blood Updated:  08/19/17 0538     Glucose 87 mg/dL      BUN 10 mg/dL      Creatinine 0.56 (L) mg/dL      Sodium 144 mmol/L      Potassium 4.5 mmol/L      Chloride 102 mmol/L      CO2 34.0 (H) mmol/L      Calcium 8.6 (L) mg/dL      eGFR Non African Amer 108 mL/min/1.73      BUN/Creatinine Ratio 17.9     Anion Gap 8.0 mmol/L     Narrative:       GFR Normal >60  Chronic Kidney Disease <60  Kidney Failure <15          Imaging Results (last 24 hours)     ** No results found for the last 24 hours. **          Medication Review:   I have reviewed the patient's current medication list    Current Facility-Administered Medications:   •  acetaminophen (TYLENOL) tablet 500 mg, 500  mg, Oral, 4x Daily PRN, Donna Ivy MD  •  atorvastatin (LIPITOR) tablet 20 mg, 20 mg, Oral, Nightly, Donna Ivy MD, 20 mg at 08/18/17 2042  •  bisacodyl (DULCOLAX) suppository 10 mg, 10 mg, Rectal, Daily PRN, Donna Ivy MD  •  timolol (TIMOPTIC) 0.5 % ophthalmic solution 1 drop, 1 drop, Both Eyes, Q12H, 1 drop at 08/19/17 0913 **AND** brimonidine (ALPHAGAN) 0.2 % ophthalmic solution 1 drop, 1 drop, Both Eyes, Q12H, Donna Ivy MD, 1 drop at 08/19/17 0913  •  busPIRone (BUSPAR) tablet 10 mg, 10 mg, Oral, BID, Donna Ivy MD, 10 mg at 08/19/17 1758  •  carvedilol (COREG) tablet 6.25 mg, 6.25 mg, Oral, BID With Meals, Donna Ivy MD, 6.25 mg at 08/19/17 1758  •  ceFAZolin (ANCEF) IVPB (duplex) 2 g, 2 g, Intravenous, Q8H, Donna Ivy MD, Last Rate: 0 mL/hr at 08/17/17 2230, 2 g at 08/19/17 1245  •  cetirizine (zyrTEC) tablet 10 mg, 10 mg, Oral, Daily, Donna Ivy MD, 10 mg at 08/19/17 1014  •  dextromethorphan polistirex ER (DELSYM) 30 MG/5ML oral suspension 60 mg, 10 mL, Oral, BID PRN, Donna Ivy MD  •  docusate sodium (COLACE) capsule 100 mg, 100 mg, Oral, BID, Donna Ivy MD, 100 mg at 08/19/17 1758  •  enoxaparin (LOVENOX) syringe 40 mg, 40 mg, Subcutaneous, Daily, Donna Ivy MD, 40 mg at 08/19/17 0913  •  fluticasone (FLONASE) 50 MCG/ACT nasal spray 2 spray, 2 spray, Nasal, Daily, Donna Ivy MD, 2 spray at 08/19/17 0913  •  HYDROmorphone (DILAUDID) injection 0.5 mg, 0.5 mg, Intravenous, Q2H PRN **AND** naloxone (NARCAN) injection 0.1 mg, 0.1 mg, Intravenous, Q5 Min PRN, Donna Ivy MD  •  lactobacillus acidophilus (RISAQUAD) capsule 1 capsule, 1 capsule, Oral, Daily, Donna Ivy MD, 1 capsule at 08/19/17 1014  •  levETIRAcetam (KEPPRA) tablet 500 mg, 500 mg, Oral, BID, Donna Ivy MD, 500 mg at 08/19/17 7862  •  levothyroxine (SYNTHROID, LEVOTHROID) tablet 50 mcg, 50 mcg, Oral, Daily, Donna Ivy MD, 50 mcg at  08/19/17 0914  •  magnesium hydroxide (MILK OF MAGNESIA) 400 MG/5ML suspension 30 mL, 30 mL, Oral, PRN, Donna Ivy MD  •  montelukast (SINGULAIR) tablet 10 mg, 10 mg, Oral, Nightly, Donna Ivy MD, 10 mg at 08/18/17 2042  •  ondansetron (ZOFRAN) tablet 4 mg, 4 mg, Oral, Q6H PRN **OR** ondansetron ODT (ZOFRAN-ODT) disintegrating tablet 4 mg, 4 mg, Oral, Q6H PRN **OR** ondansetron (ZOFRAN) injection 4 mg, 4 mg, Intravenous, Q6H PRN, Donna Ivy MD, 4 mg at 08/16/17 0649  •  oxyCODONE-acetaminophen (PERCOCET) 5-325 MG per tablet 1 tablet, 1 tablet, Oral, Q4H PRN, Donna Ivy MD  •  pantoprazole (PROTONIX) EC tablet 40 mg, 40 mg, Oral, BID, Donna Ivy MD, 40 mg at 08/19/17 1758  •  PARoxetine (PAXIL) tablet 40 mg, 40 mg, Oral, QAM, Donna Ivy MD, 40 mg at 08/19/17 0914  •  polyethylene glycol (MIRALAX) powder 17 g, 17 g, Oral, Daily, Donna Ivy MD, 17 g at 08/19/17 0913  •  potassium phosphate (monobasic) (K-PHOS) tablet 500 mg, 500 mg, Oral, 4x Daily With Meals & Nightly, Valarie Cm DO, 500 mg at 08/19/17 1758  •  QUEtiapine (SEROquel) tablet 100 mg, 100 mg, Oral, Nightly, Donna Ivy MD, 100 mg at 08/18/17 2042  •  sennosides-docusate sodium (SENOKOT-S) 8.6-50 MG tablet 2 tablet, 2 tablet, Oral, Daily, Donna Ivy MD, 2 tablet at 08/19/17 0914      Assessment/Plan     1.  Hypothyroidism:  Continue oral replacement.      2. Anxiety, Major Depression and OCD:  Nothing acute.  No change to home regimen.       3.  Dyslipidemia:  Continue statin therapy.      4.  GERD and Hx of Duodenal ulcer and stricture:  Nothing acute.  Tolerating PO per staff.  Continue PPI therapy.      5.  Allergic Rhinitis:  Nothing acute.  No change to regimen.      6.  Chronic Moderate Intellectual Disability:  Unsure of baseline mental status, but appears at baseline.  Pleasant.       7.  Seizure D/O:  No activity.  Continue current dosing regimen.      8.  Vitamin D Deficiency:   Only on a MVI?  Will inquire.      9.  Glaucoma:  Continue drops.      10.  Anemia:  Resume iron supplement.    Plan for disposition: Discharge per surgery    Tino Perez MD  08/19/17  8:06 PM

## 2017-08-20 NOTE — PLAN OF CARE
Problem: Patient Care Overview (Adult)  Goal: Plan of Care Review  Outcome: Ongoing (interventions implemented as appropriate)    08/20/17 1332   Outcome Evaluation   Outcome Summary/Follow up Plan POD#4-HANSEL #1 in place; Dsg Chg today; ABX therapy on board-WBC 12.63 improving; Tele-SR: Pulm. toileting encourage; Assist X1 w/ ADLs, walker, gait belt; Diet-GI soft, bland; Meds w/ O.J.         Problem: Fall Risk (Adult)  Goal: Identify Related Risk Factors and Signs and Symptoms  Outcome: Ongoing (interventions implemented as appropriate)

## 2017-08-21 ENCOUNTER — APPOINTMENT (OUTPATIENT)
Dept: GENERAL RADIOLOGY | Facility: HOSPITAL | Age: 67
End: 2017-08-21

## 2017-08-21 LAB
ANION GAP SERPL CALCULATED.3IONS-SCNC: 9.4 MMOL/L
BASOPHILS # BLD AUTO: 0.09 10*3/MM3 (ref 0–0.2)
BASOPHILS NFR BLD AUTO: 0.6 % (ref 0–2)
BILIRUB UR QL STRIP: NEGATIVE
BUN BLD-MCNC: 10 MG/DL (ref 8–23)
BUN/CREAT SERPL: 14.7 (ref 7–25)
CALCIUM SPEC-SCNC: 8.9 MG/DL (ref 8.8–10.5)
CHLORIDE SERPL-SCNC: 101 MMOL/L (ref 98–107)
CLARITY UR: CLEAR
CO2 SERPL-SCNC: 31.6 MMOL/L (ref 22–29)
COLOR UR: YELLOW
CREAT BLD-MCNC: 0.68 MG/DL (ref 0.57–1)
DEPRECATED RDW RBC AUTO: 50.4 FL (ref 37–54)
EOSINOPHIL # BLD AUTO: 0.59 10*3/MM3 (ref 0.1–0.3)
EOSINOPHIL NFR BLD AUTO: 4.2 % (ref 0–4)
ERYTHROCYTE [DISTWIDTH] IN BLOOD BY AUTOMATED COUNT: 16.1 % (ref 11.5–14.5)
GFR SERPL CREATININE-BSD FRML MDRD: 87 ML/MIN/1.73
GLUCOSE BLD-MCNC: 93 MG/DL (ref 65–99)
GLUCOSE UR STRIP-MCNC: NEGATIVE MG/DL
HCT VFR BLD AUTO: 37.3 % (ref 37–47)
HGB BLD-MCNC: 11.8 G/DL (ref 12–16)
HGB UR QL STRIP.AUTO: NEGATIVE
IMM GRANULOCYTES # BLD: 0.12 10*3/MM3 (ref 0–0.03)
IMM GRANULOCYTES NFR BLD: 0.9 % (ref 0–0.5)
KETONES UR QL STRIP: NEGATIVE
LEUKOCYTE ESTERASE UR QL STRIP.AUTO: NEGATIVE
LYMPHOCYTES # BLD AUTO: 4.17 10*3/MM3 (ref 0.6–4.8)
LYMPHOCYTES NFR BLD AUTO: 29.7 % (ref 20–45)
MAGNESIUM SERPL-MCNC: 2 MG/DL (ref 1.7–2.5)
MCH RBC QN AUTO: 27.3 PG (ref 27–31)
MCHC RBC AUTO-ENTMCNC: 31.6 G/DL (ref 31–37)
MCV RBC AUTO: 86.3 FL (ref 81–99)
MONOCYTES # BLD AUTO: 1.45 10*3/MM3 (ref 0–1)
MONOCYTES NFR BLD AUTO: 10.3 % (ref 3–8)
NEUTROPHILS # BLD AUTO: 7.63 10*3/MM3 (ref 1.5–8.3)
NEUTROPHILS NFR BLD AUTO: 54.3 % (ref 45–70)
NITRITE UR QL STRIP: NEGATIVE
NRBC BLD MANUAL-RTO: 0 /100 WBC (ref 0–0)
PH UR STRIP.AUTO: 7 [PH] (ref 4.5–8)
PHOSPHATE SERPL-MCNC: 3.2 MG/DL (ref 2.7–4.5)
PLATELET # BLD AUTO: 303 10*3/MM3 (ref 140–500)
PMV BLD AUTO: 10.9 FL (ref 7.4–10.4)
POTASSIUM BLD-SCNC: 4.9 MMOL/L (ref 3.5–5.2)
PROT UR QL STRIP: NEGATIVE
RBC # BLD AUTO: 4.32 10*6/MM3 (ref 4.2–5.4)
SODIUM BLD-SCNC: 142 MMOL/L (ref 136–145)
SP GR UR STRIP: 1.01 (ref 1–1.03)
UROBILINOGEN UR QL STRIP: NORMAL
WBC NRBC COR # BLD: 14.05 10*3/MM3 (ref 4.8–10.8)

## 2017-08-21 PROCEDURE — 80048 BASIC METABOLIC PNL TOTAL CA: CPT | Performed by: SURGERY

## 2017-08-21 PROCEDURE — 84100 ASSAY OF PHOSPHORUS: CPT | Performed by: SURGERY

## 2017-08-21 PROCEDURE — 87040 BLOOD CULTURE FOR BACTERIA: CPT | Performed by: SURGERY

## 2017-08-21 PROCEDURE — 81003 URINALYSIS AUTO W/O SCOPE: CPT | Performed by: SURGERY

## 2017-08-21 PROCEDURE — 85025 COMPLETE CBC W/AUTO DIFF WBC: CPT | Performed by: SURGERY

## 2017-08-21 PROCEDURE — 25010000002 ENOXAPARIN PER 10 MG: Performed by: SURGERY

## 2017-08-21 PROCEDURE — 25010000003 CEFAZOLIN PER 500 MG: Performed by: SURGERY

## 2017-08-21 PROCEDURE — G0378 HOSPITAL OBSERVATION PER HR: HCPCS

## 2017-08-21 PROCEDURE — 71020 HC CHEST PA AND LATERAL: CPT

## 2017-08-21 PROCEDURE — 99024 POSTOP FOLLOW-UP VISIT: CPT | Performed by: SURGERY

## 2017-08-21 PROCEDURE — 83735 ASSAY OF MAGNESIUM: CPT | Performed by: SURGERY

## 2017-08-21 PROCEDURE — 71010 HC CHEST PA OR AP: CPT

## 2017-08-21 PROCEDURE — 94799 UNLISTED PULMONARY SVC/PX: CPT

## 2017-08-21 RX ADMIN — PANTOPRAZOLE SODIUM 40 MG: 40 TABLET, DELAYED RELEASE ORAL at 17:53

## 2017-08-21 RX ADMIN — FERROUS SULFATE TAB EC 324 MG (65 MG FE EQUIVALENT) 324 MG: 324 (65 FE) TABLET DELAYED RESPONSE at 08:31

## 2017-08-21 RX ADMIN — POTASSIUM PHOSPHATE, MONOBASIC 500 MG: 500 TABLET, SOLUBLE ORAL at 12:33

## 2017-08-21 RX ADMIN — BRIMONIDINE TARTRATE 1 DROP: 2 SOLUTION/ DROPS OPHTHALMIC at 08:31

## 2017-08-21 RX ADMIN — MONTELUKAST SODIUM 10 MG: 10 TABLET, FILM COATED ORAL at 20:18

## 2017-08-21 RX ADMIN — POLYETHYLENE GLYCOL 3350 17 G: 17 POWDER, FOR SOLUTION ORAL at 08:31

## 2017-08-21 RX ADMIN — DOCUSATE SODIUM 100 MG: 100 CAPSULE, LIQUID FILLED ORAL at 08:30

## 2017-08-21 RX ADMIN — BUSPIRONE HYDROCHLORIDE 10 MG: 5 TABLET ORAL at 08:30

## 2017-08-21 RX ADMIN — PANTOPRAZOLE SODIUM 40 MG: 40 TABLET, DELAYED RELEASE ORAL at 08:30

## 2017-08-21 RX ADMIN — CETIRIZINE HYDROCHLORIDE 10 MG: 10 TABLET, FILM COATED ORAL at 08:31

## 2017-08-21 RX ADMIN — CARVEDILOL 6.25 MG: 6.25 TABLET, FILM COATED ORAL at 17:53

## 2017-08-21 RX ADMIN — BUSPIRONE HYDROCHLORIDE 10 MG: 5 TABLET ORAL at 17:53

## 2017-08-21 RX ADMIN — ATORVASTATIN CALCIUM 20 MG: 20 TABLET, FILM COATED ORAL at 20:18

## 2017-08-21 RX ADMIN — LEVOTHYROXINE SODIUM 50 MCG: 50 TABLET ORAL at 08:32

## 2017-08-21 RX ADMIN — BRIMONIDINE TARTRATE 1 DROP: 2 SOLUTION/ DROPS OPHTHALMIC at 20:18

## 2017-08-21 RX ADMIN — LEVETIRACETAM 500 MG: 500 TABLET, FILM COATED ORAL at 08:30

## 2017-08-21 RX ADMIN — FLUTICASONE PROPIONATE 2 SPRAY: 50 SPRAY, METERED NASAL at 08:32

## 2017-08-21 RX ADMIN — POTASSIUM PHOSPHATE, MONOBASIC 500 MG: 500 TABLET, SOLUBLE ORAL at 17:53

## 2017-08-21 RX ADMIN — LEVETIRACETAM 500 MG: 500 TABLET, FILM COATED ORAL at 17:53

## 2017-08-21 RX ADMIN — CEFAZOLIN SODIUM 2 G: 2 SOLUTION INTRAVENOUS at 12:33

## 2017-08-21 RX ADMIN — POTASSIUM PHOSPHATE, MONOBASIC 500 MG: 500 TABLET, SOLUBLE ORAL at 20:18

## 2017-08-21 RX ADMIN — CEFAZOLIN SODIUM 2 G: 2 SOLUTION INTRAVENOUS at 05:23

## 2017-08-21 RX ADMIN — QUETIAPINE FUMARATE 100 MG: 100 TABLET, FILM COATED ORAL at 20:18

## 2017-08-21 RX ADMIN — CARVEDILOL 6.25 MG: 6.25 TABLET, FILM COATED ORAL at 08:31

## 2017-08-21 RX ADMIN — PAROXETINE HYDROCHLORIDE 40 MG: 20 TABLET, FILM COATED ORAL at 08:30

## 2017-08-21 RX ADMIN — TIMOLOL MALEATE 1 DROP: 5 SOLUTION OPHTHALMIC at 08:31

## 2017-08-21 RX ADMIN — TIMOLOL MALEATE 1 DROP: 5 SOLUTION OPHTHALMIC at 20:18

## 2017-08-21 RX ADMIN — Medication 1 CAPSULE: at 08:30

## 2017-08-21 RX ADMIN — POTASSIUM PHOSPHATE, MONOBASIC 500 MG: 500 TABLET, SOLUBLE ORAL at 08:31

## 2017-08-21 RX ADMIN — CEFAZOLIN SODIUM 2 G: 2 SOLUTION INTRAVENOUS at 20:18

## 2017-08-21 RX ADMIN — DOCUSATE SODIUM AND SENNOSIDES 2 TABLET: 8.6; 5 TABLET, FILM COATED ORAL at 12:33

## 2017-08-21 RX ADMIN — ENOXAPARIN SODIUM 40 MG: 40 INJECTION SUBCUTANEOUS at 08:31

## 2017-08-21 NOTE — PROGRESS NOTES
"Adult Nutrition  Assessment/PES    Patient Name:  Caitlyn Camacho  YOB: 1950  MRN: [id number]  Admit Date:  8/15/2017    Assessment Date:  8/21/2017        Reason for Assessment       08/21/17 1018    Reason for Assessment    Reason For Assessment/Visit length of stay    Gastrointestinal Other (comment)   repair of recurrent ventral hernia     Neurological --   hx dementia            Data Eval/ Notes       08/21/17 1022     Notes    Note Pt sleep in chair with bed alarm noted at visit.               Anthropometrics       08/21/17 1022    Anthropometrics    RD Documented Current Weight  67.9 kg (149 lb 11.1 oz)    Anthropometrics (Special Considerations)    RD Calculated BMI (kg/m2) 27.4    Body Mass Index (BMI)    BMI Grade 25 - 29.9 - overweight            Labs/Tests/Procedures/Meds       08/21/17 1023    Labs/Tests/Procedures/Meds    Labs/Tests Review Reviewed;CO2;Hgb A1C    Medication Review Reviewed, pertinent;Antiemetic;Laxative;Other (comment);Iron   risaquad, kphos              Estimated/Assessed Needs       08/21/17 1023    Calculation Measurements    Weight Used For Calculations 67.9 kg (149 lb 11.1 oz)    Height Used for Calculations 1.575 m (5' 2\")    Estimated/Assessed Energy Needs    Energy Need Method Mar[place]-St Jeor    Age 67    RMR (Marquette-St. Jeor Equation) 1172.25    Activity Factors ([place] St Jeor)  Confined to bed  1.2    Estimated Kcal Range  1406 kcal     Estimated/Assessed Protein Needs    Weight Used for Protein Calculation 67.9 kg (149 lb 11.1 oz)    Protein (gm/kg) 1.0    1.0 Gm Protein (gm) 67.9    Estimated Protein Range 68 gm     Estimated/Assessed Fluid Needs    Fluid Need Method RDA method    RDA Method (mL)  1406            Nutrition Prescription Ordered       08/21/17 1025    Nutrition Prescription PO    Common Modifiers GI Soft/Oceana            Evaluation of Received Nutrient/Fluid Intake       08/21/17 1025    Evaluation of Received " Nutrient/Fluid Intake    Nutrition Delivered Fluid Evaluation    Fluid Intake Evaluation    Oral Fluid (mL) 740    Total Fluid Intake (mL) 740    % Fluid Needs 53    PO Evaluation    Number of Meals 9    % PO Intake 69              Problem/Interventions:        Problem 1       08/21/17 1025    Nutrition Diagnoses Problem 1    Problem 1 Nutrition Appropriate for Condition at this Time                    Intervention Goal       08/21/17 1025    Intervention Goal    General Maintain nutrition    PO Maintain intake;PO intake (%)    PO Intake % 70 %    Weight No significant weight loss            Nutrition Intervention       08/21/17 1026    Nutrition Intervention    RD/Tech Action Follow Tx progress              Education/Evaluation       08/21/17 1026    Education    Education Other (comment)   No needs identified. Will follow.     Monitor/Evaluation    Monitor Per protocol;I&O;PO intake;Pertinent labs;Weight;Symptoms        Comments:    Pt with appropriate diet and adequate po intake. Encourage fluids. Will cont to follow for LOS and remain available.     Electronically signed by:  Erica Lange RD  08/21/17 10:26 AM

## 2017-08-21 NOTE — PLAN OF CARE
Problem: Patient Care Overview (Adult)  Goal: Plan of Care Review  Outcome: Ongoing (interventions implemented as appropriate)    08/21/17 2434   Coping/Psychosocial Response Interventions   Plan Of Care Reviewed With patient   Patient Care Overview   Progress improving   Outcome Evaluation   Outcome Summary/Follow up Plan POD5, HANSEL remains in place. pt up to chair with assist x1. tolerating diet. meds with orange juice         Problem: Fall Risk (Adult)  Goal: Identify Related Risk Factors and Signs and Symptoms  Outcome: Ongoing (interventions implemented as appropriate)  Goal: Absence of Falls  Outcome: Ongoing (interventions implemented as appropriate)

## 2017-08-21 NOTE — PROGRESS NOTES
Gen Surg Postop Progress Note       Subjective: Yesterday events noted.  Apparently slid out of the chair.  Was examined by the hospitalist team and nursing staff deemed that she had no injuries no further radiological studies were obtained.  Has been tolerating regular diet.  Denies any nausea or vomiting.  Is passing flatus and having bowel movements. Is incontinent of urine    Objective:    Vital Signs  Temp:  [97.4 °F (36.3 °C)-97.8 °F (36.6 °C)] 97.7 °F (36.5 °C)  Heart Rate:  [70-79] 79  Resp:  [16] 16  BP: ()/(58-69) 103/69  Body mass index is 27.4 kg/(m^2).    Intake/Output Summary (Last 24 hours) at 08/21/17 0832  Last data filed at 08/20/17 1900   Gross per 24 hour   Intake              720 ml   Output                5 ml   Net              715 ml     HANSEL output-serosanguineous       Physical Exam:   General: patient awake, alert and cooperative   Cardiovascular: regular rhythm and rate, no murmurs auscultated   Pulm: clear to auscultation bilaterally, regular and unlabored   Abdomen: soft, nontender, nondistended; normal bowel sounds,inc c/d/i   Extremities: no rash or edema   Neurologic: Normal mood and behavior     Results Review:     I reviewed the patient's new clinical results.        Results from last 7 days  Lab Units 08/21/17  0334   WBC 10*3/mm3 14.05*   HEMOGLOBIN g/dL 11.8*   HEMATOCRIT % 37.3   PLATELETS 10*3/mm3 303       Results from last 7 days  Lab Units 08/21/17  0334   SODIUM mmol/L 142   POTASSIUM mmol/L 4.9   CHLORIDE mmol/L 101   CO2 mmol/L 31.6*   BUN mg/dL 10   CREATININE mg/dL 0.68   CALCIUM mg/dL 8.9   GLUCOSE mg/dL 93         PT/INR:  No results found for: PROTIME/No results found for: INR    Calcium Calcium   Date Value Ref Range Status   08/21/2017 8.9 8.8 - 10.5 mg/dL Final   08/20/2017 8.6 (L) 8.8 - 10.5 mg/dL Final   08/19/2017 8.6 (L) 8.8 - 10.5 mg/dL Final      Magnesium  AST  ALT  Bilirubin, Total  AlkPhos  Albumin    Amylase  Lipase    Radiology: Magnesium   Date  Value Ref Range Status   08/21/2017 2.0 1.7 - 2.5 mg/dL Final   08/19/2017 2.0 1.7 - 2.5 mg/dL Final     No components found for: AST.*  No components found for: ALT.*  No components found for: BILIRUBIN, TOTAL.*    No components found for: ALKPHOS.*  No components found for: ALBUMIN.*      No components found for: AMYLASE.*  No components found for: LIPASE.*      Imaging Results (most recent)     Procedure Component Value Units Date/Time    XR Abdomen Flat & Upright [[id number]] Collected:  08/16/17 0952     Updated:  08/16/17 0957    Narrative:       ABDOMEN SERIES, 08/16/2017:     HISTORY:   67-year-old female one day postop ventral hernia repair. Postoperative  generalized abdomen pain.     TECHNIQUE:  Flat and upright abdomen series.     FINDINGS:  Surgical staples and a surgical drain superimposed over the abdominal  midline. No free intraperitoneal air.     No visible bowel dilatation to suggest obstruction. Large volume stool  is present throughout the entire colon.       Impression:       1. Large volume stool throughout the colon.  2. Bowel gas pattern within normal limits. No free air.  3. Postop changes as noted.     This report was finalized on 8/16/2017 9:54 AM by Dr. Bean Garica MD.       XR Chest PA & Lateral [[id number]] Collected:  08/16/17 0954     Updated:  08/16/17 0959    Narrative:       CHEST X-RAY, 08/16/2017:     HISTORY:   67-year-old female one day postop ventral hernia repair. Postoperative  generalized abdomen pain. Cough and shortness of air.     TECHNIQUE:  PA and lateral upright chest series.     COMPARISON:  *  Chest x-ray, 03/15/2017.     FINDINGS:  Chronic scarring and atelectasis in the lung bases. No visible acute  pulmonary infiltrate or pleural effusion. Stable cardiomegaly. Severe  scoliosis. No change since the prior exam.       Impression:       1. Chronic scarring and atelectasis in the lung bases, stable since  03/15/2017.  2. Stable cardiomegaly.  3. Scoliosis.      This report was finalized on 8/16/2017 9:57 AM by Dr. Bean Garcia MD.                Assessment/Plan      Status post adhesiolysis and open incisional hernia repair   Doing well   Hemodynamically stable and afebrile  WBC increased question etiology - suspect it is likely atelectasis and/or urinary  Will check a chest x-ray, blood cultures, UA  Continue IV abx  Lovenox and SCDs for VTE prophylaxis  Plan of care discussed with hospitalist team and nursing staff.    Donna Ivy MD  08/21/17  8:32 AM    Time:

## 2017-08-21 NOTE — PLAN OF CARE
Problem: Patient Care Overview (Adult)  Goal: Plan of Care Review  Outcome: Ongoing (interventions implemented as appropriate)    08/21/17 0433   Coping/Psychosocial Response Interventions   Plan Of Care Reviewed With patient   Patient Care Overview   Progress improving       Goal: Adult Individualization and Mutuality  Outcome: Ongoing (interventions implemented as appropriate)  Goal: Discharge Needs Assessment  Outcome: Ongoing (interventions implemented as appropriate)    Problem: Fall Risk (Adult)  Goal: Identify Related Risk Factors and Signs and Symptoms  Outcome: Ongoing (interventions implemented as appropriate)  Goal: Absence of Falls  Outcome: Ongoing (interventions implemented as appropriate)

## 2017-08-22 VITALS
HEIGHT: 62 IN | HEART RATE: 76 BPM | DIASTOLIC BLOOD PRESSURE: 62 MMHG | RESPIRATION RATE: 16 BRPM | OXYGEN SATURATION: 96 % | BODY MASS INDEX: 27.57 KG/M2 | SYSTOLIC BLOOD PRESSURE: 116 MMHG | WEIGHT: 149.8 LBS | TEMPERATURE: 98.7 F

## 2017-08-22 PROBLEM — K43.2 RECURRENT VENTRAL INCISIONAL HERNIA: Status: ACTIVE | Noted: 2017-07-20

## 2017-08-22 LAB
ANION GAP SERPL CALCULATED.3IONS-SCNC: 7 MMOL/L
BASOPHILS # BLD AUTO: 0.08 10*3/MM3 (ref 0–0.2)
BASOPHILS NFR BLD AUTO: 0.6 % (ref 0–2)
BUN BLD-MCNC: 10 MG/DL (ref 8–23)
BUN/CREAT SERPL: 15.9 (ref 7–25)
CALCIUM SPEC-SCNC: 8.6 MG/DL (ref 8.8–10.5)
CHLORIDE SERPL-SCNC: 100 MMOL/L (ref 98–107)
CO2 SERPL-SCNC: 31 MMOL/L (ref 22–29)
CREAT BLD-MCNC: 0.63 MG/DL (ref 0.57–1)
DEPRECATED RDW RBC AUTO: 51.5 FL (ref 37–54)
EOSINOPHIL # BLD AUTO: 0.57 10*3/MM3 (ref 0.1–0.3)
EOSINOPHIL NFR BLD AUTO: 4.1 % (ref 0–4)
ERYTHROCYTE [DISTWIDTH] IN BLOOD BY AUTOMATED COUNT: 16.1 % (ref 11.5–14.5)
GFR SERPL CREATININE-BSD FRML MDRD: 95 ML/MIN/1.73
GLUCOSE BLD-MCNC: 119 MG/DL (ref 65–99)
HCT VFR BLD AUTO: 35.8 % (ref 37–47)
HGB BLD-MCNC: 11.5 G/DL (ref 12–16)
IMM GRANULOCYTES # BLD: 0.14 10*3/MM3 (ref 0–0.03)
IMM GRANULOCYTES NFR BLD: 1 % (ref 0–0.5)
LYMPHOCYTES # BLD AUTO: 3.95 10*3/MM3 (ref 0.6–4.8)
LYMPHOCYTES NFR BLD AUTO: 28.5 % (ref 20–45)
MAGNESIUM SERPL-MCNC: 1.9 MG/DL (ref 1.7–2.5)
MCH RBC QN AUTO: 27.9 PG (ref 27–31)
MCHC RBC AUTO-ENTMCNC: 32.1 G/DL (ref 31–37)
MCV RBC AUTO: 86.9 FL (ref 81–99)
MONOCYTES # BLD AUTO: 1.38 10*3/MM3 (ref 0–1)
MONOCYTES NFR BLD AUTO: 10 % (ref 3–8)
NEUTROPHILS # BLD AUTO: 7.74 10*3/MM3 (ref 1.5–8.3)
NEUTROPHILS NFR BLD AUTO: 55.8 % (ref 45–70)
NRBC BLD MANUAL-RTO: 0 /100 WBC (ref 0–0)
PHOSPHATE SERPL-MCNC: 3.8 MG/DL (ref 2.7–4.5)
PLATELET # BLD AUTO: 314 10*3/MM3 (ref 140–500)
PMV BLD AUTO: 10.3 FL (ref 7.4–10.4)
POTASSIUM BLD-SCNC: 4.6 MMOL/L (ref 3.5–5.2)
RBC # BLD AUTO: 4.12 10*6/MM3 (ref 4.2–5.4)
SODIUM BLD-SCNC: 138 MMOL/L (ref 136–145)
WBC NRBC COR # BLD: 13.86 10*3/MM3 (ref 4.8–10.8)

## 2017-08-22 PROCEDURE — 80048 BASIC METABOLIC PNL TOTAL CA: CPT | Performed by: SURGERY

## 2017-08-22 PROCEDURE — 85025 COMPLETE CBC W/AUTO DIFF WBC: CPT | Performed by: SURGERY

## 2017-08-22 PROCEDURE — 25010000002 ENOXAPARIN PER 10 MG: Performed by: SURGERY

## 2017-08-22 PROCEDURE — 83735 ASSAY OF MAGNESIUM: CPT | Performed by: SURGERY

## 2017-08-22 PROCEDURE — 25010000003 CEFAZOLIN PER 500 MG: Performed by: SURGERY

## 2017-08-22 PROCEDURE — G0378 HOSPITAL OBSERVATION PER HR: HCPCS

## 2017-08-22 PROCEDURE — 84100 ASSAY OF PHOSPHORUS: CPT | Performed by: SURGERY

## 2017-08-22 PROCEDURE — 99024 POSTOP FOLLOW-UP VISIT: CPT | Performed by: SURGERY

## 2017-08-22 RX ORDER — OXYCODONE HYDROCHLORIDE AND ACETAMINOPHEN 5; 325 MG/1; MG/1
1 TABLET ORAL EVERY 6 HOURS PRN
Qty: 30 TABLET | Refills: 0 | Status: ON HOLD | OUTPATIENT
Start: 2017-08-22 | End: 2022-01-01

## 2017-08-22 RX ORDER — CEPHALEXIN 500 MG/1
500 CAPSULE ORAL 3 TIMES DAILY
Qty: 21 CAPSULE | Refills: 0 | Status: SHIPPED | OUTPATIENT
Start: 2017-08-22 | End: 2022-01-01 | Stop reason: ALTCHOICE

## 2017-08-22 RX ORDER — ONDANSETRON 4 MG/1
4 TABLET, FILM COATED ORAL EVERY 6 HOURS PRN
Qty: 20 TABLET | Refills: 0 | Status: ON HOLD | OUTPATIENT
Start: 2017-08-22 | End: 2022-01-01

## 2017-08-22 RX ORDER — SENNA AND DOCUSATE SODIUM 50; 8.6 MG/1; MG/1
2 TABLET, FILM COATED ORAL DAILY
Qty: 60 TABLET | Refills: 0 | Status: SHIPPED | OUTPATIENT
Start: 2017-08-22 | End: 2017-09-21

## 2017-08-22 RX ADMIN — CEFAZOLIN SODIUM 2 G: 2 SOLUTION INTRAVENOUS at 05:52

## 2017-08-22 RX ADMIN — FLUTICASONE PROPIONATE 2 SPRAY: 50 SPRAY, METERED NASAL at 08:56

## 2017-08-22 RX ADMIN — CARVEDILOL 6.25 MG: 6.25 TABLET, FILM COATED ORAL at 08:58

## 2017-08-22 RX ADMIN — BUSPIRONE HYDROCHLORIDE 10 MG: 5 TABLET ORAL at 08:57

## 2017-08-22 RX ADMIN — PANTOPRAZOLE SODIUM 40 MG: 40 TABLET, DELAYED RELEASE ORAL at 08:57

## 2017-08-22 RX ADMIN — DOCUSATE SODIUM AND SENNOSIDES 2 TABLET: 8.6; 5 TABLET, FILM COATED ORAL at 08:57

## 2017-08-22 RX ADMIN — PAROXETINE HYDROCHLORIDE 40 MG: 20 TABLET, FILM COATED ORAL at 06:01

## 2017-08-22 RX ADMIN — FERROUS SULFATE TAB EC 324 MG (65 MG FE EQUIVALENT) 324 MG: 324 (65 FE) TABLET DELAYED RESPONSE at 08:57

## 2017-08-22 RX ADMIN — TIMOLOL MALEATE 1 DROP: 5 SOLUTION OPHTHALMIC at 08:56

## 2017-08-22 RX ADMIN — LEVETIRACETAM 500 MG: 500 TABLET, FILM COATED ORAL at 08:57

## 2017-08-22 RX ADMIN — POTASSIUM PHOSPHATE, MONOBASIC 500 MG: 500 TABLET, SOLUBLE ORAL at 08:55

## 2017-08-22 RX ADMIN — DOCUSATE SODIUM 100 MG: 100 CAPSULE, LIQUID FILLED ORAL at 08:57

## 2017-08-22 RX ADMIN — ENOXAPARIN SODIUM 40 MG: 40 INJECTION SUBCUTANEOUS at 08:56

## 2017-08-22 RX ADMIN — CEFAZOLIN SODIUM 2 G: 2 SOLUTION INTRAVENOUS at 12:29

## 2017-08-22 RX ADMIN — POTASSIUM PHOSPHATE, MONOBASIC 500 MG: 500 TABLET, SOLUBLE ORAL at 12:29

## 2017-08-22 RX ADMIN — BRIMONIDINE TARTRATE 1 DROP: 2 SOLUTION/ DROPS OPHTHALMIC at 08:56

## 2017-08-22 RX ADMIN — Medication 1 CAPSULE: at 08:57

## 2017-08-22 RX ADMIN — LEVOTHYROXINE SODIUM 50 MCG: 50 TABLET ORAL at 08:57

## 2017-08-22 RX ADMIN — CETIRIZINE HYDROCHLORIDE 10 MG: 10 TABLET, FILM COATED ORAL at 08:57

## 2017-08-22 RX ADMIN — POLYETHYLENE GLYCOL 3350 17 G: 17 POWDER, FOR SOLUTION ORAL at 08:55

## 2017-08-22 NOTE — NURSING NOTE
Continued Stay Note  JAMES Oneal     Patient Name: Caitlyn Camacho  MRN: [id number]  Today's Date: 8/22/2017    Admit Date: 8/15/2017          Discharge Plan       08/22/17 1148    Case Management/Social Work Plan    Additional Comments Attempt to visit patient, dozing in recliner, no sitter at bedside. Will continue to follow.              Discharge Codes     None            Neeraj Marquez RN

## 2017-08-22 NOTE — PROGRESS NOTES
Gen Surg Postop Progress Note       Subjective: No acute overnight events.  Tolerating regular diet.  Per nursing staff no complaints of nausea /vomiting.  Pain is well controlled.  Hasn't required any pain medicine.  Is passing gas and having bowel movements.    Objective:    Vital Signs  Temp:  [97.4 °F (36.3 °C)-98.7 °F (37.1 °C)] 98.7 °F (37.1 °C)  Heart Rate:  [75-83] 76  Resp:  [14-18] 16  BP: ()/(56-73) 116/62  Body mass index is 27.4 kg/(m^2).    Intake/Output Summary (Last 24 hours) at 08/22/17 1433  Last data filed at 08/22/17 1248   Gross per 24 hour   Intake              580 ml   Output              211 ml   Net              369 ml     HANSEL-serosanguineous       Physical Exam:   General: patient awake, alert and cooperative   Cardiovascular: regular rhythm and rate, no murmurs auscultated   Pulm: clear to auscultation bilaterally, regular and unlabored   Abdomen: soft, nontender, nondistended; normal bowel sounds,Inc c/d/i   Extremities: no rash or edema   Neurologic: Normal mood and behavior     Results Review:     I reviewed the patient's new clinical results.        Results from last 7 days  Lab Units 08/22/17  0431   WBC 10*3/mm3 13.86*   HEMOGLOBIN g/dL 11.5*   HEMATOCRIT % 35.8*   PLATELETS 10*3/mm3 314       Results from last 7 days  Lab Units 08/22/17  0430   SODIUM mmol/L 138   POTASSIUM mmol/L 4.6   CHLORIDE mmol/L 100   CO2 mmol/L 31.0*   BUN mg/dL 10   CREATININE mg/dL 0.63   CALCIUM mg/dL 8.6*   GLUCOSE mg/dL 119*         PT/INR:  No results found for: PROTIME/No results found for: INR    Calcium Calcium   Date Value Ref Range Status   08/22/2017 8.6 (L) 8.8 - 10.5 mg/dL Final   08/21/2017 8.9 8.8 - 10.5 mg/dL Final   08/20/2017 8.6 (L) 8.8 - 10.5 mg/dL Final      Magnesium  AST  ALT  Bilirubin, Total  AlkPhos  Albumin    Amylase  Lipase    Radiology: Magnesium   Date Value Ref Range Status   08/22/2017 1.9 1.7 - 2.5 mg/dL Final   08/21/2017 2.0 1.7 - 2.5 mg/dL Final     No components  found for: AST.*  No components found for: ALT.*  No components found for: BILIRUBIN, TOTAL.*    No components found for: ALKPHOS.*  No components found for: ALBUMIN.*      No components found for: AMYLASE.*  No components found for: LIPASE.*      Imaging Results (most recent)     Procedure Component Value Units Date/Time    XR Abdomen Flat & Upright [[id number]] Collected:  08/16/17 0952     Updated:  08/16/17 0957    Narrative:       ABDOMEN SERIES, 08/16/2017:     HISTORY:   67-year-old female one day postop ventral hernia repair. Postoperative  generalized abdomen pain.     TECHNIQUE:  Flat and upright abdomen series.     FINDINGS:  Surgical staples and a surgical drain superimposed over the abdominal  midline. No free intraperitoneal air.     No visible bowel dilatation to suggest obstruction. Large volume stool  is present throughout the entire colon.       Impression:       1. Large volume stool throughout the colon.  2. Bowel gas pattern within normal limits. No free air.  3. Postop changes as noted.     This report was finalized on 8/16/2017 9:54 AM by Dr. Bean Garcia MD.       XR Chest PA & Lateral [[id number]] Collected:  08/16/17 0954     Updated:  08/16/17 0959    Narrative:       CHEST X-RAY, 08/16/2017:     HISTORY:   67-year-old female one day postop ventral hernia repair. Postoperative  generalized abdomen pain. Cough and shortness of air.     TECHNIQUE:  PA and lateral upright chest series.     COMPARISON:  *  Chest x-ray, 03/15/2017.     FINDINGS:  Chronic scarring and atelectasis in the lung bases. No visible acute  pulmonary infiltrate or pleural effusion. Stable cardiomegaly. Severe  scoliosis. No change since the prior exam.       Impression:       1. Chronic scarring and atelectasis in the lung bases, stable since  03/15/2017.  2. Stable cardiomegaly.  3. Scoliosis.     This report was finalized on 8/16/2017 9:57 AM by Dr. Bean Garcia MD.       XR Chest 2 View [[id number]]  Collected:  08/21/17 1005     Updated:  08/21/17 1008    Narrative:       INDICATION:  Shortness of air. Cough. Recent hernia surgery.     COMPARISON:  08/16/2017     FINDINGS: PA and lateral views of the chest.  Heart and mediastinal  contours are unchanged.  No new pulmonary opacities. Retrocardiac left  lower lobe airspace opacity is similar the prior study. No pneumothorax  or pleural effusion. Severe scoliosis is again noted.       Impression:       No significant interval change     This report was finalized on 8/21/2017 10:06 AM by Dr. Geovani Khalil MD.                Assessment/Plan     Status post adhesiolysis and open incisional hernia repair   Doing well   Hemodynamically stable and afebrile  Tolerating regular diet  Leukocytosis-improving  Have reviewed her white cell count trended with Dr. Perez she has had elevated WBCs in the past with no left shift question if this is medication related so far all workup has been negative.  Discussed with Dr. Perez and urology she has been cleared for discharge.  Will discharge back to [place].  Follow-up in office on Friday for postop visit and drain removal.    Donna Ivy MD  08/22/17  2:33 PM    Time:

## 2017-08-22 NOTE — DISCHARGE SUMMARY
GEN SURGERY DISCHARGE SUMMARY     Caitlyn Camacho  1950  [id number]       Date of Admission: 8/15/2017  Date of Discharge:  8/22/2017    Primary Discharge Diagnoses: Status post lysis of adhesion and open recurrent incisional hernia repair    Secondary Discharge Diagnoses: Anxiety disorder, seizure disorder, obsessive-compulsive disorder, major depression, hypercholesterolemia and hypothyroidism    PCP  Patient Care Team:  Khang Merino MD as PCP - General (Family Medicine)    Consults:   Consults     Date and Time Order Name Status Description    8/15/2017 1515 Inpatient Consult to Neurology Completed     8/15/2017 1515 Inpatient Consult to Hospitalist            Operations and Procedures Performed:  Procedure(s):  Lysis of adhesion and repair of recurrent incisional hernia with onlay mesh placement      Xr Chest 2 View    Result Date: 8/21/2017  Narrative: INDICATION:  Shortness of air. Cough. Recent hernia surgery.  COMPARISON:  08/16/2017  FINDINGS: PA and lateral views of the chest.  Heart and mediastinal contours are unchanged.  No new pulmonary opacities. Retrocardiac left lower lobe airspace opacity is similar the prior study. No pneumothorax or pleural effusion. Severe scoliosis is again noted.      Impression: No significant interval change  This report was finalized on 8/21/2017 10:06 AM by Dr. Geovani Khalil MD.      Xr Chest 2 View    Result Date: 8/9/2017  Narrative: INDICATION:  Preoperative assessment for hernia surgery. Productive cough  COMPARISON:  03/15/2017  FINDINGS: PA and lateral views of the chest.  Heart and mediastinal contours are unchanged. The heart is enlarged.  The lungs are clear.  No pneumothorax or pleural effusion. Advanced scoliosis is unchanged from the prior study.      Impression: No new cardiopulmonary findings.  This report was finalized on 8/9/2017 2:12 PM by Dr. Geovani Khalil MD.      Xr Abdomen Flat & Upright    Result Date: 8/16/2017  Narrative: ABDOMEN SERIES,  08/16/2017:  HISTORY: 67-year-old female one day postop ventral hernia repair. Postoperative generalized abdomen pain.  TECHNIQUE: Flat and upright abdomen series.  FINDINGS: Surgical staples and a surgical drain superimposed over the abdominal midline. No free intraperitoneal air.  No visible bowel dilatation to suggest obstruction. Large volume stool is present throughout the entire colon.      Impression: 1. Large volume stool throughout the colon. 2. Bowel gas pattern within normal limits. No free air. 3. Postop changes as noted.  This report was finalized on 8/16/2017 9:54 AM by Dr. Bean Garcia MD.      Mammo Screening Bilateral With Cad    Result Date: 7/26/2017  Narrative: BILATERAL DIGITAL SCREENING MAMMOGRAM WITH CAD, 7/26/2017 8:31 AM.  HISTORY: 67 years-old asymptomatic female with no personal history of breast cancer.  TECHNIQUE: Bilateral digital screening mammogram was obtained. The images are reviewed with an FDA-approved CAD device.  FINDINGS:  There are scattered fibroglandular densities throughout both breasts.  No suspicious mass, microcalcifications, or architectural distortion.      Impression: Negative mammogram.  BI-RADS CATEGORY 1: Negative.  RECOMMENDATIONS: Annual screening mammogram.  Women over the age of 40 undergoing screening mammography are entered into a reminder system with target due date for the next mammogram.  This report was finalized on 7/26/2017 8:31 AM by Dr. Geovani Khalil MD.      Xr Chest Pa & Lateral    Result Date: 8/16/2017  Narrative: CHEST X-RAY, 08/16/2017:  HISTORY: 67-year-old female one day postop ventral hernia repair. Postoperative generalized abdomen pain. Cough and shortness of air.  TECHNIQUE: PA and lateral upright chest series.  COMPARISON: *  Chest x-ray, 03/15/2017.  FINDINGS: Chronic scarring and atelectasis in the lung bases. No visible acute pulmonary infiltrate or pleural effusion. Stable cardiomegaly. Severe scoliosis. No change since the prior  exam.      Impression: 1. Chronic scarring and atelectasis in the lung bases, stable since 03/15/2017. 2. Stable cardiomegaly. 3. Scoliosis.  This report was finalized on 8/16/2017 9:57 AM by Dr. Bean Garcia MD.          Discharge Medications:   Caitlyn Camacho   Home Medication Instructions LOKESH:[id number]    Printed on:08/22/17 9198   Medication Information                      acetaminophen (TYLENOL) 500 MG tablet  Take 1,000 mg by mouth Every 6 (Six) Hours As Needed for Mild Pain (1-3) or Fever.             Albuterol (VENTOLIN IN)  Inhale 2 puffs Every 4 (Four) Hours As Needed (wheezing/coughing/sob).             atorvastatin (LIPITOR) 20 MG tablet  Take 20 mg by mouth Every Night.             brimonidine-timolol (COMBIGAN) 0.2-0.5 % ophthalmic solution  Administer 1 drop to both eyes Every 12 (Twelve) Hours.             busPIRone (BUSPAR) 10 MG tablet  Take 10 mg by mouth 2 (Two) Times a Day.             calcium carbonate (OS-KATY) 600 MG tablet  Take 600 mg by mouth 3 (Three) Times a Day With Meals.             carvedilol (COREG) 6.25 MG tablet  Take 6.25 mg by mouth 2 (Two) Times a Day With Meals.             cetirizine (zyrTEC) 10 MG tablet  Take 10 mg by mouth Daily.             denosumab (PROLIA) 60 MG/ML solution syringe  Inject 60 mg under the skin Take As Directed. Every 6 months, due December             dextromethorphan polistirex ER (DELSYM) 30 MG/5ML Suspension Extended Release oral suspension  Take 10 mL by mouth 2 (Two) Times a Day As Needed (cough).             docusate sodium (COLACE) 100 MG capsule  Take 100 mg by mouth 2 (Two) Times a Day.             Ergocalciferol (VITAMIN D2 PO)  Take 50,000 Units by mouth Every 7 (Seven) Days.             ferrous sulfate 325 (65 FE) MG tablet  Take 325 mg by mouth Daily With Breakfast.             fluticasone (FLONASE) 50 MCG/ACT nasal spray  2 sprays into each nostril Daily.             glucosamine-chondroitin 500-400 MG capsule capsule  Take 3  capsules by mouth Daily.             guaiFENesin (MUCINEX) 600 MG 12 hr tablet  Take 1,200 mg by mouth 2 (Two) Times a Day As Needed for Congestion.             levETIRAcetam (KEPPRA) 500 MG tablet  Take 500 mg by mouth 2 (Two) Times a Day.             levothyroxine (SYNTHROID, LEVOTHROID) 50 MCG tablet  Take 50 mcg by mouth Daily.             magnesium hydroxide (MILK OF MAGNESIA) 400 MG/5ML suspension  Take 30 mL by mouth As Needed for Constipation (no bm x 3 days).             montelukast (SINGULAIR) 10 MG tablet  Take 10 mg by mouth Every Night.             Multiple Vitamins-Minerals (MULTIVITAMIN ADULT PO)  Take 1 tablet by mouth Daily.             Nutritional Supplements (RESOURCE ARGINAID) pack  Take  by mouth 2 (Two) Times a Day.             pantoprazole (PROTONIX) 40 MG EC tablet  Take 40 mg by mouth 2 (Two) Times a Day.             PARoxetine (PAXIL) 40 MG tablet  Take 40 mg by mouth Every Morning.             polyethylene glycol (MIRALAX) packet  Take 17 g by mouth Daily.             Probiotic Product (PROBIOTIC DAILY PO)  Take 1 capsule by mouth Daily.             QUEtiapine (SEROquel) 100 MG tablet  Take 100 mg by mouth Every Night.             sennosides-docusate sodium (SENOKOT-S) 8.6-50 MG tablet  Take 2 tablets by mouth Daily for 30 days.             solifenacin (VESICARE) 10 MG tablet  Take 10 mg by mouth Daily.                 History of Present Illness:Patient is a 67-year-old female who is a resident of [place].  She was electively admitted status post lysis of adhesion and open recurrent incisional hernia repair.     Hospital Course:She was admitted for postoperative care.  Consultation with hospitalist team and neurology Dr. Pablo was obtained.  She was started on a clear liquid diet the next day.  She remained hemodynamically stable and afebrile.  All medications were resumed.  She did have a bump in her white cell count postoperatively which then continued to trend hours.  She remained  hemodynamically stable and afebrile and was voiding without any problems.  Diet was then gradually advance the next few days.  She was out of bed ambulating in halls without any problems.  She did have a mild elevation of her white cell count but no left shift.  Blood work was obtained -- blood cultures, urine analysis and chest x-ray which were all within normal limits.  Her white cell count was trending and appeared that in the past several months she had on and off had elevated WBCs with no left shift.  Case was discussed with the hospitalist and it was felt that this likely could've been from her medications regularly seizure medicines.  Clearance was obtained from all consultants at which point then shunted was discharged back to [place].      Last Lab Results:   Lab Results (most recent)     Procedure Component Value Units Date/Time    CBC & Differential [[id number]] Collected:  08/16/17 0443    Specimen:  Blood Updated:  08/16/17 0507    Narrative:       The following orders were created for panel order CBC & Differential.  Procedure                               Abnormality         Status                     ---------                               -----------         ------                     CBC Auto Differential[[id number]]        Abnormal            Final result                 Please view results for these tests on the individual orders.    CBC Auto Differential [[id number]]  (Abnormal) Collected:  08/16/17 0443    Specimen:  Blood Updated:  08/16/17 0507     WBC 20.89 (H) 10*3/mm3      RBC 4.83 10*6/mm3      Hemoglobin 13.4 g/dL      Hematocrit 41.1 %      MCV 85.1 fL      MCH 27.7 pg      MCHC 32.6 g/dL      RDW 16.0 (H) %      RDW-SD 50.0 fl      MPV 10.8 (H) fL      Platelets 318 10*3/mm3      Neutrophil % 81.7 (H) %      Lymphocyte % 9.3 (L) %      Monocyte % 7.9 %      Eosinophil % 0.0 %      Basophil % 0.4 %      Immature Grans % 0.7 (H) %      Neutrophils, Absolute 17.09 (H) 10*3/mm3       Lymphocytes, Absolute 1.94 10*3/mm3      Monocytes, Absolute 1.64 (H) 10*3/mm3      Eosinophils, Absolute 0.00 (L) 10*3/mm3      Basophils, Absolute 0.08 10*3/mm3      Immature Grans, Absolute 0.14 (H) 10*3/mm3      nRBC 0.0 /100 WBC     Basic Metabolic Panel [[id number]]  (Abnormal) Collected:  08/16/17 0444    Specimen:  Blood Updated:  08/16/17 0516     Glucose 142 (H) mg/dL      BUN 25 (H) mg/dL      Creatinine 0.68 mg/dL      Sodium 139 mmol/L      Potassium 4.7 mmol/L      Chloride 100 mmol/L      CO2 27.9 mmol/L      Calcium 8.8 mg/dL      eGFR Non African Amer 87 mL/min/1.73      BUN/Creatinine Ratio 36.8 (H)     Anion Gap 11.1 mmol/L     Narrative:       GFR Normal >60  Chronic Kidney Disease <60  Kidney Failure <15    CBC & Differential [[id number]] Collected:  08/17/17 0411    Specimen:  Blood Updated:  08/17/17 0445    Narrative:       The following orders were created for panel order CBC & Differential.  Procedure                               Abnormality         Status                     ---------                               -----------         ------                     CBC Auto Differential[[id number]]        Abnormal            Final result                 Please view results for these tests on the individual orders.    CBC Auto Differential [[id number]]  (Abnormal) Collected:  08/17/17 0411    Specimen:  Blood Updated:  08/17/17 0445     WBC 15.18 (H) 10*3/mm3      RBC 4.40 10*6/mm3      Hemoglobin 12.0 g/dL      Hematocrit 37.7 %      MCV 85.7 fL      MCH 27.3 pg      MCHC 31.8 g/dL      RDW 16.2 (H) %      RDW-SD 50.7 fl      MPV 10.3 fL      Platelets 290 10*3/mm3      Neutrophil % 63.7 %      Lymphocyte % 24.6 %      Monocyte % 9.6 (H) %      Eosinophil % 1.3 %      Basophil % 0.3 %      Immature Grans % 0.5 %      Neutrophils, Absolute 9.68 (H) 10*3/mm3      Lymphocytes, Absolute 3.73 10*3/mm3      Monocytes, Absolute 1.46 (H) 10*3/mm3      Eosinophils, Absolute 0.19 10*3/mm3      Basophils,  Absolute 0.05 10*3/mm3      Immature Grans, Absolute 0.07 (H) 10*3/mm3      nRBC 0.0 /100 WBC     Basic Metabolic Panel [[id number]]  (Abnormal) Collected:  08/17/17 0411    Specimen:  Blood Updated:  08/17/17 0538     Glucose 111 (H) mg/dL      BUN 19 mg/dL      Creatinine 0.75 mg/dL      Sodium 141 mmol/L      Potassium 4.1 mmol/L      Chloride 100 mmol/L      CO2 30.5 (H) mmol/L      Calcium 8.5 (L) mg/dL      eGFR Non African Amer 77 mL/min/1.73      BUN/Creatinine Ratio 25.3 (H)     Anion Gap 10.5 mmol/L     Narrative:       GFR Normal >60  Chronic Kidney Disease <60  Kidney Failure <15    Magnesium [[id number]]  (Normal) Collected:  08/17/17 0411    Specimen:  Blood Updated:  08/17/17 0538     Magnesium 2.0 mg/dL     Phosphorus [[id number]]  (Abnormal) Collected:  08/17/17 0411    Specimen:  Blood Updated:  08/17/17 0538     Phosphorus 2.4 (L) mg/dL     Tissue Pathology Exam [[id number]] Collected:  08/15/17 1217    Specimen:  Tissue from Abdominal Wall, Tissue from Abdominal Wall Updated:  08/17/17 1724     Case Report --     Surgical Pathology Report                         Case: [id number]                                  Authorizing Provider:  Donna Ivy MD       Collected:           08/15/2017 12:17 PM          Ordering Location:     [facility]   Received:            08/15/2017 04:26 PM                                 OR                                                                           Pathologist:           Gabo Brambila MD                                                      Specimens:   1) - Abdominal Wall, scar tissue                                                                    2) - Abdominal Wall, hernia sac                                                             Final Diagnosis --     Testing performed at outside laboratory. See scanned report.         Embedded Images --    CBC & Differential [[id number]] Collected:  08/18/17 0647    Specimen:  Blood Updated:   08/18/17 0657    Narrative:       The following orders were created for panel order CBC & Differential.  Procedure                               Abnormality         Status                     ---------                               -----------         ------                     CBC Auto Differential[[id number]]        Abnormal            Final result                 Please view results for these tests on the individual orders.    CBC Auto Differential [[id number]]  (Abnormal) Collected:  08/18/17 0647    Specimen:  Blood Updated:  08/18/17 0657     WBC 13.04 (H) 10*3/mm3      RBC 4.44 10*6/mm3      Hemoglobin 12.1 g/dL      Hematocrit 38.2 %      MCV 86.0 fL      MCH 27.3 pg      MCHC 31.7 g/dL      RDW 16.2 (H) %      RDW-SD 50.4 fl      MPV 11.0 (H) fL      Platelets 266 10*3/mm3      Neutrophil % 64.3 %      Lymphocyte % 23.8 %      Monocyte % 9.4 (H) %      Eosinophil % 1.6 %      Basophil % 0.4 %      Immature Grans % 0.5 %      Neutrophils, Absolute 8.37 (H) 10*3/mm3      Lymphocytes, Absolute 3.11 10*3/mm3      Monocytes, Absolute 1.23 (H) 10*3/mm3      Eosinophils, Absolute 0.21 10*3/mm3      Basophils, Absolute 0.05 10*3/mm3      Immature Grans, Absolute 0.07 (H) 10*3/mm3      nRBC 0.0 /100 WBC     Basic Metabolic Panel [[id number]]  (Abnormal) Collected:  08/18/17 0647    Specimen:  Blood Updated:  08/18/17 0727     Glucose 105 (H) mg/dL      BUN 12 mg/dL      Creatinine 0.54 (L) mg/dL      Sodium 142 mmol/L      Potassium 4.2 mmol/L      Chloride 102 mmol/L      CO2 31.5 (H) mmol/L      Calcium 8.3 (L) mg/dL      eGFR Non African Amer 113 mL/min/1.73      BUN/Creatinine Ratio 22.2     Anion Gap 8.5 mmol/L     Narrative:       GFR Normal >60  Chronic Kidney Disease <60  Kidney Failure <15    Magnesium [[id number]]  (Normal) Collected:  08/18/17 0647    Specimen:  Blood Updated:  08/18/17 0727     Magnesium 2.0 mg/dL     Phosphorus [[id number]]  (Abnormal) Collected:  08/18/17 0647    Specimen:  Blood Updated:   08/18/17 0727     Phosphorus 1.8 (L) mg/dL     Phosphorus [[id number]]  (Normal) Collected:  08/18/17 2015    Specimen:  Blood Updated:  08/18/17 2047     Phosphorus 2.9 mg/dL     CBC & Differential [[id number]] Collected:  08/19/17 0357    Specimen:  Blood Updated:  08/19/17 0505    Narrative:       The following orders were created for panel order CBC & Differential.  Procedure                               Abnormality         Status                     ---------                               -----------         ------                     CBC Auto Differential[[id number]]        Abnormal            Final result                 Please view results for these tests on the individual orders.    CBC Auto Differential [[id number]]  (Abnormal) Collected:  08/19/17 0357    Specimen:  Blood Updated:  08/19/17 0505     WBC 14.38 (H) 10*3/mm3      RBC 4.25 10*6/mm3      Hemoglobin 11.6 (L) g/dL      Hematocrit 36.9 (L) %      MCV 86.8 fL      MCH 27.3 pg      MCHC 31.4 g/dL      RDW 16.1 (H) %      RDW-SD 51.3 fl      MPV 10.7 (H) fL      Platelets 261 10*3/mm3      Neutrophil % 58.4 %      Lymphocyte % 28.2 %      Monocyte % 9.8 (H) %      Eosinophil % 2.8 %      Basophil % 0.5 %      Immature Grans % 0.3 %      Neutrophils, Absolute 8.40 (H) 10*3/mm3      Lymphocytes, Absolute 4.05 10*3/mm3      Monocytes, Absolute 1.41 (H) 10*3/mm3      Eosinophils, Absolute 0.40 (H) 10*3/mm3      Basophils, Absolute 0.07 10*3/mm3      Immature Grans, Absolute 0.05 (H) 10*3/mm3      nRBC 0.0 /100 WBC     Magnesium [[id number]]  (Normal) Collected:  08/19/17 0357    Specimen:  Blood Updated:  08/19/17 0538     Magnesium 2.0 mg/dL     Phosphorus [[id number]]  (Abnormal) Collected:  08/19/17 0357    Specimen:  Blood Updated:  08/19/17 0538     Phosphorus 2.4 (L) mg/dL     Basic Metabolic Panel [[id number]]  (Abnormal) Collected:  08/19/17 0357    Specimen:  Blood Updated:  08/19/17 0538     Glucose 87 mg/dL      BUN 10 mg/dL      Creatinine 0.56  (L) mg/dL      Sodium 144 mmol/L      Potassium 4.5 mmol/L      Chloride 102 mmol/L      CO2 34.0 (H) mmol/L      Calcium 8.6 (L) mg/dL      eGFR Non African Amer 108 mL/min/1.73      BUN/Creatinine Ratio 17.9     Anion Gap 8.0 mmol/L     Narrative:       GFR Normal >60  Chronic Kidney Disease <60  Kidney Failure <15    CBC & Differential [[id number]] Collected:  08/20/17 0334    Specimen:  Blood Updated:  08/20/17 0453    Narrative:       The following orders were created for panel order CBC & Differential.  Procedure                               Abnormality         Status                     ---------                               -----------         ------                     CBC Auto Differential[[id number]]        Abnormal            Final result                 Please view results for these tests on the individual orders.    CBC Auto Differential [[id number]]  (Abnormal) Collected:  08/20/17 0334    Specimen:  Blood Updated:  08/20/17 0453     WBC 12.63 (H) 10*3/mm3      RBC 4.30 10*6/mm3      Hemoglobin 11.8 (L) g/dL      Hematocrit 37.2 %      MCV 86.5 fL      MCH 27.4 pg      MCHC 31.7 g/dL      RDW 16.1 (H) %      RDW-SD 50.3 fl      MPV 10.9 (H) fL      Platelets 287 10*3/mm3      Neutrophil % 58.8 %      Lymphocyte % 27.9 %      Monocyte % 8.3 (H) %      Eosinophil % 3.8 %      Basophil % 0.6 %      Immature Grans % 0.6 (H) %      Neutrophils, Absolute 7.41 10*3/mm3      Lymphocytes, Absolute 3.53 10*3/mm3      Monocytes, Absolute 1.05 (H) 10*3/mm3      Eosinophils, Absolute 0.48 (H) 10*3/mm3      Basophils, Absolute 0.08 10*3/mm3      Immature Grans, Absolute 0.08 (H) 10*3/mm3      nRBC 0.0 /100 WBC     Basic Metabolic Panel [[id number]]  (Abnormal) Collected:  08/20/17 0334    Specimen:  Blood Updated:  08/20/17 0517     Glucose 104 (H) mg/dL      BUN 10 mg/dL      Creatinine 0.63 mg/dL      Sodium 140 mmol/L      Potassium 4.6 mmol/L      Chloride 100 mmol/L      CO2 31.2 (H) mmol/L      Calcium 8.6 (L)  mg/dL      eGFR Non African Amer 95 mL/min/1.73      BUN/Creatinine Ratio 15.9     Anion Gap 8.8 mmol/L     Narrative:       GFR Normal >60  Chronic Kidney Disease <60  Kidney Failure <15    Phosphorus [[id number]]  (Normal) Collected:  08/20/17 0334    Specimen:  Blood Updated:  08/20/17 0526     Phosphorus 3.2 mg/dL     CBC & Differential [[id number]] Collected:  08/21/17 0334    Specimen:  Blood Updated:  08/21/17 0443    Narrative:       The following orders were created for panel order CBC & Differential.  Procedure                               Abnormality         Status                     ---------                               -----------         ------                     CBC Auto Differential[[id number]]        Abnormal            Final result                 Please view results for these tests on the individual orders.    CBC Auto Differential [[id number]]  (Abnormal) Collected:  08/21/17 0334    Specimen:  Blood Updated:  08/21/17 0443     WBC 14.05 (H) 10*3/mm3      RBC 4.32 10*6/mm3      Hemoglobin 11.8 (L) g/dL      Hematocrit 37.3 %      MCV 86.3 fL      MCH 27.3 pg      MCHC 31.6 g/dL      RDW 16.1 (H) %      RDW-SD 50.4 fl      MPV 10.9 (H) fL      Platelets 303 10*3/mm3      Neutrophil % 54.3 %      Lymphocyte % 29.7 %      Monocyte % 10.3 (H) %      Eosinophil % 4.2 (H) %      Basophil % 0.6 %      Immature Grans % 0.9 (H) %      Neutrophils, Absolute 7.63 10*3/mm3      Lymphocytes, Absolute 4.17 10*3/mm3      Monocytes, Absolute 1.45 (H) 10*3/mm3      Eosinophils, Absolute 0.59 (H) 10*3/mm3      Basophils, Absolute 0.09 10*3/mm3      Immature Grans, Absolute 0.12 (H) 10*3/mm3      nRBC 0.0 /100 WBC     Magnesium [[id number]]  (Normal) Collected:  08/21/17 0334    Specimen:  Blood Updated:  08/21/17 0526     Magnesium 2.0 mg/dL     Phosphorus [[id number]]  (Normal) Collected:  08/21/17 0334    Specimen:  Blood Updated:  08/21/17 0526     Phosphorus 3.2 mg/dL     Basic Metabolic Panel [[id number]]   (Abnormal) Collected:  08/21/17 0334    Specimen:  Blood Updated:  08/21/17 0548     Glucose 93 mg/dL      BUN 10 mg/dL      Creatinine 0.68 mg/dL      Sodium 142 mmol/L      Potassium 4.9 mmol/L      Chloride 101 mmol/L      CO2 31.6 (H) mmol/L      Calcium 8.9 mg/dL      eGFR Non African Amer 87 mL/min/1.73      BUN/Creatinine Ratio 14.7     Anion Gap 9.4 mmol/L     Narrative:       GFR Normal >60  Chronic Kidney Disease <60  Kidney Failure <15    Urinalysis With / Culture If Indicated [[id number]]  (Normal) Collected:  08/21/17 1048    Specimen:  Urine from Urine, Clean Catch Updated:  08/21/17 1112     Color, UA Yellow     Appearance, UA Clear     pH, UA 7.0     Specific Gravity, UA 1.015     Glucose, UA Negative     Ketones, UA Negative     Bilirubin, UA Negative     Blood, UA Negative     Protein, UA Negative     Leuk Esterase, UA Negative     Nitrite, UA Negative     Urobilinogen, UA 0.2 E.U./dL    Narrative:       Urine microscopic not indicated.    CBC & Differential [[id number]] Collected:  08/22/17 0431    Specimen:  Blood Updated:  08/22/17 0434    Narrative:       The following orders were created for panel order CBC & Differential.  Procedure                               Abnormality         Status                     ---------                               -----------         ------                     CBC Auto Differential[[id number]]        Abnormal            Final result                 Please view results for these tests on the individual orders.    CBC Auto Differential [[id number]]  (Abnormal) Collected:  08/22/17 0431    Specimen:  Blood Updated:  08/22/17 0434     WBC 13.86 (H) 10*3/mm3      RBC 4.12 (L) 10*6/mm3      Hemoglobin 11.5 (L) g/dL      Hematocrit 35.8 (L) %      MCV 86.9 fL      MCH 27.9 pg      MCHC 32.1 g/dL      RDW 16.1 (H) %      RDW-SD 51.5 fl      MPV 10.3 fL      Platelets 314 10*3/mm3      Neutrophil % 55.8 %      Lymphocyte % 28.5 %      Monocyte % 10.0 (H) %      Eosinophil  % 4.1 (H) %      Basophil % 0.6 %      Immature Grans % 1.0 (H) %      Neutrophils, Absolute 7.74 10*3/mm3      Lymphocytes, Absolute 3.95 10*3/mm3      Monocytes, Absolute 1.38 (H) 10*3/mm3      Eosinophils, Absolute 0.57 (H) 10*3/mm3      Basophils, Absolute 0.08 10*3/mm3      Immature Grans, Absolute 0.14 (H) 10*3/mm3      nRBC 0.0 /100 WBC     Magnesium [[id number]]  (Normal) Collected:  08/22/17 0430    Specimen:  Blood Updated:  08/22/17 0456     Magnesium 1.9 mg/dL     Basic Metabolic Panel [[id number]]  (Abnormal) Collected:  08/22/17 0430    Specimen:  Blood Updated:  08/22/17 0456     Glucose 119 (H) mg/dL      BUN 10 mg/dL      Creatinine 0.63 mg/dL      Sodium 138 mmol/L      Potassium 4.6 mmol/L      Chloride 100 mmol/L      CO2 31.0 (H) mmol/L      Calcium 8.6 (L) mg/dL      eGFR Non African Amer 95 mL/min/1.73      BUN/Creatinine Ratio 15.9     Anion Gap 7.0 mmol/L     Narrative:       GFR Normal >60  Chronic Kidney Disease <60  Kidney Failure <15    Phosphorus [[id number]]  (Normal) Collected:  08/22/17 0430    Specimen:  Blood Updated:  08/22/17 0456     Phosphorus 3.8 mg/dL     Blood Culture [[id number]]  (Normal) Collected:  08/21/17 0900    Specimen:  Blood from Hand, Left Updated:  08/22/17 1001     Blood Culture No growth at 24 hours    Blood Culture [[id number]]  (Normal) Collected:  08/21/17 0930    Specimen:  Blood from Arm, Left Updated:  08/22/17 1001     Blood Culture No growth at 24 hours        Imaging Results (most recent)     Procedure Component Value Units Date/Time    XR Abdomen Flat & Upright [[id number]] Collected:  08/16/17 0952     Updated:  08/16/17 0957    Narrative:       ABDOMEN SERIES, 08/16/2017:     HISTORY:   67-year-old female one day postop ventral hernia repair. Postoperative  generalized abdomen pain.     TECHNIQUE:  Flat and upright abdomen series.     FINDINGS:  Surgical staples and a surgical drain superimposed over the abdominal  midline. No free intraperitoneal air.      No visible bowel dilatation to suggest obstruction. Large volume stool  is present throughout the entire colon.       Impression:       1. Large volume stool throughout the colon.  2. Bowel gas pattern within normal limits. No free air.  3. Postop changes as noted.     This report was finalized on 8/16/2017 9:54 AM by Dr. Bean Garcia MD.       XR Chest PA & Lateral [[id number]] Collected:  08/16/17 0954     Updated:  08/16/17 0959    Narrative:       CHEST X-RAY, 08/16/2017:     HISTORY:   67-year-old female one day postop ventral hernia repair. Postoperative  generalized abdomen pain. Cough and shortness of air.     TECHNIQUE:  PA and lateral upright chest series.     COMPARISON:  *  Chest x-ray, 03/15/2017.     FINDINGS:  Chronic scarring and atelectasis in the lung bases. No visible acute  pulmonary infiltrate or pleural effusion. Stable cardiomegaly. Severe  scoliosis. No change since the prior exam.       Impression:       1. Chronic scarring and atelectasis in the lung bases, stable since  03/15/2017.  2. Stable cardiomegaly.  3. Scoliosis.     This report was finalized on 8/16/2017 9:57 AM by Dr. Bean Garcia MD.       XR Chest 2 View [[id number]] Collected:  08/21/17 1005     Updated:  08/21/17 1008    Narrative:       INDICATION:  Shortness of air. Cough. Recent hernia surgery.     COMPARISON:  08/16/2017     FINDINGS: PA and lateral views of the chest.  Heart and mediastinal  contours are unchanged.  No new pulmonary opacities. Retrocardiac left  lower lobe airspace opacity is similar the prior study. No pneumothorax  or pleural effusion. Severe scoliosis is again noted.       Impression:       No significant interval change     This report was finalized on 8/21/2017 10:06 AM by Dr. Geovani Khalil MD.               Condition on Discharge:  Stable    Discharge Disposition: [facility]      Discharge Diet:           Dietary Orders            Start     Ordered    08/18/17 0825  Diet Regular; GI  Soft / [place]  Diet Effective Now     Comments:  ENSURE 1 can PO tid with meals and PRN   Question Answer Comment   Diet Texture / Consistency Regular    Common Modifiers GI Soft / [place]        08/18/17 0825          Activity at Discharge:    · No driving for 2 week and when no longer taking narcotics.  · You may ride in a car  · Diet as tolerated  · You may walk as tolerated  · You may walk up and down stairs as tolerated  · In 48 hours take a shower, remove your outer dressings, leave the steri strips in place  · When your steri strips fall off, clean your incisions daily with hydrogen peroxide and cover with sterile dressings  · No lifting greater than 5-10 pounds for 4-6 weeks  · If you develop constipation, take a tablespoon of Milk of Magnesia once a day as needed  · You may place ice packs on your incisions for 20 minutes at a time as needed for pain         Follow-up Appointments : She is to follow-up in my office on Friday, 8/25/17 at 9 AM for her postoperative visit and drain removal.  She is to follow-up with her primary care physician in 2 weeks and neurologist as per their recommendation.  [facility] staff was informed to call the office should she have worsening symptoms or concerns or go to the nearest emergency room.    Time: Discharge 30 min (if over 30 minutes give explanation as to why it took greater than 30 minutes)

## 2017-08-22 NOTE — NURSING NOTE
Patient doing well today.  HANSEL drain in place. 10 ml serousangous drainage out. No complaints of pain. [facility] visited this morning. Patient is missing small green stuffed bear. Environmental service was contacted to keep a look out for it. Dressing on abdomin changed. Patient ready for discharge. Report given to Adrienne Argueta at [facility]. Patient is apprehensive about leaving because she said she likes it here so much.

## 2017-08-22 NOTE — PLAN OF CARE
Problem: Patient Care Overview (Adult)  Goal: Plan of Care Review  Outcome: Outcome(s) achieved Date Met:  08/22/17 08/22/17 1445   Coping/Psychosocial Response Interventions   Plan Of Care Reviewed With patient;other (see comments)  ([facility] )   Patient Care Overview   Progress improving       Goal: Adult Individualization and Mutuality  Outcome: Outcome(s) achieved Date Met:  08/22/17  Goal: Discharge Needs Assessment  Outcome: Outcome(s) achieved Date Met:  08/22/17    Problem: Fall Risk (Adult)  Goal: Identify Related Risk Factors and Signs and Symptoms  Outcome: Outcome(s) achieved Date Met:  08/22/17  Goal: Absence of Falls  Outcome: Outcome(s) achieved Date Met:  08/22/17

## 2017-08-25 ENCOUNTER — OFFICE VISIT (OUTPATIENT)
Dept: SURGERY | Facility: CLINIC | Age: 67
End: 2017-08-25

## 2017-08-25 VITALS
HEIGHT: 62 IN | DIASTOLIC BLOOD PRESSURE: 62 MMHG | BODY MASS INDEX: 27.42 KG/M2 | SYSTOLIC BLOOD PRESSURE: 102 MMHG | WEIGHT: 149 LBS

## 2017-08-25 DIAGNOSIS — Z09 SURGERY FOLLOW-UP: Primary | ICD-10-CM

## 2017-08-25 PROCEDURE — 99024 POSTOP FOLLOW-UP VISIT: CPT | Performed by: SURGERY

## 2017-08-25 NOTE — PROGRESS NOTES
PATIENT INFORMATION  Caitlyn Camacho   - 1950    CHIEF COMPLAINT  Chief Complaint   Patient presents with   • Post-op Follow-up   1 WK 3 DAYS S/P RECURRENT VENTRAL/INCISIONAL HERNIA, PT IS W/O COMPLAINTS    HISTORY OF PRESENT ILLNESS  HPI  Patient was as the office today for her first postoperative visit.  She status post lysis of adhesions and open recurrent incisional hernia repair.  She was discharged from the hospital postoperatively.  According to the [facility] staff and nursing notes she's done very well.  Shunted tells me that she's been tolerating regular diet.  Denies any nausea, vomiting.  She reports has been having bowel movements.  She reports good appetite.  She has resumed normal activities.  HANSEL output has been less than 30 cc/ 24 hours - per review of the log.    REVIEW OF SYSTEMS  Review of Systems  As per history of present illness    ACTIVE PROBLEMS  Patient Active Problem List    Diagnosis   • Recurrent ventral incisional hernia [K43.2]     Overview Note:     Added automatically from request for surgery [id number]     • Epilepsy [G40.909]     Overview Note:     Overview:   no sz activity in yrs     • Cardiac arrhythmia [I49.9]   • Hypoxia [R09.02]   • Hypervolemia [E87.70]   • Adverse drug effect [T88.7XXA]   • Partial symptomatic epilepsy with complex partial seizures, not intractable, without status epilepticus [G40.209]   • Nutritional disorder [E63.9]   • 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]   • Stricture of duodenum [K31.5]   • Duodenal ulcer [K26.9]   • Hypothyroidism [E03.9]   • Arthritis [M19.90]   • Dementia [F03.90]   • Depression [F32.9]   • 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, 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]   • Hyperlipidemia [E78.5]   • Mental retardation [F79]   • Osteopenia [M85.80]   • Vitamin D deficiency [E55.9]         PAST MEDICAL HISTORY  Past Medical History:   Diagnosis Date   • Anemia due to blood loss    • Anxiety    • Arthritis    • Duodenal stricture    • Duodenal ulcer    • Dysphagia     • Gallstones    • H/O convulsions    • Hypercholesteremia    • Hypothyroidism    • Major depression    • Moderate intellectual disability    • OCD (obsessive compulsive disorder)    • Ocular histoplasmosis     w/macular scars   • Osteopenia    • Ptosis    • Pure hyperglyceridemia    • S/P bypass gastrojejunostomy    • Seasonal allergies    • Seizure disorder    • Unspecified dementia without behavioral disturbance    • Ventral hernia     sched repair   • Vitamin D deficiency          SURGICAL HISTORY  Past Surgical History:   Procedure Laterality Date   • CATARACT EXTRACTION Bilateral 2016   • CHOLECYSTECTOMY OPEN  03/2017   • COLONOSCOPY  05/2009   • ENDOSCOPY  03/2017   • GASTROJEJUNOSTOMY  08/2016   • GASTROSTOMY      TEJINDER   • HYSTERECTOMY     • VENTRAL/INCISIONAL HERNIA REPAIR N/A 8/15/2017    Procedure: Lysis of adhesion and repair of recurrent incisional hernia with onlay mesh placement ;  Surgeon: Donna Ivy MD;  Location: [facility];  Service:          FAMILY HISTORY  Family History   Problem Relation Age of Onset   • Family history unknown: Yes         SOCIAL HISTORY  Social History     Occupational History   • Not on file.     Social History Main Topics   • Smoking status: Never Smoker   • Smokeless tobacco: Never Used   • Alcohol use No   • Drug use: No   • Sexual activity: Defer         CURRENT MEDICATIONS    Current Outpatient Prescriptions:   •  acetaminophen (TYLENOL) 500 MG tablet, Take 1,000 mg by mouth Every 6 (Six) Hours As Needed for Mild Pain (1-3) or Fever., Disp: , Rfl:   •  Albuterol (VENTOLIN IN), Inhale 2 puffs Every 4 (Four) Hours As Needed (wheezing/coughing/sob)., Disp: , Rfl:   •  atorvastatin (LIPITOR) 20 MG tablet, Take 20 mg by mouth Every Night., Disp: , Rfl:   •  brimonidine-timolol (COMBIGAN) 0.2-0.5 % ophthalmic solution, Administer 1 drop to both eyes Every 12 (Twelve) Hours., Disp: , Rfl:   •  busPIRone (BUSPAR) 10 MG tablet, Take 10 mg by mouth 2 (Two) Times a Day.,  Disp: , Rfl:   •  calcium carbonate (OS-KATY) 600 MG tablet, Take 600 mg by mouth 3 (Three) Times a Day With Meals., Disp: , Rfl:   •  carvedilol (COREG) 6.25 MG tablet, Take 6.25 mg by mouth 2 (Two) Times a Day With Meals., Disp: , Rfl:   •  cephalexin (KEFLEX) 500 MG capsule, Take 1 capsule by mouth 3 (Three) Times a Day., Disp: 21 capsule, Rfl: 0  •  cetirizine (zyrTEC) 10 MG tablet, Take 10 mg by mouth Daily., Disp: , Rfl:   •  denosumab (PROLIA) 60 MG/ML solution syringe, Inject 60 mg under the skin Take As Directed. Every 6 months, due December, Disp: , Rfl:   •  dextromethorphan polistirex ER (DELSYM) 30 MG/5ML Suspension Extended Release oral suspension, Take 10 mL by mouth 2 (Two) Times a Day As Needed (cough)., Disp: , Rfl:   •  docusate sodium (COLACE) 100 MG capsule, Take 100 mg by mouth 2 (Two) Times a Day., Disp: , Rfl:   •  Ergocalciferol (VITAMIN D2 PO), Take 50,000 Units by mouth Every 7 (Seven) Days., Disp: , Rfl:   •  ferrous sulfate 325 (65 FE) MG tablet, Take 325 mg by mouth Daily With Breakfast., Disp: , Rfl:   •  fluticasone (FLONASE) 50 MCG/ACT nasal spray, 2 sprays into each nostril Daily., Disp: , Rfl:   •  glucosamine-chondroitin 500-400 MG capsule capsule, Take 3 capsules by mouth Daily., Disp: , Rfl:   •  guaiFENesin (MUCINEX) 600 MG 12 hr tablet, Take 1,200 mg by mouth 2 (Two) Times a Day As Needed for Congestion., Disp: , Rfl:   •  levETIRAcetam (KEPPRA) 500 MG tablet, Take 500 mg by mouth 2 (Two) Times a Day., Disp: , Rfl:   •  levothyroxine (SYNTHROID, LEVOTHROID) 50 MCG tablet, Take 50 mcg by mouth Daily., Disp: , Rfl:   •  magnesium hydroxide (MILK OF MAGNESIA) 400 MG/5ML suspension, Take 30 mL by mouth As Needed for Constipation (no bm x 3 days)., Disp: , Rfl:   •  montelukast (SINGULAIR) 10 MG tablet, Take 10 mg by mouth Every Night., Disp: , Rfl:   •  Multiple Vitamins-Minerals (MULTIVITAMIN ADULT PO), Take 1 tablet by mouth Daily., Disp: , Rfl:   •  Nutritional Supplements  "(RESOURCE ARGINAID) pack, Take  by mouth 2 (Two) Times a Day., Disp: , Rfl:   •  ondansetron (ZOFRAN) 4 MG tablet, Take 1 tablet by mouth Every 6 (Six) Hours As Needed for Nausea or Vomiting., Disp: 20 tablet, Rfl: 0  •  oxyCODONE-acetaminophen (PERCOCET) 5-325 MG per tablet, Take 1 tablet by mouth Every 6 (Six) Hours As Needed (as needed for pain)., Disp: 30 tablet, Rfl: 0  •  pantoprazole (PROTONIX) 40 MG EC tablet, Take 40 mg by mouth 2 (Two) Times a Day., Disp: , Rfl:   •  PARoxetine (PAXIL) 40 MG tablet, Take 40 mg by mouth Every Morning., Disp: , Rfl:   •  polyethylene glycol (MIRALAX) packet, Take 17 g by mouth Daily., Disp: , Rfl:   •  Probiotic Product (PROBIOTIC DAILY PO), Take 1 capsule by mouth Daily., Disp: , Rfl:   •  QUEtiapine (SEROquel) 100 MG tablet, Take 100 mg by mouth Every Night., Disp: , Rfl:   •  sennosides-docusate sodium (SENOKOT-S) 8.6-50 MG tablet, Take 2 tablets by mouth Daily for 30 days., Disp: 60 tablet, Rfl: 0  •  solifenacin (VESICARE) 10 MG tablet, Take 10 mg by mouth Daily., Disp: , Rfl:     ALLERGIES  Codeine    VITALS  Vitals:    08/25/17 0916   BP: 102/62   Weight: 149 lb (67.6 kg)   Height: 62\" (157.5 cm)       LAST RESULTS   Admission on 08/15/2017, Discharged on 08/22/2017   No results displayed because visit has over 200 results.        Xr Chest 2 View    Result Date: 8/21/2017  Narrative: INDICATION:  Shortness of air. Cough. Recent hernia surgery.  COMPARISON:  08/16/2017  FINDINGS: PA and lateral views of the chest.  Heart and mediastinal contours are unchanged.  No new pulmonary opacities. Retrocardiac left lower lobe airspace opacity is similar the prior study. No pneumothorax or pleural effusion. Severe scoliosis is again noted.      Impression: No significant interval change  This report was finalized on 8/21/2017 10:06 AM by Dr. Geovani Khalil MD.      Xr Chest 2 View    Result Date: 8/9/2017  Narrative: INDICATION:  Preoperative assessment for hernia surgery. " Productive cough  COMPARISON:  03/15/2017  FINDINGS: PA and lateral views of the chest.  Heart and mediastinal contours are unchanged. The heart is enlarged.  The lungs are clear.  No pneumothorax or pleural effusion. Advanced scoliosis is unchanged from the prior study.      Impression: No new cardiopulmonary findings.  This report was finalized on 8/9/2017 2:12 PM by Dr. Geovani Khalil MD.      Xr Abdomen Flat & Upright    Result Date: 8/16/2017  Narrative: ABDOMEN SERIES, 08/16/2017:  HISTORY: 67-year-old female one day postop ventral hernia repair. Postoperative generalized abdomen pain.  TECHNIQUE: Flat and upright abdomen series.  FINDINGS: Surgical staples and a surgical drain superimposed over the abdominal midline. No free intraperitoneal air.  No visible bowel dilatation to suggest obstruction. Large volume stool is present throughout the entire colon.      Impression: 1. Large volume stool throughout the colon. 2. Bowel gas pattern within normal limits. No free air. 3. Postop changes as noted.  This report was finalized on 8/16/2017 9:54 AM by Dr. Bean Garcia MD.      Xr Chest Pa & Lateral    Result Date: 8/16/2017  Narrative: CHEST X-RAY, 08/16/2017:  HISTORY: 67-year-old female one day postop ventral hernia repair. Postoperative generalized abdomen pain. Cough and shortness of air.  TECHNIQUE: PA and lateral upright chest series.  COMPARISON: *  Chest x-ray, 03/15/2017.  FINDINGS: Chronic scarring and atelectasis in the lung bases. No visible acute pulmonary infiltrate or pleural effusion. Stable cardiomegaly. Severe scoliosis. No change since the prior exam.      Impression: 1. Chronic scarring and atelectasis in the lung bases, stable since 03/15/2017. 2. Stable cardiomegaly. 3. Scoliosis.  This report was finalized on 8/16/2017 9:57 AM by Dr. Bean Garcia MD.        PHYSICAL EXAM  Physical Exam   Constitutional: She appears well-developed and well-nourished.   Eyes: No scleral icterus.    Cardiovascular: Normal rate, regular rhythm and normal heart sounds.    Pulmonary/Chest: Breath sounds normal.   Abdominal:   Soft, nondistended, nontender positive bowel sounds in all 4 quadrants.  Incision healing well.  Every other staple removed, Steri-Strips applied.  HANSEL drain removed without difficulty.   Vitals reviewed.      ASSESSMENT  Status post lysis of adhesion and repair of recurrent incisional hernia  Doing well.  Wounds healing nicely.    Diet, activity, weightlifting and wound care instructions were given to the [facility].  Would recommend she keep the area clean.  Would also recommend she wear the abdominal binder.  I will see her back in a week to remove the remaining skin staples.    She has had a mildly elevated WBC count and she had further workup done in the hospital for this which was negative.  The hospitalist were managing this and upon discussion with them it was felt that this could be related to her medication and that a follow-up WBC should be obtained as an outpatient.  I have advised her physician at [facility] and the staff to obtain a follow-up CBC.      PLAN  Follow-up one week.  [facility] Staff was advised to call the office sooner should the patient have worsening symptoms or go to the nearest emergency room.

## 2017-08-26 LAB
BACTERIA SPEC AEROBE CULT: NORMAL
BACTERIA SPEC AEROBE CULT: NORMAL

## 2017-08-30 ENCOUNTER — LAB REQUISITION (OUTPATIENT)
Dept: LAB | Facility: HOSPITAL | Age: 67
End: 2017-08-30

## 2017-08-30 DIAGNOSIS — D72.819 DECREASED WHITE BLOOD CELL COUNT: ICD-10-CM

## 2017-08-30 DIAGNOSIS — R10.84 GENERALIZED ABDOMINAL PAIN: ICD-10-CM

## 2017-08-30 DIAGNOSIS — R56.9 CONVULSIONS (HCC): ICD-10-CM

## 2017-08-30 DIAGNOSIS — R94.6 ABNORMAL RESULTS OF THYROID FUNCTION STUDIES: ICD-10-CM

## 2017-08-30 DIAGNOSIS — R73.09 OTHER ABNORMAL GLUCOSE: ICD-10-CM

## 2017-08-30 DIAGNOSIS — Z79.899 OTHER LONG TERM (CURRENT) DRUG THERAPY: ICD-10-CM

## 2017-08-30 DIAGNOSIS — E55.9 VITAMIN D DEFICIENCY: ICD-10-CM

## 2017-08-30 DIAGNOSIS — R79.89 OTHER SPECIFIED ABNORMAL FINDINGS OF BLOOD CHEMISTRY: ICD-10-CM

## 2017-08-30 LAB
25(OH)D3 SERPL-MCNC: 21.4 NG/ML
ALBUMIN SERPL-MCNC: 3.1 G/DL (ref 3.5–5.2)
ALBUMIN/GLOB SERPL: 0.9 G/DL
ALP SERPL-CCNC: 74 U/L (ref 40–129)
ALT SERPL W P-5'-P-CCNC: 16 U/L (ref 5–33)
ANION GAP SERPL CALCULATED.3IONS-SCNC: 9.9 MMOL/L
AST SERPL-CCNC: 24 U/L (ref 5–32)
BASOPHILS # BLD AUTO: 0.14 10*3/MM3 (ref 0–0.2)
BASOPHILS NFR BLD AUTO: 1.3 % (ref 0–2)
BILIRUB SERPL-MCNC: <0.2 MG/DL (ref 0.2–1.2)
BUN BLD-MCNC: 21 MG/DL (ref 8–23)
BUN/CREAT SERPL: 30.4 (ref 7–25)
CALCIUM SPEC-SCNC: 9 MG/DL (ref 8.8–10.5)
CHLORIDE SERPL-SCNC: 100 MMOL/L (ref 98–107)
CHOLEST SERPL-MCNC: 122 MG/DL (ref 0–200)
CO2 SERPL-SCNC: 30.1 MMOL/L (ref 22–29)
CREAT BLD-MCNC: 0.69 MG/DL (ref 0.57–1)
DEPRECATED RDW RBC AUTO: 49.5 FL (ref 37–54)
EOSINOPHIL # BLD AUTO: 0.4 10*3/MM3 (ref 0.1–0.3)
EOSINOPHIL NFR BLD AUTO: 3.7 % (ref 0–4)
ERYTHROCYTE [DISTWIDTH] IN BLOOD BY AUTOMATED COUNT: 15.6 % (ref 11.5–14.5)
GFR SERPL CREATININE-BSD FRML MDRD: 85 ML/MIN/1.73
GLOBULIN UR ELPH-MCNC: 3.4 GM/DL
GLUCOSE BLD-MCNC: 93 MG/DL (ref 65–99)
HBA1C MFR BLD: 5.8 % (ref 4.8–5.6)
HCT VFR BLD AUTO: 36.6 % (ref 37–47)
HDLC SERPL-MCNC: 51 MG/DL (ref 40–60)
HGB BLD-MCNC: 11.6 G/DL (ref 12–16)
IMM GRANULOCYTES # BLD: 0.06 10*3/MM3 (ref 0–0.03)
IMM GRANULOCYTES NFR BLD: 0.6 % (ref 0–0.5)
LDLC SERPL CALC-MCNC: 55 MG/DL (ref 0–100)
LDLC/HDLC SERPL: 1.08 {RATIO}
LYMPHOCYTES # BLD AUTO: 4.5 10*3/MM3 (ref 0.6–4.8)
LYMPHOCYTES NFR BLD AUTO: 41.9 % (ref 20–45)
MCH RBC QN AUTO: 27.5 PG (ref 27–31)
MCHC RBC AUTO-ENTMCNC: 31.7 G/DL (ref 31–37)
MCV RBC AUTO: 86.7 FL (ref 81–99)
MONOCYTES # BLD AUTO: 0.89 10*3/MM3 (ref 0–1)
MONOCYTES NFR BLD AUTO: 8.3 % (ref 3–8)
NEUTROPHILS # BLD AUTO: 4.74 10*3/MM3 (ref 1.5–8.3)
NEUTROPHILS NFR BLD AUTO: 44.2 % (ref 45–70)
NRBC BLD MANUAL-RTO: 0 /100 WBC (ref 0–0)
PHENYTOIN SERPL-MCNC: <0.8 MCG/ML (ref 10–20)
PLATELET # BLD AUTO: 399 10*3/MM3 (ref 140–500)
PMV BLD AUTO: 9.9 FL (ref 7.4–10.4)
POTASSIUM BLD-SCNC: 4.6 MMOL/L (ref 3.5–5.2)
PROT SERPL-MCNC: 6.5 G/DL (ref 6–8.5)
RBC # BLD AUTO: 4.22 10*6/MM3 (ref 4.2–5.4)
SODIUM BLD-SCNC: 140 MMOL/L (ref 136–145)
T4 FREE SERPL-MCNC: 1.41 NG/DL (ref 0.93–1.7)
TRIGL SERPL-MCNC: 79 MG/DL (ref 0–150)
TSH SERPL DL<=0.05 MIU/L-ACNC: 2.56 MIU/ML (ref 0.27–4.2)
VLDLC SERPL-MCNC: 15.8 MG/DL (ref 7–27)
WBC NRBC COR # BLD: 10.73 10*3/MM3 (ref 4.8–10.8)

## 2017-08-30 PROCEDURE — 82306 VITAMIN D 25 HYDROXY: CPT | Performed by: FAMILY MEDICINE

## 2017-08-30 PROCEDURE — 80053 COMPREHEN METABOLIC PANEL: CPT | Performed by: FAMILY MEDICINE

## 2017-08-30 PROCEDURE — 80061 LIPID PANEL: CPT | Performed by: FAMILY MEDICINE

## 2017-08-30 PROCEDURE — 36415 COLL VENOUS BLD VENIPUNCTURE: CPT | Performed by: FAMILY MEDICINE

## 2017-08-30 PROCEDURE — 84439 ASSAY OF FREE THYROXINE: CPT | Performed by: FAMILY MEDICINE

## 2017-08-30 PROCEDURE — 85025 COMPLETE CBC W/AUTO DIFF WBC: CPT | Performed by: FAMILY MEDICINE

## 2017-08-30 PROCEDURE — 83036 HEMOGLOBIN GLYCOSYLATED A1C: CPT | Performed by: FAMILY MEDICINE

## 2017-08-30 PROCEDURE — 84443 ASSAY THYROID STIM HORMONE: CPT | Performed by: FAMILY MEDICINE

## 2017-08-30 PROCEDURE — 80185 ASSAY OF PHENYTOIN TOTAL: CPT | Performed by: FAMILY MEDICINE

## 2017-08-30 PROCEDURE — 80177 DRUG SCRN QUAN LEVETIRACETAM: CPT | Performed by: FAMILY MEDICINE

## 2017-09-01 ENCOUNTER — OFFICE VISIT (OUTPATIENT)
Dept: SURGERY | Facility: CLINIC | Age: 67
End: 2017-09-01

## 2017-09-01 VITALS
WEIGHT: 149 LBS | SYSTOLIC BLOOD PRESSURE: 122 MMHG | DIASTOLIC BLOOD PRESSURE: 82 MMHG | BODY MASS INDEX: 27.42 KG/M2 | HEIGHT: 62 IN

## 2017-09-01 DIAGNOSIS — Z09 SURGERY FOLLOW-UP: Primary | ICD-10-CM

## 2017-09-01 LAB — LEVETIRACETAM SERPL-MCNC: 21.4 UG/ML (ref 10–40)

## 2017-09-01 PROCEDURE — 99024 POSTOP FOLLOW-UP VISIT: CPT | Performed by: SURGERY

## 2017-09-01 NOTE — PROGRESS NOTES
PATIENT INFORMATION  Caitlyn Camacho   - 1950    CHIEF COMPLAINT  Chief Complaint   Patient presents with   • Post-op Follow-up   2 wks 3 days s/p recurrent ventral incisional hernia reapir, pt is w/o complaints    HISTORY OF PRESENT ILLNESS  HPI  Patient was as the office today for routine follow-up.  She status post recurrent ventral incisional hernia repair.  She is accompanied by the [facility] staff.  She offers no complaints this visit.  She reports she is eating well.  Per review of records there have been no complaints of abdominal pain, nausea vomiting fevers chills redness or drainage from her surgical incision.  She has regular bowel movements.  She has resumed normal activities.    REVIEW OF SYSTEMS  Review of Systems  As per history of present illness    ACTIVE PROBLEMS  Patient Active Problem List    Diagnosis   • Recurrent ventral incisional hernia [K43.2]     Overview Note:     Added automatically from request for surgery [id number]     • Epilepsy [G40.909]     Overview Note:     Overview:   no sz activity in yrs     • Cardiac arrhythmia [I49.9]   • Hypoxia [R09.02]   • Hypervolemia [E87.70]   • Adverse drug effect [T88.7XXA]   • Partial symptomatic epilepsy with complex partial seizures, not intractable, without status epilepticus [G40.209]   • Nutritional disorder [E63.9]   • 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]   • Stricture of duodenum [K31.5]   • Duodenal ulcer [K26.9]   • Hypothyroidism [E03.9]   • Arthritis [M19.90]   • Dementia [F03.90]   • Depression [F32.9]   • 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, 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]   • Hyperlipidemia [E78.5]   • Mental retardation [F79]   • Osteopenia [M85.80]   • Vitamin D deficiency [E55.9]         PAST MEDICAL HISTORY  Past Medical History:   Diagnosis Date   • Anemia due to blood loss    • Anxiety    • Arthritis    • Duodenal stricture    • Duodenal ulcer    • Dysphagia    • Gallstones    • H/O convulsions    • Hypercholesteremia    • Hypothyroidism    •  Major depression    • Moderate intellectual disability    • OCD (obsessive compulsive disorder)    • Ocular histoplasmosis     w/macular scars   • Osteopenia    • Ptosis    • Pure hyperglyceridemia    • S/P bypass gastrojejunostomy    • Seasonal allergies    • Seizure disorder    • Unspecified dementia without behavioral disturbance    • Ventral hernia     sched repair   • Vitamin D deficiency          SURGICAL HISTORY  Past Surgical History:   Procedure Laterality Date   • CATARACT EXTRACTION Bilateral 2016   • CHOLECYSTECTOMY OPEN  03/2017   • COLONOSCOPY  05/2009   • ENDOSCOPY  03/2017   • GASTROJEJUNOSTOMY  08/2016   • GASTROSTOMY      TEJINDER   • HYSTERECTOMY     • VENTRAL/INCISIONAL HERNIA REPAIR N/A 8/15/2017    Procedure: Lysis of adhesion and repair of recurrent incisional hernia with onlay mesh placement ;  Surgeon: Donna Ivy MD;  Location: [facility];  Service:          FAMILY HISTORY  Family History   Problem Relation Age of Onset   • Family history unknown: Yes         SOCIAL HISTORY  Social History     Occupational History   • Not on file.     Social History Main Topics   • Smoking status: Never Smoker   • Smokeless tobacco: Never Used   • Alcohol use No   • Drug use: No   • Sexual activity: Defer         CURRENT MEDICATIONS    Current Outpatient Prescriptions:   •  acetaminophen (TYLENOL) 500 MG tablet, Take 1,000 mg by mouth Every 6 (Six) Hours As Needed for Mild Pain (1-3) or Fever., Disp: , Rfl:   •  Albuterol (VENTOLIN IN), Inhale 2 puffs Every 4 (Four) Hours As Needed (wheezing/coughing/sob)., Disp: , Rfl:   •  atorvastatin (LIPITOR) 20 MG tablet, Take 20 mg by mouth Every Night., Disp: , Rfl:   •  brimonidine-timolol (COMBIGAN) 0.2-0.5 % ophthalmic solution, Administer 1 drop to both eyes Every 12 (Twelve) Hours., Disp: , Rfl:   •  busPIRone (BUSPAR) 10 MG tablet, Take 10 mg by mouth 2 (Two) Times a Day., Disp: , Rfl:   •  calcium carbonate (OS-KATY) 600 MG tablet, Take 600 mg by mouth 3  (Three) Times a Day With Meals., Disp: , Rfl:   •  carvedilol (COREG) 6.25 MG tablet, Take 6.25 mg by mouth 2 (Two) Times a Day With Meals., Disp: , Rfl:   •  cephalexin (KEFLEX) 500 MG capsule, Take 1 capsule by mouth 3 (Three) Times a Day., Disp: 21 capsule, Rfl: 0  •  cetirizine (zyrTEC) 10 MG tablet, Take 10 mg by mouth Daily., Disp: , Rfl:   •  denosumab (PROLIA) 60 MG/ML solution syringe, Inject 60 mg under the skin Take As Directed. Every 6 months, due December, Disp: , Rfl:   •  dextromethorphan polistirex ER (DELSYM) 30 MG/5ML Suspension Extended Release oral suspension, Take 10 mL by mouth 2 (Two) Times a Day As Needed (cough)., Disp: , Rfl:   •  docusate sodium (COLACE) 100 MG capsule, Take 100 mg by mouth 2 (Two) Times a Day., Disp: , Rfl:   •  Ergocalciferol (VITAMIN D2 PO), Take 50,000 Units by mouth Every 7 (Seven) Days., Disp: , Rfl:   •  ferrous sulfate 325 (65 FE) MG tablet, Take 325 mg by mouth Daily With Breakfast., Disp: , Rfl:   •  fluticasone (FLONASE) 50 MCG/ACT nasal spray, 2 sprays into each nostril Daily., Disp: , Rfl:   •  glucosamine-chondroitin 500-400 MG capsule capsule, Take 3 capsules by mouth Daily., Disp: , Rfl:   •  guaiFENesin (MUCINEX) 600 MG 12 hr tablet, Take 1,200 mg by mouth 2 (Two) Times a Day As Needed for Congestion., Disp: , Rfl:   •  levETIRAcetam (KEPPRA) 500 MG tablet, Take 500 mg by mouth 2 (Two) Times a Day., Disp: , Rfl:   •  levothyroxine (SYNTHROID, LEVOTHROID) 50 MCG tablet, Take 50 mcg by mouth Daily., Disp: , Rfl:   •  magnesium hydroxide (MILK OF MAGNESIA) 400 MG/5ML suspension, Take 30 mL by mouth As Needed for Constipation (no bm x 3 days)., Disp: , Rfl:   •  montelukast (SINGULAIR) 10 MG tablet, Take 10 mg by mouth Every Night., Disp: , Rfl:   •  Multiple Vitamins-Minerals (MULTIVITAMIN ADULT PO), Take 1 tablet by mouth Daily., Disp: , Rfl:   •  Nutritional Supplements (RESOURCE ARGINAID) pack, Take  by mouth 2 (Two) Times a Day., Disp: , Rfl:   •   "ondansetron (ZOFRAN) 4 MG tablet, Take 1 tablet by mouth Every 6 (Six) Hours As Needed for Nausea or Vomiting., Disp: 20 tablet, Rfl: 0  •  oxyCODONE-acetaminophen (PERCOCET) 5-325 MG per tablet, Take 1 tablet by mouth Every 6 (Six) Hours As Needed (as needed for pain)., Disp: 30 tablet, Rfl: 0  •  pantoprazole (PROTONIX) 40 MG EC tablet, Take 40 mg by mouth 2 (Two) Times a Day., Disp: , Rfl:   •  PARoxetine (PAXIL) 40 MG tablet, Take 40 mg by mouth Every Morning., Disp: , Rfl:   •  polyethylene glycol (MIRALAX) packet, Take 17 g by mouth Daily., Disp: , Rfl:   •  Probiotic Product (PROBIOTIC DAILY PO), Take 1 capsule by mouth Daily., Disp: , Rfl:   •  QUEtiapine (SEROquel) 100 MG tablet, Take 100 mg by mouth Every Night., Disp: , Rfl:   •  sennosides-docusate sodium (SENOKOT-S) 8.6-50 MG tablet, Take 2 tablets by mouth Daily for 30 days., Disp: 60 tablet, Rfl: 0  •  solifenacin (VESICARE) 10 MG tablet, Take 10 mg by mouth Daily., Disp: , Rfl:     ALLERGIES  Codeine    VITALS  Vitals:    09/01/17 1026   BP: 122/82   Weight: 149 lb (67.6 kg)   Height: 62\" (157.5 cm)       LAST RESULTS   Lab Requisition on 08/30/2017   Component Date Value Ref Range Status   • Glucose 08/30/2017 93  65 - 99 mg/dL Final   • BUN 08/30/2017 21  8 - 23 mg/dL Final   • Creatinine 08/30/2017 0.69  0.57 - 1.00 mg/dL Final   • Sodium 08/30/2017 140  136 - 145 mmol/L Final   • Potassium 08/30/2017 4.6  3.5 - 5.2 mmol/L Final   • Chloride 08/30/2017 100  98 - 107 mmol/L Final   • CO2 08/30/2017 30.1* 22.0 - 29.0 mmol/L Final   • Calcium 08/30/2017 9.0  8.8 - 10.5 mg/dL Final   • Total Protein 08/30/2017 6.5  6.0 - 8.5 g/dL Final   • Albumin 08/30/2017 3.10* 3.50 - 5.20 g/dL Final   • ALT (SGPT) 08/30/2017 16  5 - 33 U/L Final   • AST (SGOT) 08/30/2017 24  5 - 32 U/L Final   • Alkaline Phosphatase 08/30/2017 74  40 - 129 U/L Final   • Total Bilirubin 08/30/2017 <0.2* 0.2 - 1.2 mg/dL Final   • eGFR Non African Amer 08/30/2017 85  >60 mL/min/1.73 " Final   • Globulin 08/30/2017 3.4  gm/dL Final   • A/G Ratio 08/30/2017 0.9  g/dL Final   • BUN/Creatinine Ratio 08/30/2017 30.4* 7.0 - 25.0 Final   • Anion Gap 08/30/2017 9.9  mmol/L Final   • Total Cholesterol 08/30/2017 122  0 - 200 mg/dL Final   • Triglycerides 08/30/2017 79  0 - 150 mg/dL Final   • HDL Cholesterol 08/30/2017 51  40 - 60 mg/dL Final   • LDL Cholesterol  08/30/2017 55  0 - 100 mg/dL Final   • VLDL Cholesterol 08/30/2017 15.8  7 - 27 mg/dL Final   • LDL/HDL Ratio 08/30/2017 1.08   Final   • TSH 08/30/2017 2.560  0.270 - 4.200 mIU/mL Final   • Hemoglobin A1C 08/30/2017 5.80* 4.80 - 5.60 % Final   • Free T4 08/30/2017 1.41  0.93 - 1.70 ng/dL Final   • 25 Hydroxy, Vitamin D 08/30/2017 21.4  ng/ml Final   • Phenytoin Level 08/30/2017 <0.8* 10.0 - 20.0 mcg/mL Final   • Levetiracetam 08/30/2017 21.4  10.0 - 40.0 ug/mL Final   • WBC 08/30/2017 10.73  4.80 - 10.80 10*3/mm3 Final   • RBC 08/30/2017 4.22  4.20 - 5.40 10*6/mm3 Final   • Hemoglobin 08/30/2017 11.6* 12.0 - 16.0 g/dL Final   • Hematocrit 08/30/2017 36.6* 37.0 - 47.0 % Final   • MCV 08/30/2017 86.7  81.0 - 99.0 fL Final   • MCH 08/30/2017 27.5  27.0 - 31.0 pg Final   • MCHC 08/30/2017 31.7  31.0 - 37.0 g/dL Final   • RDW 08/30/2017 15.6* 11.5 - 14.5 % Final   • RDW-SD 08/30/2017 49.5  37.0 - 54.0 fl Final   • MPV 08/30/2017 9.9  7.4 - 10.4 fL Final   • Platelets 08/30/2017 399  140 - 500 10*3/mm3 Final   • Neutrophil % 08/30/2017 44.2* 45.0 - 70.0 % Final   • Lymphocyte % 08/30/2017 41.9  20.0 - 45.0 % Final   • Monocyte % 08/30/2017 8.3* 3.0 - 8.0 % Final   • Eosinophil % 08/30/2017 3.7  0.0 - 4.0 % Final   • Basophil % 08/30/2017 1.3  0.0 - 2.0 % Final   • Immature Grans % 08/30/2017 0.6* 0.0 - 0.5 % Final   • Neutrophils, Absolute 08/30/2017 4.74  1.50 - 8.30 10*3/mm3 Final   • Lymphocytes, Absolute 08/30/2017 4.50  0.60 - 4.80 10*3/mm3 Final   • Monocytes, Absolute 08/30/2017 0.89  0.00 - 1.00 10*3/mm3 Final   • Eosinophils, Absolute  08/30/2017 0.40* 0.10 - 0.30 10*3/mm3 Final   • Basophils, Absolute 08/30/2017 0.14  0.00 - 0.20 10*3/mm3 Final   • Immature Grans, Absolute 08/30/2017 0.06* 0.00 - 0.03 10*3/mm3 Final   • nRBC 08/30/2017 0.0  0.0 - 0.0 /100 WBC Final     Xr Chest 2 View    Result Date: 8/21/2017  Narrative: INDICATION:  Shortness of air. Cough. Recent hernia surgery.  COMPARISON:  08/16/2017  FINDINGS: PA and lateral views of the chest.  Heart and mediastinal contours are unchanged.  No new pulmonary opacities. Retrocardiac left lower lobe airspace opacity is similar the prior study. No pneumothorax or pleural effusion. Severe scoliosis is again noted.      Impression: No significant interval change  This report was finalized on 8/21/2017 10:06 AM by Dr. Geovani Khalil MD.      Xr Chest 2 View    Result Date: 8/9/2017  Narrative: INDICATION:  Preoperative assessment for hernia surgery. Productive cough  COMPARISON:  03/15/2017  FINDINGS: PA and lateral views of the chest.  Heart and mediastinal contours are unchanged. The heart is enlarged.  The lungs are clear.  No pneumothorax or pleural effusion. Advanced scoliosis is unchanged from the prior study.      Impression: No new cardiopulmonary findings.  This report was finalized on 8/9/2017 2:12 PM by Dr. Geovani Khalil MD.      Xr Abdomen Flat & Upright    Result Date: 8/16/2017  Narrative: ABDOMEN SERIES, 08/16/2017:  HISTORY: 67-year-old female one day postop ventral hernia repair. Postoperative generalized abdomen pain.  TECHNIQUE: Flat and upright abdomen series.  FINDINGS: Surgical staples and a surgical drain superimposed over the abdominal midline. No free intraperitoneal air.  No visible bowel dilatation to suggest obstruction. Large volume stool is present throughout the entire colon.      Impression: 1. Large volume stool throughout the colon. 2. Bowel gas pattern within normal limits. No free air. 3. Postop changes as noted.  This report was finalized on 8/16/2017 9:54 AM  by Dr. Bean Garcia MD.      Xr Chest Pa & Lateral    Result Date: 8/16/2017  Narrative: CHEST X-RAY, 08/16/2017:  HISTORY: 67-year-old female one day postop ventral hernia repair. Postoperative generalized abdomen pain. Cough and shortness of air.  TECHNIQUE: PA and lateral upright chest series.  COMPARISON: *  Chest x-ray, 03/15/2017.  FINDINGS: Chronic scarring and atelectasis in the lung bases. No visible acute pulmonary infiltrate or pleural effusion. Stable cardiomegaly. Severe scoliosis. No change since the prior exam.      Impression: 1. Chronic scarring and atelectasis in the lung bases, stable since 03/15/2017. 2. Stable cardiomegaly. 3. Scoliosis.  This report was finalized on 8/16/2017 9:57 AM by Dr. Bean Garcia MD.        PHYSICAL EXAM  Physical Exam   Constitutional: She is oriented to person, place, and time. She appears well-developed and well-nourished.   Cardiovascular: Normal rate, regular rhythm and normal heart sounds.    Pulmonary/Chest: Breath sounds normal.   Abdominal:   Soft, nondistended nontender positive bowel sounds in all 4 quadrants.  Incision healing well.  Remaining staples removed and Steri-Strips applied.  No recurrent hernia palpable.   Neurological: She is alert and oriented to person, place, and time.   Nursing note and vitals reviewed.      ASSESSMENT  Status post recurrent ventral incisional hernia repair  Wounds healing well.    Activity, diet, wound care and weightlifting restrictions discussed with the staff       PLAN  Follow-up 6 weeks.  [facility] staff was advised call the office sooner should she have worsening symptoms or go to the nearest emergency room.

## 2017-09-27 ENCOUNTER — TELEPHONE (OUTPATIENT)
Dept: SURGERY | Facility: CLINIC | Age: 67
End: 2017-09-27

## 2017-10-13 ENCOUNTER — OFFICE VISIT (OUTPATIENT)
Dept: SURGERY | Facility: CLINIC | Age: 67
End: 2017-10-13

## 2017-10-13 VITALS
BODY MASS INDEX: 28.52 KG/M2 | WEIGHT: 155 LBS | SYSTOLIC BLOOD PRESSURE: 128 MMHG | DIASTOLIC BLOOD PRESSURE: 80 MMHG | HEIGHT: 62 IN

## 2017-10-13 DIAGNOSIS — Z09 SURGERY FOLLOW-UP: Primary | ICD-10-CM

## 2017-10-13 PROCEDURE — 99024 POSTOP FOLLOW-UP VISIT: CPT | Performed by: SURGERY

## 2017-10-13 NOTE — PROGRESS NOTES
PATIENT INFORMATION  Caitlyn Camacho   - 1950    CHIEF COMPLAINT  Chief Complaint   Patient presents with   • Post-op Follow-up     8 wks 3 days s/p Recurrent ventral incisional hernia repair, pt is w/o complaints      HISTORY OF PRESENT ILLNESS  HPI  Caitlyn presents to the office today for routine follow-up.  She is accompanied by the [facility] staff.  Per review of the records that have been no changes in her medications are noted diagnosis.  Caitlyn reports she is doing very well.  Denies any abdominal pain nausea vomiting change in bowel movements melena or hematochezia.  She reports good appetite and her bowels are moving regularly.      REVIEW OF SYSTEMS  Review of Systems  Per history of present illness    ACTIVE PROBLEMS  Patient Active Problem List    Diagnosis   • Recurrent ventral incisional hernia [K43.2]     Overview Note:     Added automatically from request for surgery [id number]     • Epilepsy [G40.909]     Overview Note:     Overview:   no sz activity in yrs     • Cardiac arrhythmia [I49.9]   • Hypoxia [R09.02]   • Hypervolemia [E87.70]   • Adverse drug effect [T88.7XXA]   • Partial symptomatic epilepsy with complex partial seizures, not intractable, without status epilepticus [G40.209]   • Nutritional disorder [E63.9]   • 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]   • Stricture of duodenum [K31.5]   • Duodenal ulcer [K26.9]   • Hypothyroidism [E03.9]   • Arthritis [M19.90]   • Dementia [F03.90]   • Depression [F32.9]   • 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, 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]   • Hyperlipidemia [E78.5]   • Mental retardation [F79]   • Osteopenia [M85.80]   • Vitamin D deficiency [E55.9]         PAST MEDICAL HISTORY  Past Medical History:   Diagnosis Date   • Anemia due to blood loss    • Anxiety    • Arthritis    • Duodenal stricture    • Duodenal ulcer    • Dysphagia    • Gallstones    • H/O convulsions    • Hypercholesteremia    • Hypothyroidism    • Major depression    • Moderate intellectual  disability    • OCD (obsessive compulsive disorder)    • Ocular histoplasmosis     w/macular scars   • Osteopenia    • Ptosis    • Pure hyperglyceridemia    • S/P bypass gastrojejunostomy    • Seasonal allergies    • Seizure disorder    • Unspecified dementia without behavioral disturbance    • Ventral hernia     sched repair   • Vitamin D deficiency          SURGICAL HISTORY  Past Surgical History:   Procedure Laterality Date   • CATARACT EXTRACTION Bilateral 2016   • CHOLECYSTECTOMY OPEN  03/2017   • COLONOSCOPY  05/2009   • ENDOSCOPY  03/2017   • GASTROJEJUNOSTOMY  08/2016   • GASTROSTOMY      TEJINDER   • HYSTERECTOMY     • VENTRAL/INCISIONAL HERNIA REPAIR N/A 8/15/2017    Procedure: Lysis of adhesion and repair of recurrent incisional hernia with onlay mesh placement ;  Surgeon: Donna Ivy MD;  Location: [facility];  Service:          FAMILY HISTORY  Family History   Problem Relation Age of Onset   • Family history unknown: Yes         SOCIAL HISTORY  Social History     Occupational History   • Not on file.     Social History Main Topics   • Smoking status: Never Smoker   • Smokeless tobacco: Never Used   • Alcohol use No   • Drug use: No   • Sexual activity: Defer         CURRENT MEDICATIONS    Current Outpatient Prescriptions:   •  acetaminophen (TYLENOL) 500 MG tablet, Take 1,000 mg by mouth Every 6 (Six) Hours As Needed for Mild Pain (1-3) or Fever., Disp: , Rfl:   •  Albuterol (VENTOLIN IN), Inhale 2 puffs Every 4 (Four) Hours As Needed (wheezing/coughing/sob)., Disp: , Rfl:   •  atorvastatin (LIPITOR) 20 MG tablet, Take 20 mg by mouth Every Night., Disp: , Rfl:   •  brimonidine-timolol (COMBIGAN) 0.2-0.5 % ophthalmic solution, Administer 1 drop to both eyes Every 12 (Twelve) Hours., Disp: , Rfl:   •  busPIRone (BUSPAR) 10 MG tablet, Take 10 mg by mouth 2 (Two) Times a Day., Disp: , Rfl:   •  calcium carbonate (OS-KATY) 600 MG tablet, Take 600 mg by mouth 3 (Three) Times a Day With Meals., Disp: , Rfl:    •  carvedilol (COREG) 6.25 MG tablet, Take 6.25 mg by mouth 2 (Two) Times a Day With Meals., Disp: , Rfl:   •  cephalexin (KEFLEX) 500 MG capsule, Take 1 capsule by mouth 3 (Three) Times a Day., Disp: 21 capsule, Rfl: 0  •  cetirizine (zyrTEC) 10 MG tablet, Take 10 mg by mouth Daily., Disp: , Rfl:   •  denosumab (PROLIA) 60 MG/ML solution syringe, Inject 60 mg under the skin Take As Directed. Every 6 months, due December, Disp: , Rfl:   •  dextromethorphan polistirex ER (DELSYM) 30 MG/5ML Suspension Extended Release oral suspension, Take 10 mL by mouth 2 (Two) Times a Day As Needed (cough)., Disp: , Rfl:   •  docusate sodium (COLACE) 100 MG capsule, Take 100 mg by mouth 2 (Two) Times a Day., Disp: , Rfl:   •  Ergocalciferol (VITAMIN D2 PO), Take 50,000 Units by mouth Every 7 (Seven) Days., Disp: , Rfl:   •  ferrous sulfate 325 (65 FE) MG tablet, Take 325 mg by mouth Daily With Breakfast., Disp: , Rfl:   •  fluticasone (FLONASE) 50 MCG/ACT nasal spray, 2 sprays into each nostril Daily., Disp: , Rfl:   •  glucosamine-chondroitin 500-400 MG capsule capsule, Take 3 capsules by mouth Daily., Disp: , Rfl:   •  guaiFENesin (MUCINEX) 600 MG 12 hr tablet, Take 1,200 mg by mouth 2 (Two) Times a Day As Needed for Congestion., Disp: , Rfl:   •  levETIRAcetam (KEPPRA) 500 MG tablet, Take 500 mg by mouth 2 (Two) Times a Day., Disp: , Rfl:   •  levothyroxine (SYNTHROID, LEVOTHROID) 50 MCG tablet, Take 50 mcg by mouth Daily., Disp: , Rfl:   •  magnesium hydroxide (MILK OF MAGNESIA) 400 MG/5ML suspension, Take 30 mL by mouth As Needed for Constipation (no bm x 3 days)., Disp: , Rfl:   •  montelukast (SINGULAIR) 10 MG tablet, Take 10 mg by mouth Every Night., Disp: , Rfl:   •  Multiple Vitamins-Minerals (MULTIVITAMIN ADULT PO), Take 1 tablet by mouth Daily., Disp: , Rfl:   •  Nutritional Supplements (RESOURCE ARGINAID) pack, Take  by mouth 2 (Two) Times a Day., Disp: , Rfl:   •  ondansetron (ZOFRAN) 4 MG tablet, Take 1 tablet by  "mouth Every 6 (Six) Hours As Needed for Nausea or Vomiting., Disp: 20 tablet, Rfl: 0  •  oxyCODONE-acetaminophen (PERCOCET) 5-325 MG per tablet, Take 1 tablet by mouth Every 6 (Six) Hours As Needed (as needed for pain)., Disp: 30 tablet, Rfl: 0  •  pantoprazole (PROTONIX) 40 MG EC tablet, Take 40 mg by mouth 2 (Two) Times a Day., Disp: , Rfl:   •  PARoxetine (PAXIL) 40 MG tablet, Take 40 mg by mouth Every Morning., Disp: , Rfl:   •  polyethylene glycol (MIRALAX) packet, Take 17 g by mouth Daily., Disp: , Rfl:   •  Probiotic Product (PROBIOTIC DAILY PO), Take 1 capsule by mouth Daily., Disp: , Rfl:   •  QUEtiapine (SEROquel) 100 MG tablet, Take 100 mg by mouth Every Night., Disp: , Rfl:   •  solifenacin (VESICARE) 10 MG tablet, Take 10 mg by mouth Daily., Disp: , Rfl:     ALLERGIES  Codeine    VITALS  Vitals:    10/13/17 1259   BP: 128/80   Weight: 155 lb (70.3 kg)   Height: 62\" (157.5 cm)       LAST RESULTS   Lab Requisition on 08/30/2017   Component Date Value Ref Range Status   • Glucose 08/30/2017 93  65 - 99 mg/dL Final   • BUN 08/30/2017 21  8 - 23 mg/dL Final   • Creatinine 08/30/2017 0.69  0.57 - 1.00 mg/dL Final   • Sodium 08/30/2017 140  136 - 145 mmol/L Final   • Potassium 08/30/2017 4.6  3.5 - 5.2 mmol/L Final   • Chloride 08/30/2017 100  98 - 107 mmol/L Final   • CO2 08/30/2017 30.1* 22.0 - 29.0 mmol/L Final   • Calcium 08/30/2017 9.0  8.8 - 10.5 mg/dL Final   • Total Protein 08/30/2017 6.5  6.0 - 8.5 g/dL Final   • Albumin 08/30/2017 3.10* 3.50 - 5.20 g/dL Final   • ALT (SGPT) 08/30/2017 16  5 - 33 U/L Final   • AST (SGOT) 08/30/2017 24  5 - 32 U/L Final   • Alkaline Phosphatase 08/30/2017 74  40 - 129 U/L Final   • Total Bilirubin 08/30/2017 <0.2* 0.2 - 1.2 mg/dL Final   • eGFR Non African Amer 08/30/2017 85  >60 mL/min/1.73 Final   • Globulin 08/30/2017 3.4  gm/dL Final   • A/G Ratio 08/30/2017 0.9  g/dL Final   • BUN/Creatinine Ratio 08/30/2017 30.4* 7.0 - 25.0 Final   • Anion Gap 08/30/2017 9.9  " mmol/L Final   • Total Cholesterol 08/30/2017 122  0 - 200 mg/dL Final   • Triglycerides 08/30/2017 79  0 - 150 mg/dL Final   • HDL Cholesterol 08/30/2017 51  40 - 60 mg/dL Final   • LDL Cholesterol  08/30/2017 55  0 - 100 mg/dL Final   • VLDL Cholesterol 08/30/2017 15.8  7 - 27 mg/dL Final   • LDL/HDL Ratio 08/30/2017 1.08   Final   • TSH 08/30/2017 2.560  0.270 - 4.200 mIU/mL Final   • Hemoglobin A1C 08/30/2017 5.80* 4.80 - 5.60 % Final   • Free T4 08/30/2017 1.41  0.93 - 1.70 ng/dL Final   • 25 Hydroxy, Vitamin D 08/30/2017 21.4  ng/ml Final   • Phenytoin Level 08/30/2017 <0.8* 10.0 - 20.0 mcg/mL Final   • Levetiracetam 08/30/2017 21.4  10.0 - 40.0 ug/mL Final   • WBC 08/30/2017 10.73  4.80 - 10.80 10*3/mm3 Final   • RBC 08/30/2017 4.22  4.20 - 5.40 10*6/mm3 Final   • Hemoglobin 08/30/2017 11.6* 12.0 - 16.0 g/dL Final   • Hematocrit 08/30/2017 36.6* 37.0 - 47.0 % Final   • MCV 08/30/2017 86.7  81.0 - 99.0 fL Final   • MCH 08/30/2017 27.5  27.0 - 31.0 pg Final   • MCHC 08/30/2017 31.7  31.0 - 37.0 g/dL Final   • RDW 08/30/2017 15.6* 11.5 - 14.5 % Final   • RDW-SD 08/30/2017 49.5  37.0 - 54.0 fl Final   • MPV 08/30/2017 9.9  7.4 - 10.4 fL Final   • Platelets 08/30/2017 399  140 - 500 10*3/mm3 Final   • Neutrophil % 08/30/2017 44.2* 45.0 - 70.0 % Final   • Lymphocyte % 08/30/2017 41.9  20.0 - 45.0 % Final   • Monocyte % 08/30/2017 8.3* 3.0 - 8.0 % Final   • Eosinophil % 08/30/2017 3.7  0.0 - 4.0 % Final   • Basophil % 08/30/2017 1.3  0.0 - 2.0 % Final   • Immature Grans % 08/30/2017 0.6* 0.0 - 0.5 % Final   • Neutrophils, Absolute 08/30/2017 4.74  1.50 - 8.30 10*3/mm3 Final   • Lymphocytes, Absolute 08/30/2017 4.50  0.60 - 4.80 10*3/mm3 Final   • Monocytes, Absolute 08/30/2017 0.89  0.00 - 1.00 10*3/mm3 Final   • Eosinophils, Absolute 08/30/2017 0.40* 0.10 - 0.30 10*3/mm3 Final   • Basophils, Absolute 08/30/2017 0.14  0.00 - 0.20 10*3/mm3 Final   • Immature Grans, Absolute 08/30/2017 0.06* 0.00 - 0.03 10*3/mm3 Final    • [facility] 08/30/2017 0.0  0.0 - 0.0 /100 WBC Final     No results found.    PHYSICAL EXAM  Physical Exam   Constitutional: She is oriented to person, place, and time. She appears well-developed and well-nourished.   Cardiovascular: Normal rate, regular rhythm and normal heart sounds.    Pulmonary/Chest: Breath sounds normal.   Abdominal:   Soft, nondistended nontender positive bowel sounds in all 4 quadrants.  Well-healed midline incision.  No hernia recurrence palpable.   Neurological: She is alert and oriented to person, place, and time.   Skin: Skin is warm and dry.   Nursing note and vitals reviewed.      ASSESSMENT  Status post recurrent ventral incisional hernia repair  Doing well  No acute general surgical issues at this time      PLAN  Follow-up as needed/when necessary.  [facility] staff was advised to call the office sooner should she have worsening symptoms or go to the nearest emergency room.

## 2017-11-01 ENCOUNTER — LAB REQUISITION (OUTPATIENT)
Dept: LAB | Facility: HOSPITAL | Age: 67
End: 2017-11-01

## 2017-11-01 DIAGNOSIS — E03.9 HYPOTHYROIDISM: ICD-10-CM

## 2017-11-01 DIAGNOSIS — R56.9 CONVULSIONS (HCC): ICD-10-CM

## 2017-11-01 DIAGNOSIS — Z79.899 OTHER LONG TERM (CURRENT) DRUG THERAPY: ICD-10-CM

## 2017-11-01 DIAGNOSIS — D72.819 DECREASED WHITE BLOOD CELL COUNT: ICD-10-CM

## 2017-11-01 DIAGNOSIS — E55.9 VITAMIN D DEFICIENCY: ICD-10-CM

## 2017-11-01 DIAGNOSIS — R79.89 OTHER SPECIFIED ABNORMAL FINDINGS OF BLOOD CHEMISTRY: ICD-10-CM

## 2017-11-01 DIAGNOSIS — R73.09 OTHER ABNORMAL GLUCOSE: ICD-10-CM

## 2017-11-01 LAB
25(OH)D3 SERPL-MCNC: 20.9 NG/ML
ALBUMIN SERPL-MCNC: 3.4 G/DL (ref 3.5–5.2)
ALBUMIN/GLOB SERPL: 1.2 G/DL
ALP SERPL-CCNC: 74 U/L (ref 40–129)
ALT SERPL W P-5'-P-CCNC: 20 U/L (ref 5–33)
ANION GAP SERPL CALCULATED.3IONS-SCNC: 9.4 MMOL/L
AST SERPL-CCNC: 21 U/L (ref 5–32)
BASOPHILS # BLD AUTO: 0.11 10*3/MM3 (ref 0–0.2)
BASOPHILS NFR BLD AUTO: 1.3 % (ref 0–2)
BILIRUB SERPL-MCNC: 0.2 MG/DL (ref 0.2–1.2)
BUN BLD-MCNC: 18 MG/DL (ref 8–23)
BUN/CREAT SERPL: 30 (ref 7–25)
CALCIUM SPEC-SCNC: 9.2 MG/DL (ref 8.8–10.5)
CHLORIDE SERPL-SCNC: 103 MMOL/L (ref 98–107)
CO2 SERPL-SCNC: 29.6 MMOL/L (ref 22–29)
CREAT BLD-MCNC: 0.6 MG/DL (ref 0.57–1)
DEPRECATED RDW RBC AUTO: 48.3 FL (ref 37–54)
EOSINOPHIL # BLD AUTO: 0.35 10*3/MM3 (ref 0.1–0.3)
EOSINOPHIL NFR BLD AUTO: 4.2 % (ref 0–4)
ERYTHROCYTE [DISTWIDTH] IN BLOOD BY AUTOMATED COUNT: 15 % (ref 11.5–14.5)
GFR SERPL CREATININE-BSD FRML MDRD: 100 ML/MIN/1.73
GLOBULIN UR ELPH-MCNC: 2.9 GM/DL
GLUCOSE BLD-MCNC: 87 MG/DL (ref 65–99)
HBA1C MFR BLD: 5.6 % (ref 4.8–5.6)
HCT VFR BLD AUTO: 39.5 % (ref 37–47)
HGB BLD-MCNC: 13.1 G/DL (ref 12–16)
IMM GRANULOCYTES # BLD: 0.03 10*3/MM3 (ref 0–0.03)
IMM GRANULOCYTES NFR BLD: 0.4 % (ref 0–0.5)
LYMPHOCYTES # BLD AUTO: 4.16 10*3/MM3 (ref 0.6–4.8)
LYMPHOCYTES NFR BLD AUTO: 50.2 % (ref 20–45)
MCH RBC QN AUTO: 29 PG (ref 27–31)
MCHC RBC AUTO-ENTMCNC: 33.2 G/DL (ref 31–37)
MCV RBC AUTO: 87.4 FL (ref 81–99)
MONOCYTES # BLD AUTO: 0.9 10*3/MM3 (ref 0–1)
MONOCYTES NFR BLD AUTO: 10.9 % (ref 3–8)
NEUTROPHILS # BLD AUTO: 2.74 10*3/MM3 (ref 1.5–8.3)
NEUTROPHILS NFR BLD AUTO: 33 % (ref 45–70)
NRBC BLD MANUAL-RTO: 0 /100 WBC (ref 0–0)
PHENYTOIN SERPL-MCNC: <0.8 MCG/ML (ref 10–20)
PLATELET # BLD AUTO: 208 10*3/MM3 (ref 140–500)
PMV BLD AUTO: 11.5 FL (ref 7.4–10.4)
POTASSIUM BLD-SCNC: 4.4 MMOL/L (ref 3.5–5.2)
PROT SERPL-MCNC: 6.3 G/DL (ref 6–8.5)
RBC # BLD AUTO: 4.52 10*6/MM3 (ref 4.2–5.4)
SODIUM BLD-SCNC: 142 MMOL/L (ref 136–145)
T4 FREE SERPL-MCNC: 1.61 NG/DL (ref 0.93–1.7)
TSH SERPL DL<=0.05 MIU/L-ACNC: 3.04 MIU/ML (ref 0.27–4.2)
WBC NRBC COR # BLD: 8.29 10*3/MM3 (ref 4.8–10.8)

## 2017-11-01 PROCEDURE — 80177 DRUG SCRN QUAN LEVETIRACETAM: CPT | Performed by: FAMILY MEDICINE

## 2017-11-01 PROCEDURE — 84443 ASSAY THYROID STIM HORMONE: CPT | Performed by: FAMILY MEDICINE

## 2017-11-01 PROCEDURE — 80185 ASSAY OF PHENYTOIN TOTAL: CPT | Performed by: FAMILY MEDICINE

## 2017-11-01 PROCEDURE — 36415 COLL VENOUS BLD VENIPUNCTURE: CPT | Performed by: FAMILY MEDICINE

## 2017-11-01 PROCEDURE — 85025 COMPLETE CBC W/AUTO DIFF WBC: CPT | Performed by: FAMILY MEDICINE

## 2017-11-01 PROCEDURE — 84439 ASSAY OF FREE THYROXINE: CPT | Performed by: FAMILY MEDICINE

## 2017-11-01 PROCEDURE — 80053 COMPREHEN METABOLIC PANEL: CPT | Performed by: FAMILY MEDICINE

## 2017-11-01 PROCEDURE — 82306 VITAMIN D 25 HYDROXY: CPT | Performed by: FAMILY MEDICINE

## 2017-11-01 PROCEDURE — 83036 HEMOGLOBIN GLYCOSYLATED A1C: CPT | Performed by: FAMILY MEDICINE

## 2017-11-05 LAB — LEVETIRACETAM SERPL-MCNC: 20.4 UG/ML (ref 10–40)

## 2017-11-08 ENCOUNTER — LAB REQUISITION (OUTPATIENT)
Dept: LAB | Facility: HOSPITAL | Age: 67
End: 2017-11-08

## 2017-11-08 DIAGNOSIS — Z79.899 OTHER LONG TERM (CURRENT) DRUG THERAPY: ICD-10-CM

## 2017-11-08 LAB
CHOLEST SERPL-MCNC: 107 MG/DL (ref 0–200)
HDLC SERPL-MCNC: 48 MG/DL (ref 40–60)
LDLC SERPL CALC-MCNC: 45 MG/DL (ref 0–100)
LDLC/HDLC SERPL: 0.93 {RATIO}
TRIGL SERPL-MCNC: 72 MG/DL (ref 0–150)
VLDLC SERPL-MCNC: 14.4 MG/DL (ref 7–27)

## 2017-11-08 PROCEDURE — 36415 COLL VENOUS BLD VENIPUNCTURE: CPT | Performed by: FAMILY MEDICINE

## 2017-11-08 PROCEDURE — 80061 LIPID PANEL: CPT | Performed by: FAMILY MEDICINE

## 2017-11-14 ENCOUNTER — TRANSCRIBE ORDERS (OUTPATIENT)
Dept: OBSTETRICS AND GYNECOLOGY | Facility: CLINIC | Age: 67
End: 2017-11-14

## 2017-11-14 DIAGNOSIS — M89.9 BONE DISEASE: Primary | ICD-10-CM

## 2017-11-29 ENCOUNTER — TREATMENT (OUTPATIENT)
Dept: NEUROLOGY | Facility: CLINIC | Age: 67
End: 2017-11-29

## 2017-11-29 VITALS
SYSTOLIC BLOOD PRESSURE: 112 MMHG | TEMPERATURE: 96.6 F | HEART RATE: 78 BPM | OXYGEN SATURATION: 97 % | HEIGHT: 62 IN | DIASTOLIC BLOOD PRESSURE: 76 MMHG

## 2017-11-29 DIAGNOSIS — T50.905D ADVERSE DRUG EFFECT, SUBSEQUENT ENCOUNTER: ICD-10-CM

## 2017-11-29 DIAGNOSIS — G30.0 EARLY ONSET ALZHEIMER'S DEMENTIA WITHOUT BEHAVIORAL DISTURBANCE (HCC): ICD-10-CM

## 2017-11-29 DIAGNOSIS — G40.209 PARTIAL SYMPTOMATIC EPILEPSY WITH COMPLEX PARTIAL SEIZURES, NOT INTRACTABLE, WITHOUT STATUS EPILEPTICUS (HCC): Primary | ICD-10-CM

## 2017-11-29 DIAGNOSIS — F02.80 EARLY ONSET ALZHEIMER'S DEMENTIA WITHOUT BEHAVIORAL DISTURBANCE (HCC): ICD-10-CM

## 2017-11-29 PROCEDURE — 99213 OFFICE O/P EST LOW 20 MIN: CPT | Performed by: PSYCHIATRY & NEUROLOGY

## 2017-11-30 ENCOUNTER — TRANSCRIBE ORDERS (OUTPATIENT)
Dept: ADMINISTRATIVE | Facility: HOSPITAL | Age: 67
End: 2017-11-30

## 2017-11-30 DIAGNOSIS — F02.80 DEMENTIA DUE TO MEDICAL CONDITION WITHOUT BEHAVIORAL DISTURBANCE (HCC): Primary | ICD-10-CM

## 2017-12-01 PROBLEM — G30.0 EARLY ONSET ALZHEIMER'S DEMENTIA WITHOUT BEHAVIORAL DISTURBANCE (HCC): Status: ACTIVE | Noted: 2017-12-01

## 2017-12-01 PROBLEM — F02.80 EARLY ONSET ALZHEIMER'S DEMENTIA WITHOUT BEHAVIORAL DISTURBANCE (HCC): Status: ACTIVE | Noted: 2017-12-01

## 2017-12-01 PROBLEM — T50.905D ADVERSE DRUG EFFECT, SUBSEQUENT ENCOUNTER: Status: ACTIVE | Noted: 2017-12-01

## 2017-12-01 NOTE — PROGRESS NOTES
History of present illness-epilepsy     The patient is a 66-year-old woman admitted to [facility] on November 29, 2016.  She has a chronic history of epilepsy but has been seizure-free for many years.  On Dilantin 300 mg daily the blood level was 2.6 on November 30 indicating she may be able to switch to an drug with fewer side effects such as Keppra.  There is also history of anxiety disorder, major depressive disorder and unspecified dementia without behavioral disturbance.     Her medicine list includes Dilantin 300 per day, Paxil 40 daily, Seroquel 100 at bedtime and BuSpar 10 mg twice a day.  She is allergic to codeine.    Family history social history review of systems past medical history-cannot otherwise be obtained due to her dementia.      Dementia: This is chronic.  She is under went 3 operations this year on her hernia and may have exacerbated the dementia.  She has OCD gait pattern.  Dementia seems to be worsening.  Vision is poor and she has nocturnal confusion-a sundown-type syndrome.      Neurologic Exam       Cranial Nerves       CN III, IV, VI   Pupils are equal, round, and reactive to light.      General appearance: No acute distress, well appearing and well nourished.   Musculoskeletal   Gait and station: Normal gait, stance and balance.   Muscle strength: Normal strength throughout.   Muscle tone: No atrophy, abnormal movements, flaccidity, cogwheeling or spasticity.   Involuntary movements: None     Reduced memory    Treatment     12/22/2016  [facility]    Vinnie Leavitt MD   Neurology    Partial symptomatic epilepsy with complex partial seizures, not intractable, without status epilepticus +2 more   Dx    Progress Notes            History of present illness-epilepsy     The patient is a 66-year-old woman admitted to [facility] on November 29, 2016.  She has a chronic history of epilepsy but has been seizure-free for many years.  On Dilantin 300 mg daily  the blood level was 2.6 on November 30 indicating she may be able to switch to an drug with fewer side effects such as Keppra.  There is also history of anxiety disorder, major depressive disorder and unspecified dementia without behavioral disturbance.     Her medicine list includes Dilantin 300 per day, Paxil 40 daily, Seroquel 100 at bedtime and BuSpar 10 mg twice a day.  She is allergic to codeine.    Family history social history review of systems past medical history-cannot otherwise be obtained due to her dementia.       Neurologic Exam       Cranial Nerves       CN III, IV, VI   Pupils are equal, round, and reactive to light.      General appearance: No acute distress, well appearing and well nourished.   Musculoskeletal   Gait and station: Normal gait, stance and balance.   Muscle strength: Normal strength throughout.   Muscle tone: No atrophy, abnormal movements, flaccidity, cogwheeling or spasticity.   Involuntary movements: None   Blood pressure 122/80, pulse 92, respirations 18, temperature 97.7, weight 135.2 pounds  Neurologic   Orientation to person, place, and time:ab- Normal. dementiaRecent and remote memory: Demonstrates normal memory.   Attention span and concentration: dementia   Language: Names objects, able to repeat phrases on occasion, per staff  Fund of knowledge: dementia.   2nd cranial nerve: Normal.   3rd, 4th, and 6th cranial nerves: Normal.   7th cranial nerve: Normal. Normal facial strength hearing chewing swallowing tongue neck palpable movements.   8th cranial nerve: Normal.   9th cranial nerve: Normal.   10th cranial nerve: Normal.   11th cranial nerve: Normal.   12th cranial nerve: Normal.   Sensation: dementia  Reflexes: Normal. Deep tendon reflexes grade 2 at the knees and ankles.   Coordination: Normal. Gait  Cortical function: Normal. visual fields   Judgment and insight: dementia  Mood and affect: dementai.   Physical Exam   Constitutional:- appears well-developed and  well-nourished.   HENT:   Head: Normocephalic.   Eyes: Pupils are equal, round, and reactive to light.   Neck: Normal range of motion. Neck supple.   Cardiovascular: Normal rate.   Pulmonary/Chest: Effort normal.   Psychiatric: He has a normal mood and affect. His behavior is normal. Judgment and thought content normal.   Nursing note and vitals reviewed.      Assessment and plan:     She apparently has very well-controlled epilepsy.  No seizures noted in many many years.  Dilantin can cause osteopenia.  It would behoove us to switch her to a different drug and hopefully the switch to Keppra can be made easily.              Instructions        Return if symptoms worsen or fail to improve.   Additional Documentation   Encounter Info:     Billing Info,     History,     Allergies,     Detailed Report        Orders Placed     None   Medication Changes       None      Medication List   Visit Diagnoses        Partial symptomatic epilepsy with complex partial seizures, not intractable, without status epilepticus       Adverse drug effect, subsequent encounter       Alzheimer's dementia without behavioral disturbance, unspecified timing of dementia onset      Problem List     Her exam is unchanged.  She remains on Keppra Seroquel BuSpar Vesicare.  Vesicare can aggravate dementia and this will be discontinued before trying to add Aricept.  She also will need further workup of her dementia    On August 30 the blood tests for TSH CBC CMP were unremarkable with Keppra level 21.4 and vitamin D level 21    She will have a noncontrast brain CT scan as she cannot tolerate MRI.  Also she will have B12 and folate.  She will taper off Vesicare and then we will reassess

## 2017-12-06 ENCOUNTER — HOSPITAL ENCOUNTER (OUTPATIENT)
Dept: CT IMAGING | Facility: HOSPITAL | Age: 67
Discharge: HOME | End: 2017-12-06
Attending: PSYCHIATRY & NEUROLOGY | Admitting: PSYCHIATRY & NEUROLOGY

## 2017-12-06 ENCOUNTER — LAB REQUISITION (OUTPATIENT)
Dept: LAB | Facility: HOSPITAL | Age: 67
End: 2017-12-06

## 2017-12-06 DIAGNOSIS — E53.8 DEFICIENCY OF OTHER SPECIFIED B GROUP VITAMINS (CODE): ICD-10-CM

## 2017-12-06 DIAGNOSIS — E71.120 METHYLMALONIC ACIDEMIA (HCC): ICD-10-CM

## 2017-12-06 DIAGNOSIS — Z79.899 OTHER LONG TERM (CURRENT) DRUG THERAPY: ICD-10-CM

## 2017-12-06 DIAGNOSIS — F02.80 DEMENTIA DUE TO MEDICAL CONDITION WITHOUT BEHAVIORAL DISTURBANCE (HCC): ICD-10-CM

## 2017-12-06 LAB
FOLATE SERPL-MCNC: >20 NG/ML (ref 4.78–20)
VIT B12 BLD-MCNC: 321 PG/ML (ref 232–1245)

## 2017-12-06 PROCEDURE — 36415 COLL VENOUS BLD VENIPUNCTURE: CPT | Performed by: FAMILY MEDICINE

## 2017-12-06 PROCEDURE — 83921 ORGANIC ACID SINGLE QUANT: CPT | Performed by: FAMILY MEDICINE

## 2017-12-06 PROCEDURE — 82746 ASSAY OF FOLIC ACID SERUM: CPT | Performed by: FAMILY MEDICINE

## 2017-12-06 PROCEDURE — 70450 CT HEAD/BRAIN W/O DYE: CPT

## 2017-12-06 PROCEDURE — 82607 VITAMIN B-12: CPT | Performed by: FAMILY MEDICINE

## 2017-12-11 LAB — METHYLMALONATE SERPL-SCNC: 561 NMOL/L (ref 0–378)

## 2017-12-12 ENCOUNTER — TELEPHONE (OUTPATIENT)
Dept: NEUROLOGY | Facility: CLINIC | Age: 67
End: 2017-12-12

## 2017-12-12 NOTE — TELEPHONE ENCOUNTER
I spoke to the patient's nurse concerning the workup for dementia.  The CAT scan showed generalized atrophy.  However the MMA level was elevated and thus she may have functional B12 deficiency.    Staff agreed to discuss this with Dr. Merino tomorrow about the possible need for B12 injections to treat worsening mentation

## 2018-01-03 ENCOUNTER — LAB REQUISITION (OUTPATIENT)
Dept: LAB | Facility: HOSPITAL | Age: 68
End: 2018-01-03

## 2018-01-03 DIAGNOSIS — R94.6 ABNORMAL RESULTS OF THYROID FUNCTION STUDIES: ICD-10-CM

## 2018-01-03 DIAGNOSIS — R73.09 OTHER ABNORMAL GLUCOSE: ICD-10-CM

## 2018-01-03 DIAGNOSIS — E55.9 VITAMIN D DEFICIENCY: ICD-10-CM

## 2018-01-03 DIAGNOSIS — Z79.899 OTHER LONG TERM (CURRENT) DRUG THERAPY: ICD-10-CM

## 2018-01-03 DIAGNOSIS — D72.819 DECREASED WHITE BLOOD CELL COUNT: ICD-10-CM

## 2018-01-03 DIAGNOSIS — R56.9 CONVULSIONS (HCC): ICD-10-CM

## 2018-01-03 DIAGNOSIS — R79.89 OTHER SPECIFIED ABNORMAL FINDINGS OF BLOOD CHEMISTRY: ICD-10-CM

## 2018-01-03 LAB
25(OH)D3 SERPL-MCNC: 31.8 NG/ML
ALBUMIN SERPL-MCNC: 3.5 G/DL (ref 3.5–5.2)
ALBUMIN/GLOB SERPL: 1.5 G/DL
ALP SERPL-CCNC: 61 U/L (ref 40–129)
ALT SERPL W P-5'-P-CCNC: 19 U/L (ref 5–33)
ANION GAP SERPL CALCULATED.3IONS-SCNC: 9.6 MMOL/L
AST SERPL-CCNC: 18 U/L (ref 5–32)
BASOPHILS # BLD AUTO: 0.07 10*3/MM3 (ref 0–0.2)
BASOPHILS NFR BLD AUTO: 0.7 % (ref 0–2)
BILIRUB SERPL-MCNC: 0.2 MG/DL (ref 0.2–1.2)
BUN BLD-MCNC: 20 MG/DL (ref 8–23)
BUN/CREAT SERPL: 33.9 (ref 7–25)
CALCIUM SPEC-SCNC: 8.6 MG/DL (ref 8.8–10.5)
CHLORIDE SERPL-SCNC: 102 MMOL/L (ref 98–107)
CHOLEST SERPL-MCNC: 117 MG/DL (ref 0–200)
CO2 SERPL-SCNC: 29.4 MMOL/L (ref 22–29)
CREAT BLD-MCNC: 0.59 MG/DL (ref 0.57–1)
DEPRECATED RDW RBC AUTO: 45 FL (ref 37–54)
EOSINOPHIL # BLD AUTO: 0.23 10*3/MM3 (ref 0.1–0.3)
EOSINOPHIL NFR BLD AUTO: 2.4 % (ref 0–4)
ERYTHROCYTE [DISTWIDTH] IN BLOOD BY AUTOMATED COUNT: 14 % (ref 11.5–14.5)
GFR SERPL CREATININE-BSD FRML MDRD: 102 ML/MIN/1.73
GLOBULIN UR ELPH-MCNC: 2.3 GM/DL
GLUCOSE BLD-MCNC: 67 MG/DL (ref 65–99)
HBA1C MFR BLD: 5.6 % (ref 4.8–5.6)
HCT VFR BLD AUTO: 41.2 % (ref 37–47)
HDLC SERPL-MCNC: 58 MG/DL (ref 40–60)
HGB BLD-MCNC: 13.5 G/DL (ref 12–16)
IMM GRANULOCYTES # BLD: 0.03 10*3/MM3 (ref 0–0.03)
IMM GRANULOCYTES NFR BLD: 0.3 % (ref 0–0.5)
LDLC SERPL CALC-MCNC: 39 MG/DL (ref 0–100)
LDLC/HDLC SERPL: 0.68 {RATIO}
LYMPHOCYTES # BLD AUTO: 4.26 10*3/MM3 (ref 0.6–4.8)
LYMPHOCYTES NFR BLD AUTO: 45.2 % (ref 20–45)
MCH RBC QN AUTO: 28.8 PG (ref 27–31)
MCHC RBC AUTO-ENTMCNC: 32.8 G/DL (ref 31–37)
MCV RBC AUTO: 87.8 FL (ref 81–99)
MONOCYTES # BLD AUTO: 0.82 10*3/MM3 (ref 0–1)
MONOCYTES NFR BLD AUTO: 8.7 % (ref 3–8)
NEUTROPHILS # BLD AUTO: 4.02 10*3/MM3 (ref 1.5–8.3)
NEUTROPHILS NFR BLD AUTO: 42.7 % (ref 45–70)
NRBC BLD MANUAL-RTO: 0 /100 WBC (ref 0–0)
PLATELET # BLD AUTO: 205 10*3/MM3 (ref 140–500)
PMV BLD AUTO: 10.8 FL (ref 7.4–10.4)
POTASSIUM BLD-SCNC: 4.6 MMOL/L (ref 3.5–5.2)
PROT SERPL-MCNC: 5.8 G/DL (ref 6–8.5)
RBC # BLD AUTO: 4.69 10*6/MM3 (ref 4.2–5.4)
SODIUM BLD-SCNC: 141 MMOL/L (ref 136–145)
T4 FREE SERPL-MCNC: 1.47 NG/DL (ref 0.93–1.7)
TRIGL SERPL-MCNC: 98 MG/DL (ref 0–150)
VLDLC SERPL-MCNC: 19.6 MG/DL (ref 7–27)
WBC NRBC COR # BLD: 9.43 10*3/MM3 (ref 4.8–10.8)

## 2018-01-03 PROCEDURE — 85025 COMPLETE CBC W/AUTO DIFF WBC: CPT | Performed by: FAMILY MEDICINE

## 2018-01-03 PROCEDURE — 84443 ASSAY THYROID STIM HORMONE: CPT | Performed by: FAMILY MEDICINE

## 2018-01-03 PROCEDURE — 80177 DRUG SCRN QUAN LEVETIRACETAM: CPT | Performed by: FAMILY MEDICINE

## 2018-01-03 PROCEDURE — 36415 COLL VENOUS BLD VENIPUNCTURE: CPT | Performed by: FAMILY MEDICINE

## 2018-01-03 PROCEDURE — 80053 COMPREHEN METABOLIC PANEL: CPT | Performed by: FAMILY MEDICINE

## 2018-01-03 PROCEDURE — 83036 HEMOGLOBIN GLYCOSYLATED A1C: CPT | Performed by: FAMILY MEDICINE

## 2018-01-03 PROCEDURE — 80061 LIPID PANEL: CPT | Performed by: FAMILY MEDICINE

## 2018-01-03 PROCEDURE — 82306 VITAMIN D 25 HYDROXY: CPT | Performed by: FAMILY MEDICINE

## 2018-01-03 PROCEDURE — 84439 ASSAY OF FREE THYROXINE: CPT | Performed by: FAMILY MEDICINE

## 2018-01-06 LAB — LEVETIRACETAM SERPL-MCNC: 21.6 UG/ML (ref 10–40)

## 2018-01-08 LAB — TSH SERPL DL<=0.05 MIU/L-ACNC: 3.22 MIU/ML (ref 0.27–4.2)

## 2018-02-09 ENCOUNTER — OFFICE (OUTPATIENT)
Dept: URBAN - METROPOLITAN AREA CLINIC 6 | Facility: CLINIC | Age: 68
End: 2018-02-09

## 2018-02-09 VITALS — HEIGHT: 65 IN

## 2018-02-09 DIAGNOSIS — K59.1 FUNCTIONAL DIARRHEA: ICD-10-CM

## 2018-02-09 DIAGNOSIS — K31.1 ADULT HYPERTROPHIC PYLORIC STENOSIS: ICD-10-CM

## 2018-02-09 DIAGNOSIS — K43.2 INCISIONAL HERNIA WITHOUT OBSTRUCTION OR GANGRENE: ICD-10-CM

## 2018-02-09 PROCEDURE — 99213 OFFICE O/P EST LOW 20 MIN: CPT | Performed by: INTERNAL MEDICINE

## 2018-03-29 NOTE — PROGRESS NOTES
VSS and weight gain 5.9 lbs; dietary aware    Dr. Neville, psychiatrist, monitors behaviors and medications closely.    Dr. Leavitt monitors Epilepsy, medications, and care closely.    Under GI care of Dr. Weathers and he monitor GI concerns closely.    Attend [facility] during the week.    Enjoys [facility] outings often

## 2018-04-04 ENCOUNTER — LAB REQUISITION (OUTPATIENT)
Dept: LAB | Facility: HOSPITAL | Age: 68
End: 2018-04-04

## 2018-04-04 DIAGNOSIS — D72.819 DECREASED WHITE BLOOD CELL COUNT: ICD-10-CM

## 2018-04-04 DIAGNOSIS — R94.6 ABNORMAL RESULTS OF THYROID FUNCTION STUDIES: ICD-10-CM

## 2018-04-04 DIAGNOSIS — E55.9 VITAMIN D DEFICIENCY: ICD-10-CM

## 2018-04-04 DIAGNOSIS — R56.9 CONVULSIONS (HCC): ICD-10-CM

## 2018-04-04 DIAGNOSIS — R79.89 OTHER SPECIFIED ABNORMAL FINDINGS OF BLOOD CHEMISTRY: ICD-10-CM

## 2018-04-04 DIAGNOSIS — Z79.899 OTHER LONG TERM (CURRENT) DRUG THERAPY: ICD-10-CM

## 2018-04-04 DIAGNOSIS — R73.09 OTHER ABNORMAL GLUCOSE: ICD-10-CM

## 2018-04-04 LAB
25(OH)D3 SERPL-MCNC: 38 NG/ML
ALBUMIN SERPL-MCNC: 3.7 G/DL (ref 3.5–5.2)
ALBUMIN/GLOB SERPL: 1.5 G/DL
ALP SERPL-CCNC: 60 U/L (ref 40–129)
ALT SERPL W P-5'-P-CCNC: 17 U/L (ref 5–33)
ANION GAP SERPL CALCULATED.3IONS-SCNC: 10.2 MMOL/L
AST SERPL-CCNC: 18 U/L (ref 5–32)
BASOPHILS # BLD AUTO: 0.07 10*3/MM3 (ref 0–0.2)
BASOPHILS NFR BLD AUTO: 0.7 % (ref 0–2)
BILIRUB SERPL-MCNC: <0.2 MG/DL (ref 0.2–1.2)
BUN BLD-MCNC: 22 MG/DL (ref 8–23)
BUN/CREAT SERPL: 37.9 (ref 7–25)
CALCIUM SPEC-SCNC: 9 MG/DL (ref 8.8–10.5)
CHLORIDE SERPL-SCNC: 104 MMOL/L (ref 98–107)
CHOLEST SERPL-MCNC: 108 MG/DL (ref 0–200)
CO2 SERPL-SCNC: 28.8 MMOL/L (ref 22–29)
CREAT BLD-MCNC: 0.58 MG/DL (ref 0.57–1)
DEPRECATED RDW RBC AUTO: 45.5 FL (ref 37–54)
EOSINOPHIL # BLD AUTO: 0.27 10*3/MM3 (ref 0.1–0.3)
EOSINOPHIL NFR BLD AUTO: 2.8 % (ref 0–4)
ERYTHROCYTE [DISTWIDTH] IN BLOOD BY AUTOMATED COUNT: 14 % (ref 11.5–14.5)
GFR SERPL CREATININE-BSD FRML MDRD: 104 ML/MIN/1.73
GLOBULIN UR ELPH-MCNC: 2.4 GM/DL
GLUCOSE BLD-MCNC: 86 MG/DL (ref 65–99)
HBA1C MFR BLD: 5.7 % (ref 4.8–5.6)
HCT VFR BLD AUTO: 40.7 % (ref 37–47)
HDLC SERPL-MCNC: 47 MG/DL (ref 40–60)
HGB BLD-MCNC: 13.3 G/DL (ref 12–16)
IMM GRANULOCYTES # BLD: 0.03 10*3/MM3 (ref 0–0.03)
IMM GRANULOCYTES NFR BLD: 0.3 % (ref 0–0.5)
LDLC SERPL CALC-MCNC: 47 MG/DL (ref 0–100)
LDLC/HDLC SERPL: 1.01 {RATIO}
LYMPHOCYTES # BLD AUTO: 4.38 10*3/MM3 (ref 0.6–4.8)
LYMPHOCYTES NFR BLD AUTO: 45.4 % (ref 20–45)
MCH RBC QN AUTO: 29.2 PG (ref 27–31)
MCHC RBC AUTO-ENTMCNC: 32.7 G/DL (ref 31–37)
MCV RBC AUTO: 89.5 FL (ref 81–99)
MONOCYTES # BLD AUTO: 0.87 10*3/MM3 (ref 0–1)
MONOCYTES NFR BLD AUTO: 9 % (ref 3–8)
NEUTROPHILS # BLD AUTO: 4.03 10*3/MM3 (ref 1.5–8.3)
NEUTROPHILS NFR BLD AUTO: 41.8 % (ref 45–70)
NRBC BLD MANUAL-RTO: 0 /100 WBC (ref 0–0)
PLATELET # BLD AUTO: 218 10*3/MM3 (ref 140–500)
PMV BLD AUTO: 10.8 FL (ref 7.4–10.4)
POTASSIUM BLD-SCNC: 4.4 MMOL/L (ref 3.5–5.2)
PROT SERPL-MCNC: 6.1 G/DL (ref 6–8.5)
RBC # BLD AUTO: 4.55 10*6/MM3 (ref 4.2–5.4)
SODIUM BLD-SCNC: 143 MMOL/L (ref 136–145)
T4 FREE SERPL-MCNC: 1.7 NG/DL (ref 0.93–1.7)
TRIGL SERPL-MCNC: 68 MG/DL (ref 0–150)
TSH SERPL DL<=0.05 MIU/L-ACNC: 3.48 MIU/ML (ref 0.27–4.2)
VLDLC SERPL-MCNC: 13.6 MG/DL (ref 7–27)
WBC NRBC COR # BLD: 9.65 10*3/MM3 (ref 4.8–10.8)

## 2018-04-04 PROCEDURE — 80053 COMPREHEN METABOLIC PANEL: CPT | Performed by: FAMILY MEDICINE

## 2018-04-04 PROCEDURE — 36415 COLL VENOUS BLD VENIPUNCTURE: CPT | Performed by: FAMILY MEDICINE

## 2018-04-04 PROCEDURE — 85025 COMPLETE CBC W/AUTO DIFF WBC: CPT | Performed by: FAMILY MEDICINE

## 2018-04-04 PROCEDURE — 84443 ASSAY THYROID STIM HORMONE: CPT | Performed by: FAMILY MEDICINE

## 2018-04-04 PROCEDURE — 82306 VITAMIN D 25 HYDROXY: CPT | Performed by: FAMILY MEDICINE

## 2018-04-04 PROCEDURE — 83036 HEMOGLOBIN GLYCOSYLATED A1C: CPT | Performed by: FAMILY MEDICINE

## 2018-04-04 PROCEDURE — 84439 ASSAY OF FREE THYROXINE: CPT | Performed by: FAMILY MEDICINE

## 2018-04-04 PROCEDURE — 80177 DRUG SCRN QUAN LEVETIRACETAM: CPT | Performed by: FAMILY MEDICINE

## 2018-04-04 PROCEDURE — G0480 DRUG TEST DEF 1-7 CLASSES: HCPCS | Performed by: FAMILY MEDICINE

## 2018-04-04 PROCEDURE — 80061 LIPID PANEL: CPT | Performed by: FAMILY MEDICINE

## 2018-04-06 LAB
LEVETIRACETAM SERPL-MCNC: 20.8 UG/ML (ref 10–40)
QUETIAPINE: 26 NG/ML

## 2018-05-16 ENCOUNTER — LAB REQUISITION (OUTPATIENT)
Dept: LAB | Facility: HOSPITAL | Age: 68
End: 2018-05-16

## 2018-05-16 DIAGNOSIS — R94.6 ABNORMAL RESULTS OF THYROID FUNCTION STUDIES: ICD-10-CM

## 2018-05-16 DIAGNOSIS — E55.9 VITAMIN D DEFICIENCY: ICD-10-CM

## 2018-05-16 DIAGNOSIS — E03.9 HYPOTHYROIDISM: ICD-10-CM

## 2018-05-16 DIAGNOSIS — R79.89 OTHER SPECIFIED ABNORMAL FINDINGS OF BLOOD CHEMISTRY: ICD-10-CM

## 2018-05-16 DIAGNOSIS — R73.09 OTHER ABNORMAL GLUCOSE: ICD-10-CM

## 2018-05-16 DIAGNOSIS — Z79.899 OTHER LONG TERM (CURRENT) DRUG THERAPY: ICD-10-CM

## 2018-05-16 DIAGNOSIS — D72.819 DECREASED WHITE BLOOD CELL COUNT: ICD-10-CM

## 2018-05-16 LAB
25(OH)D3 SERPL-MCNC: 35.7 NG/ML
ALBUMIN SERPL-MCNC: 3.2 G/DL (ref 3.5–5.2)
ALBUMIN/GLOB SERPL: 1.3 G/DL
ALP SERPL-CCNC: 56 U/L (ref 40–129)
ALT SERPL W P-5'-P-CCNC: 21 U/L (ref 5–33)
ANION GAP SERPL CALCULATED.3IONS-SCNC: 9 MMOL/L
AST SERPL-CCNC: 33 U/L (ref 5–32)
BASOPHILS # BLD AUTO: 0.08 10*3/MM3 (ref 0–0.2)
BASOPHILS NFR BLD AUTO: 0.9 % (ref 0–2)
BILIRUB SERPL-MCNC: 0.2 MG/DL (ref 0.2–1.2)
BUN BLD-MCNC: 16 MG/DL (ref 8–23)
BUN/CREAT SERPL: 26.2 (ref 7–25)
CALCIUM SPEC-SCNC: 9.3 MG/DL (ref 8.8–10.5)
CHLORIDE SERPL-SCNC: 103 MMOL/L (ref 98–107)
CHOLEST SERPL-MCNC: 104 MG/DL (ref 0–200)
CO2 SERPL-SCNC: 29 MMOL/L (ref 22–29)
CREAT BLD-MCNC: 0.61 MG/DL (ref 0.57–1)
DEPRECATED RDW RBC AUTO: 44.2 FL (ref 37–54)
EOSINOPHIL # BLD AUTO: 0.25 10*3/MM3 (ref 0.1–0.3)
EOSINOPHIL NFR BLD AUTO: 2.9 % (ref 0–4)
ERYTHROCYTE [DISTWIDTH] IN BLOOD BY AUTOMATED COUNT: 13.4 % (ref 11.5–14.5)
GFR SERPL CREATININE-BSD FRML MDRD: 98 ML/MIN/1.73
GLOBULIN UR ELPH-MCNC: 2.5 GM/DL
GLUCOSE BLD-MCNC: 82 MG/DL (ref 65–99)
HBA1C MFR BLD: 5.6 % (ref 4.8–5.6)
HCT VFR BLD AUTO: 40.3 % (ref 37–47)
HDLC SERPL-MCNC: 52 MG/DL (ref 40–60)
HGB BLD-MCNC: 13 G/DL (ref 12–16)
IMM GRANULOCYTES # BLD: 0.02 10*3/MM3 (ref 0–0.03)
IMM GRANULOCYTES NFR BLD: 0.2 % (ref 0–0.5)
LDLC SERPL CALC-MCNC: 35 MG/DL (ref 0–100)
LDLC/HDLC SERPL: 0.67 {RATIO}
LYMPHOCYTES # BLD AUTO: 4.29 10*3/MM3 (ref 0.6–4.8)
LYMPHOCYTES NFR BLD AUTO: 50.6 % (ref 20–45)
MCH RBC QN AUTO: 29.3 PG (ref 27–31)
MCHC RBC AUTO-ENTMCNC: 32.3 G/DL (ref 31–37)
MCV RBC AUTO: 90.8 FL (ref 81–99)
MONOCYTES # BLD AUTO: 0.79 10*3/MM3 (ref 0–1)
MONOCYTES NFR BLD AUTO: 9.3 % (ref 3–8)
NEUTROPHILS # BLD AUTO: 3.05 10*3/MM3 (ref 1.5–8.3)
NEUTROPHILS NFR BLD AUTO: 36.1 % (ref 45–70)
NRBC BLD MANUAL-RTO: 0 /100 WBC (ref 0–0)
PLATELET # BLD AUTO: 203 10*3/MM3 (ref 140–500)
PMV BLD AUTO: 11.3 FL (ref 7.4–10.4)
POTASSIUM BLD-SCNC: 4.4 MMOL/L (ref 3.5–5.2)
PROT SERPL-MCNC: 5.7 G/DL (ref 6–8.5)
RBC # BLD AUTO: 4.44 10*6/MM3 (ref 4.2–5.4)
SODIUM BLD-SCNC: 141 MMOL/L (ref 136–145)
T4 FREE SERPL-MCNC: 1.72 NG/DL (ref 0.93–1.7)
TRIGL SERPL-MCNC: 85 MG/DL (ref 0–150)
TSH SERPL DL<=0.05 MIU/L-ACNC: 2.11 MIU/ML (ref 0.27–4.2)
VLDLC SERPL-MCNC: 17 MG/DL (ref 7–27)
WBC NRBC COR # BLD: 8.48 10*3/MM3 (ref 4.8–10.8)

## 2018-05-16 PROCEDURE — 83036 HEMOGLOBIN GLYCOSYLATED A1C: CPT | Performed by: FAMILY MEDICINE

## 2018-05-16 PROCEDURE — 80053 COMPREHEN METABOLIC PANEL: CPT | Performed by: FAMILY MEDICINE

## 2018-05-16 PROCEDURE — 80061 LIPID PANEL: CPT | Performed by: FAMILY MEDICINE

## 2018-05-16 PROCEDURE — 84443 ASSAY THYROID STIM HORMONE: CPT | Performed by: FAMILY MEDICINE

## 2018-05-16 PROCEDURE — 82306 VITAMIN D 25 HYDROXY: CPT | Performed by: FAMILY MEDICINE

## 2018-05-16 PROCEDURE — 84439 ASSAY OF FREE THYROXINE: CPT | Performed by: FAMILY MEDICINE

## 2018-05-16 PROCEDURE — 85025 COMPLETE CBC W/AUTO DIFF WBC: CPT | Performed by: FAMILY MEDICINE

## 2018-05-16 PROCEDURE — 36415 COLL VENOUS BLD VENIPUNCTURE: CPT | Performed by: FAMILY MEDICINE

## 2018-05-24 NOTE — PROGRESS NOTES
Wt and VSS    Dr. Leavitt monitors Epilepsy, medications, and care closely.    Attends [facility] 3 X week    Enjoys [facility] outings often.  Enjoys calls from guardian.

## 2018-06-14 ENCOUNTER — APPOINTMENT (OUTPATIENT)
Dept: BONE DENSITY | Facility: HOSPITAL | Age: 68
End: 2018-06-14
Attending: OBSTETRICS & GYNECOLOGY

## 2018-06-21 ENCOUNTER — APPOINTMENT (OUTPATIENT)
Dept: BONE DENSITY | Facility: HOSPITAL | Age: 68
End: 2018-06-21
Attending: OBSTETRICS & GYNECOLOGY

## 2018-06-21 DIAGNOSIS — M89.9 BONE DISEASE: ICD-10-CM

## 2018-06-21 PROCEDURE — 77080 DXA BONE DENSITY AXIAL: CPT

## 2018-06-30 NOTE — PROGRESS NOTES
PIP= bone mineral density shows osteopenia. I do not see that I have seen her before so I can't really advise her. Does she have an appointment?

## 2018-07-26 ENCOUNTER — TRANSCRIBE ORDERS (OUTPATIENT)
Dept: FAMILY MEDICINE CLINIC | Facility: CLINIC | Age: 68
End: 2018-07-26

## 2018-07-26 DIAGNOSIS — Z12.31 SCREENING MAMMOGRAM, ENCOUNTER FOR: Primary | ICD-10-CM

## 2018-08-10 ENCOUNTER — HOSPITAL ENCOUNTER (OUTPATIENT)
Dept: MAMMOGRAPHY | Facility: HOSPITAL | Age: 68
Discharge: HOME | End: 2018-08-10
Attending: FAMILY MEDICINE | Admitting: FAMILY MEDICINE

## 2018-08-10 DIAGNOSIS — Z12.31 SCREENING MAMMOGRAM, ENCOUNTER FOR: ICD-10-CM

## 2018-08-10 PROCEDURE — 77063 BREAST TOMOSYNTHESIS BI: CPT

## 2018-08-10 PROCEDURE — 77067 SCR MAMMO BI INCL CAD: CPT

## 2018-09-26 ENCOUNTER — LAB REQUISITION (OUTPATIENT)
Dept: LAB | Facility: HOSPITAL | Age: 68
End: 2018-09-26

## 2018-09-26 DIAGNOSIS — E55.9 VITAMIN D DEFICIENCY: ICD-10-CM

## 2018-09-26 DIAGNOSIS — R73.09 OTHER ABNORMAL GLUCOSE: ICD-10-CM

## 2018-09-26 DIAGNOSIS — D72.819 DECREASED WHITE BLOOD CELL COUNT: ICD-10-CM

## 2018-09-26 DIAGNOSIS — R79.89 OTHER SPECIFIED ABNORMAL FINDINGS OF BLOOD CHEMISTRY: ICD-10-CM

## 2018-09-26 DIAGNOSIS — R56.9 CONVULSIONS (HCC): ICD-10-CM

## 2018-09-26 DIAGNOSIS — Z79.899 OTHER LONG TERM (CURRENT) DRUG THERAPY: ICD-10-CM

## 2018-09-26 DIAGNOSIS — R94.6 ABNORMAL RESULTS OF THYROID FUNCTION STUDIES: ICD-10-CM

## 2018-09-26 LAB
25(OH)D3 SERPL-MCNC: >60 NG/ML
ALBUMIN SERPL-MCNC: 3.4 G/DL (ref 3.5–5.2)
ALBUMIN/GLOB SERPL: 1.2 G/DL
ALP SERPL-CCNC: 58 U/L (ref 40–129)
ALT SERPL W P-5'-P-CCNC: 23 U/L (ref 5–33)
ANION GAP SERPL CALCULATED.3IONS-SCNC: 7.3 MMOL/L
AST SERPL-CCNC: 23 U/L (ref 5–32)
BASOPHILS # BLD AUTO: 0.08 10*3/MM3 (ref 0–0.2)
BASOPHILS NFR BLD AUTO: 0.8 % (ref 0–2)
BILIRUB SERPL-MCNC: 0.2 MG/DL (ref 0.2–1.2)
BUN BLD-MCNC: 20 MG/DL (ref 8–23)
BUN/CREAT SERPL: 29 (ref 7–25)
CALCIUM SPEC-SCNC: 9.1 MG/DL (ref 8.8–10.5)
CHLORIDE SERPL-SCNC: 105 MMOL/L (ref 98–107)
CHOLEST SERPL-MCNC: 125 MG/DL (ref 0–200)
CO2 SERPL-SCNC: 29.7 MMOL/L (ref 22–29)
CREAT BLD-MCNC: 0.69 MG/DL (ref 0.57–1)
DEPRECATED RDW RBC AUTO: 44.4 FL (ref 37–54)
EOSINOPHIL # BLD AUTO: 0.23 10*3/MM3 (ref 0.1–0.3)
EOSINOPHIL NFR BLD AUTO: 2.4 % (ref 0–4)
ERYTHROCYTE [DISTWIDTH] IN BLOOD BY AUTOMATED COUNT: 13.6 % (ref 11.5–14.5)
GFR SERPL CREATININE-BSD FRML MDRD: 85 ML/MIN/1.73
GLOBULIN UR ELPH-MCNC: 2.8 GM/DL
GLUCOSE BLD-MCNC: 92 MG/DL (ref 65–99)
HBA1C MFR BLD: 5.7 % (ref 4.8–5.6)
HCT VFR BLD AUTO: 40.5 % (ref 37–47)
HDLC SERPL-MCNC: 53 MG/DL (ref 40–60)
HGB BLD-MCNC: 13.1 G/DL (ref 12–16)
IMM GRANULOCYTES # BLD: 0.03 10*3/MM3 (ref 0–0.03)
IMM GRANULOCYTES NFR BLD: 0.3 % (ref 0–0.5)
LDLC SERPL CALC-MCNC: 55 MG/DL (ref 0–100)
LDLC/HDLC SERPL: 1.04 {RATIO}
LYMPHOCYTES # BLD AUTO: 4.74 10*3/MM3 (ref 0.6–4.8)
LYMPHOCYTES NFR BLD AUTO: 49.1 % (ref 20–45)
MCH RBC QN AUTO: 29.2 PG (ref 27–31)
MCHC RBC AUTO-ENTMCNC: 32.3 G/DL (ref 31–37)
MCV RBC AUTO: 90.2 FL (ref 81–99)
MONOCYTES # BLD AUTO: 0.68 10*3/MM3 (ref 0–1)
MONOCYTES NFR BLD AUTO: 7 % (ref 3–8)
NEUTROPHILS # BLD AUTO: 3.89 10*3/MM3 (ref 1.5–8.3)
NEUTROPHILS NFR BLD AUTO: 40.4 % (ref 45–70)
NRBC BLD MANUAL-RTO: 0 /100 WBC (ref 0–0)
PLATELET # BLD AUTO: 227 10*3/MM3 (ref 140–500)
PMV BLD AUTO: 10.6 FL (ref 7.4–10.4)
POTASSIUM BLD-SCNC: 4.5 MMOL/L (ref 3.5–5.2)
PROT SERPL-MCNC: 6.2 G/DL (ref 6–8.5)
RBC # BLD AUTO: 4.49 10*6/MM3 (ref 4.2–5.4)
SODIUM BLD-SCNC: 142 MMOL/L (ref 136–145)
T4 FREE SERPL-MCNC: 1.47 NG/DL (ref 0.93–1.7)
TRIGL SERPL-MCNC: 85 MG/DL (ref 0–150)
TSH SERPL DL<=0.05 MIU/L-ACNC: 3.27 MIU/ML (ref 0.27–4.2)
VLDLC SERPL-MCNC: 17 MG/DL (ref 7–27)
WBC NRBC COR # BLD: 9.65 10*3/MM3 (ref 4.8–10.8)

## 2018-09-26 PROCEDURE — 82306 VITAMIN D 25 HYDROXY: CPT | Performed by: FAMILY MEDICINE

## 2018-09-26 PROCEDURE — 84443 ASSAY THYROID STIM HORMONE: CPT | Performed by: FAMILY MEDICINE

## 2018-09-26 PROCEDURE — 83036 HEMOGLOBIN GLYCOSYLATED A1C: CPT | Performed by: FAMILY MEDICINE

## 2018-09-26 PROCEDURE — 85025 COMPLETE CBC W/AUTO DIFF WBC: CPT | Performed by: FAMILY MEDICINE

## 2018-09-26 PROCEDURE — 80177 DRUG SCRN QUAN LEVETIRACETAM: CPT | Performed by: FAMILY MEDICINE

## 2018-09-26 PROCEDURE — 80053 COMPREHEN METABOLIC PANEL: CPT | Performed by: FAMILY MEDICINE

## 2018-09-26 PROCEDURE — 84439 ASSAY OF FREE THYROXINE: CPT | Performed by: FAMILY MEDICINE

## 2018-09-26 PROCEDURE — 80061 LIPID PANEL: CPT | Performed by: FAMILY MEDICINE

## 2018-09-26 PROCEDURE — 36415 COLL VENOUS BLD VENIPUNCTURE: CPT | Performed by: FAMILY MEDICINE

## 2018-09-28 LAB — LEVETIRACETAM SERPL-MCNC: 18 UG/ML (ref 10–40)

## 2018-11-14 ENCOUNTER — LAB REQUISITION (OUTPATIENT)
Dept: LAB | Facility: HOSPITAL | Age: 68
End: 2018-11-14

## 2018-11-14 DIAGNOSIS — E61.0 COPPER DEFICIENCY: ICD-10-CM

## 2018-11-14 DIAGNOSIS — D51.0 VITAMIN B12 DEFICIENCY ANEMIA DUE TO INTRINSIC FACTOR DEFICIENCY: ICD-10-CM

## 2018-11-14 DIAGNOSIS — Z79.899 OTHER LONG TERM (CURRENT) DRUG THERAPY: ICD-10-CM

## 2018-11-14 DIAGNOSIS — E61.1 IRON DEFICIENCY: ICD-10-CM

## 2018-11-14 DIAGNOSIS — E53.8 DEFICIENCY OF OTHER SPECIFIED B GROUP VITAMINS: ICD-10-CM

## 2018-11-14 DIAGNOSIS — R79.89 OTHER SPECIFIED ABNORMAL FINDINGS OF BLOOD CHEMISTRY: ICD-10-CM

## 2018-11-14 DIAGNOSIS — D50.8 OTHER IRON DEFICIENCY ANEMIAS: ICD-10-CM

## 2018-11-14 LAB
FERRITIN SERPL-MCNC: 70 NG/ML (ref 13–150)
FOLATE SERPL-MCNC: >20 NG/ML (ref 4.78–20)
IRON 24H UR-MRATE: 69 MCG/DL (ref 37–145)
IRON SATN MFR SERPL: 25 %
TIBC SERPL-MCNC: 281 MCG/DL (ref 261–478)
UIBC SERPL-MCNC: 212 MCG/DL (ref 112–346)
VIT B12 BLD-MCNC: 525 PG/ML (ref 232–1245)

## 2018-11-14 PROCEDURE — 82728 ASSAY OF FERRITIN: CPT | Performed by: FAMILY MEDICINE

## 2018-11-14 PROCEDURE — 83540 ASSAY OF IRON: CPT | Performed by: FAMILY MEDICINE

## 2018-11-14 PROCEDURE — 82746 ASSAY OF FOLIC ACID SERUM: CPT | Performed by: FAMILY MEDICINE

## 2018-11-14 PROCEDURE — 36415 COLL VENOUS BLD VENIPUNCTURE: CPT | Performed by: FAMILY MEDICINE

## 2018-11-14 PROCEDURE — 82607 VITAMIN B-12: CPT | Performed by: FAMILY MEDICINE

## 2018-11-14 PROCEDURE — 83550 IRON BINDING TEST: CPT | Performed by: FAMILY MEDICINE

## 2019-03-27 ENCOUNTER — LAB REQUISITION (OUTPATIENT)
Dept: LAB | Facility: HOSPITAL | Age: 69
End: 2019-03-27

## 2019-03-27 DIAGNOSIS — G40.909 EPILEPSY WITHOUT STATUS EPILEPTICUS, NOT INTRACTABLE (HCC): ICD-10-CM

## 2019-03-27 DIAGNOSIS — Z79.899 OTHER LONG TERM (CURRENT) DRUG THERAPY: ICD-10-CM

## 2019-03-27 DIAGNOSIS — R79.9 ABNORMAL FINDING OF BLOOD CHEMISTRY: ICD-10-CM

## 2019-03-27 LAB
25(OH)D3 SERPL-MCNC: 39.8 NG/ML (ref 30–100)
ALBUMIN SERPL-MCNC: 3.8 G/DL (ref 3.5–5.2)
ALBUMIN/GLOB SERPL: 1.5 G/DL
ALP SERPL-CCNC: 65 U/L (ref 39–117)
ALT SERPL W P-5'-P-CCNC: 27 U/L (ref 1–33)
ANION GAP SERPL CALCULATED.3IONS-SCNC: 8.3 MMOL/L
AST SERPL-CCNC: 25 U/L (ref 1–32)
BASOPHILS # BLD AUTO: 0.08 10*3/MM3 (ref 0–0.2)
BASOPHILS NFR BLD AUTO: 0.9 % (ref 0–1.5)
BILIRUB SERPL-MCNC: 0.3 MG/DL (ref 0.2–1.2)
BUN BLD-MCNC: 20 MG/DL (ref 8–23)
BUN/CREAT SERPL: 29.4 (ref 7–25)
CALCIUM SPEC-SCNC: 9 MG/DL (ref 8.6–10.5)
CHLORIDE SERPL-SCNC: 102 MMOL/L (ref 98–107)
CHOLEST SERPL-MCNC: 117 MG/DL (ref 0–200)
CO2 SERPL-SCNC: 30.7 MMOL/L (ref 22–29)
CREAT BLD-MCNC: 0.68 MG/DL (ref 0.57–1)
DEPRECATED RDW RBC AUTO: 43.4 FL (ref 37–54)
EOSINOPHIL # BLD AUTO: 0.24 10*3/MM3 (ref 0–0.4)
EOSINOPHIL NFR BLD AUTO: 2.8 % (ref 0.3–6.2)
ERYTHROCYTE [DISTWIDTH] IN BLOOD BY AUTOMATED COUNT: 13.2 % (ref 12.3–15.4)
GFR SERPL CREATININE-BSD FRML MDRD: 86 ML/MIN/1.73
GLOBULIN UR ELPH-MCNC: 2.6 GM/DL
GLUCOSE BLD-MCNC: 85 MG/DL (ref 65–99)
HBA1C MFR BLD: 5.7 % (ref 4.8–5.6)
HCT VFR BLD AUTO: 42.9 % (ref 34–46.6)
HDLC SERPL-MCNC: 55 MG/DL (ref 40–60)
HGB BLD-MCNC: 14 G/DL (ref 12–15.9)
IMM GRANULOCYTES # BLD AUTO: 0.02 10*3/MM3 (ref 0–0.05)
IMM GRANULOCYTES NFR BLD AUTO: 0.2 % (ref 0–0.5)
LDLC SERPL CALC-MCNC: 49 MG/DL (ref 0–100)
LDLC/HDLC SERPL: 0.89 {RATIO}
LYMPHOCYTES # BLD AUTO: 4.07 10*3/MM3 (ref 0.7–3.1)
LYMPHOCYTES NFR BLD AUTO: 48.2 % (ref 19.6–45.3)
MCH RBC QN AUTO: 29.2 PG (ref 26.6–33)
MCHC RBC AUTO-ENTMCNC: 32.6 G/DL (ref 31.5–35.7)
MCV RBC AUTO: 89.4 FL (ref 79–97)
MONOCYTES # BLD AUTO: 0.72 10*3/MM3 (ref 0.1–0.9)
MONOCYTES NFR BLD AUTO: 8.5 % (ref 5–12)
NEUTROPHILS # BLD AUTO: 3.31 10*3/MM3 (ref 1.4–7)
NEUTROPHILS NFR BLD AUTO: 39.4 % (ref 42.7–76)
NRBC BLD AUTO-RTO: 0 /100 WBC (ref 0–0)
PLATELET # BLD AUTO: 210 10*3/MM3 (ref 140–450)
PMV BLD AUTO: 11.2 FL (ref 6–12)
POTASSIUM BLD-SCNC: 4.2 MMOL/L (ref 3.5–5.2)
PROT SERPL-MCNC: 6.4 G/DL (ref 6–8.5)
RBC # BLD AUTO: 4.8 10*6/MM3 (ref 3.77–5.28)
SODIUM BLD-SCNC: 141 MMOL/L (ref 136–145)
T4 FREE SERPL-MCNC: 1.74 NG/DL (ref 0.93–1.7)
TRIGL SERPL-MCNC: 64 MG/DL (ref 0–150)
TSH SERPL DL<=0.05 MIU/L-ACNC: 3.88 MIU/ML (ref 0.27–4.2)
VLDLC SERPL-MCNC: 12.8 MG/DL (ref 7–27)
WBC NRBC COR # BLD: 8.44 10*3/MM3 (ref 3.4–10.8)

## 2019-03-27 PROCEDURE — 83036 HEMOGLOBIN GLYCOSYLATED A1C: CPT | Performed by: FAMILY MEDICINE

## 2019-03-27 PROCEDURE — 85025 COMPLETE CBC W/AUTO DIFF WBC: CPT | Performed by: FAMILY MEDICINE

## 2019-03-27 PROCEDURE — 84443 ASSAY THYROID STIM HORMONE: CPT | Performed by: FAMILY MEDICINE

## 2019-03-27 PROCEDURE — 80061 LIPID PANEL: CPT | Performed by: FAMILY MEDICINE

## 2019-03-27 PROCEDURE — 80177 DRUG SCRN QUAN LEVETIRACETAM: CPT | Performed by: FAMILY MEDICINE

## 2019-03-27 PROCEDURE — 80053 COMPREHEN METABOLIC PANEL: CPT | Performed by: FAMILY MEDICINE

## 2019-03-27 PROCEDURE — 84439 ASSAY OF FREE THYROXINE: CPT | Performed by: FAMILY MEDICINE

## 2019-03-27 PROCEDURE — 82306 VITAMIN D 25 HYDROXY: CPT | Performed by: FAMILY MEDICINE

## 2019-03-31 LAB — LEVETIRACETAM SERPL-MCNC: 18.6 UG/ML (ref 10–40)

## 2019-06-05 ENCOUNTER — LAB REQUISITION (OUTPATIENT)
Dept: LAB | Facility: HOSPITAL | Age: 69
End: 2019-06-05

## 2019-06-05 DIAGNOSIS — Z79.899 OTHER LONG TERM (CURRENT) DRUG THERAPY: ICD-10-CM

## 2019-06-05 LAB
ALBUMIN SERPL-MCNC: 3.5 G/DL (ref 3.5–5.2)
ALBUMIN/GLOB SERPL: 1.2 G/DL
ALP SERPL-CCNC: 61 U/L (ref 39–117)
ALT SERPL W P-5'-P-CCNC: 24 U/L (ref 1–33)
ANION GAP SERPL CALCULATED.3IONS-SCNC: 8.1 MMOL/L
AST SERPL-CCNC: 28 U/L (ref 1–32)
BILIRUB SERPL-MCNC: 0.3 MG/DL (ref 0.2–1.2)
BUN BLD-MCNC: 17 MG/DL (ref 8–23)
BUN/CREAT SERPL: 25.8 (ref 7–25)
CALCIUM SPEC-SCNC: 9.6 MG/DL (ref 8.6–10.5)
CHLORIDE SERPL-SCNC: 105 MMOL/L (ref 98–107)
CO2 SERPL-SCNC: 28.9 MMOL/L (ref 22–29)
CREAT BLD-MCNC: 0.66 MG/DL (ref 0.57–1)
GFR SERPL CREATININE-BSD FRML MDRD: 89 ML/MIN/1.73
GLOBULIN UR ELPH-MCNC: 3 GM/DL
GLUCOSE BLD-MCNC: 91 MG/DL (ref 65–99)
MAGNESIUM SERPL-MCNC: 1.9 MG/DL (ref 1.6–2.4)
PHOSPHATE SERPL-MCNC: 4 MG/DL (ref 2.5–4.5)
POTASSIUM BLD-SCNC: 4.3 MMOL/L (ref 3.5–5.2)
PROT SERPL-MCNC: 6.5 G/DL (ref 6–8.5)
SODIUM BLD-SCNC: 142 MMOL/L (ref 136–145)

## 2019-06-05 PROCEDURE — 80053 COMPREHEN METABOLIC PANEL: CPT | Performed by: FAMILY MEDICINE

## 2019-06-05 PROCEDURE — 84100 ASSAY OF PHOSPHORUS: CPT | Performed by: FAMILY MEDICINE

## 2019-06-05 PROCEDURE — 83735 ASSAY OF MAGNESIUM: CPT | Performed by: FAMILY MEDICINE

## 2019-06-13 NOTE — PLAN OF CARE
Ashely Herman Gilberto 1956 
[id number] Situation: 
Verbal report received from: JUANA Slater Procedure: Procedure(s): 
COLONOSCOPY 
COLON BIOPSY Background: 
 
Preoperative diagnosis: NOT GIVEN Postoperative diagnosis: Colon: Diverticulosis, Segmental colitis :  Dr. Bunny Riggins Assistant(s): Endoscopy Technician-1: Irving Sanchez Endoscopy RN-2: Tricia Koenig RN Specimens:  
ID Type Source Tests Collected by Time Destination 1 : Right colon Bx Preservative Colon, Right  Beatrice Nolasco MD 6/13/2019 4775 Pathology 2 : left colon Bx Preservative Colon, Left  Beatrice Nolasco MD 6/13/2019 6801 Pathology 3 : Recto-sigmoid bx Preservative Colon, Recto-sigmoid  Beatrice Nolasco MD 6/13/2019 9852 Pathology H. Pylori  no Assessment: 
Intra-procedure medications Anesthesia gave intra-procedure sedation and medications, see anesthesia flow sheet yes Intravenous fluids: NS@ Ellene Ghazi Vital signs stable Abdominal assessment: round and soft Recommendation: 
Discharge patient per MD order. Family or Friend sister, Chikis Phillips Permission to share finding with family or friend yes Problem: Patient Care Overview (Adult)  Goal: Plan of Care Review  Outcome: Ongoing (interventions implemented as appropriate)    08/20/17 0103   Coping/Psychosocial Response Interventions   Plan Of Care Reviewed With patient   Patient Care Overview   Progress improving       Goal: Adult Individualization and Mutuality  Outcome: Ongoing (interventions implemented as appropriate)  Goal: Discharge Needs Assessment  Outcome: Ongoing (interventions implemented as appropriate)    Problem: Fall Risk (Adult)  Goal: Identify Related Risk Factors and Signs and Symptoms  Outcome: Ongoing (interventions implemented as appropriate)  Goal: Absence of Falls  Outcome: Ongoing (interventions implemented as appropriate)

## 2019-08-13 ENCOUNTER — TRANSCRIBE ORDERS (OUTPATIENT)
Dept: ADMINISTRATIVE | Facility: HOSPITAL | Age: 69
End: 2019-08-13

## 2019-08-13 DIAGNOSIS — Z12.39 SCREENING BREAST EXAMINATION: Primary | ICD-10-CM

## 2019-08-22 ENCOUNTER — HOSPITAL ENCOUNTER (OUTPATIENT)
Dept: MAMMOGRAPHY | Facility: HOSPITAL | Age: 69
Discharge: HOME | End: 2019-08-22
Admitting: OBSTETRICS & GYNECOLOGY

## 2019-08-22 DIAGNOSIS — Z12.39 SCREENING BREAST EXAMINATION: ICD-10-CM

## 2019-08-22 PROCEDURE — 77067 SCR MAMMO BI INCL CAD: CPT

## 2019-09-25 ENCOUNTER — LAB REQUISITION (OUTPATIENT)
Dept: LAB | Facility: HOSPITAL | Age: 69
End: 2019-09-25

## 2019-09-25 DIAGNOSIS — Z79.899 OTHER LONG TERM (CURRENT) DRUG THERAPY: ICD-10-CM

## 2019-09-25 DIAGNOSIS — R79.9 ABNORMAL FINDING OF BLOOD CHEMISTRY: ICD-10-CM

## 2019-09-25 LAB
ALBUMIN SERPL-MCNC: 3.7 G/DL (ref 3.5–5.2)
ALBUMIN/GLOB SERPL: 1.6 G/DL
ALP SERPL-CCNC: 54 U/L (ref 39–117)
ALT SERPL W P-5'-P-CCNC: 22 U/L (ref 1–33)
ANION GAP SERPL CALCULATED.3IONS-SCNC: 8.1 MMOL/L (ref 5–15)
AST SERPL-CCNC: 26 U/L (ref 1–32)
BASOPHILS # BLD AUTO: 0.09 10*3/MM3 (ref 0–0.2)
BASOPHILS NFR BLD AUTO: 1 % (ref 0–1.5)
BILIRUB SERPL-MCNC: 0.3 MG/DL (ref 0.2–1.2)
BUN BLD-MCNC: 17 MG/DL (ref 8–23)
BUN/CREAT SERPL: 26.2 (ref 7–25)
CALCIUM SPEC-SCNC: 9.9 MG/DL (ref 8.6–10.5)
CHLORIDE SERPL-SCNC: 105 MMOL/L (ref 98–107)
CHOLEST SERPL-MCNC: 115 MG/DL (ref 0–200)
CO2 SERPL-SCNC: 30.9 MMOL/L (ref 22–29)
CREAT BLD-MCNC: 0.65 MG/DL (ref 0.57–1)
DEPRECATED RDW RBC AUTO: 45.3 FL (ref 37–54)
EOSINOPHIL # BLD AUTO: 0.24 10*3/MM3 (ref 0–0.4)
EOSINOPHIL NFR BLD AUTO: 2.8 % (ref 0.3–6.2)
ERYTHROCYTE [DISTWIDTH] IN BLOOD BY AUTOMATED COUNT: 13.7 % (ref 12.3–15.4)
GFR SERPL CREATININE-BSD FRML MDRD: 91 ML/MIN/1.73
GLOBULIN UR ELPH-MCNC: 2.3 GM/DL
GLUCOSE BLD-MCNC: 91 MG/DL (ref 65–99)
HCT VFR BLD AUTO: 42.1 % (ref 34–46.6)
HDLC SERPL-MCNC: 51 MG/DL (ref 40–60)
HGB BLD-MCNC: 13.7 G/DL (ref 12–15.9)
IMM GRANULOCYTES # BLD AUTO: 0.02 10*3/MM3 (ref 0–0.05)
IMM GRANULOCYTES NFR BLD AUTO: 0.2 % (ref 0–0.5)
LDLC SERPL CALC-MCNC: 53 MG/DL (ref 0–100)
LDLC/HDLC SERPL: 1.04 {RATIO}
LYMPHOCYTES # BLD AUTO: 4.62 10*3/MM3 (ref 0.7–3.1)
LYMPHOCYTES NFR BLD AUTO: 53 % (ref 19.6–45.3)
MCH RBC QN AUTO: 29.5 PG (ref 26.6–33)
MCHC RBC AUTO-ENTMCNC: 32.5 G/DL (ref 31.5–35.7)
MCV RBC AUTO: 90.7 FL (ref 79–97)
MONOCYTES # BLD AUTO: 0.74 10*3/MM3 (ref 0.1–0.9)
MONOCYTES NFR BLD AUTO: 8.5 % (ref 5–12)
NEUTROPHILS # BLD AUTO: 3 10*3/MM3 (ref 1.7–7)
NEUTROPHILS NFR BLD AUTO: 34.5 % (ref 42.7–76)
NRBC BLD AUTO-RTO: 0 /100 WBC (ref 0–0.2)
PLATELET # BLD AUTO: 205 10*3/MM3 (ref 140–450)
PMV BLD AUTO: 11.5 FL (ref 6–12)
POTASSIUM BLD-SCNC: 4.8 MMOL/L (ref 3.5–5.2)
PROT SERPL-MCNC: 6 G/DL (ref 6–8.5)
RBC # BLD AUTO: 4.64 10*6/MM3 (ref 3.77–5.28)
SODIUM BLD-SCNC: 144 MMOL/L (ref 136–145)
T4 FREE SERPL-MCNC: 1.6 NG/DL (ref 0.93–1.7)
TRIGL SERPL-MCNC: 55 MG/DL (ref 0–150)
TSH SERPL DL<=0.05 MIU/L-ACNC: 3.09 UIU/ML (ref 0.27–4.2)
VLDLC SERPL-MCNC: 11 MG/DL (ref 7–27)
WBC NRBC COR # BLD: 8.71 10*3/MM3 (ref 3.4–10.8)

## 2019-09-25 PROCEDURE — 85025 COMPLETE CBC W/AUTO DIFF WBC: CPT | Performed by: FAMILY MEDICINE

## 2019-09-25 PROCEDURE — 84439 ASSAY OF FREE THYROXINE: CPT | Performed by: FAMILY MEDICINE

## 2019-09-25 PROCEDURE — 84443 ASSAY THYROID STIM HORMONE: CPT | Performed by: FAMILY MEDICINE

## 2019-09-25 PROCEDURE — 80053 COMPREHEN METABOLIC PANEL: CPT | Performed by: FAMILY MEDICINE

## 2019-09-25 PROCEDURE — 80061 LIPID PANEL: CPT | Performed by: FAMILY MEDICINE

## 2019-09-25 PROCEDURE — 80177 DRUG SCRN QUAN LEVETIRACETAM: CPT | Performed by: FAMILY MEDICINE

## 2019-09-27 LAB — LEVETIRACETAM SERPL-MCNC: 19.6 UG/ML (ref 10–40)

## 2019-11-27 ENCOUNTER — LAB REQUISITION (OUTPATIENT)
Dept: LAB | Facility: HOSPITAL | Age: 69
End: 2019-11-27

## 2019-11-27 DIAGNOSIS — Z79.899 OTHER LONG TERM (CURRENT) DRUG THERAPY: ICD-10-CM

## 2019-11-27 DIAGNOSIS — R79.9 ABNORMAL FINDING OF BLOOD CHEMISTRY, UNSPECIFIED: ICD-10-CM

## 2019-11-27 LAB
CALCIUM SPEC-SCNC: 9.2 MG/DL (ref 8.6–10.5)
MAGNESIUM SERPL-MCNC: 2 MG/DL (ref 1.6–2.4)
PHOSPHATE SERPL-MCNC: 3.5 MG/DL (ref 2.5–4.5)

## 2019-11-27 PROCEDURE — 83735 ASSAY OF MAGNESIUM: CPT | Performed by: FAMILY MEDICINE

## 2019-11-27 PROCEDURE — 84100 ASSAY OF PHOSPHORUS: CPT | Performed by: FAMILY MEDICINE

## 2019-11-27 PROCEDURE — 82310 ASSAY OF CALCIUM: CPT | Performed by: FAMILY MEDICINE

## 2020-01-03 ENCOUNTER — LAB REQUISITION (OUTPATIENT)
Dept: LAB | Facility: HOSPITAL | Age: 70
End: 2020-01-03

## 2020-01-03 DIAGNOSIS — Z79.899 OTHER LONG TERM (CURRENT) DRUG THERAPY: ICD-10-CM

## 2020-01-03 DIAGNOSIS — R79.9 ABNORMAL FINDING OF BLOOD CHEMISTRY, UNSPECIFIED: ICD-10-CM

## 2020-01-03 LAB
ALBUMIN SERPL-MCNC: 3.8 G/DL (ref 3.5–5.2)
ALBUMIN/GLOB SERPL: 1.3 G/DL
ALP SERPL-CCNC: 63 U/L (ref 39–117)
ALT SERPL W P-5'-P-CCNC: 24 U/L (ref 1–33)
ANION GAP SERPL CALCULATED.3IONS-SCNC: 10.3 MMOL/L (ref 5–15)
AST SERPL-CCNC: 27 U/L (ref 1–32)
BILIRUB SERPL-MCNC: 0.2 MG/DL (ref 0.2–1.2)
BUN BLD-MCNC: 14 MG/DL (ref 8–23)
BUN/CREAT SERPL: 19.4 (ref 7–25)
CALCIUM SPEC-SCNC: 10.7 MG/DL (ref 8.6–10.5)
CHLORIDE SERPL-SCNC: 104 MMOL/L (ref 98–107)
CO2 SERPL-SCNC: 29.7 MMOL/L (ref 22–29)
CREAT BLD-MCNC: 0.72 MG/DL (ref 0.57–1)
GFR SERPL CREATININE-BSD FRML MDRD: 81 ML/MIN/1.73
GLOBULIN UR ELPH-MCNC: 3 GM/DL
GLUCOSE BLD-MCNC: 101 MG/DL (ref 65–99)
MAGNESIUM SERPL-MCNC: 1.9 MG/DL (ref 1.6–2.4)
PHOSPHATE SERPL-MCNC: 3.6 MG/DL (ref 2.5–4.5)
POTASSIUM BLD-SCNC: 5 MMOL/L (ref 3.5–5.2)
PROT SERPL-MCNC: 6.8 G/DL (ref 6–8.5)
SODIUM BLD-SCNC: 144 MMOL/L (ref 136–145)

## 2020-01-03 PROCEDURE — 83735 ASSAY OF MAGNESIUM: CPT | Performed by: FAMILY MEDICINE

## 2020-01-03 PROCEDURE — 84100 ASSAY OF PHOSPHORUS: CPT | Performed by: FAMILY MEDICINE

## 2020-01-03 PROCEDURE — 80053 COMPREHEN METABOLIC PANEL: CPT | Performed by: FAMILY MEDICINE

## 2020-02-05 ENCOUNTER — LAB REQUISITION (OUTPATIENT)
Dept: LAB | Facility: HOSPITAL | Age: 70
End: 2020-02-05

## 2020-02-05 DIAGNOSIS — Z79.899 OTHER LONG TERM (CURRENT) DRUG THERAPY: ICD-10-CM

## 2020-02-05 DIAGNOSIS — R79.9 ABNORMAL FINDING OF BLOOD CHEMISTRY, UNSPECIFIED: ICD-10-CM

## 2020-02-05 LAB
ALBUMIN SERPL-MCNC: 4.2 G/DL (ref 3.5–5.2)
ALBUMIN/GLOB SERPL: 1.8 G/DL
ALP SERPL-CCNC: 59 U/L (ref 39–117)
ALT SERPL W P-5'-P-CCNC: 32 U/L (ref 1–33)
ANION GAP SERPL CALCULATED.3IONS-SCNC: 8.8 MMOL/L (ref 5–15)
AST SERPL-CCNC: 32 U/L (ref 1–32)
BASOPHILS # BLD AUTO: 0.09 10*3/MM3 (ref 0–0.2)
BASOPHILS NFR BLD AUTO: 0.9 % (ref 0–1.5)
BILIRUB SERPL-MCNC: 0.2 MG/DL (ref 0.2–1.2)
BUN BLD-MCNC: 21 MG/DL (ref 8–23)
BUN/CREAT SERPL: 27.3 (ref 7–25)
CALCIUM SPEC-SCNC: 9.9 MG/DL (ref 8.6–10.5)
CHLORIDE SERPL-SCNC: 102 MMOL/L (ref 98–107)
CHOLEST SERPL-MCNC: 131 MG/DL (ref 0–200)
CO2 SERPL-SCNC: 32.2 MMOL/L (ref 22–29)
CREAT BLD-MCNC: 0.77 MG/DL (ref 0.57–1)
DEPRECATED RDW RBC AUTO: 46 FL (ref 37–54)
EOSINOPHIL # BLD AUTO: 0.26 10*3/MM3 (ref 0–0.4)
EOSINOPHIL NFR BLD AUTO: 2.6 % (ref 0.3–6.2)
ERYTHROCYTE [DISTWIDTH] IN BLOOD BY AUTOMATED COUNT: 13.8 % (ref 12.3–15.4)
GFR SERPL CREATININE-BSD FRML MDRD: 75 ML/MIN/1.73
GLOBULIN UR ELPH-MCNC: 2.4 GM/DL
GLUCOSE BLD-MCNC: 93 MG/DL (ref 65–99)
HCT VFR BLD AUTO: 45.5 % (ref 34–46.6)
HDLC SERPL-MCNC: 57 MG/DL (ref 40–60)
HGB BLD-MCNC: 14.7 G/DL (ref 12–15.9)
IMM GRANULOCYTES # BLD AUTO: 0.06 10*3/MM3 (ref 0–0.05)
IMM GRANULOCYTES NFR BLD AUTO: 0.6 % (ref 0–0.5)
LDLC SERPL CALC-MCNC: 55 MG/DL (ref 0–100)
LDLC/HDLC SERPL: 0.96 {RATIO}
LYMPHOCYTES # BLD AUTO: 5.03 10*3/MM3 (ref 0.7–3.1)
LYMPHOCYTES NFR BLD AUTO: 49.5 % (ref 19.6–45.3)
MCH RBC QN AUTO: 29.4 PG (ref 26.6–33)
MCHC RBC AUTO-ENTMCNC: 32.3 G/DL (ref 31.5–35.7)
MCV RBC AUTO: 91 FL (ref 79–97)
MONOCYTES # BLD AUTO: 0.72 10*3/MM3 (ref 0.1–0.9)
MONOCYTES NFR BLD AUTO: 7.1 % (ref 5–12)
NEUTROPHILS # BLD AUTO: 4 10*3/MM3 (ref 1.7–7)
NEUTROPHILS NFR BLD AUTO: 39.3 % (ref 42.7–76)
NRBC BLD AUTO-RTO: 0 /100 WBC (ref 0–0.2)
PLATELET # BLD AUTO: 221 10*3/MM3 (ref 140–450)
PMV BLD AUTO: 11.5 FL (ref 6–12)
POTASSIUM BLD-SCNC: 5 MMOL/L (ref 3.5–5.2)
PROT SERPL-MCNC: 6.6 G/DL (ref 6–8.5)
RBC # BLD AUTO: 5 10*6/MM3 (ref 3.77–5.28)
SODIUM BLD-SCNC: 143 MMOL/L (ref 136–145)
T4 FREE SERPL-MCNC: 1.74 NG/DL (ref 0.93–1.7)
TRIGL SERPL-MCNC: 96 MG/DL (ref 0–150)
TSH SERPL DL<=0.05 MIU/L-ACNC: 3.22 UIU/ML (ref 0.27–4.2)
VLDLC SERPL-MCNC: 19.2 MG/DL (ref 7–27)
WBC NRBC COR # BLD: 10.16 10*3/MM3 (ref 3.4–10.8)

## 2020-02-05 PROCEDURE — 84443 ASSAY THYROID STIM HORMONE: CPT | Performed by: FAMILY MEDICINE

## 2020-02-05 PROCEDURE — 80061 LIPID PANEL: CPT | Performed by: FAMILY MEDICINE

## 2020-02-05 PROCEDURE — 80177 DRUG SCRN QUAN LEVETIRACETAM: CPT | Performed by: FAMILY MEDICINE

## 2020-02-05 PROCEDURE — 80053 COMPREHEN METABOLIC PANEL: CPT | Performed by: FAMILY MEDICINE

## 2020-02-05 PROCEDURE — 85025 COMPLETE CBC W/AUTO DIFF WBC: CPT | Performed by: FAMILY MEDICINE

## 2020-02-05 PROCEDURE — 84439 ASSAY OF FREE THYROXINE: CPT | Performed by: FAMILY MEDICINE

## 2020-02-08 LAB — LEVETIRACETAM SERPL-MCNC: 21.3 UG/ML (ref 10–40)

## 2020-03-09 ENCOUNTER — OFFICE (OUTPATIENT)
Dept: URBAN - METROPOLITAN AREA CLINIC 6 | Facility: CLINIC | Age: 70
End: 2020-03-09

## 2020-03-09 VITALS
WEIGHT: 176.7 LBS | RESPIRATION RATE: 18 BRPM | HEIGHT: 65 IN | SYSTOLIC BLOOD PRESSURE: 132 MMHG | TEMPERATURE: 97.2 F | DIASTOLIC BLOOD PRESSURE: 80 MMHG | HEART RATE: 72 BPM

## 2020-03-09 DIAGNOSIS — K59.1 FUNCTIONAL DIARRHEA: ICD-10-CM

## 2020-03-09 PROCEDURE — 99213 OFFICE O/P EST LOW 20 MIN: CPT | Performed by: INTERNAL MEDICINE

## 2020-03-09 NOTE — SERVICENOTES
The above note was scribed by Elisabeth Cobb PA-C. Dr. Gibson saw this patient and examined this patient while in the office.

## 2020-03-09 NOTE — SERVICEHPINOTES
02/09/18... this patient has a history of previous pyloric obstruction with recurrent scar and is previously been recommended that she avoid uncooked vegetables and she done quite well since we made this change. She is also just recently status post repair of a large abdominal [id number]/09/20... [facility] patient with 1 month history of soft stools. Empirically treated for pinworms with Mebendazole as the rest of the residents in her area had tested positive. No change in symptoms with treatment.  Weight is stable, no blood in stool. No black stools. History provided by [facility] staff and records.

## 2020-03-15 ENCOUNTER — LAB REQUISITION (OUTPATIENT)
Dept: LAB | Facility: HOSPITAL | Age: 70
End: 2020-03-15

## 2020-03-15 DIAGNOSIS — A07.1 GIARDIASIS (LAMBLIASIS): ICD-10-CM

## 2020-03-15 DIAGNOSIS — Z79.899 OTHER LONG TERM (CURRENT) DRUG THERAPY: ICD-10-CM

## 2020-03-15 DIAGNOSIS — R79.9 ABNORMAL FINDING OF BLOOD CHEMISTRY, UNSPECIFIED: ICD-10-CM

## 2020-03-15 LAB
ADV 40+41 DNA STL QL NAA+NON-PROBE: NOT DETECTED
ASTRO TYP 1-8 RNA STL QL NAA+NON-PROBE: NOT DETECTED
C CAYETANENSIS DNA STL QL NAA+NON-PROBE: NOT DETECTED
C DIFF GDH STL QL: NEGATIVE
CAMPY SP DNA.DIARRHEA STL QL NAA+PROBE: NOT DETECTED
CRYPTOSP STL CULT: NOT DETECTED
E COLI DNA SPEC QL NAA+PROBE: NOT DETECTED
E HISTOLYT AG STL-ACNC: NOT DETECTED
EAEC PAA PLAS AGGR+AATA ST NAA+NON-PRB: NOT DETECTED
EC STX1 + STX2 GENES STL NAA+PROBE: NOT DETECTED
EPEC EAE GENE STL QL NAA+NON-PROBE: NOT DETECTED
ETEC LTA+ST1A+ST1B TOX ST NAA+NON-PROBE: NOT DETECTED
G LAMBLIA DNA SPEC QL NAA+PROBE: NOT DETECTED
NOROVIRUS GI+II RNA STL QL NAA+NON-PROBE: NOT DETECTED
P SHIGELLOIDES DNA STL QL NAA+PROBE: NOT DETECTED
RV RNA STL NAA+PROBE: NOT DETECTED
SALMONELLA DNA SPEC QL NAA+PROBE: NOT DETECTED
SAPO I+II+IV+V RNA STL QL NAA+NON-PROBE: NOT DETECTED
SHIGELLA SP+EIEC IPAH STL QL NAA+PROBE: NOT DETECTED
V CHOLERAE DNA SPEC QL NAA+PROBE: NOT DETECTED
VIBRIO DNA SPEC NAA+PROBE: NOT DETECTED
YERSINIA STL CULT: NOT DETECTED

## 2020-03-15 PROCEDURE — 0097U HC BIOFIRE FILMARRAY GI PANEL: CPT | Performed by: FAMILY MEDICINE

## 2020-03-15 PROCEDURE — 87324 CLOSTRIDIUM AG IA: CPT | Performed by: FAMILY MEDICINE

## 2020-03-15 PROCEDURE — 87449 NOS EACH ORGANISM AG IA: CPT | Performed by: FAMILY MEDICINE

## 2020-06-21 VITALS — HEIGHT: 65 IN

## 2020-06-22 ENCOUNTER — OFFICE (OUTPATIENT)
Dept: URBAN - METROPOLITAN AREA CLINIC 75 | Facility: CLINIC | Age: 70
End: 2020-06-22

## 2020-06-22 DIAGNOSIS — R10.84 GENERALIZED ABDOMINAL PAIN: ICD-10-CM

## 2020-06-22 DIAGNOSIS — K43.2 INCISIONAL HERNIA WITHOUT OBSTRUCTION OR GANGRENE: ICD-10-CM

## 2020-06-22 PROCEDURE — 99213 OFFICE O/P EST LOW 20 MIN: CPT | Mod: 95 | Performed by: INTERNAL MEDICINE

## 2020-06-24 ENCOUNTER — OFFICE VISIT (OUTPATIENT)
Dept: SURGERY | Facility: CLINIC | Age: 70
End: 2020-06-24

## 2020-06-24 VITALS
DIASTOLIC BLOOD PRESSURE: 72 MMHG | SYSTOLIC BLOOD PRESSURE: 130 MMHG | BODY MASS INDEX: 27.38 KG/M2 | RESPIRATION RATE: 18 BRPM | HEIGHT: 61 IN | WEIGHT: 145 LBS

## 2020-06-24 DIAGNOSIS — K43.9 ABDOMINAL WALL HERNIA: Primary | ICD-10-CM

## 2020-06-24 PROCEDURE — 99212 OFFICE O/P EST SF 10 MIN: CPT | Performed by: SURGERY

## 2020-06-24 NOTE — PROGRESS NOTES
PATIENT INFORMATION  Caitlyn Camacho       - 1950    CHIEF COMPLAINT  Chief Complaint   Patient presents with   • Hernia   right upper / middle abdominal hernia    HISTORY OF PRESENT ILLNESS  HPI she is a resident of [facility] she apparently is without complaints but nursing thought they felt an upper abdominal bulge.  She had a prior abdominal wall hernia repair by Dr. Celaya with Dr. Cuellar as an assist.        REVIEW OF SYSTEMS  Review of Systems      ACTIVE PROBLEMS  Patient Active Problem List    Diagnosis   • Adverse drug effect, subsequent encounter [T50.905D]   • Early onset Alzheimer's dementia without behavioral disturbance (CMS/HCC) [G30.0, F02.80]   • Recurrent ventral incisional hernia [K43.2]   • Epilepsy (CMS/[facility]) [G40.909]   • Cardiac arrhythmia [I49.9]   • Hypoxia [R09.02]   • Hypervolemia [E87.70]   • Adverse drug effect [T50.905A]   • Partial symptomatic epilepsy with complex partial seizures, not intractable, without status epilepticus (CMS/[facility]) [G40.209]   • Nutritional disorder [E63.9]   • 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]   • Stricture of duodenum [K31.5]   • Duodenal ulcer [K26.9]   • Hypothyroidism [E03.9]   • Arthritis [M19.90]   • Depression [F32.9]   • 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, 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]   • Hyperlipidemia [E78.5]   • Mental retardation [F79]   • Osteopenia [M85.80]   • Vitamin D deficiency [E55.9]         PAST MEDICAL HISTORY  Past Medical History:   Diagnosis Date   • Anemia due to blood loss    • Anxiety    • Arthritis    • Duodenal stricture    • Duodenal ulcer    • Dysphagia    • Gallstones    • H/O convulsions    • Hypercholesteremia    • Hypothyroidism    • Major depression    • Moderate intellectual disability    • OCD (obsessive compulsive disorder)    • Ocular histoplasmosis     w/macular scars   • Osteopenia    • Ptosis    • Pure hyperglyceridemia    • S/P bypass gastrojejunostomy    • Seasonal allergies    • Seizure disorder (CMS/HCC)    • Unspecified  dementia without behavioral disturbance (CMS/HCC)    • Ventral hernia     sched repair   • Vitamin D deficiency          SURGICAL HISTORY  Past Surgical History:   Procedure Laterality Date   • CATARACT EXTRACTION Bilateral 2016   • CHOLECYSTECTOMY OPEN  03/2017   • COLONOSCOPY  05/2009   • ENDOSCOPY  03/2017   • GASTROJEJUNOSTOMY  08/2016   • GASTROSTOMY      TEJINDER   • HYSTERECTOMY     • VENTRAL/INCISIONAL HERNIA REPAIR N/A 8/15/2017    Procedure: Lysis of adhesion and repair of recurrent incisional hernia with onlay mesh placement ;  Surgeon: Donna Ivy MD;  Location: [facility];  Service:          FAMILY HISTORY  Family History   Family history unknown: Yes         SOCIAL HISTORY  Social History     Occupational History   • Not on file   Tobacco Use   • Smoking status: Never Smoker   • Smokeless tobacco: Never Used   Substance and Sexual Activity   • Alcohol use: No   • Drug use: No   • Sexual activity: Defer         CURRENT MEDICATIONS    Current Outpatient Medications:   •  acetaminophen (TYLENOL) 500 MG tablet, Take 1,000 mg by mouth Every 6 (Six) Hours As Needed for Mild Pain (1-3) or Fever., Disp: , Rfl:   •  Albuterol (VENTOLIN IN), Inhale 2 puffs Every 4 (Four) Hours As Needed (wheezing/coughing/sob)., Disp: , Rfl:   •  atorvastatin (LIPITOR) 20 MG tablet, Take 20 mg by mouth Every Night., Disp: , Rfl:   •  brimonidine-timolol (COMBIGAN) 0.2-0.5 % ophthalmic solution, Administer 1 drop to both eyes Every 12 (Twelve) Hours., Disp: , Rfl:   •  busPIRone (BUSPAR) 10 MG tablet, Take 10 mg by mouth 2 (Two) Times a Day., Disp: , Rfl:   •  calcium carbonate (OS-KATY) 600 MG tablet, Take 600 mg by mouth 3 (Three) Times a Day With Meals., Disp: , Rfl:   •  carvedilol (COREG) 6.25 MG tablet, Take 6.25 mg by mouth 2 (Two) Times a Day With Meals., Disp: , Rfl:   •  cephalexin (KEFLEX) 500 MG capsule, Take 1 capsule by mouth 3 (Three) Times a Day., Disp: 21 capsule, Rfl: 0  •  cetirizine (zyrTEC) 10 MG tablet,  Take 10 mg by mouth Daily., Disp: , Rfl:   •  denosumab (PROLIA) 60 MG/ML solution syringe, Inject 60 mg under the skin Take As Directed. Every 6 months, due December, Disp: , Rfl:   •  dextromethorphan polistirex ER (DELSYM) 30 MG/5ML Suspension Extended Release oral suspension, Take 10 mL by mouth 2 (Two) Times a Day As Needed (cough)., Disp: , Rfl:   •  docusate sodium (COLACE) 100 MG capsule, Take 100 mg by mouth 2 (Two) Times a Day., Disp: , Rfl:   •  Ergocalciferol (VITAMIN D2 PO), Take 50,000 Units by mouth Every 7 (Seven) Days., Disp: , Rfl:   •  ferrous sulfate 325 (65 FE) MG tablet, Take 325 mg by mouth Daily With Breakfast., Disp: , Rfl:   •  fluticasone (FLONASE) 50 MCG/ACT nasal spray, 2 sprays into each nostril Daily., Disp: , Rfl:   •  glucosamine-chondroitin 500-400 MG capsule capsule, Take 3 capsules by mouth Daily., Disp: , Rfl:   •  guaiFENesin (MUCINEX) 600 MG 12 hr tablet, Take 1,200 mg by mouth 2 (Two) Times a Day As Needed for Congestion., Disp: , Rfl:   •  levETIRAcetam (KEPPRA) 500 MG tablet, Take 500 mg by mouth 2 (Two) Times a Day., Disp: , Rfl:   •  levothyroxine (SYNTHROID, LEVOTHROID) 50 MCG tablet, Take 50 mcg by mouth Daily., Disp: , Rfl:   •  magnesium hydroxide (MILK OF MAGNESIA) 400 MG/5ML suspension, Take 30 mL by mouth As Needed for Constipation (no bm x 3 days)., Disp: , Rfl:   •  montelukast (SINGULAIR) 10 MG tablet, Take 10 mg by mouth Every Night., Disp: , Rfl:   •  Multiple Vitamins-Minerals (MULTIVITAMIN ADULT PO), Take 1 tablet by mouth Daily., Disp: , Rfl:   •  Nutritional Supplements (RESOURCE ARGINAID) pack, Take  by mouth 2 (Two) Times a Day., Disp: , Rfl:   •  ondansetron (ZOFRAN) 4 MG tablet, Take 1 tablet by mouth Every 6 (Six) Hours As Needed for Nausea or Vomiting., Disp: 20 tablet, Rfl: 0  •  oxyCODONE-acetaminophen (PERCOCET) 5-325 MG per tablet, Take 1 tablet by mouth Every 6 (Six) Hours As Needed (as needed for pain)., Disp: 30 tablet, Rfl: 0  •  pantoprazole  "(PROTONIX) 40 MG EC tablet, Take 40 mg by mouth 2 (Two) Times a Day., Disp: , Rfl:   •  PARoxetine (PAXIL) 40 MG tablet, Take 40 mg by mouth Every Morning., Disp: , Rfl:   •  polyethylene glycol (MIRALAX) packet, Take 17 g by mouth Daily., Disp: , Rfl:   •  Probiotic Product (PROBIOTIC DAILY PO), Take 1 capsule by mouth Daily., Disp: , Rfl:   •  QUEtiapine (SEROquel) 100 MG tablet, Take 100 mg by mouth Every Night., Disp: , Rfl:   •  solifenacin (VESICARE) 10 MG tablet, Take 10 mg by mouth Daily., Disp: , Rfl:     ALLERGIES  Codeine    VITALS  Vitals:    06/24/20 1419   BP: 130/72   Resp: 18   Weight: 65.8 kg (145 lb)   Height: 154.9 cm (61\")       LAST RESULTS   Lab Requisition on 03/15/2020   Component Date Value Ref Range Status   • Campylobacter 03/15/2020 Not Detected  Not Detected Final   • Plesiomonas shigelloides 03/15/2020 Not Detected  Not Detected Final   • Salmonella 03/15/2020 Not Detected  Not Detected Final   • Vibrio 03/15/2020 Not Detected  Not Detected Final   • Vibrio cholerae 03/15/2020 Not Detected  Not Detected Final   • Yersinia enterocolitica 03/15/2020 Not Detected  Not Detected Final   • Enteroaggregative E. coli (EAEC) 03/15/2020 Not Detected  Not Detected Final   • Enteropathogenic E. coli (EPEC) 03/15/2020 Not Detected  Not Detected Final   • Enterotoxigenic E. coli (ETEC) lt/* 03/15/2020 Not Detected  Not Detected Final   • Shiga-like toxin-producing E. coli* 03/15/2020 Not Detected  Not Detected Final   • E. coli O157 03/15/2020 Not Detected  Not Detected Final   • Shigella/Enteroinvasive E. coli (E* 03/15/2020 Not Detected  Not Detected Final   • Cryptosporidium 03/15/2020 Not Detected  Not Detected Final   • Cyclospora cayetanensis 03/15/2020 Not Detected  Not Detected Final   • Entamoeba histolytica 03/15/2020 Not Detected  Not Detected Final   • Giardia lamblia 03/15/2020 Not Detected  Not Detected Final   • Adenovirus F40/41 03/15/2020 Not Detected  Not Detected Final   • " Astrovirus 03/15/2020 Not Detected  Not Detected Final   • Norovirus GI/GII 03/15/2020 Not Detected  Not Detected Final   • Rotavirus A 03/15/2020 Not Detected  Not Detected Final   • Sapovirus (I, II, IV or V) 03/15/2020 Not Detected  Not Detected Final   • C Diff GDH / Toxin 03/15/2020 Negative  Negative Final     No results found.    PHYSICAL EXAM  Debilities/Disabilities Identified: None and Dementia  Emotional Behavior: Appropriate  Physical Exam  So alert white female in no active distress.  She is without any signs of problems but she had difficulty cooperating with the exam.  It was hard for me to tell if she had a recurrent hernia or not.  I reviewed her prior operative note mesh was placed by Dr. Ivy  ASSESSMENT  Possible abdominal wall hernia      PLAN  I will see her back after the CT scan of the abdomen and pelvis has been performed

## 2020-07-02 ENCOUNTER — HOSPITAL ENCOUNTER (OUTPATIENT)
Dept: CT IMAGING | Facility: HOSPITAL | Age: 70
Discharge: HOME | End: 2020-07-02
Admitting: SURGERY

## 2020-07-02 DIAGNOSIS — K43.9 ABDOMINAL WALL HERNIA: ICD-10-CM

## 2020-07-02 PROCEDURE — 0 IOPAMIDOL PER 1 ML: Performed by: SURGERY

## 2020-07-02 PROCEDURE — 74177 CT ABD & PELVIS W/CONTRAST: CPT

## 2020-07-02 RX ADMIN — IOPAMIDOL 100 ML: 755 INJECTION, SOLUTION INTRAVENOUS at 11:34

## 2020-07-06 ENCOUNTER — DOCUMENTATION (OUTPATIENT)
Dept: SURGERY | Facility: CLINIC | Age: 70
End: 2020-07-06

## 2020-07-06 ENCOUNTER — TELEPHONE (OUTPATIENT)
Dept: SURGERY | Facility: CLINIC | Age: 70
End: 2020-07-06

## 2020-07-06 NOTE — PROGRESS NOTES
I reviewed her CT scan myself and discussed the case with Dr. Garcia.  I think her hernia repair is intact and the area of question is consistent with a diastases rectus above the mesh.  My staff will call [address] and recommend observation only

## 2020-07-08 ENCOUNTER — LAB REQUISITION (OUTPATIENT)
Dept: LAB | Facility: HOSPITAL | Age: 70
End: 2020-07-08

## 2020-07-08 DIAGNOSIS — R79.9 ABNORMAL FINDING OF BLOOD CHEMISTRY, UNSPECIFIED: ICD-10-CM

## 2020-07-08 DIAGNOSIS — Z79.899 OTHER LONG TERM (CURRENT) DRUG THERAPY: ICD-10-CM

## 2020-07-08 LAB
ALBUMIN SERPL-MCNC: 3.6 G/DL (ref 3.5–5.2)
ALBUMIN/GLOB SERPL: 1.4 G/DL
ALP SERPL-CCNC: 56 U/L (ref 39–117)
ALT SERPL W P-5'-P-CCNC: 28 U/L (ref 1–33)
ANION GAP SERPL CALCULATED.3IONS-SCNC: 7.3 MMOL/L (ref 5–15)
AST SERPL-CCNC: 26 U/L (ref 1–32)
BILIRUB SERPL-MCNC: 0.3 MG/DL (ref 0–1.2)
BUN SERPL-MCNC: 20 MG/DL (ref 8–23)
BUN/CREAT SERPL: 27.8 (ref 7–25)
CALCIUM SPEC-SCNC: 9.9 MG/DL (ref 8.6–10.5)
CHLORIDE SERPL-SCNC: 103 MMOL/L (ref 98–107)
CO2 SERPL-SCNC: 32.7 MMOL/L (ref 22–29)
CREAT SERPL-MCNC: 0.72 MG/DL (ref 0.57–1)
GFR SERPL CREATININE-BSD FRML MDRD: 80 ML/MIN/1.73
GLOBULIN UR ELPH-MCNC: 2.6 GM/DL
GLUCOSE SERPL-MCNC: 91 MG/DL (ref 65–99)
MAGNESIUM SERPL-MCNC: 1.9 MG/DL (ref 1.6–2.4)
PHOSPHATE SERPL-MCNC: 4.2 MG/DL (ref 2.5–4.5)
POTASSIUM SERPL-SCNC: 4.9 MMOL/L (ref 3.5–5.2)
PROT SERPL-MCNC: 6.2 G/DL (ref 6–8.5)
SODIUM SERPL-SCNC: 143 MMOL/L (ref 136–145)

## 2020-07-08 PROCEDURE — 83735 ASSAY OF MAGNESIUM: CPT | Performed by: FAMILY MEDICINE

## 2020-07-08 PROCEDURE — 84100 ASSAY OF PHOSPHORUS: CPT | Performed by: FAMILY MEDICINE

## 2020-07-08 PROCEDURE — 80053 COMPREHEN METABOLIC PANEL: CPT | Performed by: FAMILY MEDICINE

## 2020-07-24 ENCOUNTER — LAB REQUISITION (OUTPATIENT)
Dept: LAB | Facility: HOSPITAL | Age: 70
End: 2020-07-24

## 2020-07-24 DIAGNOSIS — Z03.818 ENCOUNTER FOR OBSERVATION FOR SUSPECTED EXPOSURE TO OTHER BIOLOGICAL AGENTS RULED OUT: ICD-10-CM

## 2020-07-24 PROCEDURE — U0002 COVID-19 LAB TEST NON-CDC: HCPCS | Performed by: FAMILY MEDICINE

## 2020-07-28 LAB
REF LAB TEST METHOD: NORMAL
SARS-COV-2 RNA RESP QL NAA+PROBE: NOT DETECTED

## 2020-08-05 ENCOUNTER — LAB REQUISITION (OUTPATIENT)
Dept: LAB | Facility: HOSPITAL | Age: 70
End: 2020-08-05

## 2020-08-05 DIAGNOSIS — R79.9 ABNORMAL FINDING OF BLOOD CHEMISTRY, UNSPECIFIED: ICD-10-CM

## 2020-08-05 DIAGNOSIS — Z79.899 OTHER LONG TERM (CURRENT) DRUG THERAPY: ICD-10-CM

## 2020-08-05 LAB
25(OH)D3 SERPL-MCNC: 40.1 NG/ML (ref 30–100)
ALBUMIN SERPL-MCNC: 3.6 G/DL (ref 3.5–5.2)
ALBUMIN/GLOB SERPL: 1.3 G/DL
ALP SERPL-CCNC: 51 U/L (ref 39–117)
ALT SERPL W P-5'-P-CCNC: 30 U/L (ref 1–33)
ANION GAP SERPL CALCULATED.3IONS-SCNC: 8.5 MMOL/L (ref 5–15)
AST SERPL-CCNC: 32 U/L (ref 1–32)
BASOPHILS # BLD AUTO: 0.07 10*3/MM3 (ref 0–0.2)
BASOPHILS NFR BLD AUTO: 0.7 % (ref 0–1.5)
BILIRUB SERPL-MCNC: 0.2 MG/DL (ref 0–1.2)
BUN SERPL-MCNC: 22 MG/DL (ref 8–23)
BUN/CREAT SERPL: 31.9 (ref 7–25)
CALCIUM SPEC-SCNC: 8.6 MG/DL (ref 8.6–10.5)
CHLORIDE SERPL-SCNC: 106 MMOL/L (ref 98–107)
CHOLEST SERPL-MCNC: 121 MG/DL (ref 0–200)
CO2 SERPL-SCNC: 28.5 MMOL/L (ref 22–29)
CREAT SERPL-MCNC: 0.69 MG/DL (ref 0.57–1)
DEPRECATED RDW RBC AUTO: 46.6 FL (ref 37–54)
EOSINOPHIL # BLD AUTO: 0.24 10*3/MM3 (ref 0–0.4)
EOSINOPHIL NFR BLD AUTO: 2.4 % (ref 0.3–6.2)
ERYTHROCYTE [DISTWIDTH] IN BLOOD BY AUTOMATED COUNT: 13.7 % (ref 12.3–15.4)
GFR SERPL CREATININE-BSD FRML MDRD: 84 ML/MIN/1.73
GLOBULIN UR ELPH-MCNC: 2.8 GM/DL
GLUCOSE SERPL-MCNC: 85 MG/DL (ref 65–99)
HBA1C MFR BLD: 6 % (ref 4.8–5.6)
HCT VFR BLD AUTO: 43.7 % (ref 34–46.6)
HDLC SERPL-MCNC: 48 MG/DL (ref 40–60)
HGB BLD-MCNC: 13.7 G/DL (ref 12–15.9)
IMM GRANULOCYTES # BLD AUTO: 0.06 10*3/MM3 (ref 0–0.05)
IMM GRANULOCYTES NFR BLD AUTO: 0.6 % (ref 0–0.5)
LDLC SERPL CALC-MCNC: 58 MG/DL (ref 0–100)
LDLC/HDLC SERPL: 1.22 {RATIO}
LYMPHOCYTES # BLD AUTO: 3.92 10*3/MM3 (ref 0.7–3.1)
LYMPHOCYTES NFR BLD AUTO: 38.9 % (ref 19.6–45.3)
MCH RBC QN AUTO: 28.5 PG (ref 26.6–33)
MCHC RBC AUTO-ENTMCNC: 31.4 G/DL (ref 31.5–35.7)
MCV RBC AUTO: 91 FL (ref 79–97)
MONOCYTES # BLD AUTO: 0.74 10*3/MM3 (ref 0.1–0.9)
MONOCYTES NFR BLD AUTO: 7.3 % (ref 5–12)
NEUTROPHILS NFR BLD AUTO: 5.06 10*3/MM3 (ref 1.7–7)
NEUTROPHILS NFR BLD AUTO: 50.1 % (ref 42.7–76)
NRBC BLD AUTO-RTO: 0 /100 WBC (ref 0–0.2)
PLATELET # BLD AUTO: 193 10*3/MM3 (ref 140–450)
PMV BLD AUTO: 11.9 FL (ref 6–12)
POTASSIUM SERPL-SCNC: 4.6 MMOL/L (ref 3.5–5.2)
PROT SERPL-MCNC: 6.4 G/DL (ref 6–8.5)
RBC # BLD AUTO: 4.8 10*6/MM3 (ref 3.77–5.28)
SODIUM SERPL-SCNC: 143 MMOL/L (ref 136–145)
T4 FREE SERPL-MCNC: 1.66 NG/DL (ref 0.93–1.7)
TRIGL SERPL-MCNC: 73 MG/DL (ref 0–150)
TSH SERPL DL<=0.05 MIU/L-ACNC: 2.34 UIU/ML (ref 0.27–4.2)
VLDLC SERPL-MCNC: 14.6 MG/DL (ref 7–27)
WBC # BLD AUTO: 10.09 10*3/MM3 (ref 3.4–10.8)

## 2020-08-05 PROCEDURE — 80053 COMPREHEN METABOLIC PANEL: CPT | Performed by: FAMILY MEDICINE

## 2020-08-05 PROCEDURE — 84439 ASSAY OF FREE THYROXINE: CPT | Performed by: FAMILY MEDICINE

## 2020-08-05 PROCEDURE — 84443 ASSAY THYROID STIM HORMONE: CPT | Performed by: FAMILY MEDICINE

## 2020-08-05 PROCEDURE — 80177 DRUG SCRN QUAN LEVETIRACETAM: CPT | Performed by: FAMILY MEDICINE

## 2020-08-05 PROCEDURE — 82306 VITAMIN D 25 HYDROXY: CPT | Performed by: FAMILY MEDICINE

## 2020-08-05 PROCEDURE — 83036 HEMOGLOBIN GLYCOSYLATED A1C: CPT | Performed by: FAMILY MEDICINE

## 2020-08-05 PROCEDURE — 80061 LIPID PANEL: CPT | Performed by: FAMILY MEDICINE

## 2020-08-05 PROCEDURE — 85025 COMPLETE CBC W/AUTO DIFF WBC: CPT | Performed by: FAMILY MEDICINE

## 2020-08-07 LAB — LEVETIRACETAM SERPL-MCNC: 16.8 UG/ML (ref 10–40)

## 2020-09-17 ENCOUNTER — APPOINTMENT (OUTPATIENT)
Dept: LAB | Facility: HOSPITAL | Age: 70
End: 2020-09-17

## 2020-09-28 ENCOUNTER — TELEPHONE (OUTPATIENT)
Dept: ONCOLOGY | Facility: CLINIC | Age: 70
End: 2020-09-28

## 2020-09-29 ENCOUNTER — APPOINTMENT (OUTPATIENT)
Dept: LAB | Facility: HOSPITAL | Age: 70
End: 2020-09-29

## 2020-11-17 ENCOUNTER — APPOINTMENT (OUTPATIENT)
Dept: LAB | Facility: HOSPITAL | Age: 70
End: 2020-11-17

## 2020-11-24 ENCOUNTER — LAB REQUISITION (OUTPATIENT)
Dept: LAB | Facility: HOSPITAL | Age: 70
End: 2020-11-24

## 2020-11-24 DIAGNOSIS — D51.8 OTHER VITAMIN B12 DEFICIENCY ANEMIAS: ICD-10-CM

## 2020-11-24 DIAGNOSIS — D52.0 DIETARY FOLATE DEFICIENCY ANEMIA: ICD-10-CM

## 2020-11-24 DIAGNOSIS — R94.6 ABNORMAL RESULTS OF THYROID FUNCTION STUDIES: ICD-10-CM

## 2020-11-24 DIAGNOSIS — E55.9 VITAMIN D DEFICIENCY, UNSPECIFIED: ICD-10-CM

## 2020-11-24 DIAGNOSIS — R53.83 OTHER FATIGUE: ICD-10-CM

## 2020-11-24 DIAGNOSIS — Z79.899 OTHER LONG TERM (CURRENT) DRUG THERAPY: ICD-10-CM

## 2020-11-24 DIAGNOSIS — R73.09 OTHER ABNORMAL GLUCOSE: ICD-10-CM

## 2020-11-24 DIAGNOSIS — R94.5 ABNORMAL RESULTS OF LIVER FUNCTION STUDIES: ICD-10-CM

## 2020-11-24 DIAGNOSIS — R79.89 OTHER SPECIFIED ABNORMAL FINDINGS OF BLOOD CHEMISTRY: ICD-10-CM

## 2020-11-24 DIAGNOSIS — E78.9 DISORDER OF LIPOPROTEIN METABOLISM, UNSPECIFIED: ICD-10-CM

## 2020-11-24 LAB
ALBUMIN SERPL-MCNC: 4 G/DL (ref 3.5–5.2)
ALBUMIN/GLOB SERPL: 1.6 G/DL
ALP SERPL-CCNC: 55 U/L (ref 39–117)
ALT SERPL W P-5'-P-CCNC: 32 U/L (ref 1–33)
ANION GAP SERPL CALCULATED.3IONS-SCNC: 8.4 MMOL/L (ref 5–15)
AST SERPL-CCNC: 34 U/L (ref 1–32)
BASOPHILS # BLD AUTO: 0.09 10*3/MM3 (ref 0–0.2)
BASOPHILS NFR BLD AUTO: 0.9 % (ref 0–1.5)
BILIRUB SERPL-MCNC: 0.3 MG/DL (ref 0–1.2)
BUN SERPL-MCNC: 20 MG/DL (ref 8–23)
BUN/CREAT SERPL: 25 (ref 7–25)
CALCIUM SPEC-SCNC: 9.5 MG/DL (ref 8.6–10.5)
CHLORIDE SERPL-SCNC: 105 MMOL/L (ref 98–107)
CHOLEST SERPL-MCNC: 128 MG/DL (ref 0–200)
CO2 SERPL-SCNC: 28.6 MMOL/L (ref 22–29)
CREAT SERPL-MCNC: 0.8 MG/DL (ref 0.57–1)
DEPRECATED RDW RBC AUTO: 46.2 FL (ref 37–54)
EOSINOPHIL # BLD AUTO: 0.2 10*3/MM3 (ref 0–0.4)
EOSINOPHIL NFR BLD AUTO: 2 % (ref 0.3–6.2)
ERYTHROCYTE [DISTWIDTH] IN BLOOD BY AUTOMATED COUNT: 13.5 % (ref 12.3–15.4)
FOLATE SERPL-MCNC: >20 NG/ML (ref 4.78–24.2)
GFR SERPL CREATININE-BSD FRML MDRD: 71 ML/MIN/1.73
GLOBULIN UR ELPH-MCNC: 2.5 GM/DL
GLUCOSE SERPL-MCNC: 96 MG/DL (ref 65–99)
HBA1C MFR BLD: 6.1 % (ref 4.8–5.6)
HCT VFR BLD AUTO: 43.8 % (ref 34–46.6)
HDLC SERPL-MCNC: 59 MG/DL (ref 40–60)
HGB BLD-MCNC: 13.8 G/DL (ref 12–15.9)
IMM GRANULOCYTES # BLD AUTO: 0.02 10*3/MM3 (ref 0–0.05)
IMM GRANULOCYTES NFR BLD AUTO: 0.2 % (ref 0–0.5)
LDLC SERPL CALC-MCNC: 56 MG/DL (ref 0–100)
LDLC/HDLC SERPL: 0.95 {RATIO}
LYMPHOCYTES # BLD AUTO: 4.33 10*3/MM3 (ref 0.7–3.1)
LYMPHOCYTES NFR BLD AUTO: 43.9 % (ref 19.6–45.3)
MAGNESIUM SERPL-MCNC: 2.1 MG/DL (ref 1.6–2.4)
MCH RBC QN AUTO: 29.1 PG (ref 26.6–33)
MCHC RBC AUTO-ENTMCNC: 31.5 G/DL (ref 31.5–35.7)
MCV RBC AUTO: 92.2 FL (ref 79–97)
MONOCYTES # BLD AUTO: 0.84 10*3/MM3 (ref 0.1–0.9)
MONOCYTES NFR BLD AUTO: 8.5 % (ref 5–12)
NEUTROPHILS NFR BLD AUTO: 4.39 10*3/MM3 (ref 1.7–7)
NEUTROPHILS NFR BLD AUTO: 44.5 % (ref 42.7–76)
NRBC BLD AUTO-RTO: 0 /100 WBC (ref 0–0.2)
PHOSPHATE SERPL-MCNC: 3 MG/DL (ref 2.5–4.5)
PLATELET # BLD AUTO: 173 10*3/MM3 (ref 140–450)
PMV BLD AUTO: 11.2 FL (ref 6–12)
POTASSIUM SERPL-SCNC: 4.1 MMOL/L (ref 3.5–5.2)
PROT SERPL-MCNC: 6.5 G/DL (ref 6–8.5)
RBC # BLD AUTO: 4.75 10*6/MM3 (ref 3.77–5.28)
SODIUM SERPL-SCNC: 142 MMOL/L (ref 136–145)
T4 FREE SERPL-MCNC: 1.74 NG/DL (ref 0.93–1.7)
TRIGL SERPL-MCNC: 64 MG/DL (ref 0–150)
TSH SERPL DL<=0.05 MIU/L-ACNC: 2.43 UIU/ML (ref 0.27–4.2)
VIT B12 BLD-MCNC: 586 PG/ML (ref 211–946)
VLDLC SERPL-MCNC: 13 MG/DL (ref 5–40)
WBC # BLD AUTO: 9.87 10*3/MM3 (ref 3.4–10.8)

## 2020-11-24 PROCEDURE — 80061 LIPID PANEL: CPT | Performed by: FAMILY MEDICINE

## 2020-11-24 PROCEDURE — 82306 VITAMIN D 25 HYDROXY: CPT | Performed by: FAMILY MEDICINE

## 2020-11-24 PROCEDURE — 83735 ASSAY OF MAGNESIUM: CPT | Performed by: FAMILY MEDICINE

## 2020-11-24 PROCEDURE — 80053 COMPREHEN METABOLIC PANEL: CPT | Performed by: FAMILY MEDICINE

## 2020-11-24 PROCEDURE — 82746 ASSAY OF FOLIC ACID SERUM: CPT | Performed by: FAMILY MEDICINE

## 2020-11-24 PROCEDURE — 84443 ASSAY THYROID STIM HORMONE: CPT | Performed by: FAMILY MEDICINE

## 2020-11-24 PROCEDURE — 85025 COMPLETE CBC W/AUTO DIFF WBC: CPT | Performed by: FAMILY MEDICINE

## 2020-11-24 PROCEDURE — 82607 VITAMIN B-12: CPT | Performed by: FAMILY MEDICINE

## 2020-11-24 PROCEDURE — 84100 ASSAY OF PHOSPHORUS: CPT | Performed by: FAMILY MEDICINE

## 2020-11-24 PROCEDURE — 83036 HEMOGLOBIN GLYCOSYLATED A1C: CPT | Performed by: FAMILY MEDICINE

## 2020-11-24 PROCEDURE — 84439 ASSAY OF FREE THYROXINE: CPT | Performed by: FAMILY MEDICINE

## 2020-11-25 LAB — 25(OH)D3 SERPL-MCNC: 40.9 NG/ML (ref 30–100)

## 2020-12-11 ENCOUNTER — TELEPHONE (OUTPATIENT)
Dept: ONCOLOGY | Facility: CLINIC | Age: 70
End: 2020-12-11

## 2020-12-17 ENCOUNTER — APPOINTMENT (OUTPATIENT)
Dept: LAB | Facility: HOSPITAL | Age: 70
End: 2020-12-17

## 2020-12-31 ENCOUNTER — LAB REQUISITION (OUTPATIENT)
Dept: LAB | Facility: HOSPITAL | Age: 70
End: 2020-12-31

## 2020-12-31 DIAGNOSIS — R94.6 ABNORMAL RESULTS OF THYROID FUNCTION STUDIES: ICD-10-CM

## 2020-12-31 DIAGNOSIS — R79.89 OTHER SPECIFIED ABNORMAL FINDINGS OF BLOOD CHEMISTRY: ICD-10-CM

## 2020-12-31 DIAGNOSIS — D52.0 DIETARY FOLATE DEFICIENCY ANEMIA: ICD-10-CM

## 2020-12-31 DIAGNOSIS — R53.83 OTHER FATIGUE: ICD-10-CM

## 2020-12-31 DIAGNOSIS — Z79.899 OTHER LONG TERM (CURRENT) DRUG THERAPY: ICD-10-CM

## 2020-12-31 DIAGNOSIS — R73.09 OTHER ABNORMAL GLUCOSE: ICD-10-CM

## 2020-12-31 DIAGNOSIS — E55.9 VITAMIN D DEFICIENCY, UNSPECIFIED: ICD-10-CM

## 2020-12-31 DIAGNOSIS — E78.9 DISORDER OF LIPOPROTEIN METABOLISM, UNSPECIFIED: ICD-10-CM

## 2020-12-31 DIAGNOSIS — R94.5 ABNORMAL RESULTS OF LIVER FUNCTION STUDIES: ICD-10-CM

## 2020-12-31 DIAGNOSIS — D51.8 OTHER VITAMIN B12 DEFICIENCY ANEMIAS: ICD-10-CM

## 2020-12-31 DIAGNOSIS — D64.9 ANEMIA, UNSPECIFIED: ICD-10-CM

## 2020-12-31 LAB
25(OH)D3 SERPL-MCNC: 45.5 NG/ML (ref 30–100)
ALBUMIN SERPL-MCNC: 3.7 G/DL (ref 3.5–5.2)
ALBUMIN/GLOB SERPL: 1.4 G/DL
ALP SERPL-CCNC: 61 U/L (ref 39–117)
ALT SERPL W P-5'-P-CCNC: 35 U/L (ref 1–33)
ANION GAP SERPL CALCULATED.3IONS-SCNC: 7.6 MMOL/L (ref 5–15)
AST SERPL-CCNC: 28 U/L (ref 1–32)
BILIRUB SERPL-MCNC: 0.2 MG/DL (ref 0–1.2)
BUN SERPL-MCNC: 25 MG/DL (ref 8–23)
BUN/CREAT SERPL: 33.8 (ref 7–25)
CALCIUM SPEC-SCNC: 9.2 MG/DL (ref 8.6–10.5)
CHLORIDE SERPL-SCNC: 104 MMOL/L (ref 98–107)
CHOLEST SERPL-MCNC: 122 MG/DL (ref 0–200)
CO2 SERPL-SCNC: 30.4 MMOL/L (ref 22–29)
CREAT SERPL-MCNC: 0.74 MG/DL (ref 0.57–1)
FOLATE SERPL-MCNC: >20 NG/ML (ref 4.78–24.2)
GFR SERPL CREATININE-BSD FRML MDRD: 78 ML/MIN/1.73
GLOBULIN UR ELPH-MCNC: 2.7 GM/DL
GLUCOSE SERPL-MCNC: 72 MG/DL (ref 65–99)
HBA1C MFR BLD: 5.9 % (ref 4.8–5.6)
HDLC SERPL-MCNC: 59 MG/DL (ref 40–60)
LDLC SERPL CALC-MCNC: 50 MG/DL (ref 0–100)
LDLC/HDLC SERPL: 0.87 {RATIO}
POTASSIUM SERPL-SCNC: 3.9 MMOL/L (ref 3.5–5.2)
PROT SERPL-MCNC: 6.4 G/DL (ref 6–8.5)
SODIUM SERPL-SCNC: 142 MMOL/L (ref 136–145)
T4 FREE SERPL-MCNC: 1.63 NG/DL (ref 0.93–1.7)
TRIGL SERPL-MCNC: 58 MG/DL (ref 0–150)
TSH SERPL DL<=0.05 MIU/L-ACNC: 2.9 UIU/ML (ref 0.27–4.2)
VIT B12 BLD-MCNC: 504 PG/ML (ref 211–946)
VLDLC SERPL-MCNC: 13 MG/DL (ref 5–40)

## 2020-12-31 PROCEDURE — 80053 COMPREHEN METABOLIC PANEL: CPT | Performed by: FAMILY MEDICINE

## 2020-12-31 PROCEDURE — 83036 HEMOGLOBIN GLYCOSYLATED A1C: CPT | Performed by: FAMILY MEDICINE

## 2020-12-31 PROCEDURE — 82306 VITAMIN D 25 HYDROXY: CPT | Performed by: FAMILY MEDICINE

## 2020-12-31 PROCEDURE — 84443 ASSAY THYROID STIM HORMONE: CPT | Performed by: FAMILY MEDICINE

## 2020-12-31 PROCEDURE — 80061 LIPID PANEL: CPT | Performed by: FAMILY MEDICINE

## 2020-12-31 PROCEDURE — 82607 VITAMIN B-12: CPT | Performed by: FAMILY MEDICINE

## 2020-12-31 PROCEDURE — 82746 ASSAY OF FOLIC ACID SERUM: CPT | Performed by: FAMILY MEDICINE

## 2020-12-31 PROCEDURE — 84439 ASSAY OF FREE THYROXINE: CPT | Performed by: FAMILY MEDICINE

## 2021-01-12 ENCOUNTER — CONSULT (OUTPATIENT)
Dept: ONCOLOGY | Facility: CLINIC | Age: 71
End: 2021-01-12

## 2021-01-12 ENCOUNTER — LAB (OUTPATIENT)
Dept: LAB | Facility: HOSPITAL | Age: 71
End: 2021-01-12

## 2021-01-12 VITALS
OXYGEN SATURATION: 95 % | RESPIRATION RATE: 16 BRPM | TEMPERATURE: 98 F | DIASTOLIC BLOOD PRESSURE: 69 MMHG | SYSTOLIC BLOOD PRESSURE: 106 MMHG | HEART RATE: 73 BPM

## 2021-01-12 DIAGNOSIS — D72.820 LYMPHOCYTOSIS: Primary | ICD-10-CM

## 2021-01-12 DIAGNOSIS — R79.89 OTHER SPECIFIED ABNORMAL FINDINGS OF BLOOD CHEMISTRY: Primary | ICD-10-CM

## 2021-01-12 LAB
BASOPHILS # BLD AUTO: 0.12 10*3/MM3 (ref 0–0.2)
BASOPHILS NFR BLD AUTO: 1.2 % (ref 0–1.5)
DEPRECATED RDW RBC AUTO: 46.3 FL (ref 37–54)
EOSINOPHIL # BLD AUTO: 0.28 10*3/MM3 (ref 0–0.4)
EOSINOPHIL NFR BLD AUTO: 2.9 % (ref 0.3–6.2)
ERYTHROCYTE [DISTWIDTH] IN BLOOD BY AUTOMATED COUNT: 13.7 % (ref 12.3–15.4)
HCT VFR BLD AUTO: 46.2 % (ref 34–46.6)
HGB BLD-MCNC: 14.9 G/DL (ref 12–15.9)
IMM GRANULOCYTES # BLD AUTO: 0.05 10*3/MM3 (ref 0–0.05)
IMM GRANULOCYTES NFR BLD AUTO: 0.5 % (ref 0–0.5)
LYMPHOCYTES # BLD AUTO: 3.6 10*3/MM3 (ref 0.7–3.1)
LYMPHOCYTES NFR BLD AUTO: 37 % (ref 19.6–45.3)
MCH RBC QN AUTO: 29.5 PG (ref 26.6–33)
MCHC RBC AUTO-ENTMCNC: 32.3 G/DL (ref 31.5–35.7)
MCV RBC AUTO: 91.5 FL (ref 79–97)
MONOCYTES # BLD AUTO: 0.7 10*3/MM3 (ref 0.1–0.9)
MONOCYTES NFR BLD AUTO: 7.2 % (ref 5–12)
NEUTROPHILS NFR BLD AUTO: 4.97 10*3/MM3 (ref 1.7–7)
NEUTROPHILS NFR BLD AUTO: 51.2 % (ref 42.7–76)
NRBC BLD AUTO-RTO: 0 /100 WBC (ref 0–0.2)
PLATELET # BLD AUTO: 204 10*3/MM3 (ref 140–450)
PMV BLD AUTO: 10.7 FL (ref 6–12)
RBC # BLD AUTO: 5.05 10*6/MM3 (ref 3.77–5.28)
WBC # BLD AUTO: 9.72 10*3/MM3 (ref 3.4–10.8)

## 2021-01-12 PROCEDURE — 85025 COMPLETE CBC W/AUTO DIFF WBC: CPT

## 2021-01-12 PROCEDURE — 99203 OFFICE O/P NEW LOW 30 MIN: CPT | Performed by: INTERNAL MEDICINE

## 2021-01-12 PROCEDURE — 36415 COLL VENOUS BLD VENIPUNCTURE: CPT

## 2021-01-12 NOTE — PROGRESS NOTES
Subjective     REASON FOR CONSULTATION:  lymphocytosis  Provide an opinion on any further workup or treatment                             REQUESTING PHYSICIAN:  Dr. Merino    RECORDS OBTAINED:  Records of the patients history including those obtained from the referring provider were reviewed and summarized in detail.    HISTORY OF PRESENT ILLNESS:  The patient is a 70 y.o. year old female who is here for an opinion about the above issue.    History of Present Illness   This is a pleasant 70-year-old lady with significant past medical history outlined below who is handicapped and a resident at [facility].  She is sent for evaluation of lymphocytosis.  The patient is not able to provide additional history.  The patient's caregiver who accompanies her reports no recent weight loss or obvious night sweats.    Labs reviewed since 2016 show fairly persistent mild lymphocytosis with an absolute lymphocyte count typically ranging between [address].  She has had no recent significant anemia or thrombocytopenia present.  Her current absolute lymphocyte count is 3.6 with a hemoglobin 14.9 and normal platelets 204.    Past Medical History:   Diagnosis Date   • Anemia due to blood loss    • Anxiety    • Arthritis    • Duodenal stricture    • Duodenal ulcer    • Dysphagia    • Gallstones    • H/O convulsions    • Hypercholesteremia    • Hypothyroidism    • Major depression    • Moderate intellectual disability    • OCD (obsessive compulsive disorder)    • Ocular histoplasmosis     w/macular scars   • Osteopenia    • Osteopenia    • Ptosis    • Pure hyperglyceridemia    • S/P bypass gastrojejunostomy    • Seasonal allergies    • Seizure disorder (CMS/HCC)    • Unspecified dementia without behavioral disturbance (CMS/HCC)    • Ventral hernia     sched repair   • Vitamin D deficiency         Past Surgical History:   Procedure Laterality Date   • CATARACT EXTRACTION Bilateral 2016   • CHOLECYSTECTOMY OPEN  03/2017   •  COLONOSCOPY  05/2009   • ENDOSCOPY  03/2017   • GASTROJEJUNOSTOMY  08/2016   • GASTROSTOMY      TEJINDER   • HYSTERECTOMY     • VENTRAL/INCISIONAL HERNIA REPAIR N/A 8/15/2017    Procedure: Lysis of adhesion and repair of recurrent incisional hernia with onlay mesh placement ;  Surgeon: Donna Ivy MD;  Location: [facility];  Service:         Current Outpatient Medications on File Prior to Visit   Medication Sig Dispense Refill   • acetaminophen (TYLENOL) 500 MG tablet Take 1,000 mg by mouth Every 6 (Six) Hours As Needed for Mild Pain (1-3) or Fever.     • Albuterol (VENTOLIN IN) Inhale 2 puffs Every 4 (Four) Hours As Needed (wheezing/coughing/sob).     • atorvastatin (LIPITOR) 20 MG tablet Take 20 mg by mouth Every Night.     • brimonidine-timolol (COMBIGAN) 0.2-0.5 % ophthalmic solution Administer 1 drop to both eyes Every 12 (Twelve) Hours.     • busPIRone (BUSPAR) 10 MG tablet Take 10 mg by mouth 2 (Two) Times a Day.     • calcium carbonate (OS-KATY) 600 MG tablet Take 600 mg by mouth 3 (Three) Times a Day With Meals.     • carvedilol (COREG) 6.25 MG tablet Take 6.25 mg by mouth 2 (Two) Times a Day With Meals.     • cetirizine (zyrTEC) 10 MG tablet Take 10 mg by mouth Daily.     • denosumab (PROLIA) 60 MG/ML solution syringe Inject 60 mg under the skin Take As Directed. Every 6 months, due December     • dextromethorphan polistirex ER (DELSYM) 30 MG/5ML Suspension Extended Release oral suspension Take 10 mL by mouth 2 (Two) Times a Day As Needed (cough).     • docusate sodium (COLACE) 100 MG capsule Take 100 mg by mouth 2 (Two) Times a Day.     • Ergocalciferol (VITAMIN D2 PO) Take 50,000 Units by mouth Every 7 (Seven) Days.     • fluticasone (FLONASE) 50 MCG/ACT nasal spray 2 sprays into each nostril Daily.     • glucosamine-chondroitin 500-400 MG capsule capsule Take 3 capsules by mouth Daily.     • guaiFENesin (MUCINEX) 600 MG 12 hr tablet Take 1,200 mg by mouth 2 (Two) Times a Day As Needed for Congestion.      • levETIRAcetam (KEPPRA) 500 MG tablet Take 500 mg by mouth 2 (Two) Times a Day.     • levothyroxine (SYNTHROID, LEVOTHROID) 50 MCG tablet Take 50 mcg by mouth Daily.     • magnesium hydroxide (MILK OF MAGNESIA) 400 MG/5ML suspension Take 30 mL by mouth As Needed for Constipation (no bm x 3 days).     • montelukast (SINGULAIR) 10 MG tablet Take 10 mg by mouth Every Night.     • Multiple Vitamins-Minerals (MULTIVITAMIN ADULT PO) Take 1 tablet by mouth Daily.     • Nutritional Supplements (RESOURCE ARGINAID) pack Take  by mouth 2 (Two) Times a Day.     • ondansetron (ZOFRAN) 4 MG tablet Take 1 tablet by mouth Every 6 (Six) Hours As Needed for Nausea or Vomiting. 20 tablet 0   • pantoprazole (PROTONIX) 40 MG EC tablet Take 40 mg by mouth 2 (Two) Times a Day.     • Probiotic Product (PROBIOTIC DAILY PO) Take 1 capsule by mouth Daily.     • cephalexin (KEFLEX) 500 MG capsule Take 1 capsule by mouth 3 (Three) Times a Day. 21 capsule 0   • oxyCODONE-acetaminophen (PERCOCET) 5-325 MG per tablet Take 1 tablet by mouth Every 6 (Six) Hours As Needed (as needed for pain). 30 tablet 0   • [DISCONTINUED] ferrous sulfate 325 (65 FE) MG tablet Take 325 mg by mouth Daily With Breakfast.     • [DISCONTINUED] PARoxetine (PAXIL) 40 MG tablet Take 40 mg by mouth Every Morning.     • [DISCONTINUED] polyethylene glycol (MIRALAX) packet Take 17 g by mouth Daily.     • [DISCONTINUED] QUEtiapine (SEROquel) 100 MG tablet Take 100 mg by mouth Every Night.     • [DISCONTINUED] solifenacin (VESICARE) 10 MG tablet Take 10 mg by mouth Daily.       No current facility-administered medications on file prior to visit.         ALLERGIES:    Allergies   Allergen Reactions   • Codeine Diarrhea and Nausea And Vomiting        Social History     Socioeconomic History   • Marital status: Unknown     Spouse name: Not on file   • Number of children: Not on file   • Years of education: Not on file   • Highest education level: Not on file   Tobacco Use   •  Smoking status: Never Smoker   • Smokeless tobacco: Never Used   Substance and Sexual Activity   • Alcohol use: No   • Drug use: No   • Sexual activity: Defer        Family History   Family history unknown: Yes        Review of Systems   Constitutional: Negative.    Neurological: Negative for weakness.   Hematological: Negative for adenopathy.   Psychiatric/Behavioral: Positive for agitation, behavioral problems, confusion and dysphoric mood.        Objective     Vitals:    01/12/21 1410   BP: 106/69   Pulse: 73   Resp: 16   Temp: 98 °F (36.7 °C)   TempSrc: Temporal   SpO2: 95%   PainSc: 0-No pain     Current Status 1/12/2021   ECOG score 1       Physical Exam    CONSTITUTIONAL: pleasant adult handicapped woman  LYMPH: no cervical or supraclavicular lad  CV: RRR, S1S2, no murmur  RESP: cta bilat, no wheezing, no rales  GI: soft, non-tender, no splenomegaly but somewhat limited by habitus, +bs      RECENT LABS:  Hematology WBC   Date Value Ref Range Status   01/12/2021 9.72 3.40 - 10.80 10*3/mm3 Final     RBC   Date Value Ref Range Status   01/12/2021 5.05 3.77 - 5.28 10*6/mm3 Final     Hemoglobin   Date Value Ref Range Status   01/12/2021 14.9 12.0 - 15.9 g/dL Final     Hematocrit   Date Value Ref Range Status   01/12/2021 46.2 34.0 - 46.6 % Final     Platelets   Date Value Ref Range Status   01/12/2021 204 140 - 450 10*3/mm3 Final          Assessment/Plan     This is a 70-year-old lady with somewhat persistent but stable mild lymphocytosis with an absolute lymphocyte count currently 3.6 which has been present it appears since at least 2016 according to records available on epic.  She has no significant anemia or thrombocytopenia.  She has no obviously palpable lymphadenopathy or splenomegaly on exam.  Her caregiver reports no clear constitutional symptoms such as weight loss or night sweats.  She is having no fevers or chills to suggest infection.    I favor the patient's lymphocytosis being a reactive process or  potentially a medication side effect.  At this time I think the patient can continue to be safely observed.  I would recommend a CBC with differential to be done at [facility] every 6 months.  If the absolute lymphocyte count elevates persistently above 5000, she develops anemia, thrombocytopenia, lymphadenopathy, weight loss etc. she should be seen back in hematology for evaluation of a lymphoproliferative disorder.

## 2021-01-14 ENCOUNTER — LAB REQUISITION (OUTPATIENT)
Dept: LAB | Facility: HOSPITAL | Age: 71
End: 2021-01-14

## 2021-01-14 DIAGNOSIS — R94.6 ABNORMAL RESULTS OF THYROID FUNCTION STUDIES: ICD-10-CM

## 2021-01-14 DIAGNOSIS — E55.9 VITAMIN D DEFICIENCY, UNSPECIFIED: ICD-10-CM

## 2021-01-14 DIAGNOSIS — R73.09 OTHER ABNORMAL GLUCOSE: ICD-10-CM

## 2021-01-14 DIAGNOSIS — R53.83 OTHER FATIGUE: ICD-10-CM

## 2021-01-14 DIAGNOSIS — G40.89 OTHER SEIZURES (HCC): ICD-10-CM

## 2021-01-14 DIAGNOSIS — Z79.899 OTHER LONG TERM (CURRENT) DRUG THERAPY: ICD-10-CM

## 2021-01-14 DIAGNOSIS — D52.0 DIETARY FOLATE DEFICIENCY ANEMIA: ICD-10-CM

## 2021-01-14 DIAGNOSIS — R79.89 OTHER SPECIFIED ABNORMAL FINDINGS OF BLOOD CHEMISTRY: ICD-10-CM

## 2021-01-14 DIAGNOSIS — R89.9 UNSPECIFIED ABNORMAL FINDING IN SPECIMENS FROM OTHER ORGANS, SYSTEMS AND TISSUES: ICD-10-CM

## 2021-01-14 DIAGNOSIS — D51.8 OTHER VITAMIN B12 DEFICIENCY ANEMIAS: ICD-10-CM

## 2021-01-14 LAB
ALBUMIN SERPL-MCNC: 3.8 G/DL (ref 3.5–5.2)
ALBUMIN/GLOB SERPL: 1.5 G/DL
ALP SERPL-CCNC: 61 U/L (ref 39–117)
ALT SERPL W P-5'-P-CCNC: 27 U/L (ref 1–33)
ANION GAP SERPL CALCULATED.3IONS-SCNC: 6.9 MMOL/L (ref 5–15)
AST SERPL-CCNC: 27 U/L (ref 1–32)
BASOPHILS # BLD AUTO: 0.07 10*3/MM3 (ref 0–0.2)
BASOPHILS NFR BLD AUTO: 0.8 % (ref 0–1.5)
BILIRUB SERPL-MCNC: 0.2 MG/DL (ref 0–1.2)
BUN SERPL-MCNC: 19 MG/DL (ref 8–23)
BUN/CREAT SERPL: 27.1 (ref 7–25)
CALCIUM SPEC-SCNC: 8.7 MG/DL (ref 8.6–10.5)
CHLORIDE SERPL-SCNC: 106 MMOL/L (ref 98–107)
CHOLEST SERPL-MCNC: 109 MG/DL (ref 0–200)
CO2 SERPL-SCNC: 31.1 MMOL/L (ref 22–29)
CREAT SERPL-MCNC: 0.7 MG/DL (ref 0.57–1)
DEPRECATED RDW RBC AUTO: 46.6 FL (ref 37–54)
EOSINOPHIL # BLD AUTO: 0.22 10*3/MM3 (ref 0–0.4)
EOSINOPHIL NFR BLD AUTO: 2.5 % (ref 0.3–6.2)
ERYTHROCYTE [DISTWIDTH] IN BLOOD BY AUTOMATED COUNT: 13.7 % (ref 12.3–15.4)
FOLATE SERPL-MCNC: >20 NG/ML (ref 4.78–24.2)
GFR SERPL CREATININE-BSD FRML MDRD: 83 ML/MIN/1.73
GLOBULIN UR ELPH-MCNC: 2.5 GM/DL
GLUCOSE SERPL-MCNC: 79 MG/DL (ref 65–99)
HBA1C MFR BLD: 6 % (ref 4.8–5.6)
HCT VFR BLD AUTO: 43.4 % (ref 34–46.6)
HDLC SERPL-MCNC: 54 MG/DL (ref 40–60)
HGB BLD-MCNC: 13.8 G/DL (ref 12–15.9)
IMM GRANULOCYTES # BLD AUTO: 0.04 10*3/MM3 (ref 0–0.05)
IMM GRANULOCYTES NFR BLD AUTO: 0.4 % (ref 0–0.5)
LDLC SERPL CALC-MCNC: 42 MG/DL (ref 0–100)
LDLC/HDLC SERPL: 0.82 {RATIO}
LYMPHOCYTES # BLD AUTO: 4.03 10*3/MM3 (ref 0.7–3.1)
LYMPHOCYTES NFR BLD AUTO: 45 % (ref 19.6–45.3)
MCH RBC QN AUTO: 29.5 PG (ref 26.6–33)
MCHC RBC AUTO-ENTMCNC: 31.8 G/DL (ref 31.5–35.7)
MCV RBC AUTO: 92.7 FL (ref 79–97)
MONOCYTES # BLD AUTO: 0.76 10*3/MM3 (ref 0.1–0.9)
MONOCYTES NFR BLD AUTO: 8.5 % (ref 5–12)
NEUTROPHILS NFR BLD AUTO: 3.83 10*3/MM3 (ref 1.7–7)
NEUTROPHILS NFR BLD AUTO: 42.8 % (ref 42.7–76)
NRBC BLD AUTO-RTO: 0 /100 WBC (ref 0–0.2)
PLATELET # BLD AUTO: 199 10*3/MM3 (ref 140–450)
PMV BLD AUTO: 11.7 FL (ref 6–12)
POTASSIUM SERPL-SCNC: 4.1 MMOL/L (ref 3.5–5.2)
PROT SERPL-MCNC: 6.3 G/DL (ref 6–8.5)
RBC # BLD AUTO: 4.68 10*6/MM3 (ref 3.77–5.28)
SODIUM SERPL-SCNC: 144 MMOL/L (ref 136–145)
T4 FREE SERPL-MCNC: 1.58 NG/DL (ref 0.93–1.7)
TRIGL SERPL-MCNC: 54 MG/DL (ref 0–150)
TSH SERPL DL<=0.05 MIU/L-ACNC: 1.71 UIU/ML (ref 0.27–4.2)
VIT B12 BLD-MCNC: 554 PG/ML (ref 211–946)
VLDLC SERPL-MCNC: 13 MG/DL (ref 5–40)
WBC # BLD AUTO: 8.95 10*3/MM3 (ref 3.4–10.8)

## 2021-01-14 PROCEDURE — 80053 COMPREHEN METABOLIC PANEL: CPT | Performed by: FAMILY MEDICINE

## 2021-01-14 PROCEDURE — 84443 ASSAY THYROID STIM HORMONE: CPT | Performed by: FAMILY MEDICINE

## 2021-01-14 PROCEDURE — 82607 VITAMIN B-12: CPT | Performed by: FAMILY MEDICINE

## 2021-01-14 PROCEDURE — 80177 DRUG SCRN QUAN LEVETIRACETAM: CPT | Performed by: FAMILY MEDICINE

## 2021-01-14 PROCEDURE — 80061 LIPID PANEL: CPT | Performed by: FAMILY MEDICINE

## 2021-01-14 PROCEDURE — 84439 ASSAY OF FREE THYROXINE: CPT | Performed by: FAMILY MEDICINE

## 2021-01-14 PROCEDURE — 85025 COMPLETE CBC W/AUTO DIFF WBC: CPT | Performed by: FAMILY MEDICINE

## 2021-01-14 PROCEDURE — 82746 ASSAY OF FOLIC ACID SERUM: CPT | Performed by: FAMILY MEDICINE

## 2021-01-14 PROCEDURE — 83036 HEMOGLOBIN GLYCOSYLATED A1C: CPT | Performed by: FAMILY MEDICINE

## 2021-01-17 LAB — LEVETIRACETAM SERPL-MCNC: 19.7 UG/ML (ref 10–40)

## 2021-02-04 ENCOUNTER — LAB REQUISITION (OUTPATIENT)
Dept: LAB | Facility: HOSPITAL | Age: 71
End: 2021-02-04

## 2021-02-04 DIAGNOSIS — R56.9 UNSPECIFIED CONVULSIONS (HCC): ICD-10-CM

## 2021-02-04 DIAGNOSIS — Z79.899 OTHER LONG TERM (CURRENT) DRUG THERAPY: ICD-10-CM

## 2021-02-04 DIAGNOSIS — D52.0 DIETARY FOLATE DEFICIENCY ANEMIA: ICD-10-CM

## 2021-02-04 DIAGNOSIS — E78.9 DISORDER OF LIPOPROTEIN METABOLISM, UNSPECIFIED: ICD-10-CM

## 2021-02-04 DIAGNOSIS — D51.8 OTHER VITAMIN B12 DEFICIENCY ANEMIAS: ICD-10-CM

## 2021-02-04 DIAGNOSIS — R73.09 OTHER ABNORMAL GLUCOSE: ICD-10-CM

## 2021-02-04 DIAGNOSIS — E55.9 VITAMIN D DEFICIENCY, UNSPECIFIED: ICD-10-CM

## 2021-02-04 DIAGNOSIS — R79.89 OTHER SPECIFIED ABNORMAL FINDINGS OF BLOOD CHEMISTRY: ICD-10-CM

## 2021-02-04 DIAGNOSIS — R53.83 OTHER FATIGUE: ICD-10-CM

## 2021-02-04 DIAGNOSIS — R94.6 ABNORMAL RESULTS OF THYROID FUNCTION STUDIES: ICD-10-CM

## 2021-02-04 LAB
25(OH)D3 SERPL-MCNC: 41.1 NG/ML (ref 30–100)
ALBUMIN SERPL-MCNC: 3.5 G/DL (ref 3.5–5.2)
ALBUMIN/GLOB SERPL: 1.5 G/DL
ALP SERPL-CCNC: 62 U/L (ref 39–117)
ALT SERPL W P-5'-P-CCNC: 26 U/L (ref 1–33)
ANION GAP SERPL CALCULATED.3IONS-SCNC: 5.9 MMOL/L (ref 5–15)
AST SERPL-CCNC: 27 U/L (ref 1–32)
BASOPHILS # BLD AUTO: 0.09 10*3/MM3 (ref 0–0.2)
BASOPHILS NFR BLD AUTO: 1 % (ref 0–1.5)
BILIRUB SERPL-MCNC: 0.2 MG/DL (ref 0–1.2)
BUN SERPL-MCNC: 17 MG/DL (ref 8–23)
BUN/CREAT SERPL: 23.3 (ref 7–25)
CALCIUM SPEC-SCNC: 9.1 MG/DL (ref 8.6–10.5)
CHLORIDE SERPL-SCNC: 106 MMOL/L (ref 98–107)
CHOLEST SERPL-MCNC: 124 MG/DL (ref 0–200)
CO2 SERPL-SCNC: 30.1 MMOL/L (ref 22–29)
CREAT SERPL-MCNC: 0.73 MG/DL (ref 0.57–1)
DEPRECATED RDW RBC AUTO: 46.7 FL (ref 37–54)
EOSINOPHIL # BLD AUTO: 0.32 10*3/MM3 (ref 0–0.4)
EOSINOPHIL NFR BLD AUTO: 3.4 % (ref 0.3–6.2)
ERYTHROCYTE [DISTWIDTH] IN BLOOD BY AUTOMATED COUNT: 13.6 % (ref 12.3–15.4)
FOLATE SERPL-MCNC: >20 NG/ML (ref 4.78–24.2)
GFR SERPL CREATININE-BSD FRML MDRD: 79 ML/MIN/1.73
GLOBULIN UR ELPH-MCNC: 2.4 GM/DL
GLUCOSE SERPL-MCNC: 80 MG/DL (ref 65–99)
HBA1C MFR BLD: 6 % (ref 4.8–5.6)
HCT VFR BLD AUTO: 42.5 % (ref 34–46.6)
HDLC SERPL-MCNC: 51 MG/DL (ref 40–60)
HGB BLD-MCNC: 13.5 G/DL (ref 12–15.9)
IMM GRANULOCYTES # BLD AUTO: 0.02 10*3/MM3 (ref 0–0.05)
IMM GRANULOCYTES NFR BLD AUTO: 0.2 % (ref 0–0.5)
LDLC SERPL CALC-MCNC: 52 MG/DL (ref 0–100)
LDLC/HDLC SERPL: 0.96 {RATIO}
LYMPHOCYTES # BLD AUTO: 4.68 10*3/MM3 (ref 0.7–3.1)
LYMPHOCYTES NFR BLD AUTO: 49.9 % (ref 19.6–45.3)
MCH RBC QN AUTO: 29.6 PG (ref 26.6–33)
MCHC RBC AUTO-ENTMCNC: 31.8 G/DL (ref 31.5–35.7)
MCV RBC AUTO: 93.2 FL (ref 79–97)
MONOCYTES # BLD AUTO: 0.79 10*3/MM3 (ref 0.1–0.9)
MONOCYTES NFR BLD AUTO: 8.4 % (ref 5–12)
NEUTROPHILS NFR BLD AUTO: 3.47 10*3/MM3 (ref 1.7–7)
NEUTROPHILS NFR BLD AUTO: 37.1 % (ref 42.7–76)
NRBC BLD AUTO-RTO: 0 /100 WBC (ref 0–0.2)
PLATELET # BLD AUTO: 211 10*3/MM3 (ref 140–450)
PMV BLD AUTO: 11.7 FL (ref 6–12)
POTASSIUM SERPL-SCNC: 4.1 MMOL/L (ref 3.5–5.2)
PROT SERPL-MCNC: 5.9 G/DL (ref 6–8.5)
RBC # BLD AUTO: 4.56 10*6/MM3 (ref 3.77–5.28)
SODIUM SERPL-SCNC: 142 MMOL/L (ref 136–145)
T4 FREE SERPL-MCNC: 1.6 NG/DL (ref 0.93–1.7)
TRIGL SERPL-MCNC: 119 MG/DL (ref 0–150)
TSH SERPL DL<=0.05 MIU/L-ACNC: 2.63 UIU/ML (ref 0.27–4.2)
VIT B12 BLD-MCNC: 550 PG/ML (ref 211–946)
VLDLC SERPL-MCNC: 21 MG/DL (ref 5–40)
WBC # BLD AUTO: 9.37 10*3/MM3 (ref 3.4–10.8)

## 2021-02-04 PROCEDURE — 82306 VITAMIN D 25 HYDROXY: CPT | Performed by: FAMILY MEDICINE

## 2021-02-04 PROCEDURE — 84439 ASSAY OF FREE THYROXINE: CPT | Performed by: FAMILY MEDICINE

## 2021-02-04 PROCEDURE — 82607 VITAMIN B-12: CPT | Performed by: FAMILY MEDICINE

## 2021-02-04 PROCEDURE — 82746 ASSAY OF FOLIC ACID SERUM: CPT | Performed by: FAMILY MEDICINE

## 2021-02-04 PROCEDURE — 80061 LIPID PANEL: CPT | Performed by: FAMILY MEDICINE

## 2021-02-04 PROCEDURE — 83036 HEMOGLOBIN GLYCOSYLATED A1C: CPT | Performed by: FAMILY MEDICINE

## 2021-02-04 PROCEDURE — 84443 ASSAY THYROID STIM HORMONE: CPT | Performed by: FAMILY MEDICINE

## 2021-02-04 PROCEDURE — 80053 COMPREHEN METABOLIC PANEL: CPT | Performed by: FAMILY MEDICINE

## 2021-02-04 PROCEDURE — 85025 COMPLETE CBC W/AUTO DIFF WBC: CPT | Performed by: FAMILY MEDICINE

## 2021-05-07 ENCOUNTER — TRANSCRIBE ORDERS (OUTPATIENT)
Dept: ADMINISTRATIVE | Facility: HOSPITAL | Age: 71
End: 2021-05-07

## 2021-05-07 DIAGNOSIS — Z12.31 VISIT FOR SCREENING MAMMOGRAM: Primary | ICD-10-CM

## 2021-06-09 ENCOUNTER — HOSPITAL ENCOUNTER (OUTPATIENT)
Dept: MAMMOGRAPHY | Facility: HOSPITAL | Age: 71
Discharge: HOME | End: 2021-06-09
Admitting: FAMILY MEDICINE

## 2021-06-09 DIAGNOSIS — Z12.31 VISIT FOR SCREENING MAMMOGRAM: ICD-10-CM

## 2021-06-09 PROCEDURE — 77063 BREAST TOMOSYNTHESIS BI: CPT

## 2021-06-09 PROCEDURE — 77067 SCR MAMMO BI INCL CAD: CPT

## 2021-06-23 ENCOUNTER — LAB REQUISITION (OUTPATIENT)
Dept: LAB | Facility: HOSPITAL | Age: 71
End: 2021-06-23

## 2021-06-23 DIAGNOSIS — E55.9 VITAMIN D DEFICIENCY, UNSPECIFIED: ICD-10-CM

## 2021-06-23 DIAGNOSIS — Z79.83 LONG TERM (CURRENT) USE OF BISPHOSPHONATES: ICD-10-CM

## 2021-06-23 DIAGNOSIS — R53.83 OTHER FATIGUE: ICD-10-CM

## 2021-06-23 DIAGNOSIS — Z79.899 OTHER LONG TERM (CURRENT) DRUG THERAPY: ICD-10-CM

## 2021-06-23 LAB
MAGNESIUM SERPL-MCNC: 1.9 MG/DL (ref 1.6–2.4)
PHOSPHATE SERPL-MCNC: 3.9 MG/DL (ref 2.5–4.5)

## 2021-06-23 PROCEDURE — 84100 ASSAY OF PHOSPHORUS: CPT | Performed by: FAMILY MEDICINE

## 2021-06-23 PROCEDURE — 83735 ASSAY OF MAGNESIUM: CPT | Performed by: FAMILY MEDICINE

## 2021-06-28 ENCOUNTER — LAB REQUISITION (OUTPATIENT)
Dept: LAB | Facility: HOSPITAL | Age: 71
End: 2021-06-28

## 2021-06-28 DIAGNOSIS — Z79.899 OTHER LONG TERM (CURRENT) DRUG THERAPY: ICD-10-CM

## 2021-06-28 LAB
ALBUMIN SERPL-MCNC: 4.1 G/DL (ref 3.5–5.2)
ALBUMIN/GLOB SERPL: 1.4 G/DL
ALP SERPL-CCNC: 71 U/L (ref 39–117)
ALT SERPL W P-5'-P-CCNC: 26 U/L (ref 1–33)
ANION GAP SERPL CALCULATED.3IONS-SCNC: 7.3 MMOL/L (ref 5–15)
AST SERPL-CCNC: 28 U/L (ref 1–32)
BILIRUB SERPL-MCNC: 0.2 MG/DL (ref 0–1.2)
BUN SERPL-MCNC: 22 MG/DL (ref 8–23)
BUN/CREAT SERPL: 25.3 (ref 7–25)
CALCIUM SPEC-SCNC: 9.9 MG/DL (ref 8.6–10.5)
CHLORIDE SERPL-SCNC: 100 MMOL/L (ref 98–107)
CO2 SERPL-SCNC: 32.7 MMOL/L (ref 22–29)
CREAT SERPL-MCNC: 0.87 MG/DL (ref 0.57–1)
GFR SERPL CREATININE-BSD FRML MDRD: 64 ML/MIN/1.73
GLOBULIN UR ELPH-MCNC: 3 GM/DL
GLUCOSE SERPL-MCNC: 80 MG/DL (ref 65–99)
POTASSIUM SERPL-SCNC: 4.6 MMOL/L (ref 3.5–5.2)
PROT SERPL-MCNC: 7.1 G/DL (ref 6–8.5)
SODIUM SERPL-SCNC: 140 MMOL/L (ref 136–145)

## 2021-06-28 PROCEDURE — 80053 COMPREHEN METABOLIC PANEL: CPT | Performed by: FAMILY MEDICINE

## 2021-09-01 ENCOUNTER — LAB REQUISITION (OUTPATIENT)
Dept: LAB | Facility: HOSPITAL | Age: 71
End: 2021-09-01

## 2021-09-01 DIAGNOSIS — G40.89 OTHER SEIZURES (HCC): ICD-10-CM

## 2021-09-01 DIAGNOSIS — R79.89 OTHER SPECIFIED ABNORMAL FINDINGS OF BLOOD CHEMISTRY: ICD-10-CM

## 2021-09-01 DIAGNOSIS — R73.9 HYPERGLYCEMIA, UNSPECIFIED: ICD-10-CM

## 2021-09-01 DIAGNOSIS — E55.9 VITAMIN D DEFICIENCY, UNSPECIFIED: ICD-10-CM

## 2021-09-01 DIAGNOSIS — E78.9 DISORDER OF LIPOPROTEIN METABOLISM, UNSPECIFIED: ICD-10-CM

## 2021-09-01 DIAGNOSIS — Z79.899 OTHER LONG TERM (CURRENT) DRUG THERAPY: ICD-10-CM

## 2021-09-01 LAB
25(OH)D3 SERPL-MCNC: 42.3 NG/ML (ref 30–100)
ALBUMIN SERPL-MCNC: 3.9 G/DL (ref 3.5–5.2)
ALBUMIN/GLOB SERPL: 1.3 G/DL
ALP SERPL-CCNC: 82 U/L (ref 39–117)
ALT SERPL W P-5'-P-CCNC: 24 U/L (ref 1–33)
ANION GAP SERPL CALCULATED.3IONS-SCNC: 2.8 MMOL/L (ref 5–15)
AST SERPL-CCNC: 27 U/L (ref 1–32)
BASOPHILS # BLD AUTO: 0.08 10*3/MM3 (ref 0–0.2)
BASOPHILS NFR BLD AUTO: 0.7 % (ref 0–1.5)
BILIRUB SERPL-MCNC: 0.3 MG/DL (ref 0–1.2)
BUN SERPL-MCNC: 13 MG/DL (ref 8–23)
BUN/CREAT SERPL: 16.5 (ref 7–25)
CALCIUM SPEC-SCNC: 9.3 MG/DL (ref 8.6–10.5)
CHLORIDE SERPL-SCNC: 105 MMOL/L (ref 98–107)
CHOLEST SERPL-MCNC: 141 MG/DL (ref 0–200)
CO2 SERPL-SCNC: 34.2 MMOL/L (ref 22–29)
CREAT SERPL-MCNC: 0.79 MG/DL (ref 0.57–1)
DEPRECATED RDW RBC AUTO: 45.4 FL (ref 37–54)
EOSINOPHIL # BLD AUTO: 0.37 10*3/MM3 (ref 0–0.4)
EOSINOPHIL NFR BLD AUTO: 3.1 % (ref 0.3–6.2)
ERYTHROCYTE [DISTWIDTH] IN BLOOD BY AUTOMATED COUNT: 13.4 % (ref 12.3–15.4)
FOLATE SERPL-MCNC: >20 NG/ML (ref 4.78–24.2)
GFR SERPL CREATININE-BSD FRML MDRD: 72 ML/MIN/1.73
GLOBULIN UR ELPH-MCNC: 2.9 GM/DL
GLUCOSE SERPL-MCNC: 85 MG/DL (ref 65–99)
HBA1C MFR BLD: 5.9 % (ref 4.8–5.6)
HCT VFR BLD AUTO: 45 % (ref 34–46.6)
HDLC SERPL-MCNC: 59 MG/DL (ref 40–60)
HGB BLD-MCNC: 14.5 G/DL (ref 12–15.9)
IMM GRANULOCYTES # BLD AUTO: 0.04 10*3/MM3 (ref 0–0.05)
IMM GRANULOCYTES NFR BLD AUTO: 0.3 % (ref 0–0.5)
LDLC SERPL CALC-MCNC: 69 MG/DL (ref 0–100)
LDLC/HDLC SERPL: 1.17 {RATIO}
LYMPHOCYTES # BLD AUTO: 5 10*3/MM3 (ref 0.7–3.1)
LYMPHOCYTES NFR BLD AUTO: 42.4 % (ref 19.6–45.3)
MCH RBC QN AUTO: 29.5 PG (ref 26.6–33)
MCHC RBC AUTO-ENTMCNC: 32.2 G/DL (ref 31.5–35.7)
MCV RBC AUTO: 91.5 FL (ref 79–97)
MONOCYTES # BLD AUTO: 0.97 10*3/MM3 (ref 0.1–0.9)
MONOCYTES NFR BLD AUTO: 8.2 % (ref 5–12)
NEUTROPHILS NFR BLD AUTO: 45.3 % (ref 42.7–76)
NEUTROPHILS NFR BLD AUTO: 5.33 10*3/MM3 (ref 1.7–7)
NRBC BLD AUTO-RTO: 0 /100 WBC (ref 0–0.2)
PLATELET # BLD AUTO: 207 10*3/MM3 (ref 140–450)
PMV BLD AUTO: 11.7 FL (ref 6–12)
POTASSIUM SERPL-SCNC: 4.6 MMOL/L (ref 3.5–5.2)
PROT SERPL-MCNC: 6.8 G/DL (ref 6–8.5)
RBC # BLD AUTO: 4.92 10*6/MM3 (ref 3.77–5.28)
SODIUM SERPL-SCNC: 142 MMOL/L (ref 136–145)
T4 FREE SERPL-MCNC: 1.68 NG/DL (ref 0.93–1.7)
TRIGL SERPL-MCNC: 64 MG/DL (ref 0–150)
TSH SERPL DL<=0.05 MIU/L-ACNC: 2.24 UIU/ML (ref 0.27–4.2)
VIT B12 BLD-MCNC: 522 PG/ML (ref 211–946)
VLDLC SERPL-MCNC: 13 MG/DL (ref 5–40)
WBC # BLD AUTO: 11.79 10*3/MM3 (ref 3.4–10.8)

## 2021-09-01 PROCEDURE — 80053 COMPREHEN METABOLIC PANEL: CPT | Performed by: FAMILY MEDICINE

## 2021-09-01 PROCEDURE — 82746 ASSAY OF FOLIC ACID SERUM: CPT | Performed by: FAMILY MEDICINE

## 2021-09-01 PROCEDURE — 84439 ASSAY OF FREE THYROXINE: CPT | Performed by: FAMILY MEDICINE

## 2021-09-01 PROCEDURE — 85025 COMPLETE CBC W/AUTO DIFF WBC: CPT | Performed by: FAMILY MEDICINE

## 2021-09-01 PROCEDURE — 80177 DRUG SCRN QUAN LEVETIRACETAM: CPT | Performed by: FAMILY MEDICINE

## 2021-09-01 PROCEDURE — 80061 LIPID PANEL: CPT | Performed by: FAMILY MEDICINE

## 2021-09-01 PROCEDURE — 83036 HEMOGLOBIN GLYCOSYLATED A1C: CPT | Performed by: FAMILY MEDICINE

## 2021-09-01 PROCEDURE — 82607 VITAMIN B-12: CPT | Performed by: FAMILY MEDICINE

## 2021-09-01 PROCEDURE — 84443 ASSAY THYROID STIM HORMONE: CPT | Performed by: FAMILY MEDICINE

## 2021-09-01 PROCEDURE — 82306 VITAMIN D 25 HYDROXY: CPT | Performed by: FAMILY MEDICINE

## 2021-09-03 LAB — LEVETIRACETAM SERPL-MCNC: 16.5 UG/ML (ref 10–40)

## 2021-10-09 ENCOUNTER — LAB REQUISITION (OUTPATIENT)
Dept: LAB | Facility: HOSPITAL | Age: 71
End: 2021-10-09

## 2021-10-09 DIAGNOSIS — Z79.899 OTHER LONG TERM (CURRENT) DRUG THERAPY: ICD-10-CM

## 2021-10-09 LAB
ALBUMIN SERPL-MCNC: 3.6 G/DL (ref 3.5–5.2)
ALBUMIN/GLOB SERPL: 1.2 G/DL
ALP SERPL-CCNC: 92 U/L (ref 39–117)
ALT SERPL W P-5'-P-CCNC: 15 U/L (ref 1–33)
ANION GAP SERPL CALCULATED.3IONS-SCNC: 2.8 MMOL/L (ref 5–15)
AST SERPL-CCNC: 20 U/L (ref 1–32)
BILIRUB SERPL-MCNC: 0.3 MG/DL (ref 0–1.2)
BUN SERPL-MCNC: 11 MG/DL (ref 8–23)
BUN/CREAT SERPL: 14.9 (ref 7–25)
CALCIUM SPEC-SCNC: 8.7 MG/DL (ref 8.6–10.5)
CHLORIDE SERPL-SCNC: 106 MMOL/L (ref 98–107)
CO2 SERPL-SCNC: 37.2 MMOL/L (ref 22–29)
CREAT SERPL-MCNC: 0.74 MG/DL (ref 0.57–1)
GFR SERPL CREATININE-BSD FRML MDRD: 78 ML/MIN/1.73
GLOBULIN UR ELPH-MCNC: 2.9 GM/DL
GLUCOSE SERPL-MCNC: 83 MG/DL (ref 65–99)
NT-PROBNP SERPL-MCNC: 345.2 PG/ML (ref 0–900)
POTASSIUM SERPL-SCNC: 3.5 MMOL/L (ref 3.5–5.2)
PROT SERPL-MCNC: 6.5 G/DL (ref 6–8.5)
SODIUM SERPL-SCNC: 146 MMOL/L (ref 136–145)

## 2021-10-09 PROCEDURE — 83880 ASSAY OF NATRIURETIC PEPTIDE: CPT | Performed by: FAMILY MEDICINE

## 2021-10-09 PROCEDURE — 80053 COMPREHEN METABOLIC PANEL: CPT | Performed by: FAMILY MEDICINE

## 2021-10-14 ENCOUNTER — LAB REQUISITION (OUTPATIENT)
Dept: LAB | Facility: HOSPITAL | Age: 71
End: 2021-10-14

## 2021-10-14 DIAGNOSIS — Z79.899 OTHER LONG TERM (CURRENT) DRUG THERAPY: ICD-10-CM

## 2021-10-14 DIAGNOSIS — R79.89 OTHER SPECIFIED ABNORMAL FINDINGS OF BLOOD CHEMISTRY: ICD-10-CM

## 2021-10-14 DIAGNOSIS — R53.83 OTHER FATIGUE: ICD-10-CM

## 2021-10-14 LAB
ANION GAP SERPL CALCULATED.3IONS-SCNC: 5.6 MMOL/L (ref 5–15)
BUN SERPL-MCNC: 16 MG/DL (ref 8–23)
BUN/CREAT SERPL: 20.3 (ref 7–25)
CALCIUM SPEC-SCNC: 9 MG/DL (ref 8.6–10.5)
CHLORIDE SERPL-SCNC: 103 MMOL/L (ref 98–107)
CO2 SERPL-SCNC: 36.4 MMOL/L (ref 22–29)
CREAT SERPL-MCNC: 0.79 MG/DL (ref 0.57–1)
GFR SERPL CREATININE-BSD FRML MDRD: 72 ML/MIN/1.73
GLUCOSE SERPL-MCNC: 77 MG/DL (ref 65–99)
POTASSIUM SERPL-SCNC: 4.1 MMOL/L (ref 3.5–5.2)
SODIUM SERPL-SCNC: 145 MMOL/L (ref 136–145)

## 2021-10-14 PROCEDURE — 80048 BASIC METABOLIC PNL TOTAL CA: CPT | Performed by: FAMILY MEDICINE

## 2021-11-09 ENCOUNTER — LAB REQUISITION (OUTPATIENT)
Dept: LAB | Facility: HOSPITAL | Age: 71
End: 2021-11-09

## 2021-11-09 ENCOUNTER — TRANSCRIBE ORDERS (OUTPATIENT)
Dept: ADMINISTRATIVE | Facility: HOSPITAL | Age: 71
End: 2021-11-09

## 2021-11-09 DIAGNOSIS — R60.9 EDEMA, UNSPECIFIED TYPE: Primary | ICD-10-CM

## 2021-11-09 DIAGNOSIS — R06.02 SOB (SHORTNESS OF BREATH): ICD-10-CM

## 2021-11-09 DIAGNOSIS — Z79.899 OTHER LONG TERM (CURRENT) DRUG THERAPY: ICD-10-CM

## 2021-11-09 LAB
ALBUMIN SERPL-MCNC: 3.7 G/DL (ref 3.5–5.2)
ALBUMIN/GLOB SERPL: 1.2 G/DL
ALP SERPL-CCNC: 70 U/L (ref 39–117)
ALT SERPL W P-5'-P-CCNC: 28 U/L (ref 1–33)
ANION GAP SERPL CALCULATED.3IONS-SCNC: 4 MMOL/L (ref 5–15)
AST SERPL-CCNC: 30 U/L (ref 1–32)
BASOPHILS # BLD AUTO: 0.11 10*3/MM3 (ref 0–0.2)
BASOPHILS NFR BLD AUTO: 1 % (ref 0–1.5)
BILIRUB SERPL-MCNC: 0.2 MG/DL (ref 0–1.2)
BUN SERPL-MCNC: 15 MG/DL (ref 8–23)
BUN/CREAT SERPL: 18.1 (ref 7–25)
CALCIUM SPEC-SCNC: 9.2 MG/DL (ref 8.6–10.5)
CHLORIDE SERPL-SCNC: 96 MMOL/L (ref 98–107)
CO2 SERPL-SCNC: 34 MMOL/L (ref 22–29)
CREAT SERPL-MCNC: 0.83 MG/DL (ref 0.57–1)
DEPRECATED RDW RBC AUTO: 51.3 FL (ref 37–54)
EOSINOPHIL # BLD AUTO: 0.23 10*3/MM3 (ref 0–0.4)
EOSINOPHIL NFR BLD AUTO: 2.1 % (ref 0.3–6.2)
ERYTHROCYTE [DISTWIDTH] IN BLOOD BY AUTOMATED COUNT: 14.9 % (ref 12.3–15.4)
GFR SERPL CREATININE-BSD FRML MDRD: 68 ML/MIN/1.73
GLOBULIN UR ELPH-MCNC: 3.2 GM/DL
GLUCOSE SERPL-MCNC: 116 MG/DL (ref 65–99)
HCT VFR BLD AUTO: 42.6 % (ref 34–46.6)
HGB BLD-MCNC: 13.2 G/DL (ref 12–15.9)
IMM GRANULOCYTES # BLD AUTO: 0.06 10*3/MM3 (ref 0–0.05)
IMM GRANULOCYTES NFR BLD AUTO: 0.5 % (ref 0–0.5)
LYMPHOCYTES # BLD AUTO: 3.87 10*3/MM3 (ref 0.7–3.1)
LYMPHOCYTES NFR BLD AUTO: 34.7 % (ref 19.6–45.3)
MCH RBC QN AUTO: 28.9 PG (ref 26.6–33)
MCHC RBC AUTO-ENTMCNC: 31 G/DL (ref 31.5–35.7)
MCV RBC AUTO: 93.4 FL (ref 79–97)
MONOCYTES # BLD AUTO: 1.05 10*3/MM3 (ref 0.1–0.9)
MONOCYTES NFR BLD AUTO: 9.4 % (ref 5–12)
NEUTROPHILS NFR BLD AUTO: 5.83 10*3/MM3 (ref 1.7–7)
NEUTROPHILS NFR BLD AUTO: 52.3 % (ref 42.7–76)
NRBC BLD AUTO-RTO: 0 /100 WBC (ref 0–0.2)
NT-PROBNP SERPL-MCNC: 488.9 PG/ML (ref 0–900)
PLATELET # BLD AUTO: 290 10*3/MM3 (ref 140–450)
PMV BLD AUTO: 11.1 FL (ref 6–12)
POTASSIUM SERPL-SCNC: 4.4 MMOL/L (ref 3.5–5.2)
PROT SERPL-MCNC: 6.9 G/DL (ref 6–8.5)
RBC # BLD AUTO: 4.56 10*6/MM3 (ref 3.77–5.28)
SODIUM SERPL-SCNC: 134 MMOL/L (ref 136–145)
T3 SERPL-MCNC: 94 NG/DL (ref 80–200)
T4 SERPL-MCNC: 9.54 MCG/DL (ref 4.5–11.7)
TSH SERPL DL<=0.05 MIU/L-ACNC: 2.24 UIU/ML (ref 0.27–4.2)
WBC # BLD AUTO: 11.15 10*3/MM3 (ref 3.4–10.8)

## 2021-11-09 PROCEDURE — 84436 ASSAY OF TOTAL THYROXINE: CPT | Performed by: FAMILY MEDICINE

## 2021-11-09 PROCEDURE — 84443 ASSAY THYROID STIM HORMONE: CPT | Performed by: FAMILY MEDICINE

## 2021-11-09 PROCEDURE — 85025 COMPLETE CBC W/AUTO DIFF WBC: CPT | Performed by: FAMILY MEDICINE

## 2021-11-09 PROCEDURE — 83880 ASSAY OF NATRIURETIC PEPTIDE: CPT | Performed by: FAMILY MEDICINE

## 2021-11-09 PROCEDURE — 80053 COMPREHEN METABOLIC PANEL: CPT | Performed by: FAMILY MEDICINE

## 2021-11-09 PROCEDURE — 84480 ASSAY TRIIODOTHYRONINE (T3): CPT | Performed by: FAMILY MEDICINE

## 2021-11-10 ENCOUNTER — HOSPITAL ENCOUNTER (OUTPATIENT)
Dept: CARDIOLOGY | Facility: HOSPITAL | Age: 71
Discharge: HOME | End: 2021-11-10
Admitting: FAMILY MEDICINE

## 2021-11-10 VITALS
WEIGHT: 145 LBS | SYSTOLIC BLOOD PRESSURE: 130 MMHG | DIASTOLIC BLOOD PRESSURE: 72 MMHG | HEIGHT: 61 IN | BODY MASS INDEX: 27.38 KG/M2

## 2021-11-10 DIAGNOSIS — R06.02 SOB (SHORTNESS OF BREATH): ICD-10-CM

## 2021-11-10 DIAGNOSIS — R60.9 EDEMA, UNSPECIFIED TYPE: ICD-10-CM

## 2021-11-10 LAB
AORTIC DIMENSIONLESS INDEX: 1 (DI)
BH CV ECHO MEAS - ACS: 1.9 CM
BH CV ECHO MEAS - AO MAX PG: 5 MMHG
BH CV ECHO MEAS - AO MEAN PG (FULL): 0 MMHG
BH CV ECHO MEAS - AO MEAN PG: 2 MMHG
BH CV ECHO MEAS - AO ROOT AREA (BSA CORRECTED): 1.3
BH CV ECHO MEAS - AO ROOT AREA: 3.5 CM^2
BH CV ECHO MEAS - AO ROOT DIAM: 2.1 CM
BH CV ECHO MEAS - AO V2 MAX: 115 CM/SEC
BH CV ECHO MEAS - AO V2 MEAN: 72.7 CM/SEC
BH CV ECHO MEAS - AO V2 VTI: 20.3 CM
BH CV ECHO MEAS - AVA(I,A): 3.4 CM^2
BH CV ECHO MEAS - AVA(I,D): 3.4 CM^2
BH CV ECHO MEAS - BSA(HAYCOCK): 1.7 M^2
BH CV ECHO MEAS - BSA: 1.6 M^2
BH CV ECHO MEAS - BZI_BMI: 27.4 KILOGRAMS/M^2
BH CV ECHO MEAS - BZI_METRIC_HEIGHT: 154.9 CM
BH CV ECHO MEAS - BZI_METRIC_WEIGHT: 65.8 KG
BH CV ECHO MEAS - EDV(CUBED): 65 ML
BH CV ECHO MEAS - EDV(MOD-SP2): 56.8 ML
BH CV ECHO MEAS - EDV(MOD-SP4): 44 ML
BH CV ECHO MEAS - EDV(TEICH): 70.8 ML
BH CV ECHO MEAS - EF(CUBED): 81.3 %
BH CV ECHO MEAS - EF(MOD-BP): 64.8 %
BH CV ECHO MEAS - EF(MOD-SP2): 69.9 %
BH CV ECHO MEAS - EF(MOD-SP4): 54.1 %
BH CV ECHO MEAS - EF(TEICH): 74.4 %
BH CV ECHO MEAS - ESV(CUBED): 12.2 ML
BH CV ECHO MEAS - ESV(MOD-SP2): 17.1 ML
BH CV ECHO MEAS - ESV(MOD-SP4): 20.2 ML
BH CV ECHO MEAS - ESV(TEICH): 18.1 ML
BH CV ECHO MEAS - FS: 42.8 %
BH CV ECHO MEAS - IVS/LVPW: 0.99
BH CV ECHO MEAS - IVSD: 1 CM
BH CV ECHO MEAS - LAT PEAK E' VEL: 10 CM/SEC
BH CV ECHO MEAS - LV DIASTOLIC VOL/BSA (35-75): 26.7 ML/M^2
BH CV ECHO MEAS - LV MASS(C)D: 134.5 GRAMS
BH CV ECHO MEAS - LV MASS(C)DI: 81.6 GRAMS/M^2
BH CV ECHO MEAS - LV MAX PG: 3.8 MMHG
BH CV ECHO MEAS - LV MEAN PG: 2 MMHG
BH CV ECHO MEAS - LV SYSTOLIC VOL/BSA (12-30): 12.3 ML/M^2
BH CV ECHO MEAS - LV V1 MAX: 98 CM/SEC
BH CV ECHO MEAS - LV V1 MEAN: 65 CM/SEC
BH CV ECHO MEAS - LV V1 VTI: 20.1 CM
BH CV ECHO MEAS - LVIDD: 4 CM
BH CV ECHO MEAS - LVIDS: 2.3 CM
BH CV ECHO MEAS - LVLD AP2: 6.6 CM
BH CV ECHO MEAS - LVLD AP4: 5.9 CM
BH CV ECHO MEAS - LVLS AP2: 5.2 CM
BH CV ECHO MEAS - LVLS AP4: 5.1 CM
BH CV ECHO MEAS - LVOT AREA (M): 3.5 CM^2
BH CV ECHO MEAS - LVOT AREA: 3.5 CM^2
BH CV ECHO MEAS - LVOT DIAM: 2.1 CM
BH CV ECHO MEAS - LVPWD: 1 CM
BH CV ECHO MEAS - MED PEAK E' VEL: 7.2 CM/SEC
BH CV ECHO MEAS - MV A MAX VEL: 63 CM/SEC
BH CV ECHO MEAS - MV DEC SLOPE: 271.5 CM/SEC^2
BH CV ECHO MEAS - MV DEC TIME: 224 SEC
BH CV ECHO MEAS - MV E MAX VEL: 64.3 CM/SEC
BH CV ECHO MEAS - MV E/A: 1
BH CV ECHO MEAS - MV MEAN PG: 1 MMHG
BH CV ECHO MEAS - MV P1/2T MAX VEL: 66.5 CM/SEC
BH CV ECHO MEAS - MV P1/2T: 71.7 MSEC
BH CV ECHO MEAS - MV V2 MEAN: 39.5 CM/SEC
BH CV ECHO MEAS - MV V2 VTI: 19.7 CM
BH CV ECHO MEAS - MVA P1/2T LCG: 3.3 CM^2
BH CV ECHO MEAS - MVA(P1/2T): 3.1 CM^2
BH CV ECHO MEAS - MVA(VTI): 3.5 CM^2
BH CV ECHO MEAS - PA MAX PG: 4.2 MMHG
BH CV ECHO MEAS - PA V2 MAX: 103 CM/SEC
BH CV ECHO MEAS - QP/QS: 0.95
BH CV ECHO MEAS - RAP SYSTOLE: 3 MMHG
BH CV ECHO MEAS - RV MEAN PG: 2 MMHG
BH CV ECHO MEAS - RV V1 MEAN: 62 CM/SEC
BH CV ECHO MEAS - RV V1 VTI: 19.1 CM
BH CV ECHO MEAS - RVOT AREA: 3.5 CM^2
BH CV ECHO MEAS - RVOT DIAM: 2.1 CM
BH CV ECHO MEAS - SI(AO): 42.7 ML/M^2
BH CV ECHO MEAS - SI(CUBED): 32 ML/M^2
BH CV ECHO MEAS - SI(LVOT): 42.2 ML/M^2
BH CV ECHO MEAS - SI(MOD-SP2): 24.1 ML/M^2
BH CV ECHO MEAS - SI(MOD-SP4): 14.4 ML/M^2
BH CV ECHO MEAS - SI(TEICH): 32 ML/M^2
BH CV ECHO MEAS - SV(AO): 70.3 ML
BH CV ECHO MEAS - SV(CUBED): 52.8 ML
BH CV ECHO MEAS - SV(LVOT): 69.6 ML
BH CV ECHO MEAS - SV(MOD-SP2): 39.7 ML
BH CV ECHO MEAS - SV(MOD-SP4): 23.8 ML
BH CV ECHO MEAS - SV(RVOT): 66.2 ML
BH CV ECHO MEAS - SV(TEICH): 52.7 ML
BH CV ECHO MEAS - TAPSE (>1.6): 1.8 CM
BH CV ECHO MEASUREMENTS AVERAGE E/E' RATIO: 7.48
LEFT ATRIUM VOLUME INDEX: 19 ML/M2
MAXIMAL PREDICTED HEART RATE: 150 BPM
SINUS: 2.7 CM
STRESS TARGET HR: 128 BPM

## 2021-11-10 PROCEDURE — 93306 TTE W/DOPPLER COMPLETE: CPT | Performed by: INTERNAL MEDICINE

## 2021-11-10 PROCEDURE — 93306 TTE W/DOPPLER COMPLETE: CPT

## 2021-11-11 ENCOUNTER — LAB REQUISITION (OUTPATIENT)
Dept: LAB | Facility: HOSPITAL | Age: 71
End: 2021-11-11

## 2021-11-11 DIAGNOSIS — Z79.899 OTHER LONG TERM (CURRENT) DRUG THERAPY: ICD-10-CM

## 2021-11-11 DIAGNOSIS — R60.9 EDEMA, UNSPECIFIED: ICD-10-CM

## 2021-11-11 DIAGNOSIS — R79.89 OTHER SPECIFIED ABNORMAL FINDINGS OF BLOOD CHEMISTRY: ICD-10-CM

## 2021-11-11 LAB
ANION GAP SERPL CALCULATED.3IONS-SCNC: 3.9 MMOL/L (ref 5–15)
BUN SERPL-MCNC: 16 MG/DL (ref 8–23)
BUN/CREAT SERPL: 21.1 (ref 7–25)
CALCIUM SPEC-SCNC: 9.4 MG/DL (ref 8.6–10.5)
CHLORIDE SERPL-SCNC: 99 MMOL/L (ref 98–107)
CO2 SERPL-SCNC: 38.1 MMOL/L (ref 22–29)
CREAT SERPL-MCNC: 0.76 MG/DL (ref 0.57–1)
GFR SERPL CREATININE-BSD FRML MDRD: 75 ML/MIN/1.73
GLUCOSE SERPL-MCNC: 91 MG/DL (ref 65–99)
POTASSIUM SERPL-SCNC: 4.4 MMOL/L (ref 3.5–5.2)
SODIUM SERPL-SCNC: 141 MMOL/L (ref 136–145)

## 2021-11-11 PROCEDURE — 80048 BASIC METABOLIC PNL TOTAL CA: CPT | Performed by: FAMILY MEDICINE

## 2021-12-02 ENCOUNTER — OFFICE VISIT (OUTPATIENT)
Dept: CARDIOLOGY | Facility: CLINIC | Age: 71
End: 2021-12-02

## 2021-12-02 ENCOUNTER — LAB REQUISITION (OUTPATIENT)
Dept: LAB | Facility: HOSPITAL | Age: 71
End: 2021-12-02

## 2021-12-02 ENCOUNTER — HOSPITAL ENCOUNTER (OUTPATIENT)
Dept: ULTRASOUND IMAGING | Facility: HOSPITAL | Age: 71
Discharge: HOME | End: 2021-12-02
Admitting: INTERNAL MEDICINE

## 2021-12-02 VITALS
SYSTOLIC BLOOD PRESSURE: 100 MMHG | BODY MASS INDEX: 28.89 KG/M2 | DIASTOLIC BLOOD PRESSURE: 52 MMHG | HEART RATE: 75 BPM | WEIGHT: 153 LBS | HEIGHT: 61 IN

## 2021-12-02 DIAGNOSIS — R79.89 OTHER SPECIFIED ABNORMAL FINDINGS OF BLOOD CHEMISTRY: ICD-10-CM

## 2021-12-02 DIAGNOSIS — E55.9 VITAMIN D DEFICIENCY, UNSPECIFIED: ICD-10-CM

## 2021-12-02 DIAGNOSIS — R53.83 OTHER FATIGUE: ICD-10-CM

## 2021-12-02 DIAGNOSIS — M79.89 LEG SWELLING: ICD-10-CM

## 2021-12-02 DIAGNOSIS — M79.89 LEG SWELLING: Primary | ICD-10-CM

## 2021-12-02 DIAGNOSIS — Z79.899 OTHER LONG TERM (CURRENT) DRUG THERAPY: ICD-10-CM

## 2021-12-02 DIAGNOSIS — E78.2 MIXED HYPERLIPIDEMIA: ICD-10-CM

## 2021-12-02 DIAGNOSIS — I10 ESSENTIAL HYPERTENSION: ICD-10-CM

## 2021-12-02 LAB
MAGNESIUM SERPL-MCNC: 2.1 MG/DL (ref 1.6–2.4)
PHOSPHATE SERPL-MCNC: 3.5 MG/DL (ref 2.5–4.5)

## 2021-12-02 PROCEDURE — 93970 EXTREMITY STUDY: CPT

## 2021-12-02 PROCEDURE — 84100 ASSAY OF PHOSPHORUS: CPT | Performed by: FAMILY MEDICINE

## 2021-12-02 PROCEDURE — 83735 ASSAY OF MAGNESIUM: CPT | Performed by: FAMILY MEDICINE

## 2021-12-02 PROCEDURE — 99204 OFFICE O/P NEW MOD 45 MIN: CPT | Performed by: INTERNAL MEDICINE

## 2021-12-02 PROCEDURE — 93000 ELECTROCARDIOGRAM COMPLETE: CPT | Performed by: INTERNAL MEDICINE

## 2021-12-02 RX ORDER — POTASSIUM CHLORIDE 20 MEQ/1
20 TABLET, EXTENDED RELEASE ORAL 2 TIMES DAILY
Status: ON HOLD | COMMUNITY
End: 2022-01-01

## 2021-12-02 RX ORDER — DONEPEZIL HYDROCHLORIDE 10 MG/1
10 TABLET, FILM COATED ORAL NIGHTLY
COMMUNITY

## 2021-12-02 RX ORDER — UBIDECARENONE 100 MG
100 CAPSULE ORAL DAILY
COMMUNITY

## 2021-12-02 RX ORDER — OLANZAPINE 15 MG/1
10 TABLET ORAL NIGHTLY
COMMUNITY

## 2021-12-02 NOTE — PROGRESS NOTES
PATIENTINFORMATION    Date of Office Visit: 2021  Encounter Provider: Marquez Mendez MD  Place of Service: [facility] CARDIOLOGY  Patient Name: Caitlyn Camacho  : 1950    Subjective:     Encounter Date:2021      Patient ID: Caitlyn Camacho is a 71 y.o. female.    Chief Complaint   Patient presents with   • Leg Swelling     New pt no prior cardio   • Shortness of Breath     HPI  Ms. Camacho is a 71 years old lady female with past medical history of developmental cognitive impairment and mentally challenged and currently resident of nursing home who mostly ambulates with a wheelchair brought to cardiology clinic for evaluation of bilateral lower extremity swelling of about a months duration.  Patient unable to give meaningful history so occupational therapist who brought patient was helping with history.  No significant changes in breathing and patient denied any significant pain.  Her chronic cardiovascular medications include Coreg and atorvastatin  She was started on antibiotics several weeks ago with suspicion of left lower extremity cellulitis.  She also got echocardiogram at the beginning of last month.  Otherwise no known prior coronary artery disease/CHF.  Overall swelling is improving.      ROS   All systems reviewed and negative except as noted in HPI.    Past Medical History:   Diagnosis Date   • Anemia due to blood loss    • Anxiety    • Arthritis    • Duodenal stricture    • Duodenal ulcer    • Dysphagia    • Gallstones    • H/O convulsions    • Hypercholesteremia    • Hypothyroidism    • Major depression    • Moderate intellectual disability    • OCD (obsessive compulsive disorder)    • Ocular histoplasmosis     w/macular scars   • Osteopenia    • Osteopenia    • Ptosis    • Pure hyperglyceridemia    • S/P bypass gastrojejunostomy    • Seasonal allergies    • Seizure disorder (HCC)    • Unspecified dementia without behavioral disturbance (HCC)    • Ventral hernia   "   sched repair   • Vitamin D deficiency        Past Surgical History:   Procedure Laterality Date   • CATARACT EXTRACTION Bilateral 2016   • CHOLECYSTECTOMY OPEN  03/2017   • COLONOSCOPY  05/2009   • ENDOSCOPY  03/2017   • GASTROJEJUNOSTOMY  08/2016   • GASTROSTOMY      TEJINDER   • HYSTERECTOMY     • VENTRAL/INCISIONAL HERNIA REPAIR N/A 8/15/2017    Procedure: Lysis of adhesion and repair of recurrent incisional hernia with onlay mesh placement ;  Surgeon: Donna Ivy MD;  Location: [facility];  Service:        Social History     Socioeconomic History   • Marital status: Unknown   Tobacco Use   • Smoking status: Never Smoker   • Smokeless tobacco: Never Used   Substance and Sexual Activity   • Alcohol use: No   • Drug use: No   • Sexual activity: Defer       Family History   Family history unknown: Yes           ECG 12 Lead    Date/Time: 12/2/2021 11:27 AM  Performed by: Marquez Mendez MD  Authorized by: Marquez Mendez MD   Comparison: compared with previous ECG from 8/15/2017  Rhythm: sinus rhythm  Rate: normal  Conduction: conduction normal  ST Segments: ST segments normal  T Waves: T waves normal  QRS axis: normal  Other: no other findings    Clinical impression: normal ECG               Objective:     /52   Pulse 75   Ht 154.9 cm (61\")   Wt 69.4 kg (153 lb)   BMI 28.91 kg/m²  Body mass index is 28.91 kg/m².     Constitutional:       General: Not in acute distress.     Appearance: Not diaphoretic.      Comments: Sitting on a wheelchair   Eyes:      Pupils: Pupils are equal, round, and reactive to light.   HENT:      Head: Normocephalic and atraumatic.   Neck:      Thyroid: No thyromegaly.   Pulmonary:      Effort: Pulmonary effort is normal. No respiratory distress.      Breath sounds: Normal breath sounds. No wheezing. No rales.   Chest:      Chest wall: Not tender to palpatation.   Cardiovascular:      Normal rate. Regular rhythm.      No gallop.      Comments: Slightly red left " leg  Pulses:     Intact distal pulses.   Edema:     Peripheral edema present.     Pretibial: bilateral 1+ edema of the pretibial area.     Ankle: 1+ edema of the left ankle and 2+ edema of the right ankle.  Abdominal:      General: Bowel sounds are normal. There is no distension.      Palpations: Abdomen is soft.      Tenderness: There is no guarding.   Musculoskeletal: Normal range of motion.         General: No deformity.      Cervical back: Normal range of motion and neck supple. Skin:     General: Skin is warm and dry.      Findings: No rash.   Neurological:      Mental Status: Alert.      Cranial Nerves: No cranial nerve deficit.      Deep Tendon Reflexes: Reflexes are normal and symmetric.   Psychiatric:         Judgment: Judgment normal.         Review Of Data: I have reviewed recent labs and documentation's  Echocardiogram on 11/10/2021 was unremarkable      Assessment/Plan:       1. BL LE swelling slightly more on the left side that is also slightly red.  Rule out deep venous thrombosis.  Otherwise patient is not in overt heart failure.  Echocardiogram on 11/10/2021 unremarkable  Supportive care with bilateral compression stocking and leg elevation at night.  2.  Hypertension that is currently controlled on Coreg 6.25 mg p.o. daily  3.  Hypercholesterolemia-on statin-most recent lipid panel in September 2021 at goal  4.  Mentally challenged with superimposed dementia and wheelchair-bound at baseline    Diagnosis and plan of care discussed with patient and verbalized understanding.           Marquez Mendez MD  12/02/21  12:23 EST

## 2022-01-01 ENCOUNTER — APPOINTMENT (OUTPATIENT)
Dept: CT IMAGING | Facility: HOSPITAL | Age: 72
End: 2022-01-01

## 2022-01-01 ENCOUNTER — HOSPITAL ENCOUNTER (EMERGENCY)
Facility: HOSPITAL | Age: 72
Discharge: NURSING, REHAB OR LONG-TERM CARE | End: 2022-05-30
Attending: EMERGENCY MEDICINE | Admitting: EMERGENCY MEDICINE

## 2022-01-01 ENCOUNTER — HOSPITAL ENCOUNTER (INPATIENT)
Facility: HOSPITAL | Age: 72
LOS: 2 days | Discharge: NURSING, REHAB OR LONG-TERM CARE | End: 2022-10-03
Attending: EMERGENCY MEDICINE | Admitting: FAMILY MEDICINE

## 2022-01-01 ENCOUNTER — TRANSCRIBE ORDERS (OUTPATIENT)
Dept: ADMINISTRATIVE | Facility: HOSPITAL | Age: 72
End: 2022-01-01

## 2022-01-01 ENCOUNTER — LAB REQUISITION (OUTPATIENT)
Dept: LAB | Facility: HOSPITAL | Age: 72
End: 2022-01-01

## 2022-01-01 ENCOUNTER — PATIENT OUTREACH (OUTPATIENT)
Dept: CASE MANAGEMENT | Facility: OTHER | Age: 72
End: 2022-01-01

## 2022-01-01 ENCOUNTER — APPOINTMENT (OUTPATIENT)
Dept: MAMMOGRAPHY | Facility: HOSPITAL | Age: 72
End: 2022-01-01

## 2022-01-01 ENCOUNTER — APPOINTMENT (OUTPATIENT)
Dept: CARDIOLOGY | Facility: HOSPITAL | Age: 72
End: 2022-01-01

## 2022-01-01 ENCOUNTER — HOSPITAL ENCOUNTER (OUTPATIENT)
Dept: PULMONOLOGY | Facility: HOSPITAL | Age: 72
Discharge: HOME | End: 2022-05-19
Admitting: INTERNAL MEDICINE

## 2022-01-01 ENCOUNTER — APPOINTMENT (OUTPATIENT)
Dept: GENERAL RADIOLOGY | Facility: HOSPITAL | Age: 72
End: 2022-01-01

## 2022-01-01 ENCOUNTER — TELEPHONE (OUTPATIENT)
Dept: CARDIOLOGY | Facility: CLINIC | Age: 72
End: 2022-01-01

## 2022-01-01 ENCOUNTER — HOSPITAL ENCOUNTER (OUTPATIENT)
Dept: GENERAL RADIOLOGY | Facility: HOSPITAL | Age: 72
Discharge: HOME | End: 2022-09-29

## 2022-01-01 ENCOUNTER — HOSPITAL ENCOUNTER (OUTPATIENT)
Dept: MAMMOGRAPHY | Facility: HOSPITAL | Age: 72
Discharge: HOME | End: 2022-06-28
Admitting: FAMILY MEDICINE

## 2022-01-01 ENCOUNTER — OFFICE VISIT (OUTPATIENT)
Dept: OBSTETRICS AND GYNECOLOGY | Facility: CLINIC | Age: 72
End: 2022-01-01

## 2022-01-01 ENCOUNTER — HOSPITAL ENCOUNTER (EMERGENCY)
Facility: HOSPITAL | Age: 72
Discharge: NURSING, REHAB OR LONG-TERM CARE | End: 2022-05-07
Attending: EMERGENCY MEDICINE | Admitting: EMERGENCY MEDICINE

## 2022-01-01 ENCOUNTER — OFFICE VISIT (OUTPATIENT)
Dept: CARDIOLOGY | Facility: CLINIC | Age: 72
End: 2022-01-01

## 2022-01-01 ENCOUNTER — OFFICE VISIT (OUTPATIENT)
Dept: SLEEP MEDICINE | Facility: HOSPITAL | Age: 72
End: 2022-01-01

## 2022-01-01 ENCOUNTER — APPOINTMENT (OUTPATIENT)
Dept: SLEEP MEDICINE | Facility: HOSPITAL | Age: 72
End: 2022-01-01

## 2022-01-01 ENCOUNTER — TELEPHONE (OUTPATIENT)
Dept: NEUROLOGY | Facility: CLINIC | Age: 72
End: 2022-01-01

## 2022-01-01 ENCOUNTER — HOSPITAL ENCOUNTER (EMERGENCY)
Facility: HOSPITAL | Age: 72
Discharge: NURSING, REHAB OR LONG-TERM CARE | End: 2022-05-11
Attending: EMERGENCY MEDICINE | Admitting: EMERGENCY MEDICINE

## 2022-01-01 ENCOUNTER — TELEPHONE (OUTPATIENT)
Dept: FAMILY MEDICINE CLINIC | Facility: CLINIC | Age: 72
End: 2022-01-01

## 2022-01-01 ENCOUNTER — TELEPHONE (OUTPATIENT)
Dept: SLEEP MEDICINE | Facility: HOSPITAL | Age: 72
End: 2022-01-01

## 2022-01-01 VITALS
WEIGHT: 166 LBS | HEIGHT: 55 IN | SYSTOLIC BLOOD PRESSURE: 118 MMHG | BODY MASS INDEX: 38.42 KG/M2 | HEART RATE: 65 BPM | DIASTOLIC BLOOD PRESSURE: 78 MMHG

## 2022-01-01 VITALS
BODY MASS INDEX: 27.84 KG/M2 | DIASTOLIC BLOOD PRESSURE: 70 MMHG | SYSTOLIC BLOOD PRESSURE: 132 MMHG | WEIGHT: 167.1 LBS | OXYGEN SATURATION: 92 % | HEART RATE: 90 BPM | TEMPERATURE: 97.5 F | HEIGHT: 65 IN | RESPIRATION RATE: 24 BRPM

## 2022-01-01 VITALS
BODY MASS INDEX: 29.5 KG/M2 | SYSTOLIC BLOOD PRESSURE: 124 MMHG | HEIGHT: 63 IN | DIASTOLIC BLOOD PRESSURE: 78 MMHG | WEIGHT: 166.5 LBS

## 2022-01-01 VITALS
BODY MASS INDEX: 27.45 KG/M2 | HEART RATE: 69 BPM | OXYGEN SATURATION: 95 % | TEMPERATURE: 97.3 F | SYSTOLIC BLOOD PRESSURE: 101 MMHG | RESPIRATION RATE: 22 BRPM | DIASTOLIC BLOOD PRESSURE: 62 MMHG | WEIGHT: 170.1 LBS

## 2022-01-01 VITALS
DIASTOLIC BLOOD PRESSURE: 86 MMHG | RESPIRATION RATE: 18 BRPM | OXYGEN SATURATION: 95 % | HEART RATE: 70 BPM | BODY MASS INDEX: 26.52 KG/M2 | WEIGHT: 165 LBS | HEIGHT: 66 IN | TEMPERATURE: 97.3 F | SYSTOLIC BLOOD PRESSURE: 137 MMHG

## 2022-01-01 VITALS
WEIGHT: 161.9 LBS | DIASTOLIC BLOOD PRESSURE: 73 MMHG | SYSTOLIC BLOOD PRESSURE: 129 MMHG | HEIGHT: 66 IN | HEART RATE: 76 BPM | BODY MASS INDEX: 26.02 KG/M2

## 2022-01-01 VITALS
DIASTOLIC BLOOD PRESSURE: 78 MMHG | OXYGEN SATURATION: 97 % | BODY MASS INDEX: 31.34 KG/M2 | HEIGHT: 61 IN | SYSTOLIC BLOOD PRESSURE: 115 MMHG | HEART RATE: 77 BPM | WEIGHT: 166 LBS

## 2022-01-01 VITALS
TEMPERATURE: 97.8 F | DIASTOLIC BLOOD PRESSURE: 73 MMHG | RESPIRATION RATE: 20 BRPM | SYSTOLIC BLOOD PRESSURE: 127 MMHG | OXYGEN SATURATION: 97 % | HEIGHT: 63 IN | HEART RATE: 75 BPM | BODY MASS INDEX: 28.91 KG/M2 | WEIGHT: 163.14 LBS

## 2022-01-01 DIAGNOSIS — E03.9 HYPOTHYROIDISM, UNSPECIFIED: ICD-10-CM

## 2022-01-01 DIAGNOSIS — E78.5 HYPERLIPIDEMIA, UNSPECIFIED: ICD-10-CM

## 2022-01-01 DIAGNOSIS — F02.80 ALZHEIMER'S DISEASE: ICD-10-CM

## 2022-01-01 DIAGNOSIS — G30.9 ALZHEIMER'S DISEASE: ICD-10-CM

## 2022-01-01 DIAGNOSIS — E66.9 OBESITY (BMI 30-39.9): ICD-10-CM

## 2022-01-01 DIAGNOSIS — E55.9 VITAMIN D DEFICIENCY, UNSPECIFIED: ICD-10-CM

## 2022-01-01 DIAGNOSIS — R09.02 HYPOXIA: ICD-10-CM

## 2022-01-01 DIAGNOSIS — R56.9 UNSPECIFIED CONVULSIONS: ICD-10-CM

## 2022-01-01 DIAGNOSIS — J96.11 CHRONIC RESPIRATORY FAILURE WITH HYPOXIA AND HYPERCAPNIA: ICD-10-CM

## 2022-01-01 DIAGNOSIS — R73.01 IMPAIRED FASTING GLUCOSE: ICD-10-CM

## 2022-01-01 DIAGNOSIS — G30.0 EARLY ONSET ALZHEIMER'S DEMENTIA WITHOUT BEHAVIORAL DISTURBANCE: Primary | ICD-10-CM

## 2022-01-01 DIAGNOSIS — M85.80 OTHER SPECIFIED DISORDERS OF BONE DENSITY AND STRUCTURE, UNSPECIFIED SITE: ICD-10-CM

## 2022-01-01 DIAGNOSIS — F02.818 DEMENTIA OF THE ALZHEIMER'S TYPE, WITH LATE ONSET, WITH DELUSIONS: Primary | ICD-10-CM

## 2022-01-01 DIAGNOSIS — D50.0 IRON DEFICIENCY ANEMIA SECONDARY TO BLOOD LOSS (CHRONIC): ICD-10-CM

## 2022-01-01 DIAGNOSIS — G40.509 EPILEPTIC SEIZURES RELATED TO EXTERNAL CAUSES, NOT INTRACTABLE, WITHOUT STATUS EPILEPTICUS: ICD-10-CM

## 2022-01-01 DIAGNOSIS — G30.1 DEMENTIA OF THE ALZHEIMER'S TYPE, WITH LATE ONSET, WITH DELUSIONS: Primary | ICD-10-CM

## 2022-01-01 DIAGNOSIS — R53.83 OTHER FATIGUE: ICD-10-CM

## 2022-01-01 DIAGNOSIS — G47.33 OBSTRUCTIVE SLEEP APNEA: Primary | ICD-10-CM

## 2022-01-01 DIAGNOSIS — Z01.419 WELL WOMAN EXAM WITH ROUTINE GYNECOLOGICAL EXAM: Primary | ICD-10-CM

## 2022-01-01 DIAGNOSIS — I51.89 DIASTOLIC DYSFUNCTION: ICD-10-CM

## 2022-01-01 DIAGNOSIS — R53.82 CHRONIC FATIGUE, UNSPECIFIED: ICD-10-CM

## 2022-01-01 DIAGNOSIS — J98.11 ATELECTASIS: ICD-10-CM

## 2022-01-01 DIAGNOSIS — R53.1 GENERALIZED WEAKNESS: Primary | ICD-10-CM

## 2022-01-01 DIAGNOSIS — J96.10 CHRONIC RESPIRATORY FAILURE, UNSPECIFIED WHETHER WITH HYPOXIA OR HYPERCAPNIA: ICD-10-CM

## 2022-01-01 DIAGNOSIS — J96.12 CHRONIC RESPIRATORY FAILURE WITH HYPOXIA AND HYPERCAPNIA: ICD-10-CM

## 2022-01-01 DIAGNOSIS — Z79.899 OTHER LONG TERM (CURRENT) DRUG THERAPY: ICD-10-CM

## 2022-01-01 DIAGNOSIS — R73.09 OTHER ABNORMAL GLUCOSE: ICD-10-CM

## 2022-01-01 DIAGNOSIS — R94.5 ABNORMAL RESULTS OF LIVER FUNCTION STUDIES: ICD-10-CM

## 2022-01-01 DIAGNOSIS — R79.9 ABNORMAL FINDING OF BLOOD CHEMISTRY, UNSPECIFIED: ICD-10-CM

## 2022-01-01 DIAGNOSIS — I10 ESSENTIAL HYPERTENSION: Primary | ICD-10-CM

## 2022-01-01 DIAGNOSIS — J96.20 ACUTE ON CHRONIC RESPIRATORY FAILURE, UNSPECIFIED WHETHER WITH HYPOXIA OR HYPERCAPNIA: Primary | ICD-10-CM

## 2022-01-01 DIAGNOSIS — E75.6 LIPID STORAGE DISORDER, UNSPECIFIED: ICD-10-CM

## 2022-01-01 DIAGNOSIS — R39.9 UNSPECIFIED SYMPTOMS AND SIGNS INVOLVING THE GENITOURINARY SYSTEM: ICD-10-CM

## 2022-01-01 DIAGNOSIS — R06.00 DYSPNEA, UNSPECIFIED TYPE: ICD-10-CM

## 2022-01-01 DIAGNOSIS — R79.89 OTHER SPECIFIED ABNORMAL FINDINGS OF BLOOD CHEMISTRY: ICD-10-CM

## 2022-01-01 DIAGNOSIS — J98.11 ATELECTASIS: Primary | ICD-10-CM

## 2022-01-01 DIAGNOSIS — F02.80 EARLY ONSET ALZHEIMER'S DEMENTIA WITHOUT BEHAVIORAL DISTURBANCE: Primary | ICD-10-CM

## 2022-01-01 DIAGNOSIS — E78.6 LIPOPROTEIN DEFICIENCY: ICD-10-CM

## 2022-01-01 DIAGNOSIS — Z87.09 HISTORY OF RESPIRATORY DISTRESS: Primary | ICD-10-CM

## 2022-01-01 DIAGNOSIS — E78.9 DISORDER OF LIPOPROTEIN METABOLISM, UNSPECIFIED: ICD-10-CM

## 2022-01-01 DIAGNOSIS — E78.2 MIXED HYPERLIPIDEMIA: ICD-10-CM

## 2022-01-01 DIAGNOSIS — M41.9 SCOLIOSIS, UNSPECIFIED SCOLIOSIS TYPE, UNSPECIFIED SPINAL REGION: ICD-10-CM

## 2022-01-01 DIAGNOSIS — E78.00 PURE HYPERCHOLESTEROLEMIA, UNSPECIFIED: ICD-10-CM

## 2022-01-01 DIAGNOSIS — Z51.81 ENCOUNTER FOR THERAPEUTIC DRUG LEVEL MONITORING: ICD-10-CM

## 2022-01-01 DIAGNOSIS — Z13.29 ENCOUNTER FOR SCREENING FOR OTHER SUSPECTED ENDOCRINE DISORDER: ICD-10-CM

## 2022-01-01 DIAGNOSIS — Z12.31 ENCOUNTER FOR SCREENING MAMMOGRAM FOR MALIGNANT NEOPLASM OF BREAST: ICD-10-CM

## 2022-01-01 DIAGNOSIS — R94.6 ABNORMAL RESULTS OF THYROID FUNCTION STUDIES: ICD-10-CM

## 2022-01-01 DIAGNOSIS — N90.89 LABIAL LESION: ICD-10-CM

## 2022-01-01 DIAGNOSIS — G47.34 NOCTURNAL HYPOXIA: ICD-10-CM

## 2022-01-01 DIAGNOSIS — F02.818 DEMENTIA OF THE ALZHEIMER'S TYPE, WITH LATE ONSET, WITH DELUSIONS: ICD-10-CM

## 2022-01-01 DIAGNOSIS — G30.1 DEMENTIA OF THE ALZHEIMER'S TYPE, WITH LATE ONSET, WITH DELUSIONS: ICD-10-CM
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
## 2022-01-01 DIAGNOSIS — J45.40 MODERATE PERSISTENT ASTHMA, UNSPECIFIED WHETHER COMPLICATED: ICD-10-CM

## 2022-01-01 DIAGNOSIS — R41.89 COGNITIVE IMPAIRMENT: ICD-10-CM

## 2022-01-01 DIAGNOSIS — G40.919 EPILEPSY, UNSPECIFIED, INTRACTABLE, WITHOUT STATUS EPILEPTICUS: ICD-10-CM

## 2022-01-01 DIAGNOSIS — Z12.31 ENCOUNTER FOR SCREENING MAMMOGRAM FOR MALIGNANT NEOPLASM OF BREAST: Primary | ICD-10-CM

## 2022-01-01 DIAGNOSIS — R79.81 LOW O2 SATURATION: Primary | ICD-10-CM

## 2022-01-01 LAB
25(OH)D3 SERPL-MCNC: 36.7 NG/ML
25(OH)D3 SERPL-MCNC: 43.2 NG/ML (ref 30–100)
25(OH)D3 SERPL-MCNC: 43.7 NG/ML
25(OH)D3 SERPL-MCNC: 43.9 NG/ML (ref 30–100)
ALBUMIN SERPL-MCNC: 3.1 G/DL (ref 3.5–5.2)
ALBUMIN SERPL-MCNC: 3.2 G/DL (ref 3.5–5.2)
ALBUMIN SERPL-MCNC: 3.4 G/DL (ref 3.5–5.2)
ALBUMIN SERPL-MCNC: 3.5 G/DL (ref 3.5–5.2)
ALBUMIN SERPL-MCNC: 3.5 G/DL (ref 3.5–5.2)
ALBUMIN SERPL-MCNC: 3.7 G/DL (ref 3.5–5.2)
ALBUMIN SERPL-MCNC: 3.9 G/DL (ref 3.5–5.2)
ALBUMIN SERPL-MCNC: 4 G/DL (ref 3.5–5.2)
ALBUMIN/GLOB SERPL: 1 G/DL
ALBUMIN/GLOB SERPL: 1.2 G/DL
ALBUMIN/GLOB SERPL: 1.2 G/DL
ALBUMIN/GLOB SERPL: 1.3 G/DL
ALBUMIN/GLOB SERPL: 1.3 G/DL
ALBUMIN/GLOB SERPL: 1.4 G/DL
ALBUMIN/GLOB SERPL: 1.4 G/DL
ALBUMIN/GLOB SERPL: 1.6 G/DL
ALP SERPL-CCNC: 71 U/L (ref 39–117)
ALP SERPL-CCNC: 76 U/L (ref 39–117)
ALP SERPL-CCNC: 80 U/L (ref 39–117)
ALP SERPL-CCNC: 80 U/L (ref 39–117)
ALP SERPL-CCNC: 81 U/L (ref 39–117)
ALP SERPL-CCNC: 83 U/L (ref 39–117)
ALP SERPL-CCNC: 86 U/L (ref 39–117)
ALP SERPL-CCNC: 98 U/L (ref 39–117)
ALT SERPL W P-5'-P-CCNC: 15 U/L (ref 1–33)
ALT SERPL W P-5'-P-CCNC: 16 U/L (ref 1–33)
ALT SERPL W P-5'-P-CCNC: 18 U/L (ref 1–33)
ALT SERPL W P-5'-P-CCNC: 19 U/L (ref 1–33)
ALT SERPL W P-5'-P-CCNC: 20 U/L (ref 1–33)
ALT SERPL W P-5'-P-CCNC: 22 U/L (ref 1–33)
ANION GAP SERPL CALCULATED.3IONS-SCNC: 10.4 MMOL/L (ref 5–15)
ANION GAP SERPL CALCULATED.3IONS-SCNC: 11 MMOL/L (ref 5–15)
ANION GAP SERPL CALCULATED.3IONS-SCNC: 5.7 MMOL/L (ref 5–15)
ANION GAP SERPL CALCULATED.3IONS-SCNC: 6.1 MMOL/L (ref 5–15)
ANION GAP SERPL CALCULATED.3IONS-SCNC: 6.7 MMOL/L (ref 5–15)
ANION GAP SERPL CALCULATED.3IONS-SCNC: 7.4 MMOL/L (ref 5–15)
ANION GAP SERPL CALCULATED.3IONS-SCNC: 8.1 MMOL/L (ref 5–15)
ANION GAP SERPL CALCULATED.3IONS-SCNC: 8.7 MMOL/L (ref 5–15)
ANION GAP SERPL CALCULATED.3IONS-SCNC: 9 MMOL/L (ref 5–15)
ANION GAP SERPL CALCULATED.3IONS-SCNC: 9.2 MMOL/L (ref 5–15)
ANION GAP SERPL CALCULATED.3IONS-SCNC: 9.7 MMOL/L (ref 5–15)
ANION GAP SERPL CALCULATED.3IONS-SCNC: 9.7 MMOL/L (ref 5–15)
AORTIC DIMENSIONLESS INDEX: 0.6 (DI)
ARTERIAL PATENCY WRIST A: POSITIVE
ARTERIAL PATENCY WRIST A: POSITIVE
AST SERPL-CCNC: 19 U/L (ref 1–32)
AST SERPL-CCNC: 19 U/L (ref 1–32)
AST SERPL-CCNC: 22 U/L (ref 1–32)
AST SERPL-CCNC: 22 U/L (ref 1–32)
AST SERPL-CCNC: 23 U/L (ref 1–32)
AST SERPL-CCNC: 23 U/L (ref 1–32)
AST SERPL-CCNC: 26 U/L (ref 1–32)
AST SERPL-CCNC: 28 U/L (ref 1–32)
ATMOSPHERIC PRESS: 735 MMHG
ATMOSPHERIC PRESS: 740 MMHG
BACTERIA SPEC AEROBE CULT: ABNORMAL
BACTERIA SPEC AEROBE CULT: ABNORMAL
BACTERIA SPEC AEROBE CULT: NORMAL
BACTERIA UR QL AUTO: ABNORMAL /HPF
BACTERIA UR QL AUTO: NORMAL /HPF
BASE EXCESS BLDA CALC-SCNC: 7.1 MMOL/L (ref 0–2)
BASE EXCESS BLDA CALC-SCNC: 7.8 MMOL/L (ref 0–2)
BASOPHILS # BLD AUTO: 0.03 10*3/MM3 (ref 0–0.2)
BASOPHILS # BLD AUTO: 0.04 10*3/MM3 (ref 0–0.2)
BASOPHILS # BLD AUTO: 0.04 10*3/MM3 (ref 0–0.2)
BASOPHILS # BLD AUTO: 0.05 10*3/MM3 (ref 0–0.2)
BASOPHILS # BLD AUTO: 0.06 10*3/MM3 (ref 0–0.2)
BASOPHILS # BLD AUTO: 0.07 10*3/MM3 (ref 0–0.2)
BASOPHILS # BLD AUTO: 0.07 10*3/MM3 (ref 0–0.2)
BASOPHILS NFR BLD AUTO: 0.3 % (ref 0–1.5)
BASOPHILS NFR BLD AUTO: 0.4 % (ref 0–1.5)
BASOPHILS NFR BLD AUTO: 0.4 % (ref 0–1.5)
BASOPHILS NFR BLD AUTO: 0.5 % (ref 0–1.5)
BASOPHILS NFR BLD AUTO: 0.5 % (ref 0–1.5)
BASOPHILS NFR BLD AUTO: 0.6 % (ref 0–1.5)
BASOPHILS NFR BLD AUTO: 0.6 % (ref 0–1.5)
BDY SITE: ABNORMAL
BDY SITE: ABNORMAL
BH CV ECHO MEAS - ACS: 1.81 CM
BH CV ECHO MEAS - AO MAX PG: 4.6 MMHG
BH CV ECHO MEAS - AO MEAN PG: 3 MMHG
BH CV ECHO MEAS - AO V2 MAX: 107 CM/SEC
BH CV ECHO MEAS - AO V2 VTI: 25.6 CM
BH CV ECHO MEAS - AVA(I,D): 1.68 CM2
BH CV ECHO MEAS - EDV(CUBED): 79.5 ML
BH CV ECHO MEAS - EDV(MOD-SP4): 39 ML
BH CV ECHO MEAS - EF(MOD-SP4): 66.7 %
BH CV ECHO MEAS - ESV(CUBED): 22.4 ML
BH CV ECHO MEAS - ESV(MOD-SP4): 13 ML
BH CV ECHO MEAS - FS: 34.4 %
BH CV ECHO MEAS - IVS/LVPW: 1.13 CM
BH CV ECHO MEAS - IVSD: 0.9 CM
BH CV ECHO MEAS - LAT PEAK E' VEL: 7.7 CM/SEC
BH CV ECHO MEAS - LV DIASTOLIC VOL/BSA (35-75): 22 CM2
BH CV ECHO MEAS - LV MASS(C)D: 114.2 GRAMS
BH CV ECHO MEAS - LV MAX PG: 1.83 MMHG
BH CV ECHO MEAS - LV MEAN PG: 1 MMHG
BH CV ECHO MEAS - LV SYSTOLIC VOL/BSA (12-30): 7.3 CM2
BH CV ECHO MEAS - LV V1 MAX: 67.6 CM/SEC
BH CV ECHO MEAS - LV V1 VTI: 16.5 CM
BH CV ECHO MEAS - LVIDD: 4.3 CM
BH CV ECHO MEAS - LVIDS: 2.8 CM
BH CV ECHO MEAS - LVOT AREA: 2.6 CM2
BH CV ECHO MEAS - LVOT DIAM: 1.82 CM
BH CV ECHO MEAS - LVPWD: 0.8 CM
BH CV ECHO MEAS - MED PEAK E' VEL: 7.3 CM/SEC
BH CV ECHO MEAS - MV A MAX VEL: 39.8 CM/SEC
BH CV ECHO MEAS - MV DEC SLOPE: 701 CM/SEC2
BH CV ECHO MEAS - MV DEC TIME: 0.17 MSEC
BH CV ECHO MEAS - MV E MAX VEL: 91.7 CM/SEC
BH CV ECHO MEAS - MV E/A: 2.3
BH CV ECHO MEAS - MV MAX PG: 4.7 MMHG
BH CV ECHO MEAS - MV MEAN PG: 1.28 MMHG
BH CV ECHO MEAS - MV P1/2T: 47.1 MSEC
BH CV ECHO MEAS - MV V2 VTI: 28.4 CM
BH CV ECHO MEAS - MVA(P1/2T): 4.7 CM2
BH CV ECHO MEAS - MVA(VTI): 1.51 CM2
BH CV ECHO MEAS - PA ACC TIME: 0.12 SEC
BH CV ECHO MEAS - PA PR(ACCEL): 23.5 MMHG
BH CV ECHO MEAS - PA V2 MAX: 98.1 CM/SEC
BH CV ECHO MEAS - QP/QS: 1.18
BH CV ECHO MEAS - RV MAX PG: 3.4 MMHG
BH CV ECHO MEAS - RV V1 MAX: 92.8 CM/SEC
BH CV ECHO MEAS - RV V1 VTI: 24.8 CM
BH CV ECHO MEAS - RVOT DIAM: 1.61 CM
BH CV ECHO MEAS - SI(MOD-SP4): 14.7 ML/M2
BH CV ECHO MEAS - SV(LVOT): 43 ML
BH CV ECHO MEAS - SV(MOD-SP4): 26 ML
BH CV ECHO MEAS - SV(RVOT): 50.7 ML
BH CV ECHO MEASUREMENTS AVERAGE E/E' RATIO: 12.23
BH CV XLRA - RV BASE: 2.7 CM
BH CV XLRA - RV LENGTH: 5.9 CM
BH CV XLRA - RV MID: 2.8 CM
BH CV XLRA - TDI S': 12.2 CM/SEC
BILIRUB SERPL-MCNC: 0.2 MG/DL (ref 0–1.2)
BILIRUB SERPL-MCNC: <0.2 MG/DL (ref 0–1.2)
BILIRUB UR QL STRIP: NEGATIVE
BODY TEMPERATURE: 37 C
BODY TEMPERATURE: 37 C
BUN SERPL-MCNC: 12 MG/DL (ref 8–23)
BUN SERPL-MCNC: 14 MG/DL (ref 8–23)
BUN SERPL-MCNC: 14 MG/DL (ref 8–23)
BUN SERPL-MCNC: 15 MG/DL (ref 8–23)
BUN SERPL-MCNC: 16 MG/DL (ref 8–23)
BUN SERPL-MCNC: 17 MG/DL (ref 8–23)
BUN SERPL-MCNC: 17 MG/DL (ref 8–23)
BUN SERPL-MCNC: 18 MG/DL (ref 8–23)
BUN SERPL-MCNC: 20 MG/DL (ref 8–23)
BUN SERPL-MCNC: 21 MG/DL (ref 8–23)
BUN SERPL-MCNC: 22 MG/DL (ref 8–23)
BUN SERPL-MCNC: 25 MG/DL (ref 8–23)
BUN/CREAT SERPL: 17.1 (ref 7–25)
BUN/CREAT SERPL: 19.5 (ref 7–25)
BUN/CREAT SERPL: 20.6 (ref 7–25)
BUN/CREAT SERPL: 20.8 (ref 7–25)
BUN/CREAT SERPL: 22.4 (ref 7–25)
BUN/CREAT SERPL: 22.7 (ref 7–25)
BUN/CREAT SERPL: 23 (ref 7–25)
BUN/CREAT SERPL: 23.4 (ref 7–25)
BUN/CREAT SERPL: 25 (ref 7–25)
BUN/CREAT SERPL: 27 (ref 7–25)
BUN/CREAT SERPL: 28.1 (ref 7–25)
BUN/CREAT SERPL: 28.6 (ref 7–25)
CALCIUM SPEC-SCNC: 10 MG/DL (ref 8.6–10.5)
CALCIUM SPEC-SCNC: 10 MG/DL (ref 8.6–10.5)
CALCIUM SPEC-SCNC: 10.4 MG/DL (ref 8.6–10.5)
CALCIUM SPEC-SCNC: 8.5 MG/DL (ref 8.6–10.5)
CALCIUM SPEC-SCNC: 8.7 MG/DL (ref 8.6–10.5)
CALCIUM SPEC-SCNC: 9 MG/DL (ref 8.6–10.5)
CALCIUM SPEC-SCNC: 9.1 MG/DL (ref 8.6–10.5)
CALCIUM SPEC-SCNC: 9.3 MG/DL (ref 8.6–10.5)
CALCIUM SPEC-SCNC: 9.3 MG/DL (ref 8.6–10.5)
CALCIUM SPEC-SCNC: 9.4 MG/DL (ref 8.6–10.5)
CALCIUM SPEC-SCNC: 9.6 MG/DL (ref 8.6–10.5)
CALCIUM SPEC-SCNC: 9.8 MG/DL (ref 8.6–10.5)
CHLORIDE SERPL-SCNC: 101 MMOL/L (ref 98–107)
CHLORIDE SERPL-SCNC: 101 MMOL/L (ref 98–107)
CHLORIDE SERPL-SCNC: 102 MMOL/L (ref 98–107)
CHLORIDE SERPL-SCNC: 103 MMOL/L (ref 98–107)
CHLORIDE SERPL-SCNC: 103 MMOL/L (ref 98–107)
CHLORIDE SERPL-SCNC: 104 MMOL/L (ref 98–107)
CHLORIDE SERPL-SCNC: 106 MMOL/L (ref 98–107)
CHLORIDE SERPL-SCNC: 107 MMOL/L (ref 98–107)
CHLORIDE SERPL-SCNC: 99 MMOL/L (ref 98–107)
CHOLEST SERPL-MCNC: 118 MG/DL (ref 0–200)
CHOLEST SERPL-MCNC: 120 MG/DL (ref 0–200)
CHOLEST SERPL-MCNC: 131 MG/DL (ref 0–200)
CHOLEST SERPL-MCNC: 136 MG/DL (ref 0–200)
CLARITY UR: CLEAR
CO2 SERPL-SCNC: 27 MMOL/L (ref 22–29)
CO2 SERPL-SCNC: 28.6 MMOL/L (ref 22–29)
CO2 SERPL-SCNC: 29.3 MMOL/L (ref 22–29)
CO2 SERPL-SCNC: 29.8 MMOL/L (ref 22–29)
CO2 SERPL-SCNC: 30 MMOL/L (ref 22–29)
CO2 SERPL-SCNC: 30.6 MMOL/L (ref 22–29)
CO2 SERPL-SCNC: 31.3 MMOL/L (ref 22–29)
CO2 SERPL-SCNC: 31.3 MMOL/L (ref 22–29)
CO2 SERPL-SCNC: 31.9 MMOL/L (ref 22–29)
CO2 SERPL-SCNC: 32.3 MMOL/L (ref 22–29)
CO2 SERPL-SCNC: 32.9 MMOL/L (ref 22–29)
CO2 SERPL-SCNC: 35.3 MMOL/L (ref 22–29)
COLOR UR: YELLOW
CREAT SERPL-MCNC: 0.61 MG/DL (ref 0.57–1)
CREAT SERPL-MCNC: 0.63 MG/DL (ref 0.57–1)
CREAT SERPL-MCNC: 0.68 MG/DL (ref 0.57–1)
CREAT SERPL-MCNC: 0.7 MG/DL (ref 0.57–1)
CREAT SERPL-MCNC: 0.7 MG/DL (ref 0.57–1)
CREAT SERPL-MCNC: 0.76 MG/DL (ref 0.57–1)
CREAT SERPL-MCNC: 0.77 MG/DL (ref 0.57–1)
CREAT SERPL-MCNC: 0.84 MG/DL (ref 0.57–1)
CREAT SERPL-MCNC: 0.89 MG/DL (ref 0.57–1)
CREAT SERPL-MCNC: 0.97 MG/DL (ref 0.57–1)
D DIMER PPP FEU-MCNC: 0.73 MCGFEU/ML (ref 0–0.46)
D-LACTATE SERPL-SCNC: 0.6 MMOL/L (ref 0.5–2)
DEPRECATED RDW RBC AUTO: 46.5 FL (ref 37–54)
DEPRECATED RDW RBC AUTO: 46.7 FL (ref 37–54)
DEPRECATED RDW RBC AUTO: 47.2 FL (ref 37–54)
DEPRECATED RDW RBC AUTO: 47.5 FL (ref 37–54)
DEPRECATED RDW RBC AUTO: 48 FL (ref 37–54)
DEPRECATED RDW RBC AUTO: 48.3 FL (ref 37–54)
DEPRECATED RDW RBC AUTO: 50.8 FL (ref 37–54)
EGFRCR SERPLBLD CKD-EPI 2021: 69.4 ML/MIN/1.73
EGFRCR SERPLBLD CKD-EPI 2021: 74.4 ML/MIN/1.73
EGFRCR SERPLBLD CKD-EPI 2021: 82.6 ML/MIN/1.73
EGFRCR SERPLBLD CKD-EPI 2021: 82.6 ML/MIN/1.73
EGFRCR SERPLBLD CKD-EPI 2021: 83.1 ML/MIN/1.73
EGFRCR SERPLBLD CKD-EPI 2021: 83.9 ML/MIN/1.73
EGFRCR SERPLBLD CKD-EPI 2021: 92.6 ML/MIN/1.73
EGFRCR SERPLBLD CKD-EPI 2021: 92.6 ML/MIN/1.73
EGFRCR SERPLBLD CKD-EPI 2021: 93.2 ML/MIN/1.73
EGFRCR SERPLBLD CKD-EPI 2021: 95 ML/MIN/1.73
EGFRCR SERPLBLD CKD-EPI 2021: 95.7 ML/MIN/1.73
EOSINOPHIL # BLD AUTO: 0.16 10*3/MM3 (ref 0–0.4)
EOSINOPHIL # BLD AUTO: 0.18 10*3/MM3 (ref 0–0.4)
EOSINOPHIL # BLD AUTO: 0.19 10*3/MM3 (ref 0–0.4)
EOSINOPHIL # BLD AUTO: 0.19 10*3/MM3 (ref 0–0.4)
EOSINOPHIL # BLD AUTO: 0.26 10*3/MM3 (ref 0–0.4)
EOSINOPHIL # BLD AUTO: 0.29 10*3/MM3 (ref 0–0.4)
EOSINOPHIL # BLD AUTO: 0.29 10*3/MM3 (ref 0–0.4)
EOSINOPHIL NFR BLD AUTO: 1.4 % (ref 0.3–6.2)
EOSINOPHIL NFR BLD AUTO: 1.7 % (ref 0.3–6.2)
EOSINOPHIL NFR BLD AUTO: 2 % (ref 0.3–6.2)
EOSINOPHIL NFR BLD AUTO: 2 % (ref 0.3–6.2)
EOSINOPHIL NFR BLD AUTO: 2.4 % (ref 0.3–6.2)
EOSINOPHIL NFR BLD AUTO: 2.4 % (ref 0.3–6.2)
EOSINOPHIL NFR BLD AUTO: 3.2 % (ref 0.3–6.2)
EPAP: 5
ERYTHROCYTE [DISTWIDTH] IN BLOOD BY AUTOMATED COUNT: 13.7 % (ref 12.3–15.4)
ERYTHROCYTE [DISTWIDTH] IN BLOOD BY AUTOMATED COUNT: 14.2 % (ref 12.3–15.4)
ERYTHROCYTE [DISTWIDTH] IN BLOOD BY AUTOMATED COUNT: 14.2 % (ref 12.3–15.4)
ERYTHROCYTE [DISTWIDTH] IN BLOOD BY AUTOMATED COUNT: 14.5 % (ref 12.3–15.4)
ERYTHROCYTE [DISTWIDTH] IN BLOOD BY AUTOMATED COUNT: 14.6 % (ref 12.3–15.4)
FLUAV RNA RESP QL NAA+PROBE: NOT DETECTED
FLUAV RNA RESP QL NAA+PROBE: NOT DETECTED
FLUBV RNA RESP QL NAA+PROBE: NOT DETECTED
FLUBV RNA RESP QL NAA+PROBE: NOT DETECTED
FOLATE SERPL-MCNC: 13.4 NG/ML (ref 4.78–24.2)
FOLATE SERPL-MCNC: 19.1 NG/ML (ref 4.78–24.2)
FOLATE SERPL-MCNC: >20 NG/ML (ref 4.78–24.2)
FOLATE SERPL-MCNC: >20 NG/ML (ref 4.78–24.2)
GFR SERPL CREATININE-BSD FRML MDRD: 57 ML/MIN/1.73
GLOBULIN UR ELPH-MCNC: 2.5 GM/DL
GLOBULIN UR ELPH-MCNC: 2.5 GM/DL
GLOBULIN UR ELPH-MCNC: 2.6 GM/DL
GLOBULIN UR ELPH-MCNC: 2.7 GM/DL
GLOBULIN UR ELPH-MCNC: 2.8 GM/DL
GLOBULIN UR ELPH-MCNC: 2.8 GM/DL
GLOBULIN UR ELPH-MCNC: 2.9 GM/DL
GLOBULIN UR ELPH-MCNC: 3.1 GM/DL
GLUCOSE SERPL-MCNC: 102 MG/DL (ref 65–99)
GLUCOSE SERPL-MCNC: 104 MG/DL (ref 65–99)
GLUCOSE SERPL-MCNC: 107 MG/DL (ref 65–99)
GLUCOSE SERPL-MCNC: 108 MG/DL (ref 65–99)
GLUCOSE SERPL-MCNC: 113 MG/DL (ref 65–99)
GLUCOSE SERPL-MCNC: 129 MG/DL (ref 65–99)
GLUCOSE SERPL-MCNC: 130 MG/DL (ref 65–99)
GLUCOSE SERPL-MCNC: 172 MG/DL (ref 65–99)
GLUCOSE SERPL-MCNC: 224 MG/DL (ref 65–99)
GLUCOSE SERPL-MCNC: 58 MG/DL (ref 65–99)
GLUCOSE SERPL-MCNC: 96 MG/DL (ref 65–99)
GLUCOSE SERPL-MCNC: 98 MG/DL (ref 65–99)
GLUCOSE UR STRIP-MCNC: NEGATIVE MG/DL
GRAM STN SPEC: ABNORMAL
HBA1C MFR BLD: 5.9 % (ref 4.8–5.6)
HBA1C MFR BLD: 6.1 % (ref 4.8–5.6)
HBA1C MFR BLD: 6.4 % (ref 4.8–5.6)
HBA1C MFR BLD: 6.5 % (ref 4.8–5.6)
HCO3 BLDA-SCNC: 32.8 MMOL/L (ref 20–26)
HCO3 BLDA-SCNC: 33.7 MMOL/L (ref 20–26)
HCT VFR BLD AUTO: 39.1 % (ref 34–46.6)
HCT VFR BLD AUTO: 39.9 % (ref 34–46.6)
HCT VFR BLD AUTO: 39.9 % (ref 34–46.6)
HCT VFR BLD AUTO: 40 % (ref 34–46.6)
HCT VFR BLD AUTO: 40.7 % (ref 34–46.6)
HCT VFR BLD AUTO: 42.3 % (ref 34–46.6)
HCT VFR BLD AUTO: 43.7 % (ref 34–46.6)
HDLC SERPL-MCNC: 49 MG/DL (ref 40–60)
HDLC SERPL-MCNC: 49 MG/DL (ref 40–60)
HDLC SERPL-MCNC: 58 MG/DL (ref 40–60)
HDLC SERPL-MCNC: 58 MG/DL (ref 40–60)
HGB BLD-MCNC: 12.2 G/DL (ref 12–15.9)
HGB BLD-MCNC: 12.5 G/DL (ref 12–15.9)
HGB BLD-MCNC: 12.6 G/DL (ref 12–15.9)
HGB BLD-MCNC: 13.3 G/DL (ref 12–15.9)
HGB BLD-MCNC: 13.6 G/DL (ref 12–15.9)
HGB BLDA-MCNC: 13.3 G/DL (ref 13.5–17.5)
HGB BLDA-MCNC: 13.9 G/DL (ref 13.5–17.5)
HGB UR QL STRIP.AUTO: ABNORMAL
HGB UR QL STRIP.AUTO: NEGATIVE
HGB UR QL STRIP.AUTO: NEGATIVE
HOLD SPECIMEN: NORMAL
HYALINE CASTS UR QL AUTO: ABNORMAL /LPF
HYALINE CASTS UR QL AUTO: NORMAL /LPF
IMM GRANULOCYTES # BLD AUTO: 0.04 10*3/MM3 (ref 0–0.05)
IMM GRANULOCYTES # BLD AUTO: 0.05 10*3/MM3 (ref 0–0.05)
IMM GRANULOCYTES # BLD AUTO: 0.05 10*3/MM3 (ref 0–0.05)
IMM GRANULOCYTES # BLD AUTO: 0.06 10*3/MM3 (ref 0–0.05)
IMM GRANULOCYTES # BLD AUTO: 0.07 10*3/MM3 (ref 0–0.05)
IMM GRANULOCYTES NFR BLD AUTO: 0.3 % (ref 0–0.5)
IMM GRANULOCYTES NFR BLD AUTO: 0.4 % (ref 0–0.5)
IMM GRANULOCYTES NFR BLD AUTO: 0.5 % (ref 0–0.5)
IMM GRANULOCYTES NFR BLD AUTO: 0.6 % (ref 0–0.5)
IMM GRANULOCYTES NFR BLD AUTO: 0.8 % (ref 0–0.5)
INHALED O2 CONCENTRATION: 30 %
IPAP: 15
ISOLATED FROM: ABNORMAL
KETONES UR QL STRIP: NEGATIVE
LARGE PLATELETS: NORMAL
LDLC SERPL CALC-MCNC: 48 MG/DL (ref 0–100)
LDLC SERPL CALC-MCNC: 54 MG/DL (ref 0–100)
LDLC SERPL CALC-MCNC: 56 MG/DL (ref 0–100)
LDLC SERPL CALC-MCNC: 70 MG/DL (ref 0–100)
LDLC/HDLC SERPL: 0.83 {RATIO}
LDLC/HDLC SERPL: 0.96 {RATIO}
LDLC/HDLC SERPL: 1.11 {RATIO}
LDLC/HDLC SERPL: 1.42 {RATIO}
LEUKOCYTE ESTERASE UR QL STRIP.AUTO: ABNORMAL
LEUKOCYTE ESTERASE UR QL STRIP.AUTO: NEGATIVE
LEUKOCYTE ESTERASE UR QL STRIP.AUTO: NEGATIVE
LEVETIRACETAM SERPL-MCNC: 16.2 UG/ML (ref 10–40)
LEVETIRACETAM SERPL-MCNC: 26.8 UG/ML (ref 10–40)
LEVETIRACETAM SERPL-MCNC: 35.8 UG/ML (ref 10–40)
LYMPHOCYTES # BLD AUTO: 3.36 10*3/MM3 (ref 0.7–3.1)
LYMPHOCYTES # BLD AUTO: 3.38 10*3/MM3 (ref 0.7–3.1)
LYMPHOCYTES # BLD AUTO: 3.39 10*3/MM3 (ref 0.7–3.1)
LYMPHOCYTES # BLD AUTO: 3.71 10*3/MM3 (ref 0.7–3.1)
LYMPHOCYTES # BLD AUTO: 3.9 10*3/MM3 (ref 0.7–3.1)
LYMPHOCYTES # BLD AUTO: 4.03 10*3/MM3 (ref 0.7–3.1)
LYMPHOCYTES # BLD AUTO: 4.59 10*3/MM3 (ref 0.7–3.1)
LYMPHOCYTES NFR BLD AUTO: 31.4 % (ref 19.6–45.3)
LYMPHOCYTES NFR BLD AUTO: 34.1 % (ref 19.6–45.3)
LYMPHOCYTES NFR BLD AUTO: 35.3 % (ref 19.6–45.3)
LYMPHOCYTES NFR BLD AUTO: 35.3 % (ref 19.6–45.3)
LYMPHOCYTES NFR BLD AUTO: 35.8 % (ref 19.6–45.3)
LYMPHOCYTES NFR BLD AUTO: 38.5 % (ref 19.6–45.3)
LYMPHOCYTES NFR BLD AUTO: 43.1 % (ref 19.6–45.3)
Lab: ABNORMAL
Lab: ABNORMAL
MAGNESIUM SERPL-MCNC: 1.8 MG/DL (ref 1.6–2.4)
MAGNESIUM SERPL-MCNC: 1.9 MG/DL (ref 1.6–2.4)
MAGNESIUM SERPL-MCNC: 1.9 MG/DL (ref 1.6–2.4)
MAGNESIUM SERPL-MCNC: 2 MG/DL (ref 1.6–2.4)
MAXIMAL PREDICTED HEART RATE: 149 BPM
MCH RBC QN AUTO: 28.2 PG (ref 26.6–33)
MCH RBC QN AUTO: 28.3 PG (ref 26.6–33)
MCH RBC QN AUTO: 28.4 PG (ref 26.6–33)
MCH RBC QN AUTO: 28.5 PG (ref 26.6–33)
MCH RBC QN AUTO: 28.6 PG (ref 26.6–33)
MCHC RBC AUTO-ENTMCNC: 30.7 G/DL (ref 31.5–35.7)
MCHC RBC AUTO-ENTMCNC: 31.1 G/DL (ref 31.5–35.7)
MCHC RBC AUTO-ENTMCNC: 31.2 G/DL (ref 31.5–35.7)
MCHC RBC AUTO-ENTMCNC: 31.3 G/DL (ref 31.5–35.7)
MCHC RBC AUTO-ENTMCNC: 31.3 G/DL (ref 31.5–35.7)
MCHC RBC AUTO-ENTMCNC: 31.4 G/DL (ref 31.5–35.7)
MCHC RBC AUTO-ENTMCNC: 31.5 G/DL (ref 31.5–35.7)
MCV RBC AUTO: 90.1 FL (ref 79–97)
MCV RBC AUTO: 90.5 FL (ref 79–97)
MCV RBC AUTO: 90.5 FL (ref 79–97)
MCV RBC AUTO: 90.7 FL (ref 79–97)
MCV RBC AUTO: 90.8 FL (ref 79–97)
MCV RBC AUTO: 90.9 FL (ref 79–97)
MCV RBC AUTO: 93.1 FL (ref 79–97)
MODALITY: ABNORMAL
MODALITY: ABNORMAL
MONOCYTES # BLD AUTO: 0.64 10*3/MM3 (ref 0.1–0.9)
MONOCYTES # BLD AUTO: 0.78 10*3/MM3 (ref 0.1–0.9)
MONOCYTES # BLD AUTO: 0.81 10*3/MM3 (ref 0.1–0.9)
MONOCYTES # BLD AUTO: 0.86 10*3/MM3 (ref 0.1–0.9)
MONOCYTES # BLD AUTO: 0.93 10*3/MM3 (ref 0.1–0.9)
MONOCYTES # BLD AUTO: 1 10*3/MM3 (ref 0.1–0.9)
MONOCYTES # BLD AUTO: 1.01 10*3/MM3 (ref 0.1–0.9)
MONOCYTES NFR BLD AUTO: 6.7 % (ref 5–12)
MONOCYTES NFR BLD AUTO: 8 % (ref 5–12)
MONOCYTES NFR BLD AUTO: 8.3 % (ref 5–12)
MONOCYTES NFR BLD AUTO: 8.5 % (ref 5–12)
MONOCYTES NFR BLD AUTO: 8.5 % (ref 5–12)
MONOCYTES NFR BLD AUTO: 8.8 % (ref 5–12)
MONOCYTES NFR BLD AUTO: 9 % (ref 5–12)
NEUTROPHILS NFR BLD AUTO: 3.94 10*3/MM3 (ref 1.7–7)
NEUTROPHILS NFR BLD AUTO: 43.5 % (ref 42.7–76)
NEUTROPHILS NFR BLD AUTO: 49.7 % (ref 42.7–76)
NEUTROPHILS NFR BLD AUTO: 5.01 10*3/MM3 (ref 1.7–7)
NEUTROPHILS NFR BLD AUTO: 5.25 10*3/MM3 (ref 1.7–7)
NEUTROPHILS NFR BLD AUTO: 5.61 10*3/MM3 (ref 1.7–7)
NEUTROPHILS NFR BLD AUTO: 5.91 10*3/MM3 (ref 1.7–7)
NEUTROPHILS NFR BLD AUTO: 53.1 % (ref 42.7–76)
NEUTROPHILS NFR BLD AUTO: 53.3 % (ref 42.7–76)
NEUTROPHILS NFR BLD AUTO: 55.1 % (ref 42.7–76)
NEUTROPHILS NFR BLD AUTO: 55.2 % (ref 42.7–76)
NEUTROPHILS NFR BLD AUTO: 57 % (ref 42.7–76)
NEUTROPHILS NFR BLD AUTO: 6.16 10*3/MM3 (ref 1.7–7)
NEUTROPHILS NFR BLD AUTO: 6.53 10*3/MM3 (ref 1.7–7)
NITRITE UR QL STRIP: NEGATIVE
NRBC BLD AUTO-RTO: 0 /100 WBC (ref 0–0.2)
NT-PROBNP SERPL-MCNC: 169.6 PG/ML (ref 0–900)
NT-PROBNP SERPL-MCNC: 368.3 PG/ML (ref 0–900)
PCO2 BLDA: 49.9 MM HG (ref 35–45)
PCO2 BLDA: 51.1 MM HG (ref 35–45)
PCO2 TEMP ADJ BLD: 49.9 MM HG (ref 35–45)
PCO2 TEMP ADJ BLD: 51.1 MM HG (ref 35–45)
PH BLDA: 7.43 PH UNITS (ref 7.35–7.45)
PH BLDA: 7.43 PH UNITS (ref 7.35–7.45)
PH UR STRIP.AUTO: 5.5 [PH] (ref 4.5–8)
PH UR STRIP.AUTO: 5.5 [PH] (ref 4.5–8)
PH UR STRIP.AUTO: <=5 [PH] (ref 4.5–8)
PH, TEMP CORRECTED: 7.43 PH UNITS (ref 7.35–7.45)
PH, TEMP CORRECTED: 7.43 PH UNITS (ref 7.35–7.45)
PHOSPHATE SERPL-MCNC: 4.1 MG/DL (ref 2.5–4.5)
PHOSPHATE SERPL-MCNC: 4.1 MG/DL (ref 2.5–4.5)
PHOSPHATE SERPL-MCNC: 4.3 MG/DL (ref 2.5–4.5)
PHOSPHATE SERPL-MCNC: 4.7 MG/DL (ref 2.5–4.5)
PLATELET # BLD AUTO: 212 10*3/MM3 (ref 140–450)
PLATELET # BLD AUTO: 219 10*3/MM3 (ref 140–450)
PLATELET # BLD AUTO: 226 10*3/MM3 (ref 140–450)
PLATELET # BLD AUTO: 238 10*3/MM3 (ref 140–450)
PLATELET # BLD AUTO: 239 10*3/MM3 (ref 140–450)
PLATELET # BLD AUTO: 243 10*3/MM3 (ref 140–450)
PLATELET # BLD AUTO: 246 10*3/MM3 (ref 140–450)
PMV BLD AUTO: 10.7 FL (ref 6–12)
PMV BLD AUTO: 10.9 FL (ref 6–12)
PMV BLD AUTO: 10.9 FL (ref 6–12)
PMV BLD AUTO: 11 FL (ref 6–12)
PMV BLD AUTO: 11.2 FL (ref 6–12)
PMV BLD AUTO: 11.6 FL (ref 6–12)
PMV BLD AUTO: 11.7 FL (ref 6–12)
PO2 BLDA: 122 MM HG (ref 83–108)
PO2 BLDA: 67.3 MM HG (ref 83–108)
PO2 TEMP ADJ BLD: 122 MM HG (ref 83–108)
PO2 TEMP ADJ BLD: 67.3 MM HG (ref 83–108)
POTASSIUM SERPL-SCNC: 3.9 MMOL/L (ref 3.5–5.2)
POTASSIUM SERPL-SCNC: 4 MMOL/L (ref 3.5–5.2)
POTASSIUM SERPL-SCNC: 4 MMOL/L (ref 3.5–5.2)
POTASSIUM SERPL-SCNC: 4.2 MMOL/L (ref 3.5–5.2)
POTASSIUM SERPL-SCNC: 4.3 MMOL/L (ref 3.5–5.2)
POTASSIUM SERPL-SCNC: 4.3 MMOL/L (ref 3.5–5.2)
POTASSIUM SERPL-SCNC: 4.4 MMOL/L (ref 3.5–5.2)
POTASSIUM SERPL-SCNC: 4.6 MMOL/L (ref 3.5–5.2)
PROCALCITONIN SERPL-MCNC: 0.05 NG/ML (ref 0–0.25)
PROCALCITONIN SERPL-MCNC: 0.06 NG/ML (ref 0–0.25)
PROCALCITONIN SERPL-MCNC: 0.08 NG/ML (ref 0–0.25)
PROCALCITONIN SERPL-MCNC: 0.08 NG/ML (ref 0–0.25)
PROT SERPL-MCNC: 5.9 G/DL (ref 6–8.5)
PROT SERPL-MCNC: 6 G/DL (ref 6–8.5)
PROT SERPL-MCNC: 6 G/DL (ref 6–8.5)
PROT SERPL-MCNC: 6.2 G/DL (ref 6–8.5)
PROT SERPL-MCNC: 6.4 G/DL (ref 6–8.5)
PROT SERPL-MCNC: 6.4 G/DL (ref 6–8.5)
PROT SERPL-MCNC: 6.5 G/DL (ref 6–8.5)
PROT SERPL-MCNC: 6.8 G/DL (ref 6–8.5)
PROT UR QL STRIP: NEGATIVE
QT INTERVAL: 383 MS
QT INTERVAL: 407 MS
QT INTERVAL: 421 MS
RBC # BLD AUTO: 4.3 10*6/MM3 (ref 3.77–5.28)
RBC # BLD AUTO: 4.37 10*6/MM3 (ref 3.77–5.28)
RBC # BLD AUTO: 4.4 10*6/MM3 (ref 3.77–5.28)
RBC # BLD AUTO: 4.41 10*6/MM3 (ref 3.77–5.28)
RBC # BLD AUTO: 4.44 10*6/MM3 (ref 3.77–5.28)
RBC # BLD AUTO: 4.66 10*6/MM3 (ref 3.77–5.28)
RBC # BLD AUTO: 4.83 10*6/MM3 (ref 3.77–5.28)
RBC # UR STRIP: ABNORMAL /HPF
RBC # UR STRIP: NORMAL /HPF
RBC MORPH BLD: NORMAL
RBC MORPH BLD: NORMAL
REF LAB TEST METHOD: ABNORMAL
REF LAB TEST METHOD: NORMAL
SAO2 % BLDCOA: 91.7 % (ref 94–99)
SAO2 % BLDCOA: 98.3 % (ref 94–99)
SARS-COV-2 RNA RESP QL NAA+PROBE: NOT DETECTED
SARS-COV-2 RNA RESP QL NAA+PROBE: NOT DETECTED
SET MECH RESP RATE: 18
SINUS: 2.7 CM
SMALL PLATELETS BLD QL SMEAR: ADEQUATE
SODIUM SERPL-SCNC: 140 MMOL/L (ref 136–145)
SODIUM SERPL-SCNC: 141 MMOL/L (ref 136–145)
SODIUM SERPL-SCNC: 142 MMOL/L (ref 136–145)
SODIUM SERPL-SCNC: 143 MMOL/L (ref 136–145)
SODIUM SERPL-SCNC: 143 MMOL/L (ref 136–145)
SODIUM SERPL-SCNC: 144 MMOL/L (ref 136–145)
SODIUM SERPL-SCNC: 145 MMOL/L (ref 136–145)
SP GR UR STRIP: 1.01 (ref 1–1.03)
SP GR UR STRIP: 1.02 (ref 1–1.03)
SP GR UR STRIP: 1.04 (ref 1–1.03)
SQUAMOUS #/AREA URNS HPF: ABNORMAL /HPF
SQUAMOUS #/AREA URNS HPF: NORMAL /HPF
STRESS TARGET HR: 127 BPM
T4 FREE SERPL-MCNC: 1.33 NG/DL (ref 0.93–1.7)
T4 FREE SERPL-MCNC: 1.56 NG/DL (ref 0.93–1.7)
T4 FREE SERPL-MCNC: 1.6 NG/DL (ref 0.93–1.7)
T4 FREE SERPL-MCNC: 1.73 NG/DL (ref 0.93–1.7)
TRIGL SERPL-MCNC: 69 MG/DL (ref 0–150)
TRIGL SERPL-MCNC: 74 MG/DL (ref 0–150)
TRIGL SERPL-MCNC: 86 MG/DL (ref 0–150)
TRIGL SERPL-MCNC: 87 MG/DL (ref 0–150)
TROPONIN T SERPL-MCNC: <0.01 NG/ML (ref 0–0.03)
TROPONIN T SERPL-MCNC: <0.01 NG/ML (ref 0–0.03)
TSH SERPL DL<=0.05 MIU/L-ACNC: 1.49 UIU/ML (ref 0.27–4.2)
TSH SERPL DL<=0.05 MIU/L-ACNC: 1.66 UIU/ML (ref 0.27–4.2)
TSH SERPL DL<=0.05 MIU/L-ACNC: 1.71 UIU/ML (ref 0.27–4.2)
TSH SERPL DL<=0.05 MIU/L-ACNC: 2.18 UIU/ML (ref 0.27–4.2)
TSH SERPL DL<=0.05 MIU/L-ACNC: 2.99 UIU/ML (ref 0.27–4.2)
UROBILINOGEN UR QL STRIP: ABNORMAL
VENTILATOR MODE: ABNORMAL
VENTILATOR MODE: ABNORMAL
VIT B12 BLD-MCNC: 310 PG/ML (ref 211–946)
VIT B12 BLD-MCNC: 422 PG/ML (ref 211–946)
VIT B12 BLD-MCNC: 489 PG/ML (ref 211–946)
VIT B12 BLD-MCNC: 490 PG/ML (ref 211–946)
VLDLC SERPL-MCNC: 14 MG/DL (ref 5–40)
VLDLC SERPL-MCNC: 15 MG/DL (ref 5–40)
VLDLC SERPL-MCNC: 17 MG/DL (ref 5–40)
VLDLC SERPL-MCNC: 17 MG/DL (ref 5–40)
WBC # UR STRIP: ABNORMAL /HPF
WBC # UR STRIP: NORMAL /HPF
WBC MORPH BLD: NORMAL
WBC MORPH BLD: NORMAL
WBC NRBC COR # BLD: 10.52 10*3/MM3 (ref 3.4–10.8)
WBC NRBC COR # BLD: 10.8 10*3/MM3 (ref 3.4–10.8)
WBC NRBC COR # BLD: 11.83 10*3/MM3 (ref 3.4–10.8)
WBC NRBC COR # BLD: 11.91 10*3/MM3 (ref 3.4–10.8)
WBC NRBC COR # BLD: 9.05 10*3/MM3 (ref 3.4–10.8)
WBC NRBC COR # BLD: 9.44 10*3/MM3 (ref 3.4–10.8)
WBC NRBC COR # BLD: 9.52 10*3/MM3 (ref 3.4–10.8)
WHOLE BLOOD HOLD COAG: NORMAL
WHOLE BLOOD HOLD COAG: NORMAL
WHOLE BLOOD HOLD SPECIMEN: NORMAL
WHOLE BLOOD HOLD SPECIMEN: NORMAL

## 2022-01-01 PROCEDURE — 84100 ASSAY OF PHOSPHORUS: CPT | Performed by: FAMILY MEDICINE

## 2022-01-01 PROCEDURE — 99232 SBSQ HOSP IP/OBS MODERATE 35: CPT | Performed by: STUDENT IN AN ORGANIZED HEALTH CARE EDUCATION/TRAINING PROGRAM

## 2022-01-01 PROCEDURE — 93010 ELECTROCARDIOGRAM REPORT: CPT | Performed by: INTERNAL MEDICINE

## 2022-01-01 PROCEDURE — 82306 VITAMIN D 25 HYDROXY: CPT | Performed by: FAMILY MEDICINE

## 2022-01-01 PROCEDURE — 99282 EMERGENCY DEPT VISIT SF MDM: CPT | Performed by: EMERGENCY MEDICINE

## 2022-01-01 PROCEDURE — 99285 EMERGENCY DEPT VISIT HI MDM: CPT

## 2022-01-01 PROCEDURE — 94799 UNLISTED PULMONARY SVC/PX: CPT

## 2022-01-01 PROCEDURE — 87076 CULTURE ANAEROBE IDENT EACH: CPT | Performed by: EMERGENCY MEDICINE

## 2022-01-01 PROCEDURE — 85025 COMPLETE CBC W/AUTO DIFF WBC: CPT | Performed by: STUDENT IN AN ORGANIZED HEALTH CARE EDUCATION/TRAINING PROGRAM

## 2022-01-01 PROCEDURE — 80061 LIPID PANEL: CPT | Performed by: FAMILY MEDICINE

## 2022-01-01 PROCEDURE — 82607 VITAMIN B-12: CPT | Performed by: FAMILY MEDICINE

## 2022-01-01 PROCEDURE — 83735 ASSAY OF MAGNESIUM: CPT | Performed by: FAMILY MEDICINE

## 2022-01-01 PROCEDURE — 73502 X-RAY EXAM HIP UNI 2-3 VIEWS: CPT

## 2022-01-01 PROCEDURE — 84484 ASSAY OF TROPONIN QUANT: CPT

## 2022-01-01 PROCEDURE — 93306 TTE W/DOPPLER COMPLETE: CPT | Performed by: INTERNAL MEDICINE

## 2022-01-01 PROCEDURE — 93005 ELECTROCARDIOGRAM TRACING: CPT | Performed by: EMERGENCY MEDICINE

## 2022-01-01 PROCEDURE — 99213 OFFICE O/P EST LOW 20 MIN: CPT | Performed by: INTERNAL MEDICINE

## 2022-01-01 PROCEDURE — 80048 BASIC METABOLIC PNL TOTAL CA: CPT | Performed by: STUDENT IN AN ORGANIZED HEALTH CARE EDUCATION/TRAINING PROGRAM

## 2022-01-01 PROCEDURE — 99214 OFFICE O/P EST MOD 30 MIN: CPT | Performed by: INTERNAL MEDICINE

## 2022-01-01 PROCEDURE — 85007 BL SMEAR W/DIFF WBC COUNT: CPT | Performed by: FAMILY MEDICINE

## 2022-01-01 PROCEDURE — 84145 PROCALCITONIN (PCT): CPT | Performed by: EMERGENCY MEDICINE

## 2022-01-01 PROCEDURE — 25010000002 AZITHROMYCIN PER 500 MG: Performed by: EMERGENCY MEDICINE

## 2022-01-01 PROCEDURE — 71045 X-RAY EXAM CHEST 1 VIEW: CPT

## 2022-01-01 PROCEDURE — 87040 BLOOD CULTURE FOR BACTERIA: CPT | Performed by: EMERGENCY MEDICINE

## 2022-01-01 PROCEDURE — G0378 HOSPITAL OBSERVATION PER HR: HCPCS

## 2022-01-01 PROCEDURE — 99284 EMERGENCY DEPT VISIT MOD MDM: CPT | Performed by: EMERGENCY MEDICINE

## 2022-01-01 PROCEDURE — 25010000002 ENOXAPARIN PER 10 MG: Performed by: STUDENT IN AN ORGANIZED HEALTH CARE EDUCATION/TRAINING PROGRAM

## 2022-01-01 PROCEDURE — 25010000002 ENOXAPARIN PER 10 MG: Performed by: FAMILY MEDICINE

## 2022-01-01 PROCEDURE — 93005 ELECTROCARDIOGRAM TRACING: CPT

## 2022-01-01 PROCEDURE — 80053 COMPREHEN METABOLIC PANEL: CPT | Performed by: EMERGENCY MEDICINE

## 2022-01-01 PROCEDURE — 84443 ASSAY THYROID STIM HORMONE: CPT | Performed by: FAMILY MEDICINE

## 2022-01-01 PROCEDURE — 83880 ASSAY OF NATRIURETIC PEPTIDE: CPT

## 2022-01-01 PROCEDURE — 93000 ELECTROCARDIOGRAM COMPLETE: CPT | Performed by: INTERNAL MEDICINE

## 2022-01-01 PROCEDURE — 36415 COLL VENOUS BLD VENIPUNCTURE: CPT

## 2022-01-01 PROCEDURE — 85025 COMPLETE CBC W/AUTO DIFF WBC: CPT | Performed by: EMERGENCY MEDICINE

## 2022-01-01 PROCEDURE — 94761 N-INVAS EAR/PLS OXIMETRY MLT: CPT

## 2022-01-01 PROCEDURE — 83036 HEMOGLOBIN GLYCOSYLATED A1C: CPT | Performed by: FAMILY MEDICINE

## 2022-01-01 PROCEDURE — 99283 EMERGENCY DEPT VISIT LOW MDM: CPT | Performed by: EMERGENCY MEDICINE

## 2022-01-01 PROCEDURE — 63710000001 DIPHENHYDRAMINE PER 50 MG: Performed by: STUDENT IN AN ORGANIZED HEALTH CARE EDUCATION/TRAINING PROGRAM

## 2022-01-01 PROCEDURE — 80053 COMPREHEN METABOLIC PANEL: CPT | Performed by: FAMILY MEDICINE

## 2022-01-01 PROCEDURE — 25010000002 FUROSEMIDE PER 20 MG: Performed by: STUDENT IN AN ORGANIZED HEALTH CARE EDUCATION/TRAINING PROGRAM

## 2022-01-01 PROCEDURE — 94660 CPAP INITIATION&MGMT: CPT

## 2022-01-01 PROCEDURE — 85025 COMPLETE CBC W/AUTO DIFF WBC: CPT

## 2022-01-01 PROCEDURE — 80048 BASIC METABOLIC PNL TOTAL CA: CPT | Performed by: EMERGENCY MEDICINE

## 2022-01-01 PROCEDURE — 83880 ASSAY OF NATRIURETIC PEPTIDE: CPT | Performed by: EMERGENCY MEDICINE

## 2022-01-01 PROCEDURE — 0 IOPAMIDOL PER 1 ML: Performed by: EMERGENCY MEDICINE

## 2022-01-01 PROCEDURE — 71275 CT ANGIOGRAPHY CHEST: CPT

## 2022-01-01 PROCEDURE — 84145 PROCALCITONIN (PCT): CPT | Performed by: STUDENT IN AN ORGANIZED HEALTH CARE EDUCATION/TRAINING PROGRAM

## 2022-01-01 PROCEDURE — 36600 WITHDRAWAL OF ARTERIAL BLOOD: CPT

## 2022-01-01 PROCEDURE — 84484 ASSAY OF TROPONIN QUANT: CPT | Performed by: EMERGENCY MEDICINE

## 2022-01-01 PROCEDURE — 97161 PT EVAL LOW COMPLEX 20 MIN: CPT

## 2022-01-01 PROCEDURE — 71046 X-RAY EXAM CHEST 2 VIEWS: CPT

## 2022-01-01 PROCEDURE — 99283 EMERGENCY DEPT VISIT LOW MDM: CPT

## 2022-01-01 PROCEDURE — 99284 EMERGENCY DEPT VISIT MOD MDM: CPT

## 2022-01-01 PROCEDURE — 87636 SARSCOV2 & INF A&B AMP PRB: CPT | Performed by: EMERGENCY MEDICINE

## 2022-01-01 PROCEDURE — 87088 URINE BACTERIA CULTURE: CPT | Performed by: FAMILY MEDICINE

## 2022-01-01 PROCEDURE — 81003 URINALYSIS AUTO W/O SCOPE: CPT | Performed by: EMERGENCY MEDICINE

## 2022-01-01 PROCEDURE — 80177 DRUG SCRN QUAN LEVETIRACETAM: CPT | Performed by: FAMILY MEDICINE

## 2022-01-01 PROCEDURE — 99239 HOSP IP/OBS DSCHRG MGMT >30: CPT | Performed by: NURSE PRACTITIONER

## 2022-01-01 PROCEDURE — 99203 OFFICE O/P NEW LOW 30 MIN: CPT | Performed by: STUDENT IN AN ORGANIZED HEALTH CARE EDUCATION/TRAINING PROGRAM

## 2022-01-01 PROCEDURE — 83605 ASSAY OF LACTIC ACID: CPT | Performed by: EMERGENCY MEDICINE

## 2022-01-01 PROCEDURE — 87185 SC STD ENZYME DETCJ PER NZM: CPT | Performed by: EMERGENCY MEDICINE

## 2022-01-01 PROCEDURE — 85379 FIBRIN DEGRADATION QUANT: CPT | Performed by: EMERGENCY MEDICINE

## 2022-01-01 PROCEDURE — 84443 ASSAY THYROID STIM HORMONE: CPT | Performed by: EMERGENCY MEDICINE

## 2022-01-01 PROCEDURE — 94640 AIRWAY INHALATION TREATMENT: CPT

## 2022-01-01 PROCEDURE — 87186 SC STD MICRODIL/AGAR DIL: CPT | Performed by: FAMILY MEDICINE

## 2022-01-01 PROCEDURE — 85007 BL SMEAR W/DIFF WBC COUNT: CPT | Performed by: EMERGENCY MEDICINE

## 2022-01-01 PROCEDURE — G0463 HOSPITAL OUTPT CLINIC VISIT: HCPCS

## 2022-01-01 PROCEDURE — 82746 ASSAY OF FOLIC ACID SERUM: CPT | Performed by: FAMILY MEDICINE

## 2022-01-01 PROCEDURE — 80053 COMPREHEN METABOLIC PANEL: CPT

## 2022-01-01 PROCEDURE — 84439 ASSAY OF FREE THYROXINE: CPT | Performed by: FAMILY MEDICINE

## 2022-01-01 PROCEDURE — 70450 CT HEAD/BRAIN W/O DYE: CPT

## 2022-01-01 PROCEDURE — 25010000002 CEFTRIAXONE SODIUM-DEXTROSE 1-3.74 GM-%(50ML) RECONSTITUTED SOLUTION: Performed by: EMERGENCY MEDICINE

## 2022-01-01 PROCEDURE — 82803 BLOOD GASES ANY COMBINATION: CPT

## 2022-01-01 PROCEDURE — 81001 URINALYSIS AUTO W/SCOPE: CPT | Performed by: EMERGENCY MEDICINE

## 2022-01-01 PROCEDURE — P9612 CATHETERIZE FOR URINE SPEC: HCPCS

## 2022-01-01 PROCEDURE — 85025 COMPLETE CBC W/AUTO DIFF WBC: CPT | Performed by: FAMILY MEDICINE

## 2022-01-01 PROCEDURE — 99222 1ST HOSP IP/OBS MODERATE 55: CPT | Performed by: FAMILY MEDICINE

## 2022-01-01 PROCEDURE — 87086 URINE CULTURE/COLONY COUNT: CPT | Performed by: FAMILY MEDICINE

## 2022-01-01 PROCEDURE — 81001 URINALYSIS AUTO W/SCOPE: CPT | Performed by: FAMILY MEDICINE

## 2022-01-01 PROCEDURE — 93306 TTE W/DOPPLER COMPLETE: CPT

## 2022-01-01 PROCEDURE — 73660 X-RAY EXAM OF TOE(S): CPT

## 2022-01-01 PROCEDURE — 77063 BREAST TOMOSYNTHESIS BI: CPT

## 2022-01-01 PROCEDURE — 80048 BASIC METABOLIC PNL TOTAL CA: CPT | Performed by: FAMILY MEDICINE

## 2022-01-01 PROCEDURE — 77067 SCR MAMMO BI INCL CAD: CPT

## 2022-01-01 RX ORDER — SODIUM CHLORIDE 0.9 % (FLUSH) 0.9 %
10 SYRINGE (ML) INJECTION AS NEEDED
Status: DISCONTINUED | OUTPATIENT
Start: 2022-01-01 | End: 2022-01-01 | Stop reason: HOSPADM

## 2022-01-01 RX ORDER — IPRATROPIUM BROMIDE AND ALBUTEROL SULFATE 2.5; .5 MG/3ML; MG/3ML
3 SOLUTION RESPIRATORY (INHALATION)
Qty: 360 ML
Start: 2022-01-01

## 2022-01-01 RX ORDER — IPRATROPIUM BROMIDE AND ALBUTEROL SULFATE 2.5; .5 MG/3ML; MG/3ML
3 SOLUTION RESPIRATORY (INHALATION)
Status: DISCONTINUED | OUTPATIENT
Start: 2022-01-01 | End: 2022-01-01 | Stop reason: HOSPADM

## 2022-01-01 RX ORDER — ATORVASTATIN CALCIUM 20 MG/1
20 TABLET, FILM COATED ORAL NIGHTLY
Status: DISCONTINUED | OUTPATIENT
Start: 2022-01-01 | End: 2022-01-01 | Stop reason: HOSPADM

## 2022-01-01 RX ORDER — BUDESONIDE 0.5 MG/2ML
0.5 INHALANT ORAL
Status: DISCONTINUED | OUTPATIENT
Start: 2022-01-01 | End: 2022-01-01 | Stop reason: HOSPADM

## 2022-01-01 RX ORDER — BUSPIRONE HYDROCHLORIDE 5 MG/1
10 TABLET ORAL EVERY 12 HOURS SCHEDULED
Status: DISCONTINUED | OUTPATIENT
Start: 2022-01-01 | End: 2022-01-01 | Stop reason: HOSPADM

## 2022-01-01 RX ORDER — OLANZAPINE 5 MG/1
15 TABLET ORAL NIGHTLY
Status: DISCONTINUED | OUTPATIENT
Start: 2022-01-01 | End: 2022-01-01 | Stop reason: HOSPADM

## 2022-01-01 RX ORDER — ONDANSETRON 4 MG/1
4 TABLET, FILM COATED ORAL EVERY 8 HOURS PRN
COMMUNITY

## 2022-01-01 RX ORDER — SODIUM CHLORIDE 0.9 % (FLUSH) 0.9 %
10 SYRINGE (ML) INJECTION EVERY 12 HOURS SCHEDULED
Status: DISCONTINUED | OUTPATIENT
Start: 2022-01-01 | End: 2022-01-01 | Stop reason: HOSPADM

## 2022-01-01 RX ORDER — DIPHENHYDRAMINE HCL 25 MG
25 CAPSULE ORAL EVERY 6 HOURS PRN
Status: DISCONTINUED | OUTPATIENT
Start: 2022-01-01 | End: 2022-01-01 | Stop reason: HOSPADM

## 2022-01-01 RX ORDER — SERTRALINE HYDROCHLORIDE 100 MG/1
100 TABLET, FILM COATED ORAL DAILY
COMMUNITY

## 2022-01-01 RX ORDER — FUROSEMIDE 10 MG/ML
40 INJECTION INTRAMUSCULAR; INTRAVENOUS ONCE
Status: COMPLETED | OUTPATIENT
Start: 2022-01-01 | End: 2022-01-01

## 2022-01-01 RX ORDER — PROMETHAZINE HYDROCHLORIDE 25 MG/1
25 TABLET ORAL EVERY 6 HOURS PRN
COMMUNITY

## 2022-01-01 RX ORDER — PROMETHAZINE HYDROCHLORIDE 25 MG/1
25 SUPPOSITORY RECTAL EVERY 6 HOURS PRN
COMMUNITY

## 2022-01-01 RX ORDER — BUDESONIDE 0.5 MG/2ML
0.5 INHALANT ORAL 2 TIMES DAILY
COMMUNITY

## 2022-01-01 RX ORDER — FLUTICASONE PROPIONATE 50 MCG
2 SPRAY, SUSPENSION (ML) NASAL DAILY
Status: DISCONTINUED | OUTPATIENT
Start: 2022-01-01 | End: 2022-01-01 | Stop reason: HOSPADM

## 2022-01-01 RX ORDER — FUROSEMIDE 40 MG/1
40 TABLET ORAL DAILY
COMMUNITY

## 2022-01-01 RX ORDER — LEVOTHYROXINE SODIUM 0.05 MG/1
50 TABLET ORAL
Status: DISCONTINUED | OUTPATIENT
Start: 2022-01-01 | End: 2022-01-01 | Stop reason: HOSPADM

## 2022-01-01 RX ORDER — CARVEDILOL 3.12 MG/1
6.25 TABLET ORAL 2 TIMES DAILY WITH MEALS
Status: DISCONTINUED | OUTPATIENT
Start: 2022-01-01 | End: 2022-01-01 | Stop reason: HOSPADM

## 2022-01-01 RX ORDER — ENOXAPARIN SODIUM 100 MG/ML
40 INJECTION SUBCUTANEOUS NIGHTLY
Status: DISCONTINUED | OUTPATIENT
Start: 2022-01-01 | End: 2022-01-01 | Stop reason: HOSPADM

## 2022-01-01 RX ORDER — MONTELUKAST SODIUM 10 MG/1
10 TABLET ORAL NIGHTLY
Status: DISCONTINUED | OUTPATIENT
Start: 2022-01-01 | End: 2022-01-01 | Stop reason: HOSPADM

## 2022-01-01 RX ORDER — CHOLECALCIFEROL (VITAMIN D3) 125 MCG
5 CAPSULE ORAL
COMMUNITY

## 2022-01-01 RX ORDER — PANTOPRAZOLE SODIUM 40 MG/1
40 TABLET, DELAYED RELEASE ORAL
Status: DISCONTINUED | OUTPATIENT
Start: 2022-01-01 | End: 2022-01-01 | Stop reason: HOSPADM

## 2022-01-01 RX ORDER — DEXTROSE, SODIUM CHLORIDE, AND POTASSIUM CHLORIDE 5; .45; .15 G/100ML; G/100ML; G/100ML
100 INJECTION INTRAVENOUS CONTINUOUS
Status: DISCONTINUED | OUTPATIENT
Start: 2022-01-01 | End: 2022-01-01

## 2022-01-01 RX ORDER — CEFTRIAXONE 1 G/50ML
1 INJECTION, SOLUTION INTRAVENOUS ONCE
Status: COMPLETED | OUTPATIENT
Start: 2022-01-01 | End: 2022-01-01

## 2022-01-01 RX ORDER — DIPHENHYDRAMINE HCL 25 MG
25 CAPSULE ORAL EVERY 6 HOURS PRN
COMMUNITY

## 2022-01-01 RX ORDER — DONEPEZIL HYDROCHLORIDE 5 MG/1
10 TABLET, FILM COATED ORAL NIGHTLY
Status: DISCONTINUED | OUTPATIENT
Start: 2022-01-01 | End: 2022-01-01 | Stop reason: HOSPADM

## 2022-01-01 RX ORDER — SODIUM CHLORIDE 9 MG/ML
40 INJECTION, SOLUTION INTRAVENOUS AS NEEDED
Status: DISCONTINUED | OUTPATIENT
Start: 2022-01-01 | End: 2022-01-01 | Stop reason: HOSPADM

## 2022-01-01 RX ORDER — LEVETIRACETAM 500 MG/1
500 TABLET ORAL EVERY 12 HOURS SCHEDULED
Status: DISCONTINUED | OUTPATIENT
Start: 2022-01-01 | End: 2022-01-01 | Stop reason: HOSPADM

## 2022-01-01 RX ORDER — OMEPRAZOLE 40 MG/1
40 CAPSULE, DELAYED RELEASE ORAL DAILY
COMMUNITY

## 2022-01-01 RX ORDER — MULTIVIT WITH MINERALS/LUTEIN
1000 TABLET ORAL DAILY
COMMUNITY

## 2022-01-01 RX ADMIN — ENOXAPARIN SODIUM 40 MG: 40 INJECTION SUBCUTANEOUS at 20:31

## 2022-01-01 RX ADMIN — MONTELUKAST SODIUM 10 MG: 10 TABLET, COATED ORAL at 20:23

## 2022-01-01 RX ADMIN — CARVEDILOL 6.25 MG: 3.12 TABLET, FILM COATED ORAL at 08:42

## 2022-01-01 RX ADMIN — DEXTROSE MONOHYDRATE, SODIUM CHLORIDE, AND POTASSIUM CHLORIDE 100 ML/HR: 50; 4.5; 1.49 INJECTION, SOLUTION INTRAVENOUS at 23:24

## 2022-01-01 RX ADMIN — IPRATROPIUM BROMIDE AND ALBUTEROL SULFATE 3 ML: .5; 2.5 SOLUTION RESPIRATORY (INHALATION) at 11:28

## 2022-01-01 RX ADMIN — LEVOTHYROXINE SODIUM 50 MCG: 50 TABLET ORAL at 06:40

## 2022-01-01 RX ADMIN — IOPAMIDOL 100 ML: 755 INJECTION, SOLUTION INTRAVENOUS at 01:11

## 2022-01-01 RX ADMIN — IPRATROPIUM BROMIDE AND ALBUTEROL SULFATE 3 ML: .5; 2.5 SOLUTION RESPIRATORY (INHALATION) at 07:21

## 2022-01-01 RX ADMIN — PANTOPRAZOLE SODIUM 40 MG: 40 TABLET, DELAYED RELEASE ORAL at 06:05

## 2022-01-01 RX ADMIN — FUROSEMIDE 40 MG: 10 INJECTION, SOLUTION INTRAVENOUS at 12:14

## 2022-01-01 RX ADMIN — BUDESONIDE 0.5 MG: 0.5 SUSPENSION RESPIRATORY (INHALATION) at 19:59

## 2022-01-01 RX ADMIN — SODIUM CHLORIDE 500 MG: 900 INJECTION, SOLUTION INTRAVENOUS at 19:37

## 2022-01-01 RX ADMIN — ENOXAPARIN SODIUM 40 MG: 40 INJECTION SUBCUTANEOUS at 20:24

## 2022-01-01 RX ADMIN — PANTOPRAZOLE SODIUM 40 MG: 40 TABLET, DELAYED RELEASE ORAL at 06:40

## 2022-01-01 RX ADMIN — SERTRALINE HYDROCHLORIDE 100 MG: 50 TABLET ORAL at 08:39

## 2022-01-01 RX ADMIN — BUSPIRONE HYDROCHLORIDE 10 MG: 5 TABLET ORAL at 08:17

## 2022-01-01 RX ADMIN — BUSPIRONE HYDROCHLORIDE 10 MG: 5 TABLET ORAL at 08:39

## 2022-01-01 RX ADMIN — BUDESONIDE 0.5 MG: 0.5 SUSPENSION RESPIRATORY (INHALATION) at 11:28

## 2022-01-01 RX ADMIN — LEVETIRACETAM 500 MG: 500 TABLET, FILM COATED ORAL at 08:41

## 2022-01-01 RX ADMIN — ENOXAPARIN SODIUM 40 MG: 40 INJECTION SUBCUTANEOUS at 23:24

## 2022-01-01 RX ADMIN — IOPAMIDOL 100 ML: 755 INJECTION, SOLUTION INTRAVENOUS at 18:52

## 2022-01-01 RX ADMIN — LEVOTHYROXINE SODIUM 50 MCG: 50 TABLET ORAL at 05:48

## 2022-01-01 RX ADMIN — CARVEDILOL 6.25 MG: 3.12 TABLET, FILM COATED ORAL at 18:02

## 2022-01-01 RX ADMIN — Medication 10 ML: at 08:39

## 2022-01-01 RX ADMIN — BUDESONIDE 0.5 MG: 0.5 SUSPENSION RESPIRATORY (INHALATION) at 07:36

## 2022-01-01 RX ADMIN — CEFTRIAXONE 1 G: 1 INJECTION, SOLUTION INTRAVENOUS at 18:18

## 2022-01-01 RX ADMIN — IPRATROPIUM BROMIDE AND ALBUTEROL SULFATE 3 ML: .5; 2.5 SOLUTION RESPIRATORY (INHALATION) at 20:07

## 2022-01-01 RX ADMIN — IPRATROPIUM BROMIDE AND ALBUTEROL SULFATE 3 ML: .5; 2.5 SOLUTION RESPIRATORY (INHALATION) at 14:58

## 2022-01-01 RX ADMIN — BUDESONIDE 0.5 MG: 0.5 SUSPENSION RESPIRATORY (INHALATION) at 07:21

## 2022-01-01 RX ADMIN — CARVEDILOL 6.25 MG: 3.12 TABLET, FILM COATED ORAL at 08:39

## 2022-01-01 RX ADMIN — OLANZAPINE 15 MG: 5 TABLET, FILM COATED ORAL at 20:31

## 2022-01-01 RX ADMIN — OLANZAPINE 15 MG: 5 TABLET, FILM COATED ORAL at 20:23

## 2022-01-01 RX ADMIN — ATORVASTATIN CALCIUM 20 MG: 20 TABLET, FILM COATED ORAL at 20:31

## 2022-01-01 RX ADMIN — DONEPEZIL HYDROCHLORIDE 10 MG: 5 TABLET, FILM COATED ORAL at 20:31

## 2022-01-01 RX ADMIN — LEVOTHYROXINE SODIUM 50 MCG: 50 TABLET ORAL at 06:05

## 2022-01-01 RX ADMIN — BUSPIRONE HYDROCHLORIDE 10 MG: 5 TABLET ORAL at 23:23

## 2022-01-01 RX ADMIN — PANTOPRAZOLE SODIUM 40 MG: 40 TABLET, DELAYED RELEASE ORAL at 05:48

## 2022-01-01 RX ADMIN — ATORVASTATIN CALCIUM 20 MG: 20 TABLET, FILM COATED ORAL at 20:23

## 2022-01-01 RX ADMIN — LEVETIRACETAM 500 MG: 500 TABLET, FILM COATED ORAL at 20:31

## 2022-01-01 RX ADMIN — LEVETIRACETAM 500 MG: 500 TABLET, FILM COATED ORAL at 08:39

## 2022-01-01 RX ADMIN — BUDESONIDE 0.5 MG: 0.5 SUSPENSION RESPIRATORY (INHALATION) at 20:07

## 2022-01-01 RX ADMIN — LEVETIRACETAM 500 MG: 500 TABLET, FILM COATED ORAL at 20:23

## 2022-01-01 RX ADMIN — FLUTICASONE PROPIONATE 2 SPRAY: 50 SPRAY, METERED NASAL at 08:18

## 2022-01-01 RX ADMIN — IPRATROPIUM BROMIDE AND ALBUTEROL SULFATE 3 ML: .5; 2.5 SOLUTION RESPIRATORY (INHALATION) at 19:59

## 2022-01-01 RX ADMIN — CARVEDILOL 6.25 MG: 3.12 TABLET, FILM COATED ORAL at 23:23

## 2022-01-01 RX ADMIN — DIPHENHYDRAMINE HYDROCHLORIDE 25 MG: 25 CAPSULE ORAL at 20:31

## 2022-01-01 RX ADMIN — Medication 10 ML: at 08:18

## 2022-01-01 RX ADMIN — CARVEDILOL 6.25 MG: 3.12 TABLET, FILM COATED ORAL at 18:42

## 2022-01-01 RX ADMIN — OLANZAPINE 15 MG: 5 TABLET, FILM COATED ORAL at 23:23

## 2022-01-01 RX ADMIN — BUSPIRONE HYDROCHLORIDE 10 MG: 5 TABLET ORAL at 20:23

## 2022-01-01 RX ADMIN — CARVEDILOL 6.25 MG: 3.12 TABLET, FILM COATED ORAL at 08:17

## 2022-01-01 RX ADMIN — IPRATROPIUM BROMIDE AND ALBUTEROL SULFATE 3 ML: .5; 2.5 SOLUTION RESPIRATORY (INHALATION) at 11:14

## 2022-01-01 RX ADMIN — FLUTICASONE PROPIONATE 2 SPRAY: 50 SPRAY, METERED NASAL at 08:40

## 2022-01-01 RX ADMIN — DONEPEZIL HYDROCHLORIDE 10 MG: 5 TABLET, FILM COATED ORAL at 23:23

## 2022-01-01 RX ADMIN — SERTRALINE HYDROCHLORIDE 100 MG: 50 TABLET ORAL at 12:14

## 2022-01-01 RX ADMIN — IPRATROPIUM BROMIDE AND ALBUTEROL SULFATE 3 ML: .5; 2.5 SOLUTION RESPIRATORY (INHALATION) at 15:37

## 2022-01-01 RX ADMIN — IPRATROPIUM BROMIDE AND ALBUTEROL SULFATE 3 ML: .5; 2.5 SOLUTION RESPIRATORY (INHALATION) at 07:36

## 2022-01-01 RX ADMIN — BUSPIRONE HYDROCHLORIDE 10 MG: 5 TABLET ORAL at 20:31

## 2022-01-01 RX ADMIN — BUSPIRONE HYDROCHLORIDE 10 MG: 5 TABLET ORAL at 08:41

## 2022-01-01 RX ADMIN — Medication 10 ML: at 08:42

## 2022-01-01 RX ADMIN — LEVETIRACETAM 500 MG: 500 TABLET, FILM COATED ORAL at 23:23

## 2022-01-01 RX ADMIN — DONEPEZIL HYDROCHLORIDE 10 MG: 5 TABLET, FILM COATED ORAL at 20:23

## 2022-01-01 RX ADMIN — LEVETIRACETAM 500 MG: 500 TABLET, FILM COATED ORAL at 08:18

## 2022-01-01 RX ADMIN — SERTRALINE HYDROCHLORIDE 100 MG: 50 TABLET ORAL at 08:18

## 2022-01-01 RX ADMIN — MONTELUKAST SODIUM 10 MG: 10 TABLET, COATED ORAL at 20:31

## 2022-01-04 ENCOUNTER — TELEPHONE (OUTPATIENT)
Dept: CARDIOLOGY | Facility: CLINIC | Age: 72
End: 2022-01-04

## 2022-01-04 NOTE — TELEPHONE ENCOUNTER
Debby at [facility], the nursing facility patient lives in, called to inquire as to when the patient's next visit should be scheduled.   Please advise.   Thank you

## 2022-01-18 NOTE — TELEPHONE ENCOUNTER
Caller: KEVIN    Relationship: Other    Best call back number: [phone]    What is the best time to reach you: ANY TIME     Who are you requesting to speak with (clinical staff, provider,  specific staff member): CLINICAL STAFF     What was the call regarding:     KEVIN WITH [facility] CALLED, STATING SHE RECEIVED MONOCLONAL ANTIBODY INFUSION IV TREATMENT ORDERS ON THIS PATIENT BUT THE ORDERS CANNOT BE FILLED YET- THEY ARE NEEDING TO KNOW THE PATIENT'S ONSET DATE FOR HER COVID SYMPTOMS.    PLEASE CALL AND ADVISE

## 2022-05-07 NOTE — DISCHARGE INSTRUCTIONS
Please follow-up with your primary care physician.  Please take fall precautions around your living space.  Return to the emergency room if you have chest pain, difficulty breathing, uncontrolled vomiting or for any other concerns.

## 2022-05-10 NOTE — OUTREACH NOTE
AMBULATORY CASE MANAGEMENT NOTE    Name and Relationship of Patient/Support Person: Graciela Lindquist - Emergency Contact      Outreach to Graciela Lindquist, guardian.  Introduced self and explained role of RN-ACM.  Discussed CCM and HRCM programs.  She declines these services.      TARAS LANE  Ambulatory Case Management    5/10/2022, 13:36 EDT

## 2022-05-11 NOTE — ED PROVIDER NOTES
Subjective   History of Present Illness  History of Present Illness    Chief complaint: Shortness of air    Location: [place]    Quality/Severity: Sats in the 70s on room air    Timing/Onset/Duration: Noted today    Modifying Factors: Better with oxygen    Associated Symptoms: Patient unable to give associated symptoms    Narrative: This 72-year-old female presents with shortness of breath.  The patient is a resident of [facility].  O2 sats in the 70s per EMS.  Patient was 99% on simple mask when they arrived at the facility.  Patient was 99% on nasal cannula upon arrival to the ED.  Patient denies any complaints.  Patient will answer questions but will not open her eyes.  The patient has a history of anxiety.  The patient has intellectual disability.  Patient has a history of hypothyroidism.  Patient has a history of dementia.  The patient has had a bypass gastrojejunostomy.  He has had duodenal stricture and anemia.  He has had gallstone and ocular histoplasmosis.  Patient's had hyperglycemia.  Patient is not a smoker.    PCP: Khang Merino  Review of Systems   Unable to perform ROS: Dementia        Medication List      ASK your doctor about these medications    acetaminophen 500 MG tablet  Commonly known as: TYLENOL     atorvastatin 20 MG tablet  Commonly known as: LIPITOR     brimonidine-timolol 0.2-0.5 % ophthalmic solution  Commonly known as: COMBIGAN     busPIRone 10 MG tablet  Commonly known as: BUSPAR     calcium carbonate 600 MG tablet  Commonly known as: OS-KATY     carvedilol 6.25 MG tablet  Commonly known as: COREG     cephalexin 500 MG capsule  Commonly known as: Keflex  Take 1 capsule by mouth 3 (Three) Times a Day.     cetirizine 10 MG tablet  Commonly known as: zyrTEC     coenzyme Q10 100 MG capsule     Delsym 30 MG/5ML Suspension Extended Release oral suspension  Generic drug: dextromethorphan polistirex ER     denosumab 60 MG/ML solution syringe  Commonly known as: PROLIA      docusate sodium 100 MG capsule  Commonly known as: COLACE     donepezil 10 MG tablet  Commonly known as: ARICEPT     fluticasone 50 MCG/ACT nasal spray  Commonly known as: FLONASE     glucosamine-chondroitin 500-400 MG capsule capsule     guaiFENesin 600 MG 12 hr tablet  Commonly known as: MUCINEX     levETIRAcetam 500 MG tablet  Commonly known as: KEPPRA     levothyroxine 50 MCG tablet  Commonly known as: SYNTHROID, LEVOTHROID     Milk of Magnesia 400 MG/5ML suspension  Generic drug: magnesium hydroxide     montelukast 10 MG tablet  Commonly known as: SINGULAIR     multivitamin with minerals tablet tablet     OLANZapine 15 MG tablet  Commonly known as: zyPREXA     ondansetron 4 MG tablet  Commonly known as: Zofran  Take 1 tablet by mouth Every 6 (Six) Hours As Needed for Nausea or Vomiting.     oxyCODONE-acetaminophen 5-325 MG per tablet  Commonly known as: Percocet  Take 1 tablet by mouth Every 6 (Six) Hours As Needed (as needed for pain).     pantoprazole 40 MG EC tablet  Commonly known as: PROTONIX     potassium chloride 20 MEQ CR tablet  Commonly known as: K-DUR,KLOR-CON     PROBIOTIC DAILY PO     Resource Arginaid pack     VENTOLIN IN     VITAMIN D2 PO            Past Medical History:   Diagnosis Date   • Anemia due to blood loss    • Anxiety    • Arthritis    • Duodenal stricture    • Duodenal ulcer    • Dysphagia    • Gallstones    • H/O convulsions    • Hypercholesteremia    • Hypothyroidism    • Major depression    • Moderate intellectual disability    • OCD (obsessive compulsive disorder)    • Ocular histoplasmosis     w/macular scars   • Osteopenia    • Osteopenia    • Ptosis    • Pure hyperglyceridemia    • S/P bypass gastrojejunostomy    • Seasonal allergies    • Seizure disorder (HCC)    • Unspecified dementia without behavioral disturbance (HCC)    • Ventral hernia     sched repair   • Vitamin D deficiency        Allergies   Allergen Reactions   • Codeine Diarrhea and Nausea And Vomiting       Past  Surgical History:   Procedure Laterality Date   • CATARACT EXTRACTION Bilateral 2016   • CHOLECYSTECTOMY OPEN  03/2017   • COLONOSCOPY  05/2009   • ENDOSCOPY  03/2017   • GASTROJEJUNOSTOMY  08/2016   • GASTROSTOMY      TEJINDER   • HYSTERECTOMY     • VENTRAL/INCISIONAL HERNIA REPAIR N/A 8/15/2017    Procedure: Lysis of adhesion and repair of recurrent incisional hernia with onlay mesh placement ;  Surgeon: Donna Ivy MD;  Location: [facility];  Service:        Family History   Family history unknown: Yes       Social History     Socioeconomic History   • Marital status: Unknown   Tobacco Use   • Smoking status: Never Smoker   • Smokeless tobacco: Never Used   Substance and Sexual Activity   • Alcohol use: No   • Drug use: No   • Sexual activity: Defer           Objective   Physical Exam  Vitals (The blood pressure is 97.3 °F, pulse 68, respirations 22, /82, room air pulse ox 99% on the mask.) and nursing note reviewed.   Constitutional:       Comments: Sleeping but arousable   HENT:      Head: Normocephalic and atraumatic.      Mouth/Throat:      Mouth: Mucous membranes are moist.   Eyes:      Pupils: Pupils are equal, round, and reactive to light.   Cardiovascular:      Rate and Rhythm: Normal rate and regular rhythm.      Heart sounds: No murmur heard.    No friction rub. No gallop.   Pulmonary:      Effort: Pulmonary effort is normal.      Breath sounds: Rales (Rales in the posterior lung field) present. No decreased breath sounds.   Chest:      Chest wall: No tenderness.   Abdominal:      Palpations: Abdomen is soft. There is no mass.      Tenderness: There is no abdominal tenderness. There is no guarding or rebound.   Musculoskeletal:      Cervical back: Normal range of motion and neck supple.      Right lower leg: No edema.      Left lower leg: No edema.   Skin:     General: Skin is warm and dry.      Findings: No rash.   Neurological:      Comments: Patient will groan when stimulated.  No audible  speech.  Will open her mouth and stick her tongue out.  She would not cooperate with helping her eyes she localizes pain.  Her pupils are equal round and reactive to light.         Procedures           ED Course  ED Course as of 05/10/22 2359   Tue May 10, 2022   2323 The D-dimer is 0.73. [RC]   2357 The glucose is 120.  The CO2 is 29.  The total protein is 5.9.  The albumin is 3.3.  The D-dimer is 173.  White blood cell count is 11.8.  The urinalysis specific gravity shows 1.037.  The laboratory values are otherwise unremarkable. [RC]      ED Course User Index  [RC] Toul Weiss MD      23:05 EDT, 05/10/22:  The EKG shows a normal sinus rhythm with rate of 67.  The AR, QRS, and QT intervals are unremarkable.  There is no ectopy.  There is low voltage in the precordial leads.  There is no acute ST elevation or depression.  The EKG tonight compared to EKG dated 12/2/2021 is essentially unchanged.     01:48 EDT, 05/11/22:  The patient's room air saturations are 95%.  Her vital signs reviewed and are otherwise stable.    01:48 EDT, 05/11/22:  The patient's diagnosis of history of low oxygen saturations were discussed with her.  The patient has maintained normal saturations here in the emergency department.  Her CAT scan shows evidence of groundglass appearance in her lungs, the patient has a history of COVID 19 that was diagnosed back in January..  She tested COVID-negative here in the emergency department.  Patient should follow-up with Dr. Merino tomorrow.  She should return the emergency department if there is worsening shortness of breath, low oxygen saturations, worse in any way at all.                                      MDM    Final diagnoses:   None       ED Disposition  ED Disposition     None          No follow-up provider specified.       Medication List      No changes were made to your prescriptions during this visit.          Tolu Weiss MD  05/11/22 4016

## 2022-05-11 NOTE — ED NOTES
Pt presents to the ED with c/o SOB per [facility] with o2 saturation in the 70's, per EMS pt was 99% on simple mask when they arrived to facility. Pt was 99% on nasal cannula upon arrival to the ED. Pt denies any complaints. Pt will answer questions but will not open her eyes. VSS.

## 2022-05-19 NOTE — PROGRESS NOTES
PATIENTINFORMATION    Date of Office Visit: 2022  Encounter Provider: Marquez Mendez MD  Place of Service: [facility] CARDIOLOGY  Patient Name: Caitlyn Camacho  : 1950    Subjective:     Encounter Date:2022      Patient ID: Caitlyn Camacho is a 72 y.o. female.    No chief complaint on file.    HPI  Ms. Camacho is  72 years old lady who was currently resident of [facility] and history of severe developmental cognitive impairment here for follow-up visit.  She was taken to the ER on 5/10/2022 with hypoxemia.  She was noted to be cyanotic with blood pressure drop that prompted ER visit.  Her O2 sats corrected easily with facemask and then nasal cannula.  Work-up did not reveal any significant obvious abnormality other than CAT scan showing scattered unspecified groundglass appearance of lungs.  PE ruled out.  proBNP was normal.  She was released from the ER in stable condition.  No recurrence of hypoxemia.  Patient noted to be increasingly somnolent and sleepy over the past several months without any obvious cause.  She is wheelchair-bound and and can feed herself from time to time needing assistance.  She wears compression stocking for bilateral lower extremity swelling.  ROS  All systems reviewed and negative except as noted in HPI.    Past Medical History:   Diagnosis Date   • Anemia due to blood loss    • Anxiety    • Arthritis    • Duodenal stricture    • Duodenal ulcer    • Dysphagia    • Gallstones    • H/O convulsions    • Hypercholesteremia    • Hypothyroidism    • Major depression    • Moderate intellectual disability    • OCD (obsessive compulsive disorder)    • Ocular histoplasmosis     w/macular scars   • Osteopenia    • Osteopenia    • Ptosis    • Pure hyperglyceridemia    • S/P bypass gastrojejunostomy    • Seasonal allergies    • Seizure disorder (HCC)    • Unspecified dementia without behavioral disturbance (HCC)    • Ventral hernia     sched repair    • Vitamin D deficiency        Past Surgical History:   Procedure Laterality Date   • CATARACT EXTRACTION Bilateral 2016   • CHOLECYSTECTOMY OPEN  03/2017   • COLONOSCOPY  05/2009   • ENDOSCOPY  03/2017   • GASTROJEJUNOSTOMY  08/2016   • GASTROSTOMY      TEJINDER   • HYSTERECTOMY     • VENTRAL/INCISIONAL HERNIA REPAIR N/A 8/15/2017    Procedure: Lysis of adhesion and repair of recurrent incisional hernia with onlay mesh placement ;  Surgeon: Donan Ivy MD;  Location: [facility];  Service:        Social History     Socioeconomic History   • Marital status: Unknown   Tobacco Use   • Smoking status: Never Smoker   • Smokeless tobacco: Never Used   Substance and Sexual Activity   • Alcohol use: No   • Drug use: No   • Sexual activity: Defer       Family History   Family history unknown: Yes           ECG 12 Lead    Date/Time: 5/19/2022 10:56 AM  Performed by: Marquez Mendez MD  Authorized by: Marquez Mendez MD   Comparison: compared with previous ECG from 5/10/2022  Similar to previous ECG  Rhythm: sinus rhythm  Rate: normal  Conduction: conduction normal  ST Segments: ST segments normal  T Waves: T waves normal  QRS axis: normal  Other: no other findings    Clinical impression: normal ECG               Objective:     There were no vitals taken for this visit. There is no height or weight on file to calculate BMI.     Constitutional:       General: Not in acute distress.     Appearance: Not diaphoretic.      Comments: Sitting on a wheelchair   Eyes:      Pupils: Pupils are equal, round, and reactive to light.   HENT:      Head: Normocephalic and atraumatic.   Neck:      Thyroid: No thyromegaly.   Pulmonary:      Effort: Pulmonary effort is normal. No respiratory distress.      Breath sounds: Normal breath sounds. No wheezing. No rales.   Chest:      Chest wall: Not tender to palpatation.   Cardiovascular:      Normal rate. Regular rhythm.      No gallop.   Pulses:     Intact distal pulses.   Edema:      Peripheral edema present.     Pretibial: bilateral 1+ edema of the pretibial area.     Ankle: bilateral 1+ edema of the ankle.     Feet: bilateral 1+ edema of the feet.  Abdominal:      General: Bowel sounds are normal. There is no distension.      Palpations: Abdomen is soft.      Tenderness: There is no guarding.   Musculoskeletal: Normal range of motion.         General: No deformity.      Cervical back: Normal range of motion and neck supple. Skin:     General: Skin is warm and dry.      Findings: No rash.   Neurological:      Cranial Nerves: No cranial nerve deficit.      Comments: Patient sleepy but arousable during exam and wheelchair-bound.         Review Of Data: I have reviewed pertinent recent labs, images and documents and pertinent findings included in HPI or assessment below.    Lipid Panel    Lipid Panel 9/1/21 1/10/22 3/8/22 3/8/22      0532 1159   Total Cholesterol 141 120 118 131   Triglycerides 64 69 74 86   HDL Cholesterol 59 58 49 58   VLDL Cholesterol 13 14 15 17   LDL Cholesterol  69 48 54 56   LDL/HDL Ratio 1.17 0.83 1.11 0.96               Assessment/Plan:         1.  Recent ER visit with hypoxemia with O2 sats in the 70s-no identifiable cause and completely resolved without any recurrence  2.  Chronic bilateral lower extremity swelling: Normal echocardiogram done in November 2021, normal proBNP  Controlled with compression stocking  Continue same  3.  Hypertension controlled on Coreg 6.25 mg p.o. bid  4.  Hypercholesterolemia on statin: Lipid panel at goal  5. Mentally challenged with superimposed dementia and wheelchair-bound at baseline  7.  Worsening somnolence.  Getting sleep study  -Normal thyroid function test.    No medication changes made today  I have talked to her nurse from [place] on the phone  I have ordered ABG  Return to clinic in 6 months or sooner with any concerning symptoms.      Diagnosis and plan of care discussed with patient and verbalized understanding.             Your medication list          Accurate as of May 19, 2022 10:29 AM. If you have any questions, ask your nurse or doctor.            CONTINUE taking these medications      Instructions Last Dose Given Next Dose Due   acetaminophen 500 MG tablet  Commonly known as: TYLENOL      Take 1,000 mg by mouth Every 6 (Six) Hours As Needed for Mild Pain (1-3) or Fever.       atorvastatin 20 MG tablet  Commonly known as: LIPITOR      Take 20 mg by mouth Every Night.       brimonidine-timolol 0.2-0.5 % ophthalmic solution  Commonly known as: COMBIGAN      Administer 1 drop to both eyes Every 12 (Twelve) Hours.       busPIRone 10 MG tablet  Commonly known as: BUSPAR      Take 10 mg by mouth 2 (Two) Times a Day.       calcium carbonate 600 MG tablet  Commonly known as: OS-KATY      Take 600 mg by mouth 3 (Three) Times a Day With Meals.       carvedilol 6.25 MG tablet  Commonly known as: COREG      Take 6.25 mg by mouth 2 (Two) Times a Day With Meals.       cetirizine 10 MG tablet  Commonly known as: zyrTEC      Take 10 mg by mouth Daily.       coenzyme Q10 100 MG capsule      Take 100 mg by mouth Daily.       denosumab 60 MG/ML solution syringe  Commonly known as: PROLIA      Inject 60 mg under the skin Take As Directed. Every 6 months, due December       dextromethorphan polistirex ER 30 MG/5ML Suspension Extended Release oral suspension  Commonly known as: DELSYM      Take 10 mL by mouth 2 (Two) Times a Day As Needed (cough).       docusate sodium 100 MG capsule  Commonly known as: COLACE      Take 100 mg by mouth 2 (Two) Times a Day.       donepezil 10 MG tablet  Commonly known as: ARICEPT      Take 10 mg by mouth Every Night.       fluticasone 50 MCG/ACT nasal spray  Commonly known as: FLONASE      2 sprays into each nostril Daily.       glucosamine-chondroitin 500-400 MG capsule capsule      Take 3 capsules by mouth Daily.       guaiFENesin 600 MG 12 hr tablet  Commonly known as: MUCINEX      Take 1,200 mg by mouth 2 (Two)  Times a Day As Needed for Congestion.       levETIRAcetam 500 MG tablet  Commonly known as: KEPPRA      Take 500 mg by mouth 2 (Two) Times a Day.       levothyroxine 50 MCG tablet  Commonly known as: SYNTHROID, LEVOTHROID      Take 50 mcg by mouth Daily.       magnesium hydroxide 400 MG/5ML suspension  Commonly known as: MILK OF MAGNESIA      Take 30 mL by mouth As Needed for Constipation (no bm x 3 days).       montelukast 10 MG tablet  Commonly known as: SINGULAIR      Take 10 mg by mouth Every Night.       multivitamin with minerals tablet tablet      Take 1 tablet by mouth Daily.       OLANZapine 15 MG tablet  Commonly known as: zyPREXA      Take 15 mg by mouth Every Night.       ondansetron 4 MG tablet  Commonly known as: Zofran      Take 1 tablet by mouth Every 6 (Six) Hours As Needed for Nausea or Vomiting.       oxyCODONE-acetaminophen 5-325 MG per tablet  Commonly known as: Percocet      Take 1 tablet by mouth Every 6 (Six) Hours As Needed (as needed for pain).       pantoprazole 40 MG EC tablet  Commonly known as: PROTONIX      Take 40 mg by mouth 2 (Two) Times a Day.       potassium chloride 20 MEQ CR tablet  Commonly known as: K-DUR,KLOR-CON      Take 20 mEq by mouth 2 (Two) Times a Day.       PROBIOTIC DAILY PO      Take 1 capsule by mouth Daily.       Resource Arginaid pack      Take  by mouth 2 (Two) Times a Day.       VENTOLIN IN      Inhale 2 puffs Every 4 (Four) Hours As Needed (wheezing/coughing/sob).       VITAMIN D2 PO      Take 50,000 Units by mouth Every 7 (Seven) Days.                  Marquez Mendez MD  05/19/22  10:29 EDT

## 2022-05-23 NOTE — TELEPHONE ENCOUNTER
Called [employer] ([phone], extension [phone]) and left second voicemail requesting callback.    Thank you,  Isabel Robertson RN  Triage Nurse [facility]

## 2022-05-23 NOTE — TELEPHONE ENCOUNTER
Called [employer] ([phone]) and left voicemail requesting callback.    Thank you,  Isabel Robertson RN  Triage Nurse [facility]

## 2022-05-23 NOTE — TELEPHONE ENCOUNTER
"Reviewed result and recommendation with Darrel at [facility].  Darrel verbalized understanding and stated he will let Caitlyn Camacho's nurse know of result and recommendation.    Faxed ABG result to [phone], Attn: Darrel as requested, however, fax call report says \"no success\".  Called back and spoke with Darrel: patient is in [facility] area (ext. [phone]), no other fax available; will notify Caitlyn Camacho's nurse    Called [phone], ext. [phone], however, there was no answer again.  Left voicemail requesting callback.    Thank you,  Isabel Robertson RN  Triage Nurse [facility]  "

## 2022-05-23 NOTE — TELEPHONE ENCOUNTER
Called [employer] ([phone], extension [phone]) and left third voicemail requesting callback.     Thank you,  Isabel Robertson RN  Triage Nurse [facility]

## 2022-05-23 NOTE — TELEPHONE ENCOUNTER
----- Message from Marquez Mendez MD sent at 5/22/2022  8:33 PM EDT -----  Please notify nursing home that abg is abnormal and patient needs to see pulmonologist. Thank you

## 2022-05-24 NOTE — TELEPHONE ENCOUNTER
Received call from Brittani at [facility] yesterday (5/23) afternoon.  Brittani provided fax number to send ABG result to ([phone]).    Fax sent.  Fax call report: Success 5/23 at 4:26pm    Thank you,  Isabel Robertson RN  Triage Nurse G

## 2022-06-03 NOTE — TELEPHONE ENCOUNTER
Caller: [facility]  Best call back number: [phone]    Type of visit: NEW PT APPT    Additional notes: HAD TO RESCHEDULE DUE TO ON THE WAY TO DR. ALONSO THERE WAS A ACCIDENT AND IT MADE THEM RUN BEHIND AND APPT HAD TO BE RESCHEDULE.  IF POSSIBLE CAN SHE BE WORKED IN FOR A SOONER APPT OR CAN THEY DO A TELEHEALTH APPT EVEN THOUGH IT'S A NEW PT APPT?    PLEASE CALL HER BACK.

## 2022-06-07 NOTE — TELEPHONE ENCOUNTER
Spoke to Debby, informed no earlier appts, states understanding, after initial follow up would like to be telehealth visit

## 2022-06-15 NOTE — PROGRESS NOTES
[facility] SLEEP MEDICINE  [address]  3RD FLO[address]  [facility] [address]  [phone]    Referring Physician: Dr. Khang Merino  PCP: Khang Merino MD    Reason for consult:  Sleep disorders of witnessed apneas    Caitlyn Camacho is a 72 y.o.female was seen in the [facility] today. Refd for hypoxia during day and also sleep apnea symptoms. Patient is mentally impaired and is here in a wheelchair with caregiver. Sleep time is 9pm and awake time 6:30am. Has EDS and severe fatigue. Some snoring. Uses melatonin to help her sleep at night. Gets leg swelling and is on Lasix. Has wheezing often. Does not have nebulizers or inhalers. Gets VEST treatment.  She was seen by cardiology and they ordered a blood gas and CT of the chest.  ABG as below.  The recommended a pulmonary appointment.  According to the nurse at the [facility] patient has episodes of falling asleep and staring off into space as well as blackouts/syncopal episodes.  She has not had recent syncopal episodes.  She is pending an appointment with neurology.  Cheney Sleepiness Score: 18. Caffeine intake 1 per day. Alcohol intake 0 per week.    Caitlyn Camacho  has a past medical history of Anemia due to blood loss, Anxiety, Arthritis, Duodenal stricture, Duodenal ulcer, Dysphagia, Gallstones, H/O convulsions, Hypercholesteremia, Hypothyroidism, Major depression, Moderate intellectual disability, OCD (obsessive compulsive disorder), Ocular histoplasmosis, Osteopenia, Osteopenia, Ptosis, Pure hyperglyceridemia, S/P bypass gastrojejunostomy, Seasonal allergies, Seizure disorder (HCC), Unspecified dementia without behavioral disturbance (HCC), Ventral hernia, and Vitamin D deficiency. Dementia, Anxiety, Depression    Current Medications:    Current Outpatient Medications:   •  acetaminophen (TYLENOL) 500 MG tablet, Take 1,000 mg by mouth Every 6 (Six) Hours As Needed for Mild Pain (1-3) or Fever., Disp: , Rfl:   •  Albuterol  (VENTOLIN IN), Inhale 2 puffs Every 4 (Four) Hours As Needed (wheezing/coughing/sob)., Disp: , Rfl:   •  atorvastatin (LIPITOR) 20 MG tablet, Take 20 mg by mouth Every Night., Disp: , Rfl:   •  brimonidine-timolol (COMBIGAN) 0.2-0.5 % ophthalmic solution, Administer 1 drop to both eyes Every 12 (Twelve) Hours., Disp: , Rfl:   •  busPIRone (BUSPAR) 10 MG tablet, Take 10 mg by mouth 2 (Two) Times a Day., Disp: , Rfl:   •  calcium carbonate (OS-KATY) 600 MG tablet, Take 600 mg by mouth 3 (Three) Times a Day With Meals., Disp: , Rfl:   •  carvedilol (COREG) 6.25 MG tablet, Take 6.25 mg by mouth 2 (Two) Times a Day With Meals., Disp: , Rfl:   •  cetirizine (zyrTEC) 10 MG tablet, Take 10 mg by mouth Daily., Disp: , Rfl:   •  coenzyme Q10 100 MG capsule, Take 100 mg by mouth Daily., Disp: , Rfl:   •  denosumab (PROLIA) 60 MG/ML solution syringe, Inject 60 mg under the skin Take As Directed. Every 6 months, due December, Disp: , Rfl:   •  dextromethorphan polistirex ER (DELSYM) 30 MG/5ML Suspension Extended Release oral suspension, Take 10 mL by mouth 2 (Two) Times a Day As Needed (cough)., Disp: , Rfl:   •  docusate sodium (COLACE) 100 MG capsule, Take 100 mg by mouth 2 (Two) Times a Day., Disp: , Rfl:   •  donepezil (ARICEPT) 10 MG tablet, Take 10 mg by mouth Every Night., Disp: , Rfl:   •  Ergocalciferol (VITAMIN D2 PO), Take 50,000 Units by mouth Every 7 (Seven) Days., Disp: , Rfl:   •  fluticasone (FLONASE) 50 MCG/ACT nasal spray, 2 sprays into each nostril Daily., Disp: , Rfl:   •  glucosamine-chondroitin 500-400 MG capsule capsule, Take 3 capsules by mouth Daily., Disp: , Rfl:   •  guaiFENesin (MUCINEX) 600 MG 12 hr tablet, Take 1,200 mg by mouth 2 (Two) Times a Day As Needed for Congestion., Disp: , Rfl:   •  levETIRAcetam (KEPPRA) 500 MG tablet, Take 500 mg by mouth 2 (Two) Times a Day., Disp: , Rfl:   •  levothyroxine (SYNTHROID, LEVOTHROID) 50 MCG tablet, Take 50 mcg by mouth Daily., Disp: , Rfl:   •  magnesium  "hydroxide (MILK OF MAGNESIA) 400 MG/5ML suspension, Take 30 mL by mouth As Needed for Constipation (no bm x 3 days)., Disp: , Rfl:   •  montelukast (SINGULAIR) 10 MG tablet, Take 10 mg by mouth Every Night., Disp: , Rfl:   •  Multiple Vitamins-Minerals (MULTIVITAMIN ADULT PO), Take 1 tablet by mouth Daily., Disp: , Rfl:   •  Nutritional Supplements (RESOURCE ARGINAID) pack, Take  by mouth 2 (Two) Times a Day., Disp: , Rfl:   •  OLANZapine (zyPREXA) 15 MG tablet, Take 15 mg by mouth Every Night., Disp: , Rfl:   •  ondansetron (ZOFRAN) 4 MG tablet, Take 1 tablet by mouth Every 6 (Six) Hours As Needed for Nausea or Vomiting., Disp: 20 tablet, Rfl: 0  •  oxyCODONE-acetaminophen (PERCOCET) 5-325 MG per tablet, Take 1 tablet by mouth Every 6 (Six) Hours As Needed (as needed for pain)., Disp: 30 tablet, Rfl: 0  •  pantoprazole (PROTONIX) 40 MG EC tablet, Take 40 mg by mouth 2 (Two) Times a Day., Disp: , Rfl:   •  potassium chloride (K-DUR,KLOR-CON) 20 MEQ CR tablet, Take 20 mEq by mouth 2 (Two) Times a Day., Disp: , Rfl:   •  Probiotic Product (PROBIOTIC DAILY PO), Take 1 capsule by mouth Daily., Disp: , Rfl:    also listed in Sleep Questionnaire.    FH: Family history is unknown by patient.  SH:  reports that she has never smoked. She has never used smokeless tobacco. She reports that she does not drink alcohol and does not use drugs.    Pertinent Positive Review of Systems of nasal congestion, PND, fatigue, swelling ankles, ASOA, wheezing, anxiety, depression  Rest of Review of Systems was negative as recorded in Sleep Questionnaire.        Vital Signs: /78   Pulse 77   Ht 154.9 cm (61\")   Wt 75.3 kg (166 lb)   SpO2 97%   BMI 31.37 kg/m²     Body mass index is 31.37 kg/m².       Tongue: Large       Dentition: poor       Pharynx: Posterior pharyngeal pillars are narrow   Mallampatti: IV (only hard palate visible)        General: Alert. Cooperative. Well developed. No acute distress.             Head: "  Normocephalic. Symmetrical. Atraumatic.              Eyes: Sclera clear. No icterus. PERRLA. Normal EOM.             Ears: No deformities. Normal hearing.             Nose: No septal deviation. No drainage.          Throat: No oral lesions. No thrush. Moist mucous membranes.    Chest Wall:  Normal shape. Symmetric expansion with respiration. No tenderness.             Neck:  Trachea midline.           Lungs:  Clear to auscultation bilaterally. No wheezes. No rhonchi. No rales. Respirations regular, even and unlabored.            Heart:  Regular rhythm and normal rate. Normal S1 and S2. No murmur.     Abdomen:  Soft, non-tender and non-distended. Normal bowel sounds. No masses.  Extremities:  Moves all extremities well. No edema.           Pulses: Pulses palpable and equal bilaterally.               Skin: Dry. Intact. No bleeding. No rash.           Neuro: Moves all 4 extremities and cranial nerves grossly intact.  Psychiatric: Normal mood and affect.     Latest Reference Range & Units 05/19/22 11:05   PH, Arterial 7.350 - 7.450 pH units 7.428   PH, Temp Corrected 7.350 - 7.450 pH Units 7.428   PCO2, Arterial 35.0 - 45.0 mm Hg 51.1 (H) [1]   PO2, Arterial 83.0 - 108.0 mm Hg 67.3 (L) [2]   PO2, Temperature Corrected 83 - 108 mm Hg 67.3 (L)   HCO3, Arterial 20.0 - 26.0 mmol/L 33.7 (H) [3]   Base Excess 0.0 - 2.0 mmol/L 7.8 (H) [4]   O2 Saturation, Arterial 94.0 - 99.0 % 91.7 (L) [5]   (H): Data is abnormally high  (L): Data is abnormally low  [1] 83 Value above reference range  [2] 84 Value below reference range  [3] 83 Value above reference range  [4] 83 Value above reference range  [5] 84 Value below reference range    Impression:  1. Obstructive sleep apnea    2. Obesity (BMI 30-39.9)    3. Dyspnea, unspecified type    4. Nocturnal hypoxia    5. Diastolic dysfunction    6. Chronic respiratory failure with hypoxia and hypercapnia (HCC)    7. Scoliosis, unspecified scoliosis type, unspecified spinal region    8.  Moderate persistent asthma, unspecified whether complicated    9. Cognitive impairment          No orders of the defined types were placed in this encounter.           Plan:  Patient has restrictive lung disease due to scoliosis. She does not have COPD. Trilogy is indicated to correct this. Hypoxic/hypercapnic respiratory failure is due to neuromuscular etiology.     Caitlyn has clear evidence of hypercapnic chronic respiratory failure.  This is likely contributed by her scoliosis and restrictive lung disease.  She requires treatment with Trilogy device as below. She has chronic respiratory failure and requires non-invasive ventilation. Other modalities such as CPAP and BIPAP were considered but ruled out due to severity of lung disease and their ineffectiveness in this condition. Non-invasive-ventilation is required to decrease work of breathing, improve pulmonary status and interruption of respiratory support which could lead to serious harm or death.    Settings on Trilogy:  Mode- AVAPS-AE; Rate - Auto; Goal Tidal Volume 500cc; IPAP max 20; EPAP max 20; EPAP min 4;  PS max 16; PS min 4; FiO2 - O2 bled in at 2L      RN reports wheezing.  This could be due to underlying moderate persistent asthma.  I have recommended to start Brovana nebs twice daily, budesonide nebs twice daily and DuoNebs 4 times daily as needed at [facility].  I am not confident that this patient has the mental capacity to perform PFTs.  We will have to adjust medications based on her clinical response.    Continue treatment with cardiology for diastolic dysfunction.  She is on low-dose diuretics.    Will see neurology for episodes of syncope.    Concomitant diagnosis of obstructive sleep apnea is likely.  However trilogy device will correct this as well and separate evaluation with a sleep study would not be helpful at this juncture.    She will require appointment at the [place] pulmonary care office within 6 months so we can review  trilogy data and adjust medication treatments for moderate persistent asthma.    Patient will follow up at [facility].    Thank you for allowing me to participate in your patient's care.    Electronically signed by Thomas Montalvo MD, 06/15/22, 2:19 PM EDT.    Part of this note may be an electronic transcription/translation of spoken language to printed text using the Dragon Dictation System.

## 2022-09-30 PROBLEM — J96.20 ACUTE ON CHRONIC RESPIRATORY FAILURE (HCC): Status: ACTIVE | Noted: 2022-01-01

## 2022-10-01 PROBLEM — J96.20 ACUTE ON CHRONIC RESPIRATORY FAILURE, UNSPECIFIED WHETHER WITH HYPOXIA OR HYPERCAPNIA: Status: ACTIVE | Noted: 2022-01-01

## 2022-10-01 NOTE — H&P
Patient Name: Caitlyn Camacho  :1950  72 y.o.    Date of Hospital Visit: 2022  2:36 PM  22:52 EDT    Encounter Provider: Willian Norris MD    Place of Service: [facility].  [facility] Hospitalist group.    Patient Care Team:  Khang Merino MD as PCP - General (Family Medicine)  Tolu Neville MD (Psychiatry)  Vinnie Leavitt MD as Consulting Physician (Neurology)  Zheng Weathers MD as Consulting Physician (Gastroenterology)  Khang Merino MD as Referring Physician (Family Medicine)  Khang Lynch MD as Consulting Physician (Hematology and Oncology)      Chief complaint:  Low oxygen  History of Present Illness:  Ms. Narvaez is a 72-year-old  obese female with multiple medical problems disability.  Who is a long-term patient of [facility].  Who has severe obstructive sleep apnea and is on CPAP at night.  Has become more hypoxic over the past 1 week.  No fever or chills no cough no sputum.  Staff checked her vital signs her oxygen saturation was in the low 80s to 70s today and brought to the emergency room.  Upon presentation to ER here at [facility] her O2 sats were 94% on 2 L.  But she was quite tachypnea.  And her O2 sats were dropping.  BiPAP was started and she improved dramatically.  Chest x-ray revealed atelectasis.  CT scan of chest also revealed atelectasis.  She was attempted a weaning trial off her BiPAP back on nasal cannula.  Unfortunate she continued to drop after there is BiPAP was discontinued.  Respiratory therapy reapplied BiPAP and her ox saturations rebounded nicely.  Has a history of seizure disorder.  No recent seizures      Past Medical History:   Diagnosis Date   • Anemia due to blood loss    • Anxiety    • Arthritis    • Duodenal stricture    • Duodenal ulcer    • Dysphagia    • Gallstones    • H/O convulsions    • Hypercholesteremia    • Hypothyroidism    • Major depression    • Moderate  intellectual disability    • OCD (obsessive compulsive disorder)    • Ocular histoplasmosis     w/macular scars   • Osteopenia    • Osteopenia    • Ptosis    • Pure hyperglyceridemia    • S/P bypass gastrojejunostomy    • Seasonal allergies    • Seizure disorder (HCC)    • Unspecified dementia without behavioral disturbance (HCC)    • Ventral hernia     sched repair   • Vitamin D deficiency        Past Surgical History:   Procedure Laterality Date   • CATARACT EXTRACTION Bilateral 2016   • CHOLECYSTECTOMY OPEN  03/2017   • COLONOSCOPY  05/2009   • ENDOSCOPY  03/2017   • GASTROJEJUNOSTOMY  08/2016   • GASTROSTOMY      TEJINDER   • HYSTERECTOMY     • VENTRAL/INCISIONAL HERNIA REPAIR N/A 8/15/2017    Procedure: Lysis of adhesion and repair of recurrent incisional hernia with onlay mesh placement ;  Surgeon: Donna Ivy MD;  Location: [facility];  Service:          Prior to Admission medications    Medication Sig Start Date End Date Taking? Authorizing Provider   acetaminophen (TYLENOL) 500 MG tablet Take 1,000 mg by mouth Every 6 (Six) Hours As Needed for Mild Pain (1-3) or Fever.    Yonathan Gonsalez MD   Albuterol (VENTOLIN IN) Inhale 2 puffs Every 4 (Four) Hours As Needed (wheezing/coughing/sob).    Yonathan Gonsalez MD   atorvastatin (LIPITOR) 20 MG tablet Take 20 mg by mouth Every Night.    Yonathan Gonsalez MD   brimonidine-timolol (COMBIGAN) 0.2-0.5 % ophthalmic solution Administer 1 drop to both eyes Every 12 (Twelve) Hours.    Yonathan Gonsalez MD   buspirone (BUSPAR) 10 MG tablet Take 10 mg by mouth 2 (Two) Times a Day.    Yonathan Gosnalez MD   calcium carbonate (OS-KATY) 600 MG tablet Take 600 mg by mouth 3 (Three) Times a Day With Meals.    Yonathan Gonsalez MD   carvedilol (COREG) 6.25 MG tablet Take 6.25 mg by mouth 2 (Two) Times a Day With Meals.    Yonathan Gonsalez MD   cetirizine (ZyrTEC) 10 MG tablet Take 10 mg by mouth Daily.    Yonathan Gonsalez MD   coenzyme Q10  100 MG capsule Take 100 mg by mouth Daily.    Yonathan Gonsalez MD   denosumab (PROLIA) 60 MG/ML solution syringe Inject 60 mg under the skin Take As Directed. Every 6 months, due December    Yonathan Gonsalez MD   dextromethorphan polities ER (DELSYM) 30 MG/5ML Suspension Extended Release oral suspension Take 10 mL by mouth 2 (Two) Times a Day As Needed (cough).    Yonathan Gonsalez MD   docusate sodium (COLACE) 100 MG capsule Take 100 mg by mouth 2 (Two) Times a Day.    Yonathan Gonsalez MD   donepezil (ARICEPT) 10 MG tablet Take 10 mg by mouth Every Night.    Yonathan Gonsalez MD   Ergocalciferol (VITAMIN D2 PO) Take 50,000 Units by mouth Every 7 (Seven) Days.    Yonathan Gonsalez MD   fluticasone (FLONASE) 50 MCG/ACT nasal spray 2 sprays into each nostril Daily.    Yonathan Gonsalez MD   glucosamine-chondroitin 500-400 MG capsule Take 3 capsules by mouth Daily.    Yonathan Gonsalez MD   guaifenesin (MUCINEX) 600 MG 12 hr tablet Take 1,200 mg by mouth 2 (Two) Times a Day As Needed for Congestion.    Yonathan Gonsalez MD   levetiracetam (KEPPRA) 500 MG tablet Take 500 mg by mouth 2 (Two) Times a Day.    Yonathan Gonsalez MD   levothyroxine (SYNTHROID, LEVOTHROID) 50 MCG tablet Take 50 mcg by mouth Daily.    Yonathan Gonsalez MD   magnesium hydroxide (MILK OF MAGNESIA) 400 MG/5ML suspension Take 30 mL by mouth As Needed for Constipation (no Bm x 3 days).    Yonathan Gonsalez MD   montelukast (SINGULAIR) 10 MG tablet Take 10 mg by mouth Every Night.    Yonathan Gonsalez MD   Multiple Vitamins-Minerals (MULTIVITAMIN ADULT PO) Take 1 tablet by mouth Daily.    Yonathan Gonsalez MD   Nutritional Supplements (RESOURCE ARGINAID) pack Take  by mouth 2 (Two) Times a Day.    Yonathan Gonsalez MD   OLANZapine (Zyprexa) 15 MG tablet Take 15 mg by mouth Every Night.    Yonathan Gonsalez MD   ondansetron (ZOFRAN) 4 MG tablet Take 1 tablet by mouth Every 6 (Six)  Hours As Needed for Nausea or Vomiting. 8/22/17   Donna Ivy MD   oxyCODONE-acetaminophen (PERCOCET) 5-325 MG per tablet Take 1 tablet by mouth Every 6 (Six) Hours As Needed (as needed for pain). 8/22/17   Donna Ivy MD   pantoprazole (PROTONIX) 40 MG EC tablet Take 40 mg by mouth 2 (Two) Times a Day.    Provider, MD Yonathan   potassium chloride (K-DUR,KLOR-CON) 20 MEQ CR tablet Take 20 mEq by mouth 2 (Two) Times a Day.    Provider, MD Yonathan   Probiotic Product (PROBIOTIC DAILY PO) Take 1 capsule by mouth Daily.    Provider, MD Yonathan       Allergies   Allergen Reactions   • Codeine Diarrhea and Nausea And Vomiting       Social History     Socioeconomic History   • Marital status: Unknown   Tobacco Use   • Smoking status: Never Smoker   • Smokeless tobacco: Never Used   Substance and Sexual Activity   • Alcohol use: No   • Drug use: No   • Sexual activity: Defer       Family History   Family history unknown: Yes       REVIEW OF SYSTEMS:   If not otherwise mentioned in the HPI the following: General, HEENT, cardio,  GI, , skin, neuro, muscle skeletal, derm are negative or not pertinent to current case.         Objective:     Vitals:    09/30/22 1947 09/30/22 2020 09/30/22 2039 09/30/22 2153   BP:    124/86   Pulse: 71   66   Resp:    (!) 29   Temp:       TempSrc:       SpO2: 96% 94% 98% 96%   Height:         Body mass index is 31.37 kg/m².    Constitutional: Patient is awake but drowsy with BiPAP on  Patient appears well-developed. Does not appear ill.   HENT:   Head: Normocephalic and atraumatic. Head is without contusion.   Right and Left Ear: Hearing normal to conversational tones. No visible drainage.    Nose: No epistaxis. External nose normal.   Eyes: Lids are normal. Pupils are equal, round, and reactive to light. Right and left eye exhibit no exudate. Conjunctiva has no hemorrhage.   Neck: No JVD present. No carotid bruit. No tracheal deviation present. No thyroid mass and no  thyromegaly.  Cardiovascular: Normal rate, regular rhythm and normal heart sounds.    Pulses:present in radial and DP  Pulmonary/Chest: Mild respiratory distress on BiPAP  Abdominal: Soft. Normal appearance and bowel sounds are normal. There is no tenderness.   Musculoskeletal: Normal range of motion.   Neurological: Patient is alert and oriented to person, place, and time.  Normal strength.   Skin: Skin is warm, dry and intact. No rash noted.   Psychiatric: Unable to assess      Lab Review:   Lab Results   Component Value Date    WBC 10.80 09/30/2022    HGB 13.6 09/30/2022    HCT 43.7 09/30/2022    MCV 90.5 09/30/2022     09/30/2022       Lab Results   Component Value Date    TROPONINT <0.010 09/30/2022       Glucose   Date Value Ref Range Status   09/30/2022 172 (H) 65 - 99 mg/dL Final     BUN   Date Value Ref Range Status   09/30/2022 15 8 - 23 mg/dL Final     Creatinine   Date Value Ref Range Status   09/30/2022 0.77 0.57 - 1.00 mg/dL Final     Sodium   Date Value Ref Range Status   09/30/2022 140 136 - 145 mmol/L Final     Potassium   Date Value Ref Range Status   09/30/2022 4.3 3.5 - 5.2 mmol/L Final     Chloride   Date Value Ref Range Status   09/30/2022 102 98 - 107 mmol/L Final     CO2   Date Value Ref Range Status   09/30/2022 31.9 (H) 22.0 - 29.0 mmol/L Final     Calcium   Date Value Ref Range Status   09/30/2022 9.4 8.6 - 10.5 mg/dL Final     Total Protein   Date Value Ref Range Status   09/30/2022 6.8 6.0 - 8.5 g/dL Final     Albumin   Date Value Ref Range Status   09/30/2022 4.00 3.50 - 5.20 g/dL Final     ALT (SGPT)   Date Value Ref Range Status   09/30/2022 18 1 - 33 U/L Final     AST (SGOT)   Date Value Ref Range Status   09/30/2022 22 1 - 32 U/L Final     Alkaline Phosphatase   Date Value Ref Range Status   09/30/2022 86 39 - 117 U/L Final     Total Bilirubin   Date Value Ref Range Status   09/30/2022 0.2 0.0 - 1.2 mg/dL Final     BUN/Creatinine Ratio   Date Value Ref Range Status    09/30/2022 19.5 7.0 - 25.0 Final     Anion Gap   Date Value Ref Range Status   09/30/2022 6.1 5.0 - 15.0 mmol/L Final           I personally viewed:  the patient's EKG/Telemetry data, the current radiology reports, the data from the emergency room, and reviewed all pertinent data in our EMR.     Imaging Results (Last 24 Hours)     Procedure Component Value Units Date/Time    CT Angiogram Chest [[id number]] Collected: 09/30/22 1928     Updated: 09/30/22 1930    Narrative:      CTA Chest    INDICATION:   Dyspnea and hypoxia. Nursing home patient. Increasing shortness of air x3 days    TECHNIQUE:   CT angiogram of the chest with 100 cc Isovue-370 IV contrast. 3-D reconstructions were obtained and reviewed.   Radiation dose reduction techniques included automated exposure control or exposure modulation based on body size. Count of known CT and  cardiac nuc med studies performed in previous 12 months: 2.     COMPARISON:   CT chest 5/11/2022    FINDINGS:   Deformity of the thorax secondary to severe dextroscoliosis. Left-sided volume loss with multifocal atelectasis. No focal consolidation. No sizable effusions or pneumothorax.    No evidence of pulmonary embolus. Cardiomegaly. Aorta unremarkable. No central mass or significant adenopathy. No acute osseous abnormality.      Impression:      Negative for pulmonary embolus.    Left-sided volume loss with multifocal bibasilar atelectasis left greater than right. Some mosaic attenuation within the lungs likely related to underlying cardiovascular disease.    Stable cardiomegaly.    Signer Name: NAILA Thayer MD   Signed: 9/30/2022 7:28 PM   Workstation Name: [id number]    [facility]    XR Chest 2 View [[id number]] Collected: 09/30/22 1539     Updated: 09/30/22 1542    Narrative:      CR Chest 2 Vws    INDICATION:    Shortness of breath for 5 days.    COMPARISON:    Chest 9/29/2022    FINDINGS:   PA and lateral views of the chest.  Severe  thoracolumbar scoliosis again noted. There is persistent left basilar opacity concerning for atelectasis and/or infiltrate. Left pleural effusion is not excluded. Mild right basilar atelectasis. No pneumothorax.  Heart size appears enlarged, but stable.      Impression:        1. Persistent left basilar opacity concerning for atelectasis and/or infiltrate. Small left pleural effusion is not excluded.  2. Right basilar atelectasis.  3. Stable cardiomegaly.    Signer Name: Fredis Bullock MD   Signed: 9/30/2022 3:39 PM   Workstation Name: [id number]    [facility]            Assessment and Plan:       1.  Acute on chronic respiratory failure.  Known obstructive sleep apnea on CPAP at night.  With acute decompensation.  With hypoxia.  Extensive atelectasis seen on chest x-ray and chest CT scan.  Improved with positive pressure, BiPAP.  Desatted once BiPAP was attempted to be weaned.  BiPAP restarted will need to maintain BiPAP for now to keep to prevent hypoxia.  Will admit to ICU for BiPAP management.  Pulmonary consultation in morning  Continue most of her critical home medications  IV fluids      Willian Norris MD  09/30/22  22:52 EDT

## 2022-10-01 NOTE — CONSULTS
Group: [facility]         CONSULT NOTE    Patient Identification:    Caitlyn Camacho  72 y.o.  female  1950  [id number]            Patient Care Team:  Khang Merino MD as PCP - General (Family Medicine)  Tolu Neville MD (Psychiatry)  Vinnie Leavitt MD as Consulting Physician (Neurology)  Zheng Weathers MD as Consulting Physician (Gastroenterology)  Khang Merino MD as Referring Physician (Family Medicine)  Khang Lynch MD as Consulting Physician (Hematology and Oncology)    Requesting physician: Dr. Norris    Reason for Consultation: Acute on chronic respiratory failure    CC: Shortness of breath, confusion    History of Present Illness: Patient is 72-year-old white female who lives at [facility] with a past medical history significant for dementia, chronic hypercapnic respiratory failure on trilogy ventilator, MRACIN/OHS, nocturnal hypoxemia, restrictive lung disease, hypothyroidism, intellectual disability who has been doing fairly well until a week back when she started having generalized weakness and is getting hypoxic with ambulation and not being herself at which point staff checked her oxygen saturation were 70s and 80s at which point she was sent to [facility] for further evaluation.  Patient was placed on supplemental oxygen.  Chest x-ray showed concern for left basilar atelectasis and CT of the chest was done and patient was placed on noninvasive ventilator and we have been asked to evaluate the patient for the same.    Patient was given Lasix and did diurese some with improvement.  CT of the chest reviewed and shows some atelectasis along with changes concerning for pulmonary edema.  Patient apparently was more awake and interactive and worked with the nurse this morning but got tired and went back to the bed and is on noninvasive ventilator.  Not too interactive during my evaluation but squeezed fingers.  Akiko is at bedside and states that she has severe  hardness of hearing.    I have reviewed the H&P as well as the notes from the other consultants taking care of the patient this admission as well as previous hospital notes (if any available) from our group physicians and summarized above      Objective     Review of Systems:  Unable to obtain due to encephalopathy and respiratory failure    Past Medical History:  Past Medical History:   Diagnosis Date   • Anemia due to blood loss    • Anxiety    • Arthritis    • Duodenal stricture    • Duodenal ulcer    • Dysphagia    • Gallstones    • H/O convulsions    • Hypercholesteremia    • Hypothyroidism    • Major depression    • Moderate intellectual disability    • OCD (obsessive compulsive disorder)    • Ocular histoplasmosis     w/macular scars   • Osteopenia    • Osteopenia    • Ptosis    • Pure hyperglyceridemia    • S/P bypass gastrojejunostomy    • Seasonal allergies    • Seizure disorder (HCC)    • Unspecified dementia without behavioral disturbance (HCC)    • Ventral hernia     sched repair   • Vitamin D deficiency        Past Surgical History:  Past Surgical History:   Procedure Laterality Date   • CATARACT EXTRACTION Bilateral 2016   • CHOLECYSTECTOMY OPEN  03/2017   • COLONOSCOPY  05/2009   • ENDOSCOPY  03/2017   • GASTROJEJUNOSTOMY  08/2016   • GASTROSTOMY      TEJINDER   • HYSTERECTOMY     • VENTRAL/INCISIONAL HERNIA REPAIR N/A 8/15/2017    Procedure: Lysis of adhesion and repair of recurrent incisional hernia with onlay mesh placement ;  Surgeon: Donna Ivy MD;  Location: [facility];  Service:         Home Meds:  Medications Prior to Admission   Medication Sig Dispense Refill Last Dose   • Albuterol (VENTOLIN IN) Inhale 2 puffs Every 4 (Four) Hours As Needed (wheezing/coughing/sob).   9/29/2022 at Unknown time   • atorvastatin (LIPITOR) 20 MG tablet Take 20 mg by mouth Every Night.   9/29/2022 at Unknown time   • busPIRone (BUSPAR) 10 MG tablet Take 10 mg by mouth 2 (Two) Times a Day.   9/30/2022 at  Unknown time   • calcium carbonate (OS-KATY) 600 MG tablet Take 600 mg by mouth 3 (Three) Times a Day With Meals.   9/30/2022 at Unknown time   • carvedilol (COREG) 6.25 MG tablet Take 6.25 mg by mouth 2 (Two) Times a Day With Meals.   9/30/2022 at Unknown time   • cetirizine (zyrTEC) 10 MG tablet Take 10 mg by mouth Daily.   9/30/2022 at Unknown time   • coenzyme Q10 100 MG capsule Take 100 mg by mouth Daily.   9/30/2022 at Unknown time   • docusate sodium (COLACE) 100 MG capsule Take 100 mg by mouth 2 (Two) Times a Day.   9/29/2022 at Unknown time   • donepezil (ARICEPT) 10 MG tablet Take 10 mg by mouth Every Night.   9/29/2022 at Unknown time   • Ergocalciferol (VITAMIN D2 PO) Take 50,000 Units by mouth Every 7 (Seven) Days.   Past Week at Unknown time   • fluticasone (FLONASE) 50 MCG/ACT nasal spray 2 sprays into each nostril Daily.   9/30/2022 at Unknown time   • furosemide (LASIX) 40 MG tablet Take 40 mg by mouth Daily.   9/30/2022 at Unknown time   • glucosamine-chondroitin 500-400 MG capsule capsule Take 3 capsules by mouth Daily.   9/30/2022 at Unknown time   • levETIRAcetam (KEPPRA) 500 MG tablet Take 500 mg by mouth 2 (Two) Times a Day.   9/30/2022 at Unknown time   • levothyroxine (SYNTHROID, LEVOTHROID) 50 MCG tablet Take 50 mcg by mouth Daily.   9/30/2022 at Unknown time   • montelukast (SINGULAIR) 10 MG tablet Take 10 mg by mouth Every Night.   9/29/2022 at Unknown time   • Multiple Vitamins-Minerals (b complex-C-E-zinc) tablet Take 1 tablet by mouth Daily.   9/29/2022 at Unknown time   • Multiple Vitamins-Minerals (MULTIVITAMIN ADULT PO) Take 1 tablet by mouth Daily.   9/29/2022 at Unknown time   • Nutritional Supplements (RESOURCE ARGINAID) pack Take  by mouth 2 (Two) Times a Day.   9/29/2022 at Unknown time   • OLANZapine (zyPREXA) 15 MG tablet Take 10 mg by mouth Every Night. 10mg once daily at 4pm   9/29/2022 at Unknown time   • pantoprazole (PROTONIX) 40 MG EC tablet Take 40 mg by mouth 2 (Two)  Times a Day.   9/29/2022 at Unknown time   • Probiotic Product (PROBIOTIC DAILY PO) Take 1 capsule by mouth Daily.   9/29/2022 at Unknown time   • sertraline (ZOLOFT) 100 MG tablet Take 100 mg by mouth Daily.   9/29/2022 at Unknown time   • vitamin C (ASCORBIC ACID) 250 MG tablet Take 1,000 mg by mouth Daily.   9/29/2022 at Unknown time   • acetaminophen (TYLENOL) 500 MG tablet Take 1,000 mg by mouth Every 6 (Six) Hours As Needed for Mild Pain (1-3) or Fever.   Unknown at Unknown time   • brimonidine-timolol (COMBIGAN) 0.2-0.5 % ophthalmic solution Administer 1 drop to both eyes Every 12 (Twelve) Hours.      • budesonide (PULMICORT) 0.5 MG/2ML nebulizer solution Take 0.5 mg by nebulization 2 (Two) Times a Day.   Unknown at Unknown time   • denosumab (PROLIA) 60 MG/ML solution syringe Inject 60 mg under the skin Take As Directed. Every 6 months, due December   Unknown at Unknown time   • dextromethorphan polistirex ER (DELSYM) 30 MG/5ML Suspension Extended Release oral suspension Take 10 mL by mouth 2 (Two) Times a Day As Needed (cough).   Unknown at Unknown time   • diphenhydrAMINE (Banophen) 25 mg capsule Take 25 mg by mouth Every 6 (Six) Hours As Needed for Itching.   Unknown at Unknown time   • guaiFENesin (MUCINEX) 600 MG 12 hr tablet Take 1,200 mg by mouth 2 (Two) Times a Day As Needed for Congestion.   Unknown at Unknown time   • magnesium hydroxide (MILK OF MAGNESIA) 400 MG/5ML suspension Take 30 mL by mouth As Needed for Constipation (no bm x 3 days).   Unknown at Unknown time   • promethazine (PHENERGAN) 25 MG suppository Insert 25 mg into the rectum Every 6 (Six) Hours As Needed for Nausea or Vomiting.   Unknown at Unknown time   • promethazine (PHENERGAN) 25 MG tablet Take 25 mg by mouth Every 6 (Six) Hours As Needed for Nausea or Vomiting.   Unknown at Unknown time       Allergies:  Allergies   Allergen Reactions   • Codeine Diarrhea and Nausea And Vomiting       Social History:   Social History  "    Socioeconomic History   • Marital status: Unknown   Tobacco Use   • Smoking status: Never Smoker   • Smokeless tobacco: Never Used   Substance and Sexual Activity   • Alcohol use: No   • Drug use: No   • Sexual activity: Defer       Family History:  Family History   Family history unknown: Yes       Physical Exam:  BP 98/60   Pulse 66   Temp 96.5 °F (35.8 °C) (Oral)   Resp 15   Ht 160 cm (63\")   Wt 74.7 kg (164 lb 10.9 oz)   SpO2 100%   BMI 29.17 kg/m²    Body mass index is 29.17 kg/m². 100% 74.7 kg (164 lb 10.9 oz)    Vent Settings                                           Physical Exam     Constitutional: Middle-aged white female pt in bed, no acute respiratory distress, + accessory muscle use while on noninvasive ventilator  Head: - NCAT  Eyes: No pallor, anicteric conjunctivae, EOMI.  ENMT:  Mask+, no oral thrush. Moist MM.   Severe [place]+  NECK: Trachea midline, No thyromegaly, no palpable cervical lymphadenopathy  Heart: RRR, no murmur.  Trace pedal edema   Lungs: JANETH +, scoliosis, decreased breath sounds at the left base no wheezes/ crackles heard    Abdomen: Soft. No tenderness, guarding or rigidity. No palpable masses  Extremities: Extremities warm and well perfused. No cyanosis/ clubbing  Neuro: Conscious, confused, no gross focal neuro deficits  Psych: Mood and affect - unable to obtain    PPE recommended per [facility] infectious disease Isolation protocol for the current clinical scenario (as mentioned below) was followed.     LABS:  Results from last 7 days   Lab Units 09/30/22  1412   SODIUM mmol/L 140   POTASSIUM mmol/L 4.3   CHLORIDE mmol/L 102   CO2 mmol/L 31.9*   BUN mg/dL 15   CREATININE mg/dL 0.77   CALCIUM mg/dL 9.4   BILIRUBIN mg/dL 0.2   ALK PHOS U/L 86   ALT (SGPT) U/L 18   AST (SGOT) U/L 22   GLUCOSE mg/dL 172*   WBC 10*3/mm3 10.80   HEMOGLOBIN g/dL 13.6   PLATELETS 10*3/mm3 243   PROBNP pg/mL 368.3   PROCALCITONIN ng/mL 0.08       Lab Results   Component Value Date    " CALCIUM 9.4 09/30/2022    PHOS 4.3 08/02/2022             Results from last 7 days   Lab Units 09/30/22  1745   PH, ARTERIAL pH units 7.427   PO2 ART mm Hg 122.0*   PCO2, ARTERIAL mm Hg 49.9*   HCO3 ART mmol/L 32.8*        Result Review      I have personally reviewed the results from the time of this admission to 10/1/2022 14:12 EDT and agree with these findings:  [x]  Laboratory  [x]  Microbiology  [x]  Radiology  []  EKG/Telemetry   [x]  Cardiology/Vascular   []  Pathology  []  Old records  []  Other:    Assessment   Acute on hypoxic respiratory failure (nocturnal oxygen)  Acute congestive heart failure  Left lower lobe atelectasis  Acute metabolic encephalopathy  Chronic hypercapnic respiratory failure on trilogy ventilator  Severe restrictive lung disease from kyphoscoliosis  MARCIN/OHS  Seizure disorder  Polypharmacy  Dementia    RECOMMENDATIONS:  Patient remains noninvasive ventilator dependent but has trilogy ventilator at home likely multifactorial from some atelectasis, volume overload, severe baseline restrictive lung disease, MARCIN and OHS.  Continue with noninvasive ventilator in place of her home trilogy ventilator.  Settings are adjusted as appropriate and she could use it at night and with naps.  Agree with diuresis to keep the patient as negative as possible.  Does not seem to be significantly volume overloaded however.  Continue bronchodilators to help with mucus clearance  Patient reportedly is awake and interactive now but gets tired and is back on noninvasive ventilator.  Agree with holding off on antibiotics but patient remains high risk for pneumonia and will need to be monitored closely  Would avoid sedative medications and limit polypharmacy  Guarded prognosis  DNR/DNI  DVT prophylaxis-Lovenox    Discussed with the patient's caregiver and ICU nurse. Appreciate input.    Thank you for letting me participate in the care of this pleasant patient.    Vlad Olsen MD  10/1/2022  14:12  EDT

## 2022-10-01 NOTE — PROGRESS NOTES
"SERVICE: [facility] HOSPITALIST    CONSULTANTS: Pulmonology    CHIEF COMPLAINT: Acute respiratory failure    SUBJECTIVE: Patient's caretaker from [facility] at bedside reports that patient seems much better than she did prior to admission.  Reportedly recently patient has \"made weird sound\" when breathing.  She wears her CPAP every night.  She has been having to take deeper breaths and had associated tachypnea.  According to her caretaker she has not seemed acutely ill-she has not been running a fever, no rigors, no generalized malaise, no known chest pain, no nausea, no vomiting, no significant edema.     OBJECTIVE:    Physical exam is largely unchanged from previous exam, except where documented below, examination is accurate as of 10/1/2022    Physical Exam:  General: Patient is a 72-year-old female, awake and alert, NAD on 2L O2 via NC  HENT: NC in place. Poor dentition. Head is atraumatic, normocephalic. Hearing is grossly intact. Nose is without obvious congestion and appears patent. Neck is supple and trachea is midline.   Eyes: Vision is grossly intact. Pupils appear equal and round.   Cardiovascular: Heart has regular rate and rhythm with no murmurs, rubs or gallops noted.    Respiratory: Exam limited by habitus and patient participation. Breath sounds seem slightly diminished. On supplemental O2 as noted above. No wheezing, rales, or rhonchi noted  Abdominal/GI: Soft, nontender, bowel sounds present. No rebound or guarding present.   Extremities: Scant pedal edema  Musculoskeletal: Spontaneous movement of bilateral upper and lower extremities against gravity noted. No signs of injury or deformity noted.   Skin: Warm and dry.   Psych: Mood and affect are appropriate. Cooperative with exam.   Neuro: Answers some questions appropriately. At times does not answer questions. Known intellectual disability. No focal motor deficits appreciated    /78   Pulse 71   Temp 97.5 °F " "(36.4 °C) (Oral)   Resp 22   Ht 160 cm (63\")   Wt 74.7 kg (164 lb 10.9 oz)   SpO2 96%   BMI 29.17 kg/m²     MEDS/LABS REVIEWED AND ORDERED    busPIRone, 10 mg, Oral, Q12H  carvedilol, 6.25 mg, Oral, BID With Meals  donepezil, 10 mg, Oral, Nightly  enoxaparin, 40 mg, Subcutaneous, Nightly  levETIRAcetam, 500 mg, Oral, Q12H  levothyroxine, 50 mcg, Oral, Q AM  OLANZapine, 15 mg, Oral, Nightly  pantoprazole, 40 mg, Oral, Q AM  sodium chloride, 10 mL, Intravenous, Q12H          LAB/DIAGNOSTICS:    Lab Results (last 24 hours)     Procedure Component Value Units Date/Time    Blood Gas, Arterial - [[id number]]  (Abnormal) Collected: 09/30/22 1745    Specimen: Arterial Blood Updated: 09/30/22 1749     Site Right Radial     Sven's Test Positive     pH, Arterial 7.427 pH units      pCO2, Arterial 49.9 mm Hg      Comment: 83 Value above reference range        pO2, Arterial 122.0 mm Hg      Comment: 83 Value above reference range        HCO3, Arterial 32.8 mmol/L      Comment: 83 Value above reference range        Base Excess, Arterial 7.1 mmol/L      Comment: 83 Value above reference range        O2 Saturation, Arterial 98.3 %      Hemoglobin, Blood Gas 13.3 g/dL      Temperature 37.0 C      Barometric Pressure for Blood Gas 740 mmHg      Modality NIV     FIO2 30 %      Ventilator Mode BIPAP-ST     Set Mec Resp Rate 18.0     IPAP 15     Comment: Meter: [id number]     :  [id number]        EPAP 5     Collected by [id number]     pCO2, Temperature Corrected 49.9 mm Hg      pH, Temp Corrected 7.427 pH Units      pO2, Temperature Corrected 122 mm Hg     COVID-19 and FLU A/B PCR - Swab, Nasopharynx [[id number]]  (Normal) Collected: 09/30/22 1625    Specimen: Swab from Nasopharynx Updated: 09/30/22 1709     COVID19 Not Detected     Influenza A PCR Not Detected     Influenza B PCR Not Detected    Narrative:      Fact sheet for providers: https://www.fda.gov/media/767418/download    Fact sheet for patients: " "https://www.fda.gov/media/376908/download    Test performed by PCR.    Lactic Acid, Plasma [[id number]]  (Normal) Collected: 09/30/22 1621    Specimen: Blood Updated: 09/30/22 1700     Lactate 0.6 mmol/L     Blood Culture - Blood, Arm, Right [[id number]] Collected: 09/30/22 1621    Specimen: Blood from Arm, Right Updated: 09/30/22 1636    Blood Culture - Blood, Arm, Left [[id number]] Collected: 09/30/22 1621    Specimen: Blood from Arm, Left Updated: 09/30/22 1636    Procalcitonin [[id number]]  (Normal) Collected: 09/30/22 1412    Specimen: Blood Updated: 09/30/22 1610     Procalcitonin 0.08 ng/mL     Narrative:      As a Marker for Sepsis (Non-Neonates):    1. <0.5 ng/mL represents a low risk of severe sepsis and/or septic shock.  2. >2 ng/mL represents a high risk of severe sepsis and/or septic shock.    As a Marker for Lower Respiratory Tract Infections that require antibiotic therapy:    PCT on Admission    Antibiotic Therapy       6-12 Hrs later    >0.5                Strongly Recommended  >0.25 - <0.5        Recommended   0.1 - 0.25          Discouraged              Remeasure/reassess PCT  <0.1                Strongly Discouraged     Remeasure/reassess PCT    As 28 day mortality risk marker: \"Change in Procalcitonin Result\" (>80% or <=80%) if Day 0 (or Day 1) and Day 4 values are available. Refer to http://www.Fast[employer]-pct-calculator.com    Change in PCT <=80%  A decrease of PCT levels below or equal to 80% defines a positive change in PCT test result representing a higher risk for 28-day all-cause mortality of patients diagnosed with severe sepsis for septic shock.    Change in PCT >80%  A decrease of PCT levels of more than 80% defines a negative change in PCT result representing a lower risk for 28-day all-cause mortality of patients diagnosed with severe sepsis or septic shock.       [place] Draw [[id number]] Collected: 09/30/22 1412    Specimen: Blood Updated: 09/30/22 1518    Narrative:      The following " orders were created for panel order [place] Draw.  Procedure                               Abnormality         Status                     ---------                               -----------         ------                     Green Top (Gel)[[id number]]                                  Final result               Lavender Top[[id number]]                                     Final result               Gold Top - SST[[id number]]                                                              Light Blue Top[[id number]]                                   Final result                 Please view results for these tests on the individual orders.    Lavender Top [[id number]] Collected: 09/30/22 1412    Specimen: Blood Updated: 09/30/22 1518     Extra Tube hold for add-on     Comment: Auto resulted       Green Top (Gel) [[id number]] Collected: 09/30/22 1412    Specimen: Blood Updated: 09/30/22 1518     Extra Tube Hold for add-ons.     Comment: Auto resulted.       Light Blue Top [[id number]] Collected: 09/30/22 1412    Specimen: Blood Updated: 09/30/22 1518     Extra Tube Hold for add-ons.     Comment: Auto resulted       BNP [[id number]]  (Normal) Collected: 09/30/22 1412    Specimen: Blood Updated: 09/30/22 1501     proBNP 368.3 pg/mL     Narrative:      Among patients with dyspnea, NT-proBNP is highly sensitive for the detection of acute congestive heart failure. In addition NT-proBNP of <300 pg/ml effectively rules out acute congestive heart failure with 99% negative predictive value.    Results may be falsely decreased if patient taking Biotin.      Troponin [[id number]]  (Normal) Collected: 09/30/22 1412    Specimen: Blood Updated: 09/30/22 1451     Troponin T <0.010 ng/mL     Narrative:      Troponin T Reference Range:  <= 0.03 ng/mL-   Negative for AMI  >0.03 ng/mL-     Abnormal for myocardial necrosis.  Clinicians would have to utilize clinical acumen, EKG, Troponin and serial changes to determine if it is an Acute Myocardial  Infarction or myocardial injury due to an underlying chronic condition.       Results may be falsely decreased if patient taking Biotin.      Comprehensive Metabolic Panel [[id number]]  (Abnormal) Collected: 09/30/22 1412    Specimen: Blood Updated: 09/30/22 1449     Glucose 172 mg/dL      BUN 15 mg/dL      Creatinine 0.77 mg/dL      Sodium 140 mmol/L      Potassium 4.3 mmol/L      Chloride 102 mmol/L      CO2 31.9 mmol/L      Calcium 9.4 mg/dL      Total Protein 6.8 g/dL      Albumin 4.00 g/dL      ALT (SGPT) 18 U/L      AST (SGOT) 22 U/L      Alkaline Phosphatase 86 U/L      Total Bilirubin 0.2 mg/dL      Globulin 2.8 gm/dL      A/G Ratio 1.4 g/dL      BUN/Creatinine Ratio 19.5     Anion Gap 6.1 mmol/L      eGFR 82.6 mL/min/1.73      Comment: National Kidney Foundation and American Society of Nephrology (ASN) Task Force recommended calculation based on the Chronic Kidney Disease Epidemiology Collaboration (CKD-EPI) equation refit without adjustment for race.       Narrative:      GFR Normal >60  Chronic Kidney Disease <60  Kidney Failure <15      CBC & Differential [[id number]]  (Abnormal) Collected: 09/30/22 1412    Specimen: Blood Updated: 09/30/22 1419    Narrative:      The following orders were created for panel order CBC & Differential.  Procedure                               Abnormality         Status                     ---------                               -----------         ------                     CBC Auto Differential[[id number]]        Abnormal            Final result                 Please view results for these tests on the individual orders.    CBC Auto Differential [[id number]]  (Abnormal) Collected: 09/30/22 1412    Specimen: Blood Updated: 09/30/22 1419     WBC 10.80 10*3/mm3      RBC 4.83 10*6/mm3      Hemoglobin 13.6 g/dL      Hematocrit 43.7 %      MCV 90.5 fL      MCH 28.2 pg      MCHC 31.1 g/dL      RDW 14.2 %      RDW-SD 46.7 fl      MPV 10.9 fL      Platelets 243 10*3/mm3       Neutrophil % 57.0 %      Lymphocyte % 31.4 %      Monocyte % 8.0 %      Eosinophil % 2.4 %      Basophil % 0.6 %      Immature Grans % 0.6 %      Neutrophils, Absolute 6.16 10*3/mm3      Lymphocytes, Absolute 3.39 10*3/mm3      Monocytes, Absolute 0.86 10*3/mm3      Eosinophils, Absolute 0.26 10*3/mm3      Basophils, Absolute 0.07 10*3/mm3      Immature Grans, Absolute 0.06 10*3/mm3      nRBC 0.0 /100 WBC         ECG 12 Lead   Final Result   HEART RATE= 82  bpm   RR Interval= 732  ms   IN Interval= 151  ms   P Horizontal Axis= 25  deg   P Front Axis= 34  deg   QRSD Interval= 105  ms   QT Interval= 407  ms   QRS Axis= -24  deg   T Wave Axis= 48  deg   - BORDERLINE ECG -   Sinus rhythm   Incomplete right bundle branch block   No change from prior tracing   Electronically Signed By: Kelsey Lowry ([facility]) 30-Sep-2022 16:45:10   Date and Time of Study: 2022-09-30 14:53:24        Results for orders placed during the hospital encounter of 11/10/21    Adult Transthoracic Echo Complete W/ Cont if Necessary Per Protocol    Interpretation Summary  · Calculated left ventricular EF = 64.8% Estimated left ventricular EF was in agreement with the calculated left ventricular EF. Left ventricular systolic function is normal.  · Left ventricular diastolic function is consistent with (grade I) impaired relaxation and age.  · The aortic valve exhibits sclerosis.  · No significant regurgitation or stenosis    XR Chest 2 View    Result Date: 9/30/2022  1. Persistent left basilar opacity concerning for atelectasis and/or infiltrate. Small left pleural effusion is not excluded. 2. Right basilar atelectasis. 3. Stable cardiomegaly. Signer Name: Fredis Bullock MD  Signed: 9/30/2022 3:39 PM  Workstation Name: [id number]  [facility]    XR Chest 2 View    Result Date: 9/29/2022   1. Advanced dextroscoliosis and secondary degenerative change. 2. Cardiomegaly without distinct volume overload. 3. Probable right base  atelectasis. Persistent underpenetration of the left base with worsening left basilar opacities that could reflect atelectasis or developing pneumonia.  This report was finalized on 9/29/2022 2:28 PM by Dr. Darrel Henley MD.      XR Toe 2+ View Right    Result Date: 9/29/2022   1. Negative study.  This report was finalized on 9/29/2022 2:46 PM by Dr. Darrel Henley MD.      CT Angiogram Chest    Result Date: 9/30/2022  Negative for pulmonary embolus. Left-sided volume loss with multifocal bibasilar atelectasis left greater than right. Some mosaic attenuation within the lungs likely related to underlying cardiovascular disease. Stable cardiomegaly. Signer Name: NAILA Thayer MD  Signed: 9/30/2022 7:28 PM  Workstation Name: [id number]  [facility] [place]        ASSESSMENT/PLAN:  Please not portions of this assessment/plan may have been copied and pasted, but I have personally seen this patient and reviewed each line of this assessment and plan for accuracy and made updates to reflect my necessary changes on 10/1/2022    Acute respiratory failure/MARCIN/PNA vs atelectasis  Admitted to ICU  Reportedly overnight patient was BiPAP dependent- on NC this AM  CXR demonstrated atelectasis vs PNA. CTA ruled out PE and suggested the patient may have some slight pulmonary edema  Giving one-time dose of IV Lasix this a.m.  Was started on Zithromax and Rocephin for possible on admission.  Will stop and monitor off of antibiotics.  Patient adherent [facility]  Pulmonology consulted, appreciate recommendations    HFpEF, suspected mild exacerbation  Last echocardiogram almost a year ago demonstrated diastolic dysfunction, patient has known cardiomegaly from imaging  Possible pulmonary edema noted on CT  One-time dose of IV Lasix as noted above  If patient is still here on Monday we will repeat echocardiogram.  However, patient with negative troponin and no signs of ACS so this  "could also be done as an outpatient  Monitor I&O's, daily weights, low-sodium diet, fluid restriction    Seizure disorder/Intellectual disability/Dementia  Resident of [facility]  Continue Keppra    Anxiety  Continue Buspar, Zoloft, Zyprexa    Hypothyroidism  Continue Synthroid        PLAN FOR DISPOSITION: Pending clinical course. Likely back to [facility] when ready.    Lizbeth Aviles DO  Hospitalist, [facility]  10/01/22  09:36 EDT    As of April 2021, as required by the Federal 21st Century Cures Act, medical records (including provider notes and laboratory/imaging results) are to be made available to patients and/or their designees as soon as the documents are signed/resulted. While the intention is to ensure transparency and to engage patients in their healthcare, this immediate access may create unintended consequences because this document uses language intended for communication between medical providers for interpretation with the entirety of the patient's clinical picture in mind. It is recommended that patients and/or their designees review all available information with their primary or specialist providers for explanation and to avoid misinterpretation of this information.    \"Dictated utilizing Dragon dictation\"  "

## 2022-10-01 NOTE — SIGNIFICANT NOTE
"   10/01/22 1201   OTHER   Discipline physical therapist   Rehab Time/Intention   Session Not Performed other (see comments)  (Discussed with care giver at bed side about mobility. She generally uses a stand up walker for ambulation. She states they uses a FWW this morning to transfer patient and \"it did not go well\". She states she had to lower her to the floor. She states someone is bringing her walker from the facility today. Plan to hold PT evaluation until her walker arrives)     "

## 2022-10-01 NOTE — PLAN OF CARE
Goal Outcome Evaluation:  Plan of Care Reviewed With: patient           Outcome Evaluation: Patient admitted to ICU with hypoxemia on BiPap FiO2 30% overnight- tolerated well. Vital signs stable. Medications verified with [facility] nurse.

## 2022-10-01 NOTE — NURSING NOTE
Pt had a near miss fall this am while staff getting her up to bedside commode. She was assisted to floor by 3 staff members. No injury noted. Pt did fairly well getting up to chair with walker gair belt and assist x 2, [facility] staff reported that pt is able to ambulate with walker at facility. She was noted to be very weak but was able to transfer to chair. However after a couple hours up in chairs, she  became weaker & she was too weak to get off of the bedside commode even with max assist. Her legs slid outward and staff has forced to assist her to floor on her bottom. She was then able to stand and get back to bed with max assist.

## 2022-10-01 NOTE — CASE MANAGEMENT/SOCIAL WORK
Continued Stay Note  JAMES Oneal     Patient Name: Caitlyn Camacho  MRN: [id number]  Today's Date: 10/1/2022    Admit Date: 9/30/2022    Plan: Return back to [facility]   Discharge Plan     Row Name 10/01/22 1428       Plan    Plan Return back to [facility]    Patient/Family in Agreement with Plan yes  per bella Sandoval    Plan Comments Per notes patient is a LTC resident at [facility]. CM called the facility and spoke with her nurse Jessica and she states that patient can return back when stable and they will be able to provide transportation. CM also call bella Sandoval and updated her on patient's discharge plan and she is agreeable. Graciela had no other questions or concerns regarding discharge plans. CM will continue to follow for needs.               Discharge Codes    No documentation.                     Inga Juares RN

## 2022-10-01 NOTE — PLAN OF CARE
Goal Outcome Evaluation:  Plan of Care Reviewed With: other (see comments)        Progress: improving  Outcome Evaluation: PATIENT AWAKE SITTING UP IN BED WITH CHRIS IBRAHIM AT BEDSIDE. SHE IS ALERT AND WITHOUT COMPLAINTS.    PT /OT TO EVAL DUE TO INCREASED WEAKNESS. APPETITE IS GREAT.

## 2022-10-01 NOTE — PROGRESS NOTES
Trialed patient back on nasal cannula per Dr Navarro's request. Placed on 2 lpm, patient became very restless and respiratory rate increased into 40's-50's after about 10 min. Placed back on bipap, patient settled right down. Dr Navarro notified.

## 2022-10-02 NOTE — PLAN OF CARE
Goal Outcome Evaluation:  Plan of Care Reviewed With: patient, caregiver           Outcome Evaluation: PT evaluation complete. Patient is a long term patient at [facility]. She has a rollator that she uses daily and is in her room. Patient supine in bed upon entry to room. Patient agrees to PT treatment. Patient performs supine to sitting EOB with Mod A. She performs sit to stand transfers with Mod A. Patient ambulated x10 feet with her rollator and Mod A. Patient requires constant cueing to keep rollator close to her. Plan to follow patient during her hospital stay. Plan for patient to return to [facility] when ready.

## 2022-10-02 NOTE — PLAN OF CARE
Goal Outcome Evaluation:  Plan of Care Reviewed With: patient           Outcome Evaluation: Patient rested well overnight with bipap. No acute events. VSS.

## 2022-10-02 NOTE — PROGRESS NOTES
"SERVICE: [facility] HOSPITALIST    CONSULTANTS: Pulmonology    CHIEF COMPLAINT: Acute respiratory failure, MARCIN    SUBJECTIVE: Patient seems significantly improved today.  Sitting up in a chair upon interview.  Frankie thornton at bedside.  Patient reports that her breathing is better.  She denies any fever, chills, chest pain, cough, nausea, vomiting.    OBJECTIVE:    Physical exam is largely unchanged from previous exam, except where documented below, examination is accurate as of 10/2/2022    Physical Exam:  General: Patient is a 72-year-old female, awake and alert, NAD on 2L O2 via NC  HENT: NC in place. Poor dentition. Head is atraumatic, normocephalic. Hearing is grossly intact. Nose is without obvious congestion and appears patent. Neck is supple and trachea is midline.   Eyes: Vision is grossly intact. Pupils appear equal and round.   Cardiovascular: Heart has regular rate and rhythm with no murmurs, rubs or gallops noted.    Respiratory: Exam limited by habitus and patient participation. Breath sounds seem slightly diminished. On supplemental O2 as noted above. No wheezing, rales, or rhonchi noted  Abdominal/GI: Soft, nontender, bowel sounds present. No rebound or guarding present.   Extremities: Scant pedal edema  Musculoskeletal: Spontaneous movement of bilateral upper and lower extremities against gravity noted. No signs of injury or deformity noted.   Skin: Warm and dry.   Psych: Mood and affect are appropriate. Cooperative with exam.   Neuro: Answers some questions appropriately. At times does not answer or answers inappropriately questions. Known intellectual disability. No focal motor deficits appreciated    /73 (BP Location: Right arm, Patient Position: Sitting)   Pulse 76   Temp 98 °F (36.7 °C) (Oral)   Resp 20   Ht 160 cm (63\")   Wt 74.7 kg (164 lb 10.9 oz)   SpO2 97%   BMI 29.17 kg/m²     MEDS/LABS REVIEWED AND ORDERED    atorvastatin, 20 mg, Oral, " Nightly  budesonide, 0.5 mg, Nebulization, BID - RT  busPIRone, 10 mg, Oral, Q12H  carvedilol, 6.25 mg, Oral, BID With Meals  donepezil, 10 mg, Oral, Nightly  enoxaparin, 40 mg, Subcutaneous, Nightly  fluticasone, 2 spray, Nasal, Daily  ipratropium-albuterol, 3 mL, Nebulization, 4x Daily - RT  levETIRAcetam, 500 mg, Oral, Q12H  levothyroxine, 50 mcg, Oral, Q AM  montelukast, 10 mg, Oral, Nightly  OLANZapine, 15 mg, Oral, Nightly  pantoprazole, 40 mg, Oral, Q AM  sertraline, 100 mg, Oral, Daily  sodium chloride, 10 mL, Intravenous, Q12H          LAB/DIAGNOSTICS:    Lab Results (last 24 hours)     Procedure Component Value Units Date/Time    Basic Metabolic Panel [[id number]]  (Abnormal) Collected: 10/02/22 0517    Specimen: Blood Updated: 10/02/22 0608     Glucose 96 mg/dL      BUN 12 mg/dL      Creatinine 0.70 mg/dL      Sodium 142 mmol/L      Potassium 4.4 mmol/L      Chloride 104 mmol/L      CO2 29.3 mmol/L      Calcium 8.5 mg/dL      BUN/Creatinine Ratio 17.1     Anion Gap 8.7 mmol/L      eGFR 92.6 mL/min/1.73      Comment: National Kidney Foundation and American Society of Nephrology (ASN) Task Force recommended calculation based on the Chronic Kidney Disease Epidemiology Collaboration (CKD-EPI) equation refit without adjustment for race.       Narrative:      GFR Normal >60  Chronic Kidney Disease <60  Kidney Failure <15      Procalcitonin [[id number]]  (Normal) Collected: 10/02/22 0517    Specimen: Blood Updated: 10/02/22 0605     Procalcitonin 0.06 ng/mL     Narrative:      As a Marker for Sepsis (Non-Neonates):    1. <0.5 ng/mL represents a low risk of severe sepsis and/or septic shock.  2. >2 ng/mL represents a high risk of severe sepsis and/or septic shock.    As a Marker for Lower Respiratory Tract Infections that require antibiotic therapy:    PCT on Admission    Antibiotic Therapy       6-12 Hrs later    >0.5                Strongly Recommended  >0.25 - <0.5        Recommended   0.1 - 0.25           "Discouraged              Remeasure/reassess PCT  <0.1                Strongly Discouraged     Remeasure/reassess PCT    As 28 day mortality risk marker: \"Change in Procalcitonin Result\" (>80% or <=80%) if Day 0 (or Day 1) and Day 4 values are available. Refer to http://[email or web address]-pct-calculator.com    Change in PCT <=80%  A decrease of PCT levels below or equal to 80% defines a positive change in PCT test result representing a higher risk for 28-day all-cause mortality of patients diagnosed with severe sepsis for septic shock.    Change in PCT >80%  A decrease of PCT levels of more than 80% defines a negative change in PCT result representing a lower risk for 28-day all-cause mortality of patients diagnosed with severe sepsis or septic shock.       CBC & Differential [[id number]]  (Abnormal) Collected: 10/02/22 0517    Specimen: Blood Updated: 10/02/22 0531    Narrative:      The following orders were created for panel order CBC & Differential.  Procedure                               Abnormality         Status                     ---------                               -----------         ------                     CBC Auto Differential[[id number]]        Abnormal            Final result                 Please view results for these tests on the individual orders.    CBC Auto Differential [[id number]]  (Abnormal) Collected: 10/02/22 0517    Specimen: Blood Updated: 10/02/22 0531     WBC 9.05 10*3/mm3      RBC 4.41 10*6/mm3      Hemoglobin 12.5 g/dL      Hematocrit 39.9 %      MCV 90.5 fL      MCH 28.3 pg      MCHC 31.3 g/dL      RDW 14.2 %      RDW-SD 47.2 fl      MPV 10.7 fL      Platelets 212 10*3/mm3      Neutrophil % 43.5 %      Lymphocyte % 43.1 %      Monocyte % 9.0 %      Eosinophil % 3.2 %      Basophil % 0.4 %      Immature Grans % 0.8 %      Neutrophils, Absolute 3.94 10*3/mm3      Lymphocytes, Absolute 3.90 10*3/mm3      Monocytes, Absolute 0.81 10*3/mm3      Eosinophils, Absolute 0.29 10*3/mm3      " Basophils, Absolute 0.04 10*3/mm3      Immature Grans, Absolute 0.07 10*3/mm3      nRBC 0.0 /100 WBC     Blood Culture - Blood, Arm, Left [[id number]]  (Normal) Collected: 09/30/22 1621    Specimen: Blood from Arm, Left Updated: 10/01/22 1647     Blood Culture No growth at 24 hours    Blood Culture - Blood, Arm, Right [[id number]]  (Normal) Collected: 09/30/22 1621    Specimen: Blood from Arm, Right Updated: 10/01/22 1647     Blood Culture No growth at 24 hours        ECG 12 Lead   Final Result   HEART RATE= 82  bpm   RR Interval= 732  ms   DE Interval= 151  ms   P Horizontal Axis= 25  deg   P Front Axis= 34  deg   QRSD Interval= 105  ms   QT Interval= 407  ms   QRS Axis= -24  deg   T Wave Axis= 48  deg   - BORDERLINE ECG -   Sinus rhythm   Incomplete right bundle branch block   No change from prior tracing   Electronically Signed By: Kelsey Lowry ([facility]) 30-Sep-2022 16:45:10   Date and Time of Study: 2022-09-30 14:53:24        Results for orders placed during the hospital encounter of 11/10/21    Adult Transthoracic Echo Complete W/ Cont if Necessary Per Protocol    Interpretation Summary  · Calculated left ventricular EF = 64.8% Estimated left ventricular EF was in agreement with the calculated left ventricular EF. Left ventricular systolic function is normal.  · Left ventricular diastolic function is consistent with (grade I) impaired relaxation and age.  · The aortic valve exhibits sclerosis.  · No significant regurgitation or stenosis    XR Chest 2 View    Result Date: 9/30/2022  1. Persistent left basilar opacity concerning for atelectasis and/or infiltrate. Small left pleural effusion is not excluded. 2. Right basilar atelectasis. 3. Stable cardiomegaly. Signer Name: Fredis Bullock MD  Signed: 9/30/2022 3:39 PM  Workstation Name: [id number]  [facility]    CT Angiogram Chest    Result Date: 9/30/2022  Negative for pulmonary embolus. Left-sided volume loss with multifocal bibasilar  atelectasis left greater than right. Some mosaic attenuation within the lungs likely related to underlying cardiovascular disease. Stable cardiomegaly. Signer Name: NAILA Thayer MD  Signed: 9/30/2022 7:28 PM  Workstation Name: [id number]  [facility] [place]        ASSESSMENT/PLAN:  Please note portions of this assessment/plan may have been copied and pasted, but I have personally seen this patient and reviewed each line of this assessment and plan for accuracy and made updates to reflect my necessary changes on 10/2/2022     10/02/22: Patient significantly improved.  Yesterday there was an incident that the patient's nurse wrote up when she was eating.  A CNA was assisting in feeding her and the patient bit off a plastic fork percy and chewed it.  She ended up swallowing it.  Discussed with GI physician, stated that this should not be an issue.  Patient also seems to have decreased functional status, see nursing note from yesterday.  PT seeing patient.  Have added OT as well to assist with evaluating whether she is safe to discharge back to [facility].  Feel that patient also would benefit from more IV Lasix.  Moving out of the ICU.  If she is safe in terms of functional status can likely be discharged tomorrow if her echocardiogram has not changed significantly.  She may need a slight increase in her home dose of Lasix upon discharge.    Acute respiratory failure/MARCIN/PNA vs atelectasis  Moving out of ICU  Patient has trilogy machine at home, is on BiPAP at this facility, pulmonology managing  CXR demonstrated atelectasis vs PNA. CTA ruled out PE and suggested the patient may have some slight pulmonary edema.  Have been giving IV Lasix, patient improved.  Will give one-time dose today  Monitoring off of antibiotics.  Patient is high risk for pneumonia but very low suspicion for pneumonia at this time     HFpEF, suspected mild exacerbation  Last echocardiogram almost a year ago demonstrated  "diastolic dysfunction, patient has known cardiomegaly from imaging  Possible pulmonary edema noted on CT  One-time dose of IV Lasix today as noted above  Repeating echocardiogram tomorrow  Monitor I&O's, daily weights, low-sodium diet, fluid restriction     Seizure disorder/Intellectual disability/Dementia  Resident of [facility]  Continue Keppra     Anxiety  Continue Buspar, Zoloft, Zyprexa     Hypothyroidism  Continue Synthroid        PLAN FOR DISPOSITION: Pending clinical course. Likely back to [facility] when ready.  PT and OT assisting in evaluation.    Lizbeth Aviles DO  Hospitalist, [facility]  10/02/22  12:29 EDT    As of April 2021, as required by the Federal 21st Century Cures Act, medical records (including provider notes and laboratory/imaging results) are to be made available to patients and/or their designees as soon as the documents are signed/resulted. While the intention is to ensure transparency and to engage patients in their healthcare, this immediate access may create unintended consequences because this document uses language intended for communication between medical providers for interpretation with the entirety of the patient's clinical picture in mind. It is recommended that patients and/or their designees review all available information with their primary or specialist providers for explanation and to avoid misinterpretation of this information.    \"Dictated utilizing Dragon dictation\"  "

## 2022-10-02 NOTE — PLAN OF CARE
Goal Outcome Evaluation:  Plan of Care Reviewed With: patient        Progress: improving  Outcome Evaluation: pt is doing well has been up to chair with PT today and bedside commode as tolerated. has purewic while in bed. she is much improved, bery pleasant and has [place] sitter at bedside. Was downgraded to med surg overflow but will remain in ICU for now.

## 2022-10-02 NOTE — THERAPY EVALUATION
Acute Care - Physical Therapy Initial Evaluation  JAMES Oneal     Patient Name: Caitlyn Camacho  : 1950  MRN: [id number]  Today's Date: 10/2/2022      Visit Dx:     ICD-10-CM ICD-9-CM   1. Acute on chronic respiratory failure, unspecified whether with hypoxia or hypercapnia (HCC)  J96.20 518.84   2. Atelectasis  J98.11 518.0     Patient Active Problem List   Diagnosis   • Adverse drug effect   • Partial symptomatic epilepsy with complex partial seizures, not intractable, without status epilepticus (HCC)   • Cardiac arrhythmia   • Arthritis   • Depression   • Stricture of duodenum   • Duodenal ulcer   • Epilepsy (HCC)   • Histoplasmosis with retinitis   • Hyperlipidemia   • Hypothyroidism   • Hypoxia   • Nutritional disorder   • Moderate intellectual disability   • Osteopenia   • History of intestinal bypass   • Vitamin D deficiency   • Hypervolemia   • Recurrent ventral incisional hernia   • Adverse drug effect, subsequent encounter   • Early onset Alzheimer's dementia without behavioral disturbance (HCC)   • Lymphocytosis   • Essential hypertension   • Acute on chronic respiratory failure (HCC)   • Acute on chronic respiratory failure, unspecified whether with hypoxia or hypercapnia (HCC)     Past Medical History:   Diagnosis Date   • Anemia due to blood loss    • Anxiety    • Arthritis    • Duodenal stricture    • Duodenal ulcer    • Dysphagia    • Gallstones    • H/O convulsions    • Hypercholesteremia    • Hypothyroidism    • Major depression    • Moderate intellectual disability    • OCD (obsessive compulsive disorder)    • Ocular histoplasmosis     w/macular scars   • Osteopenia    • Osteopenia    • Ptosis    • Pure hyperglyceridemia    • S/P bypass gastrojejunostomy    • Seasonal allergies    • Seizure disorder (HCC)    • Unspecified dementia without behavioral disturbance (HCC)    • Ventral hernia     sched repair   • Vitamin D deficiency      Past Surgical History:   Procedure Laterality Date   •  CATARACT EXTRACTION Bilateral 2016   • CHOLECYSTECTOMY OPEN  03/2017   • COLONOSCOPY  05/2009   • ENDOSCOPY  03/2017   • GASTROJEJUNOSTOMY  08/2016   • GASTROSTOMY      TEJINDER   • HYSTERECTOMY     • VENTRAL/INCISIONAL HERNIA REPAIR N/A 8/15/2017    Procedure: Lysis of adhesion and repair of recurrent incisional hernia with onlay mesh placement ;  Surgeon: Donna Ivy MD;  Location: [facility];  Service:      PT Assessment (last 12 hours)     PT Evaluation and Treatment    No documentation.                 Physical Therapy Education                 Title: PT OT SLP Therapies (In Progress)     Topic: Physical Therapy (In Progress)     Point: Mobility training (In Progress)     Learning Progress Summary           Patient Acceptance, E, NR,NL by AS at 10/2/2022 0956   Caregiver Acceptance, E, NR,NL by AS at 10/2/2022 0956                               User Key     Initials Effective Dates Name Provider Type Discipline    AS 06/16/21 -  Jimmy Tierney, PT Physical Therapist PT              PT Recommendation and Plan  Anticipated Discharge Disposition (PT): extended care facility  Planned Therapy Interventions (PT): gait training  Therapy Frequency (PT): daily  Plan of Care Reviewed With: patient, caregiver  Outcome Evaluation: PT evaluation complete. Patient is a long term patient at [facility]. She has a rollator that she uses daily and is in her room. Patient supine in bed upon entry to room. Patient agrees to PT treatment. Patient performs supine to sitting EOB with Mod A. She performs sit to stand transfers with Mod A. Patient ambulated x10 feet with her rollator and Mod A. Patient requires constant cueing to keep rollator close to her. Plan to follow patient during her hospital stay. Plan for patient to return to [facility] when ready.       Time Calculation:    PT Charges     Row Name 10/02/22 0957             Time Calculation    Start Time 0856  -AS      Stop Time 0923  -AS      Time  Calculation (min) 27 min  -AS            User Key  (r) = Recorded By, (t) = Taken By, (c) = Cosigned By    Initials Name Provider Type    AS Jimmy Tierney, PT Physical Therapist              Therapy Charges for Today     Code Description Service Date Service Provider Modifiers Qty    33529366241 HC PT EVAL LOW COMPLEXITY 2 10/2/2022 Jimmy Tierney, PT GP 1          PT G-Codes  Outcome Measure Options: AM-PAC 6 Clicks Basic Mobility (PT)  AM-PAC 6 Clicks Score (PT): 15    Jimmy Tierney, PT  10/2/2022

## 2022-10-02 NOTE — PROGRESS NOTES
Vlad Olsen MD                          [phone]            Patient ID:    Name:  Caitlyn Camacho    MRN:  [id number]    1950   72 y.o.  female            Patient Care Team:  Khang Merino MD as PCP - General (Family Medicine)  Tolu Neville MD (Psychiatry)  Vinnie Leavitt MD as Consulting Physician (Neurology)  Zheng Weathers MD as Consulting Physician (Gastroenterology)  Khang Merino MD as Referring Physician (Family Medicine)  Khang Lynch MD as Consulting Physician (Hematology and Oncology)    CC/ Reason for visit: Acute metabolic encephalopathy, acute on chronic respiratory failure    Subjective: Pt seen and examined this AM. No acute overnight events noted. Doing better.  Awake and interactive and having her lunch with the help of her aide.  Seems to be at her baseline mental status per them.  Used BiPAP overnight    ROS: Unable to obtain more than about due to baseline mental status    Objective     Vital Signs past 24hrs    BP range: BP: ()/() 127/102  Pulse range: Heart Rate:  [54-84] 84  Resp rate range: Resp:  [15-22] 20  Temp range: Temp (24hrs), Av.9 °F (36.6 °C), Min:97.3 °F (36.3 °C), Max:98.3 °F (36.8 °C)      Ventilator/Non-Invasive Ventilation Settings (From admission, onward)             Start     Ordered    10/01/22 0143  BIPAP  Until Discontinued        Question Answer Comment   Type: AVAPS/PC/PS    Backup Rate 12    Target VT (mL) 500    EPAP/PEEP (cm H2O) 4    Min Pressure (cm H2O) 8    Max Pressure (cm H2O) 20        10/01/22 0143    22 1725  BIPAP  Until Discontinued,   Status:  Canceled        Question:  Type:  Answer:  BIPAP    22 1724                Vent Settings                                         Device (Oxygen Therapy): nasal cannula       74.7 kg (164 lb 10.9 oz); Body mass index is 29.17 kg/m².      Intake/Output Summary (Last 24 hours) at 10/2/2022 9102  Last data filed at 10/2/2022  1503  Gross per 24 hour   Intake 600 ml   Output 350 ml   Net 250 ml       PHYSICAL EXAM   Constitutional: Middle-aged white female pt in bed, no acute respiratory distress, no accessory muscle use  Head: - NCAT  Eyes: No pallor, anicteric conjunctivae, EOMI.  ENMT: Mallampati 4, no oral thrush. Moist MM.   Severe [place]+  NECK: Trachea midline, No thyromegaly, no palpable cervical lymphadenopathy  Heart: RRR, no murmur.  Trace pedal edema   Lungs: JANETH +, scoliosis, decreased breath sounds at the left base no wheezes/ crackles heard    Abdomen: Soft. No tenderness, guarding or rigidity. No palpable masses  Extremities: Extremities warm and well perfused. No cyanosis/ clubbing  Neuro: Conscious,  interactive, no gross focal neuro deficits -Baseline cognitive dysfunction  Psych: Mood and affect - appropriate for her    PPE recommended per [facility] infectious disease Isolation protocol for the current clinical scenario (as mentioned below) was followed.     Scheduled meds:  atorvastatin, 20 mg, Oral, Nightly  budesonide, 0.5 mg, Nebulization, BID - RT  busPIRone, 10 mg, Oral, Q12H  carvedilol, 6.25 mg, Oral, BID With Meals  donepezil, 10 mg, Oral, Nightly  enoxaparin, 40 mg, Subcutaneous, Nightly  fluticasone, 2 spray, Nasal, Daily  ipratropium-albuterol, 3 mL, Nebulization, 4x Daily - RT  levETIRAcetam, 500 mg, Oral, Q12H  levothyroxine, 50 mcg, Oral, Q AM  montelukast, 10 mg, Oral, Nightly  OLANZapine, 15 mg, Oral, Nightly  pantoprazole, 40 mg, Oral, Q AM  sertraline, 100 mg, Oral, Daily  sodium chloride, 10 mL, Intravenous, Q12H        IV meds:                           Data Review:      Results from last 7 days   Lab Units 10/02/22  0517 09/30/22  1412   SODIUM mmol/L 142 140   POTASSIUM mmol/L 4.4 4.3   CHLORIDE mmol/L 104 102   CO2 mmol/L 29.3* 31.9*   BUN mg/dL 12 15   CREATININE mg/dL 0.70 0.77   CALCIUM mg/dL 8.5* 9.4   BILIRUBIN mg/dL  --  0.2   ALK PHOS U/L  --  86   ALT (SGPT) U/L  --  18   AST (SGOT)  U/L  --  22   GLUCOSE mg/dL 96 172*   WBC 10*3/mm3 9.05 10.80   HEMOGLOBIN g/dL 12.5 13.6   PLATELETS 10*3/mm3 212 243   PROBNP pg/mL  --  368.3   PROCALCITONIN ng/mL 0.06 0.08       Lab Results   Component Value Date    CALCIUM 8.5 (L) 10/02/2022    PHOS 4.3 08/02/2022       Results from last 7 days   Lab Units 09/30/22  1621   BLOODCX  No growth at 2 days  No growth at 2 days       Results from last 7 days   Lab Units 09/30/22  1745   PH, ARTERIAL pH units 7.427   PO2 ART mm Hg 122.0*   PCO2, ARTERIAL mm Hg 49.9*   HCO3 ART mmol/L 32.8*        I have personally reviewed the results from the time of this admission to 10/2/2022 17:39 EDT and agree with these findings:  [x]  Laboratory  [x]  Microbiology  [x]  Radiology  []  EKG/Telemetry   [x]  Cardiology/Vascular   []  Pathology  []  Old records    Assessment    Acute on hypoxic respiratory failure (nocturnal oxygen)  Acute congestive heart failure  Left lower lobe atelectasis; improved  Acute metabolic encephalopathy  Chronic hypercapnic respiratory failure on trilogy ventilator  Severe restrictive lung disease from kyphoscoliosis  MARCIN/OHS  Seizure disorder  Polypharmacy  Dementia  DNR/DNI     RECOMMENDATIONS:  Patient stable from a respiratory standpoint and seems to be back to baseline.  Continue with V 60 in place of her Trilogy ventilator with naps and night  Altered mental status is improved and seems to be close to baseline   Agree with diuresis to keep the patient as negative as possible.  Continue bronchodilators to help with mucus clearance  Would avoid sedative medications and limit polypharmacy  Guarded prognosis  DNR/DNI  DVT prophylaxis-Lovenox    Okay to discharge back to nursing home from my standpoint    Vlad Olsen MD  10/2/2022

## 2022-10-03 NOTE — PLAN OF CARE
Goal Outcome Evaluation:  Plan of Care Reviewed With: patient           Outcome Evaluation: No overnight events. Patient tolerated bipap overnight. labs pending.

## 2022-10-03 NOTE — DISCHARGE SUMMARY
"Caitlyn Hernandezl  1950  [id number]    Hospitalists Discharge Summary    Date of Admission: 9/30/2022  Date of Discharge:  10/3/2022    History of Present Illness from [facility] [place] on admit:   \"Ms. Narvaez is a 72-year-old  obese female with multiple medical problems disability.  Who is a long-term patient of [facility].  Who has severe obstructive sleep apnea and is on CPAP at night.  Has become more hypoxic over the past 1 week.  No fever or chills no cough no sputum.  Staff checked her vital signs her oxygen saturation was in the low 80s to 70s today and brought to the emergency room.  Upon presentation to ER here at [facility] her O2 sats were 94% on 2 L.  But she was quite tachypnea.  And her O2 sats were dropping.  BiPAP was started and she improved dramatically.  Chest x-ray revealed atelectasis.  CT scan of chest also revealed atelectasis.  She was attempted a weaning trial off her BiPAP back on nasal cannula.  Unfortunate she continued to drop after there is BiPAP was discontinued.  Respiratory therapy reapplied BiPAP and her ox saturations rebounded nicely.  Has a history of seizure disorder.  No recent seizures\"    Primary Discharge diagnoses:  Acute on chronic hypoxic, hypercarbic respiratory failure on trilogy with known h/o MARCIN  Acute HFpEF  Acute metabolic encephalopathy on chronic dementia    Secondary Discharge Diagnoses:   Left lower lobe atelectasis  Chronic hypercapnic respiratory failure on [facility]  Seizure disorder  MARCIN/OHS  Anxiety  Hypothyroidism  Polypharmacy  Recent pansensitive E. coli UTI  DNR/DNI    Hospital Course Summary:   Patient was followed in consultation by pulmonary with V 60 utilized while here.  She received single dose of IV Lasix with improvement for her HFpEF.  CTA chest ruled out PE.  Labs remained stable, procalcitonin remains negative.  She was given 4 times daily duo nebs.  Resuming daily Lasix.  She reportedly ate a plastic fork accidentally and was " therefore kept overnight to ensure stability.  Recommend continue daily weight, strict I and O.  She was changed to cardiac diet with low sodium and 1500 mL fluid restriction daily.  Echo showed normal systolic and diastolic function with EF 51 to 55%  PT/OT to continue at [facility]  Follow-up PCP 1 week  Follow-up cardiology 2 weeks    PCP  Patient Care Team:  Khang Merino MD as PCP - General (Family Medicine)  Tolu Neville MD (Psychiatry)  Vinnie Leavitt MD as Consulting Physician (Neurology)  Zheng Weathers MD as Consulting Physician (Gastroenterology)  Khang Merino MD as Referring Physician (Family Medicine)  Khang Lynch MD as Consulting Physician (Hematology and Oncology)    Consults:   Consults     Date and Time Order Name Status Description    10/1/2022 12:34 AM Inpatient Pulmonology Consult Completed         Operations and Procedures Performed:     Adult Transthoracic Echo Complete w/ Color, Spectral and Contrast if Necessary Per Protocol    Result Date: 10/3/2022  Narrative: · Peak velocity of the flow distal to the aortic valve is 107 cm/s. Aortic valve maximum pressure gradient is 4.6 mmHg. Aortic valve mean pressure gradient is 3 mmHg. Aortic valve dimensionless index is 0.6 . · Left ventricular diastolic function was normal. · Left ventricular ejection fraction appears to be 51 - 55%. Left ventricular systolic function is normal.      XR Chest 2 View    Result Date: 9/30/2022  Narrative: CR Chest 2 Vws INDICATION:  Shortness of breath for 5 days. COMPARISON:  Chest 9/29/2022 FINDINGS: PA and lateral views of the chest.  Severe thoracolumbar scoliosis again noted. There is persistent left basilar opacity concerning for atelectasis and/or infiltrate. Left pleural effusion is not excluded. Mild right basilar atelectasis. No pneumothorax. Heart size appears enlarged, but stable.     Impression: 1. Persistent left basilar opacity concerning for atelectasis and/or infiltrate. Small left pleural  effusion is not excluded. 2. Right basilar atelectasis. 3. Stable cardiomegaly. Signer Name: Fredis Bullock MD  Signed: 9/30/2022 3:39 PM  Workstation Name: [id number]  [facility]    XR Chest 2 View    Result Date: 9/29/2022  Narrative: XR CHEST 2 VW-: 9/29/2022 2:00 PM  INDICATION:  Decreasing O2 sats the past 3 days. Oxygen requirement patient  COMPARISON:  05/10/2022.  FINDINGS: PA and lateral views of the chest.  There is severe thoracolumbar scoliosis and secondary degenerative change. The heart is enlarged. Shallow lung expansion. No pneumothorax. No distinct volume overload. There is some probable atelectasis in the right base. There is underpenetration of the left base related to the scoliosis with some increasing density compared to the prior study that could reflect atelectasis or developing pneumonia      Impression:  1. Advanced dextroscoliosis and secondary degenerative change. 2. Cardiomegaly without distinct volume overload. 3. Probable right base atelectasis. Persistent underpenetration of the left base with worsening left basilar opacities that could reflect atelectasis or developing pneumonia.  This report was finalized on 9/29/2022 2:28 PM by Dr. Darrel Henley MD.      XR Toe 2+ View Right    Result Date: 9/29/2022  Narrative: XR TOE 2+ VW RIGHT-: 9/29/2022 1:59 PM  INDICATION: Bruising of the distal aspect of the right great toe. Symptoms noticed by caregiver today.  COMPARISON: None available.  FINDINGS: 3 views of the right first toe.  No fracture or dislocation.  No bone erosion or destruction.   No foreign body.      Impression:  1. Negative study.  This report was finalized on 9/29/2022 2:46 PM by Dr. Darrel Henley MD.      CT Angiogram Chest    Result Date: 9/30/2022  Narrative: CTA Chest INDICATION: Dyspnea and hypoxia. Nursing home patient. Increasing shortness of air x3 days TECHNIQUE: CT angiogram of the chest with 100 cc Isovue-370 IV contrast. 3-D reconstructions  were obtained and reviewed.   Radiation dose reduction techniques included automated exposure control or exposure modulation based on body size. Count of known CT and cardiac nuc med studies performed in previous 12 months: 2. COMPARISON: CT chest 5/11/2022 FINDINGS: Deformity of the thorax secondary to severe dextroscoliosis. Left-sided volume loss with multifocal atelectasis. No focal consolidation. No sizable effusions or pneumothorax. No evidence of pulmonary embolus. Cardiomegaly. Aorta unremarkable. No central mass or significant adenopathy. No acute osseous abnormality.     Impression: Negative for pulmonary embolus. Left-sided volume loss with multifocal bibasilar atelectasis left greater than right. Some mosaic attenuation within the lungs likely related to underlying cardiovascular disease. Stable cardiomegaly. Signer Name: NAILA Thayer MD  Signed: 9/30/2022 7:28 PM  Workstation Name: [facility]  [facility]    Allergies:  is allergic to codeine.    Reinier  No medications noted per report, reviewed by me    Discharge Medications:     Discharge Medications      New Medications      Instructions Start Date   ipratropium-albuterol 0.5-2.5 mg/3 ml nebulizer  Commonly known as: DUO-NEB   3 mL, Nebulization, 4 Times Daily - RT         Continue These Medications      Instructions Start Date   acetaminophen 500 MG tablet  Commonly known as: TYLENOL   1,000 mg, Oral, Every 6 Hours PRN      atorvastatin 20 MG tablet  Commonly known as: LIPITOR   20 mg, Oral, Nightly      b complex-C-E-zinc tablet   1 tablet, Oral, Daily      Banophen 25 mg capsule  Generic drug: diphenhydrAMINE   25 mg, Oral, Every 6 Hours PRN      brimonidine-timolol 0.2-0.5 % ophthalmic solution  Commonly known as: COMBIGAN   1 drop, Both Eyes, Every 12 Hours      budesonide 0.5 MG/2ML nebulizer solution  Commonly known as: PULMICORT   0.5 mg, Nebulization, 2 Times Daily      busPIRone 10 MG tablet  Commonly known  as: BUSPAR   10 mg, Oral, 2 Times Daily      calcium carbonate 600 MG tablet  Commonly known as: OS-KATY   600 mg, Oral, 3 Times Daily With Meals      carvedilol 6.25 MG tablet  Commonly known as: COREG   6.25 mg, Oral, 2 Times Daily With Meals      cetirizine 10 MG tablet  Commonly known as: zyrTEC   10 mg, Oral, Daily      coenzyme Q10 100 MG capsule   100 mg, Oral, Daily      denosumab 60 MG/ML solution syringe  Commonly known as: PROLIA   60 mg, Subcutaneous, Take As Directed, Every 6 months, due December      dextromethorphan polistirex ER 30 MG/5ML Suspension Extended Release oral suspension  Commonly known as: DELSYM   10 mL, Oral, 2 Times Daily PRN      docusate sodium 100 MG capsule  Commonly known as: COLACE   100 mg, Oral, 2 Times Daily      donepezil 10 MG tablet  Commonly known as: ARICEPT   10 mg, Oral, Nightly      fluticasone 50 MCG/ACT nasal spray  Commonly known as: FLONASE   2 sprays, Nasal, Daily      furosemide 40 MG tablet  Commonly known as: LASIX   40 mg, Oral, Daily      glucosamine-chondroitin 500-400 MG capsule capsule   3 capsules, Oral, Daily      guaiFENesin 600 MG 12 hr tablet  Commonly known as: MUCINEX   1,200 mg, Oral, 2 Times Daily PRN      levETIRAcetam 500 MG tablet  Commonly known as: KEPPRA   500 mg, Oral, 2 Times Daily      levothyroxine 50 MCG tablet  Commonly known as: SYNTHROID, LEVOTHROID   50 mcg, Oral, Daily      magnesium hydroxide 400 MG/5ML suspension  Commonly known as: MILK OF MAGNESIA   30 mL, Oral, As Needed      montelukast 10 MG tablet  Commonly known as: SINGULAIR   10 mg, Oral, Nightly      multivitamin with minerals tablet tablet   1 tablet, Oral, Daily      OLANZapine 15 MG tablet  Commonly known as: zyPREXA   10 mg, Oral, Nightly, 10mg once daily at 4pm      pantoprazole 40 MG EC tablet  Commonly known as: PROTONIX   40 mg, Oral, 2 Times Daily      PROBIOTIC DAILY PO   1 capsule, Oral, Daily      promethazine 25 MG tablet  Commonly known as: PHENERGAN   25  mg, Oral, Every 6 Hours PRN      promethazine 25 MG suppository  Commonly known as: PHENERGAN   25 mg, Rectal, Every 6 Hours PRN      Resource Arginaid pack   Oral, 2 Times Daily      sertraline 100 MG tablet  Commonly known as: ZOLOFT   100 mg, Oral, Daily      VENTOLIN IN   2 puffs, Inhalation, Every 4 Hours PRN      vitamin C 250 MG tablet  Commonly known as: ASCORBIC ACID   1,000 mg, Oral, Daily      VITAMIN D2 PO   50,000 Units, Oral, Every 7 Days             Last Lab Results:   Lab Results (most recent)     Procedure Component Value Units Date/Time    Basic Metabolic Panel [[id number]]  (Abnormal) Collected: 10/03/22 0604    Specimen: Blood from Arm, Right Updated: 10/03/22 0654     Glucose 108 mg/dL      BUN 14 mg/dL      Creatinine 0.61 mg/dL      Sodium 142 mmol/L      Potassium 4.6 mmol/L      Comment: Slight hemolysis detected by analyzer. Results may be affected.        Chloride 106 mmol/L      CO2 27.0 mmol/L      Calcium 8.7 mg/dL      BUN/Creatinine Ratio 23.0     Anion Gap 9.0 mmol/L      eGFR 95.7 mL/min/1.73      Comment: National Kidney Foundation and American Society of Nephrology (ASN) Task Force recommended calculation based on the Chronic Kidney Disease Epidemiology Collaboration (CKD-EPI) equation refit without adjustment for race.       Narrative:      GFR Normal >60  Chronic Kidney Disease <60  Kidney Failure <15      Procalcitonin [[id number]]  (Normal) Collected: 10/03/22 0604    Specimen: Blood from Arm, Right Updated: 10/03/22 0647     Procalcitonin 0.05 ng/mL     Narrative:      As a Marker for Sepsis (Non-Neonates):    1. <0.5 ng/mL represents a low risk of severe sepsis and/or septic shock.  2. >2 ng/mL represents a high risk of severe sepsis and/or septic shock.    As a Marker for Lower Respiratory Tract Infections that require antibiotic therapy:    PCT on Admission    Antibiotic Therapy       6-12 Hrs later    >0.5                Strongly Recommended  >0.25 - <0.5        Recommended  "  0.1 - 0.25          Discouraged              Remeasure/reassess PCT  <0.1                Strongly Discouraged     Remeasure/reassess PCT    As 28 day mortality risk marker: \"Change in Procalcitonin Result\" (>80% or <=80%) if Day 0 (or Day 1) and Day 4 values are available. Refer to http://www.[facility]-pct-calculator.com    Change in PCT <=80%  A decrease of PCT levels below or equal to 80% defines a positive change in PCT test result representing a higher risk for 28-day all-cause mortality of patients diagnosed with severe sepsis for septic shock.    Change in PCT >80%  A decrease of PCT levels of more than 80% defines a negative change in PCT result representing a lower risk for 28-day all-cause mortality of patients diagnosed with severe sepsis or septic shock.       Blood Culture - Blood, Arm, Right [[id number]]  (Normal) Collected: 09/30/22 1621    Specimen: Blood from Arm, Right Updated: 10/02/22 1647     Blood Culture No growth at 2 days    Blood Culture - Blood, Arm, Left [[id number]]  (Normal) Collected: 09/30/22 1621    Specimen: Blood from Arm, Left Updated: 10/02/22 1646     Blood Culture No growth at 2 days    Basic Metabolic Panel [[id number]]  (Abnormal) Collected: 10/02/22 0517    Specimen: Blood Updated: 10/02/22 0608     Glucose 96 mg/dL      BUN 12 mg/dL      Creatinine 0.70 mg/dL      Sodium 142 mmol/L      Potassium 4.4 mmol/L      Chloride 104 mmol/L      CO2 29.3 mmol/L      Calcium 8.5 mg/dL      BUN/Creatinine Ratio 17.1     Anion Gap 8.7 mmol/L      eGFR 92.6 mL/min/1.73      Comment: National Kidney Foundation and American Society of Nephrology (ASN) Task Force recommended calculation based on the Chronic Kidney Disease Epidemiology Collaboration (CKD-EPI) equation refit without adjustment for race.       Narrative:      GFR Normal >60  Chronic Kidney Disease <60  Kidney Failure <15      Procalcitonin [[id number]]  (Normal) Collected: 10/02/22 0517    Specimen: Blood Updated: 10/02/22 0605     " "Procalcitonin 0.06 ng/mL     Narrative:      As a Marker for Sepsis (Non-Neonates):    1. <0.5 ng/mL represents a low risk of severe sepsis and/or septic shock.  2. >2 ng/mL represents a high risk of severe sepsis and/or septic shock.    As a Marker for Lower Respiratory Tract Infections that require antibiotic therapy:    PCT on Admission    Antibiotic Therapy       6-12 Hrs later    >0.5                Strongly Recommended  >0.25 - <0.5        Recommended   0.1 - 0.25          Discouraged              Remeasure/reassess PCT  <0.1                Strongly Discouraged     Remeasure/reassess PCT    As 28 day mortality risk marker: \"Change in Procalcitonin Result\" (>80% or <=80%) if Day 0 (or Day 1) and Day 4 values are available. Refer to http://www.[email or web address]ast[facility]ct-calculator.com    Change in PCT <=80%  A decrease of PCT levels below or equal to 80% defines a positive change in PCT test result representing a higher risk for 28-day all-cause mortality of patients diagnosed with severe sepsis for septic shock.    Change in PCT >80%  A decrease of PCT levels of more than 80% defines a negative change in PCT result representing a lower risk for 28-day all-cause mortality of patients diagnosed with severe sepsis or septic shock.       CBC & Differential [[id number]]  (Abnormal) Collected: 10/02/22 0517    Specimen: Blood Updated: 10/02/22 0531    Narrative:      The following orders were created for panel order CBC & Differential.  Procedure                               Abnormality         Status                     ---------                               -----------         ------                     CBC Auto Differential[[id number]]        Abnormal            Final result                 Please view results for these tests on the individual orders.    CBC Auto Differential [[id number]]  (Abnormal) Collected: 10/02/22 0517    Specimen: Blood Updated: 10/02/22 0531     WBC 9.05 10*3/mm3      RBC 4.41 10*6/mm3      Hemoglobin 12.5 " g/dL      Hematocrit 39.9 %      MCV 90.5 fL      MCH 28.3 pg      MCHC 31.3 g/dL      RDW 14.2 %      RDW-SD 47.2 fl      MPV 10.7 fL      Platelets 212 10*3/mm3      Neutrophil % 43.5 %      Lymphocyte % 43.1 %      Monocyte % 9.0 %      Eosinophil % 3.2 %      Basophil % 0.4 %      Immature Grans % 0.8 %      Neutrophils, Absolute 3.94 10*3/mm3      Lymphocytes, Absolute 3.90 10*3/mm3      Monocytes, Absolute 0.81 10*3/mm3      Eosinophils, Absolute 0.29 10*3/mm3      Basophils, Absolute 0.04 10*3/mm3      Immature Grans, Absolute 0.07 10*3/mm3      nRBC 0.0 /100 WBC     Blood Gas, Arterial - [[id number]]  (Abnormal) Collected: 09/30/22 1745    Specimen: Arterial Blood Updated: 09/30/22 1749     Site Right Radial     Sven's Test Positive     pH, Arterial 7.427 pH units      pCO2, Arterial 49.9 mm Hg      Comment: 83 Value above reference range        pO2, Arterial 122.0 mm Hg      Comment: 83 Value above reference range        HCO3, Arterial 32.8 mmol/L      Comment: 83 Value above reference range        Base Excess, Arterial 7.1 mmol/L      Comment: 83 Value above reference range        O2 Saturation, Arterial 98.3 %      Hemoglobin, Blood Gas 13.3 g/dL      Temperature 37.0 C      Barometric Pressure for Blood Gas 740 mmHg      Modality NIV     FIO2 30 %      Ventilator Mode BIPAP-ST     Set Mech Resp Rate 18.0     IPAP 15     Comment: Meter: [id number]     :  [id number]        EPAP 5     Collected by [id number]     pCO2, Temperature Corrected 49.9 mm Hg      pH, Temp Corrected 7.427 pH Units      pO2, Temperature Corrected 122 mm Hg     COVID-19 and FLU A/B PCR - Swab, Nasopharynx [[id number]]  (Normal) Collected: 09/30/22 1625    Specimen: Swab from Nasopharynx Updated: 09/30/22 1709     COVID19 Not Detected     Influenza A PCR Not Detected     Influenza B PCR Not Detected    Narrative:      Fact sheet for providers: https://www.fda.gov/media/691884/download    Fact sheet for patients:  https://www.fda.gov/media/132421/download    Test performed by PCR.    Lactic Acid, Plasma [[id number]]  (Normal) Collected: 09/30/22 1621    Specimen: Blood Updated: 09/30/22 1700     Lactate 0.6 mmol/L     [place] Draw [[id number]] Collected: 09/30/22 1412    Specimen: Blood Updated: 09/30/22 1518    Narrative:      The following orders were created for panel order [place] Draw.  Procedure                               Abnormality         Status                     ---------                               -----------         ------                     Green Top (Gel)[[id number]]                                  Final result               Lavender Top[[id number]]                                     Final result               Gold Top - SST[[id number]]                                                              Light Blue Top[[id number]]                                   Final result                 Please view results for these tests on the individual orders.    Lavender Top [[id number]] Collected: 09/30/22 1412    Specimen: Blood Updated: 09/30/22 1518     Extra Tube hold for add-on     Comment: Auto resulted       Green Top (Gel) [[id number]] Collected: 09/30/22 1412    Specimen: Blood Updated: 09/30/22 1518     Extra Tube Hold for add-ons.     Comment: Auto resulted.       Light Blue Top [[id number]] Collected: 09/30/22 1412    Specimen: Blood Updated: 09/30/22 1518     Extra Tube Hold for add-ons.     Comment: Auto resulted       BNP [[id number]]  (Normal) Collected: 09/30/22 1412    Specimen: Blood Updated: 09/30/22 1501     proBNP 368.3 pg/mL     Narrative:      Among patients with dyspnea, NT-proBNP is highly sensitive for the detection of acute congestive heart failure. In addition NT-proBNP of <300 pg/ml effectively rules out acute congestive heart failure with 99% negative predictive value.    Results may be falsely decreased if patient taking Biotin.      Troponin [[id number]]  (Normal) Collected: 09/30/22 1412     Specimen: Blood Updated: 09/30/22 1451     Troponin T <0.010 ng/mL     Narrative:      Troponin T Reference Range:  <= 0.03 ng/mL-   Negative for AMI  >0.03 ng/mL-     Abnormal for myocardial necrosis.  Clinicians would have to utilize clinical acumen, EKG, Troponin and serial changes to determine if it is an Acute Myocardial Infarction or myocardial injury due to an underlying chronic condition.       Results may be falsely decreased if patient taking Biotin.      Comprehensive Metabolic Panel [[id number]]  (Abnormal) Collected: 09/30/22 1412    Specimen: Blood Updated: 09/30/22 1449     Glucose 172 mg/dL      BUN 15 mg/dL      Creatinine 0.77 mg/dL      Sodium 140 mmol/L      Potassium 4.3 mmol/L      Chloride 102 mmol/L      CO2 31.9 mmol/L      Calcium 9.4 mg/dL      Total Protein 6.8 g/dL      Albumin 4.00 g/dL      ALT (SGPT) 18 U/L      AST (SGOT) 22 U/L      Alkaline Phosphatase 86 U/L      Total Bilirubin 0.2 mg/dL      Globulin 2.8 gm/dL      A/G Ratio 1.4 g/dL      BUN/Creatinine Ratio 19.5     Anion Gap 6.1 mmol/L      eGFR 82.6 mL/min/1.73      Comment: National Kidney Foundation and American Society of Nephrology (ASN) Task Force recommended calculation based on the Chronic Kidney Disease Epidemiology Collaboration (CKD-EPI) equation refit without adjustment for race.       Narrative:      GFR Normal >60  Chronic Kidney Disease <60  Kidney Failure <15      CBC & Differential [[id number]]  (Abnormal) Collected: 09/30/22 1412    Specimen: Blood Updated: 09/30/22 1419    Narrative:      The following orders were created for panel order CBC & Differential.  Procedure                               Abnormality         Status                     ---------                               -----------         ------                     CBC Auto Differential[[id number]]        Abnormal            Final result                 Please view results for these tests on the individual orders.    CBC Auto Differential  [[id number]]  (Abnormal) Collected: 09/30/22 1412    Specimen: Blood Updated: 09/30/22 1419     WBC 10.80 10*3/mm3      RBC 4.83 10*6/mm3      Hemoglobin 13.6 g/dL      Hematocrit 43.7 %      MCV 90.5 fL      MCH 28.2 pg      MCHC 31.1 g/dL      RDW 14.2 %      RDW-SD 46.7 fl      MPV 10.9 fL      Platelets 243 10*3/mm3      Neutrophil % 57.0 %      Lymphocyte % 31.4 %      Monocyte % 8.0 %      Eosinophil % 2.4 %      Basophil % 0.6 %      Immature Grans % 0.6 %      Neutrophils, Absolute 6.16 10*3/mm3      Lymphocytes, Absolute 3.39 10*3/mm3      Monocytes, Absolute 0.86 10*3/mm3      Eosinophils, Absolute 0.26 10*3/mm3      Basophils, Absolute 0.07 10*3/mm3      Immature Grans, Absolute 0.06 10*3/mm3      nRBC 0.0 /100 WBC         Imaging Results (Most Recent)     Procedure Component Value Units Date/Time    CT Angiogram Chest [[id number]] Collected: 09/30/22 1928     Updated: 09/30/22 1930    Narrative:      CTA Chest    INDICATION:   Dyspnea and hypoxia. Nursing home patient. Increasing shortness of air x3 days    TECHNIQUE:   CT angiogram of the chest with 100 cc Isovue-370 IV contrast. 3-D reconstructions were obtained and reviewed.   Radiation dose reduction techniques included automated exposure control or exposure modulation based on body size. Count of known CT and  cardiac nuc med studies performed in previous 12 months: 2.     COMPARISON:   CT chest 5/11/2022    FINDINGS:   Deformity of the thorax secondary to severe dextroscoliosis. Left-sided volume loss with multifocal atelectasis. No focal consolidation. No sizable effusions or pneumothorax.    No evidence of pulmonary embolus. Cardiomegaly. Aorta unremarkable. No central mass or significant adenopathy. No acute osseous abnormality.      Impression:      Negative for pulmonary embolus.    Left-sided volume loss with multifocal bibasilar atelectasis left greater than right. Some mosaic attenuation within the lungs likely related to underlying  cardiovascular disease.    Stable cardiomegaly.    Signer Name: NAILA Thayer MD   Signed: 9/30/2022 7:28 PM   Workstation Name: [id number]    [facility]    XR Chest 2 View [[id number]] Collected: 09/30/22 1539     Updated: 09/30/22 1542    Narrative:      CR Chest 2 Vws    INDICATION:    Shortness of breath for 5 days.    COMPARISON:    Chest 9/29/2022    FINDINGS:   PA and lateral views of the chest.  Severe thoracolumbar scoliosis again noted. There is persistent left basilar opacity concerning for atelectasis and/or infiltrate. Left pleural effusion is not excluded. Mild right basilar atelectasis. No pneumothorax.  Heart size appears enlarged, but stable.      Impression:        1. Persistent left basilar opacity concerning for atelectasis and/or infiltrate. Small left pleural effusion is not excluded.  2. Right basilar atelectasis.  3. Stable cardiomegaly.    Signer Name: Fredis Bullock MD   Signed: 9/30/2022 3:39 PM   Workstation Name: [id number]    [facility]          PROCEDURES: None    Condition on Discharge: stable    Physical Exam at Discharge  Vital Signs  Temp:  [97.1 °F (36.2 °C)-99.1 °F (37.3 °C)] 97.8 °F (36.6 °C)  Heart Rate:  [56-87] 80  Resp:  [16-20] 18  BP: (103-127)/() 127/73   Body mass index is 28.91 kg/m².    Physical Exam  Vitals reviewed.   Constitutional:       General: She is not in acute distress.     Appearance: She is obese.      Comments: Chronically ill appearance   HENT:      Head: Normocephalic and atraumatic.      Mouth/Throat:      Mouth: Mucous membranes are moist.   Eyes:      Extraocular Movements: Extraocular movements intact.      Pupils: Pupils are equal, round, and reactive to light.   Cardiovascular:      Rate and Rhythm: Normal rate and regular rhythm.   Pulmonary:      Effort: Pulmonary effort is normal.      Comments: Diminished bases otherwise clear  Abdominal:      General: Abdomen is flat. Bowel sounds are  normal.      Palpations: Abdomen is soft.      Hernia: A hernia is present.      Comments: Mid abdominal hernia noted, soft and easy to reduce   Musculoskeletal:      Comments: Trace lower extremity pitting edema   Skin:     General: Skin is warm and dry.      Capillary Refill: Capillary refill takes less than 2 seconds.      Findings: No erythema.   Neurological:      General: No focal deficit present.      Mental Status: She is alert.      Comments: Unable to determine orientation, responds to verbal name call appropriately   Psychiatric:      Comments: Calm, cooperative       Discharge Disposition  [facility]    Visiting Nurse:    Resume previous    Home PT/OT:  Resume previous    Home Safety Evaluation:  Resume previous    DME  None new    Discharge Diet:      Dietary Orders (From admission, onward)     Start     Ordered    10/01/22 1046  Diet Regular; Cardiac, Daily Fluid Restriction, Low Sodium; 1500 mL Fluid Per Day; No Added Salt  Diet Effective Now        Question Answer Comment   Diet Texture / Consistency Regular    Common Modifiers Cardiac    Common Modifiers Daily Fluid Restriction    Common Modifiers Low Sodium    Fluid Restriction Per Day: 1500 mL Fluid Per Day    Low Sodium Restriction: No Added Salt        10/01/22 1046                Activity at Discharge:  As tolerated    Pre-discharge education  Cardiac, educations, follow-up    Follow-up Appointments  Future Appointments   Date Time Provider Department Center   11/28/2022 10:15 AM Marquez Mendez MD [id number]   1/11/2023  2:20 PM Isac Navarro II, MD [facility]     Additional Instructions for the Follow-ups that You Need to Schedule     Discharge Follow-up with PCP   As directed       Currently Documented PCP:    Khang Merino MD    PCP Phone Number:    [phone]     Follow Up Details: 1 week         Discharge Follow-up with Specified Provider: Cardiology; 2 Weeks   As directed      To: Cardiology    Follow Up: 2  "Weeks               Test Results Pending at Discharge: To be followed up at [facility] per PCP  Pending Labs     Order Current Status    Blood Culture - Blood, Arm, Left Preliminary result    Blood Culture - Blood, Arm, Right Preliminary result           Samantha MEGHANCandy Singer, APRN  10/03/22  09:32 EDT    Time: Discharge Over 30 min (if over 30 minutes give explanation as to why it took greater than 30 minutes)  Secondary to:   Coordination of care/follow up  Medication reconciliation  D/W staff    as of April 2021, as required by the Federal 21st Century Cures Act, medical records (including provider notes and laboratory/imaging results) are to be made available to patients and/or their designees as soon as the documents are signed/resulted.  This document's primary intended use is for review by medical experts and colleagues.   If read by patients and family members there should be medical advice from a licensed healthcare provider to help interpret medical language, lab results, radiology imagery and diagnoses. The federal government recently made it mandatory that medical notes be accessible to the patient and their designees as they are completed. Any interpretation of such data is the responsibility of the patient and/or family member responsible for the patient in concert with their primary or specialist providers, not to be left for sources of online searches such as [employer], [employer] or similar queries. Relying on these approaches to knowledge may result in misinterpretation, misguided goals of care and even death should patients or family members try recommendations outside of the realm of professional medical care in a supervised inpatient or outpatient environment.        \"Dictated utilizing Dragon dictation\"      "

## 2022-10-03 NOTE — CASE MANAGEMENT/SOCIAL WORK
Case Management Discharge Note      Final Note: dc to [facility] - intermmediate level of care         Selected Continued Care - Discharged on 10/3/2022 Admission date: 9/30/2022 - Discharge disposition: Long Term Care (DC - External)    Destination Coordination complete.    Service Provider Selected Services Address Phone Fax Patient Preferred    [facility] [address], [place] [id number] [phone] --          Durable Medical Equipment    No services have been selected for the patient.              Dialysis/Infusion    No services have been selected for the patient.              Home Medical Care    No services have been selected for the patient.              Therapy    No services have been selected for the patient.              Community Resources    No services have been selected for the patient.              Community & DME    No services have been selected for the patient.                       Final Discharge Disposition Code: 04 - intermediate care facility

## 2022-10-03 NOTE — PROGRESS NOTES
Vlad Olsen MD                          [phone]            Patient ID:    Name:  Caitlyn Camacho    MRN:  [id number]    1950   72 y.o.  female            Patient Care Team:  Khang Merino MD as PCP - General (Family Medicine)  Tolu Neville MD (Psychiatry)  Vinnie Leavitt MD as Consulting Physician (Neurology)  Zheng Weathers MD as Consulting Physician (Gastroenterology)  Khang Merino MD as Referring Physician (Family Medicine)  Khang Lynch MD as Consulting Physician (Hematology and Oncology)    CC/ Reason for visit: Acute metabolic encephalopathy, acute on chronic respiratory failure    Subjective: Pt seen and examined this AM. No acute overnight events noted. Doing better.  Seems to back to her baseline respiratory status as well as neurological status.  Nurse tells me that she is being discharged.    ROS: Unable to obtain more than about due to baseline mental status    Objective     Vital Signs past 24hrs    BP range: BP: (103-127)/() 127/73  Pulse range: Heart Rate:  [56-87] 75  Resp rate range: Resp:  [16-20] 20  Temp range: Temp (24hrs), Av.9 °F (36.6 °C), Min:97.1 °F (36.2 °C), Max:99.1 °F (37.3 °C)      Ventilator/Non-Invasive Ventilation Settings (From admission, onward)             Start     Ordered    10/01/22 0143  BIPAP  Until Discontinued        Question Answer Comment   Type: AVAPS/PC/PS    Backup Rate 12    Target VT (mL) 500    EPAP/PEEP (cm H2O) 4    Min Pressure (cm H2O) 8    Max Pressure (cm H2O) 20        10/01/22 0143    22 1725  BIPAP  Until Discontinued,   Status:  Canceled        Question:  Type:  Answer:  BIPAP    22 1724                Vent Settings                                         Device (Oxygen Therapy): nasal cannula       74 kg (163 lb 2.3 oz); Body mass index is 28.91 kg/m².      Intake/Output Summary (Last 24 hours) at 10/3/2022 1550  Last data filed at 10/3/2022 1242  Gross per 24 hour    Intake 600 ml   Output 825 ml   Net -225 ml       PHYSICAL EXAM   Constitutional: Middle-aged white female pt in bed, no acute respiratory distress, no accessory muscle use  Head: - NCAT  Eyes: No pallor, anicteric conjunctivae, EOMI.  ENMT: Mallampati 4, no oral thrush. Moist MM.   Severe [place]+  NECK: Trachea midline, No thyromegaly, no palpable cervical lymphadenopathy  Heart: RRR, no murmur.  Trace pedal edema   Lungs: JANETH +, scoliosis, decreased breath sounds at the left base no wheezes/ crackles heard    Abdomen: Soft. No tenderness, guarding or rigidity. No palpable masses  Extremities: Extremities warm and well perfused. No cyanosis/ clubbing  Neuro: Conscious,  interactive, no gross focal neuro deficits -Baseline cognitive dysfunction  Psych: Mood and affect - appropriate for her    PPE recommended per [facility] infectious disease Isolation protocol for the current clinical scenario (as mentioned below) was followed.     Scheduled meds:      IV meds:                      No current facility-administered medications for this encounter.      Data Review:      Results from last 7 days   Lab Units 10/03/22  0604 10/02/22  0517 09/30/22  1412   SODIUM mmol/L 142 142 140   POTASSIUM mmol/L 4.6 4.4 4.3   CHLORIDE mmol/L 106 104 102   CO2 mmol/L 27.0 29.3* 31.9*   BUN mg/dL 14 12 15   CREATININE mg/dL 0.61 0.70 0.77   CALCIUM mg/dL 8.7 8.5* 9.4   BILIRUBIN mg/dL  --   --  0.2   ALK PHOS U/L  --   --  86   ALT (SGPT) U/L  --   --  18   AST (SGOT) U/L  --   --  22   GLUCOSE mg/dL 108* 96 172*   WBC 10*3/mm3  --  9.05 10.80   HEMOGLOBIN g/dL  --  12.5 13.6   PLATELETS 10*3/mm3  --  212 243   PROBNP pg/mL  --   --  368.3   PROCALCITONIN ng/mL 0.05 0.06 0.08       Lab Results   Component Value Date    CALCIUM 8.7 10/03/2022    PHOS 4.3 08/02/2022       Results from last 7 days   Lab Units 09/30/22  1621   BLOODCX  No growth at 2 days  No growth at 2 days       Results from last 7 days   Lab Units 09/30/22  1370    PH, ARTERIAL pH units 7.427   PO2 ART mm Hg 122.0*   PCO2, ARTERIAL mm Hg 49.9*   HCO3 ART mmol/L 32.8*        I have personally reviewed the results from the time of this admission to 10/3/2022 15:50 EDT and agree with these findings:  [x]  Laboratory  [x]  Microbiology  [x]  Radiology  []  EKG/Telemetry   [x]  Cardiology/Vascular   []  Pathology  []  Old records    Assessment    Acute on hypoxic respiratory failure (nocturnal oxygen)  Acute congestive heart failure  Left lower lobe atelectasis; improved  Acute metabolic encephalopathy  Chronic hypercapnic respiratory failure on trilogy ventilator  Severe restrictive lung disease from kyphoscoliosis  MARCIN/OHS  Seizure disorder  Polypharmacy  Dementia  DNR/DNI     RECOMMENDATIONS:  Patient stable from a respiratory standpoint - Continue with V 60 in place of her Trilogy ventilator with naps and night  Continue diuresis to keep the patient as negative as possible.  Continue bronchodilators to help with mucus clearance  Would avoid sedative medications and limit polypharmacy    DNR/DNI    Okay to discharge back to nursing home from my standpoint.  Discussed with nurse    Vlad Olsen MD  10/3/2022

## 2022-10-03 NOTE — CASE MANAGEMENT/SOCIAL WORK
Continued Stay Note  JAMES Oneal     Patient Name: Caitlyn Camacho  MRN: [id number]  Today's Date: 10/3/2022    Admit Date: 9/30/2022    Plan: Return to [facility]   Discharge Plan     Row Name 10/03/22 0953       Plan    Plan Return to [facility]    Plan Comments Informed by Tish BLANK that plan for patient to return to [facility] today. CM called facility [phone], ext [phone]. LVM to notify of planned dc today. Facility arranges transportation. Informed Nursing staff will call report once dc order is rec'd. Enc to return call for any questions/issues.               Discharge Codes    No documentation.                     Neeraj Marquez RN

## 2022-11-21 NOTE — PROGRESS NOTES
73 yo No obstetric history on file. here today for assessment. Concern for lesion on external genitalia. No bleeding, ulcerations or foul smelling discharge. No hx of abuse. Developmental delays. Caregivers with her and answer most questions.     Her caretakers deny incontinence      PMH:   Past Medical History:   Diagnosis Date   • Anemia due to blood loss    • Anxiety    • Arthritis    • Duodenal stricture    • Duodenal ulcer    • Dysphagia    • Gallstones    • H/O convulsions    • Hypercholesteremia    • Hypothyroidism    • Major depression    • Moderate intellectual disability    • OCD (obsessive compulsive disorder)    • Ocular histoplasmosis     w/macular scars   • Osteopenia    • Osteopenia    • Ptosis    • Pure hyperglyceridemia    • S/P bypass gastrojejunostomy    • Seasonal allergies    • Seizure disorder (HCC)    • Unspecified dementia without behavioral disturbance (HCC)    • Ventral hernia     sched repair   • Vitamin D deficiency                 PSH:     Past Surgical History:   Procedure Laterality Date   • CATARACT EXTRACTION Bilateral 2016   • CHOLECYSTECTOMY OPEN  03/2017   • COLONOSCOPY  05/2009   • ENDOSCOPY  03/2017   • GASTROJEJUNOSTOMY  08/2016   • GASTROSTOMY      TEJINDER   • HYSTERECTOMY     • VENTRAL/INCISIONAL HERNIA REPAIR N/A 8/15/2017    Procedure: Lysis of adhesion and repair of recurrent incisional hernia with onlay mesh placement ;  Surgeon: Donna Ivy MD;  Location: [facility];  Service:               STD Not sexually active   Pap s/p hysterectomy   Contraception hysterectomy     Menopause      Social hx:   Social History     Socioeconomic History   • Marital status: Unknown   Tobacco Use   • Smoking status: Never   • Smokeless tobacco: Never   Substance and Sexual Activity   • Alcohol use: No   • Drug use: No   • Sexual activity: Defer        [  X ] no h/o abuse/DV:         [ X  ] No ETOH, tobacco, drugs  [   ] Tobacco use   [   ] Alcohol use  [   ] Drug use   Marital status:            [  X ] FOB involved     Significant family hx:   Denies                 [ X  ] No birth defects, stillbirth           Allergies:       Allergies   Allergen Reactions   • Codeine Diarrhea and Nausea And Vomiting                  Meds:   Current Outpatient Medications:   •  acetaminophen (TYLENOL) 500 MG tablet, Take 1,000 mg by mouth Every 6 (Six) Hours As Needed for Mild Pain (1-3) or Fever., Disp: , Rfl:   •  Albuterol (VENTOLIN IN), Inhale 2 puffs Every 4 (Four) Hours As Needed (wheezing/coughing/sob)., Disp: , Rfl:   •  atorvastatin (LIPITOR) 20 MG tablet, Take 20 mg by mouth Every Night., Disp: , Rfl:   •  brimonidine-timolol (COMBIGAN) 0.2-0.5 % ophthalmic solution, Administer 1 drop to both eyes Every 12 (Twelve) Hours., Disp: , Rfl:   •  budesonide (PULMICORT) 0.5 MG/2ML nebulizer solution, Take 0.5 mg by nebulization 2 (Two) Times a Day., Disp: , Rfl:   •  busPIRone (BUSPAR) 10 MG tablet, Take 10 mg by mouth 2 (Two) Times a Day., Disp: , Rfl:   •  calcium carbonate (OS-KATY) 600 MG tablet, Take 600 mg by mouth 3 (Three) Times a Day With Meals., Disp: , Rfl:   •  carvedilol (COREG) 6.25 MG tablet, Take 6.25 mg by mouth 2 (Two) Times a Day With Meals., Disp: , Rfl:   •  cetirizine (zyrTEC) 10 MG tablet, Take 10 mg by mouth Daily., Disp: , Rfl:   •  coenzyme Q10 100 MG capsule, Take 100 mg by mouth Daily., Disp: , Rfl:   •  denosumab (PROLIA) 60 MG/ML solution syringe, Inject 60 mg under the skin Take As Directed. Every 6 months, due December, Disp: , Rfl:   •  dextromethorphan polistirex ER (DELSYM) 30 MG/5ML Suspension Extended Release oral suspension, Take 10 mL by mouth 2 (Two) Times a Day As Needed (cough)., Disp: , Rfl:   •  diphenhydrAMINE (Banophen) 25 mg capsule, Take 25 mg by mouth Every 6 (Six) Hours As Needed for Itching., Disp: , Rfl:   •  docusate sodium (COLACE) 100 MG capsule, Take 100 mg by mouth 2 (Two) Times a Day., Disp: , Rfl:   •  donepezil (ARICEPT) 10 MG tablet, Take 10  mg by mouth Every Night., Disp: , Rfl:   •  Ergocalciferol (VITAMIN D2 PO), Take 50,000 Units by mouth Every 7 (Seven) Days., Disp: , Rfl:   •  fluticasone (FLONASE) 50 MCG/ACT nasal spray, 2 sprays into each nostril Daily., Disp: , Rfl:   •  furosemide (LASIX) 40 MG tablet, Take 40 mg by mouth Daily., Disp: , Rfl:   •  glucosamine-chondroitin 500-400 MG capsule capsule, Take 3 capsules by mouth Daily., Disp: , Rfl:   •  guaiFENesin (MUCINEX) 600 MG 12 hr tablet, Take 1,200 mg by mouth 2 (Two) Times a Day As Needed for Congestion., Disp: , Rfl:   •  ipratropium-albuterol (DUO-NEB) 0.5-2.5 mg/3 ml nebulizer, Take 3 mL by nebulization 4 (Four) Times a Day., Disp: 360 mL, Rfl:   •  levETIRAcetam (KEPPRA) 500 MG tablet, Take 500 mg by mouth 2 (Two) Times a Day., Disp: , Rfl:   •  levothyroxine (SYNTHROID, LEVOTHROID) 50 MCG tablet, Take 50 mcg by mouth Daily., Disp: , Rfl:   •  magnesium hydroxide (MILK OF MAGNESIA) 400 MG/5ML suspension, Take 30 mL by mouth As Needed for Constipation (no bm x 3 days)., Disp: , Rfl:   •  montelukast (SINGULAIR) 10 MG tablet, Take 10 mg by mouth Every Night., Disp: , Rfl:   •  Multiple Vitamins-Minerals (b complex-C-E-zinc) tablet, Take 1 tablet by mouth Daily., Disp: , Rfl:   •  Multiple Vitamins-Minerals (MULTIVITAMIN ADULT PO), Take 1 tablet by mouth Daily., Disp: , Rfl:   •  Nutritional Supplements (RESOURCE ARGINAID) pack, Take  by mouth 2 (Two) Times a Day., Disp: , Rfl:   •  OLANZapine (zyPREXA) 15 MG tablet, Take 10 mg by mouth Every Night. 10mg once daily at 4pm, Disp: , Rfl:   •  pantoprazole (PROTONIX) 40 MG EC tablet, Take 40 mg by mouth 2 (Two) Times a Day., Disp: , Rfl:   •  Probiotic Product (PROBIOTIC DAILY PO), Take 1 capsule by mouth Daily., Disp: , Rfl:   •  promethazine (PHENERGAN) 25 MG suppository, Insert 25 mg into the rectum Every 6 (Six) Hours As Needed for Nausea or Vomiting., Disp: , Rfl:   •  promethazine (PHENERGAN) 25 MG tablet, Take 25 mg by mouth Every 6  (Six) Hours As Needed for Nausea or Vomiting., Disp: , Rfl:   •  sertraline (ZOLOFT) 100 MG tablet, Take 100 mg by mouth Daily., Disp: , Rfl:   •  vitamin C (ASCORBIC ACID) 250 MG tablet, Take 1,000 mg by mouth Daily., Disp: , Rfl:      Review of Systems   Denies     Vitals:    11/21/22 1430   BP: 124/78    Vitals: VSS; AF    General Appearance:  Awake. Alert. Well developed. Well nourished. In no acute distress.    Lungs:  ° Clear to auscultation bilaterally without wheezes, rales or rhonchi.  Cardiovascular:  ° System: RRR, no murmur, consistent with normal pregnancy.  Abdomen:  Visual Inspection: ° Abdomen was normal on visual inspection.  Palpation: ° Abdomen was soft. ° Abdominal non-tender.  Genitalia:  External: ° Vulva was normal. ° Labia showed no abnormalities. ° Clitoris was normal. ° [place]'s gland was normal. ° Bartholin's gland was normal. ° Genitalia exhibited no lesion.  Vagina: ° Mucosa was normal. ° Not tender. ° No vaginal mass was observed.  Probable pimple or cyst that has healed.   Neurological:  ° Oriented to time, place, and person.  Skin:  ° General appearance was normal. No bruising or ecchymosis.    OBGyn Exam     There are no diagnoses linked to this encounter.   1) Concern for labial lesion     A/P   PE benign. No s/s of cellulitis, ulcerations, cysts, etc.. No  Signs of trauma or assault.   F/U annually with me or prn     Myke Cross DO  11/21/2022    14:40 EST

## 2022-11-28 NOTE — PROGRESS NOTES
PATIENTINFORMATION    Date of Office Visit: 2022  Encounter Provider: Marquez Mendez MD  Place of Service: [facility] CARDIOLOGY  Patient Name: Caitlyn Camacho  : 1950    Subjective:     Encounter Date:2022      Patient ID: Caitlyn Camacho is a 72 y.o. female.    Chief Complaint   Patient presents with   • Follow-up     HPI  Ms. Camacho is a 73 yo lady from  [facility] and history of severe developmental cognitive impairment brought to cardiology clinic for follow-up visit.  No ER visit or hospitalization since last clinic visit and no significant complaints today.  Her vital sign running normal during nursing home as well as hospice checkups.    ROS  All systems reviewed and negative except as noted in HPI.    Past Medical History:   Diagnosis Date   • Anemia due to blood loss    • Anxiety    • Arthritis    • Duodenal stricture    • Duodenal ulcer    • Dysphagia    • Gallstones    • H/O convulsions    • Hypercholesteremia    • Hypothyroidism    • Major depression    • Moderate intellectual disability    • OCD (obsessive compulsive disorder)    • Ocular histoplasmosis     w/macular scars   • Osteopenia    • Osteopenia    • Ptosis    • Pure hyperglyceridemia    • S/P bypass gastrojejunostomy    • Seasonal allergies    • Seizure disorder (HCC)    • Unspecified dementia without behavioral disturbance    • Ventral hernia     sched repair   • Vitamin D deficiency        Past Surgical History:   Procedure Laterality Date   • CATARACT EXTRACTION Bilateral    • CHOLECYSTECTOMY OPEN  2017   • COLONOSCOPY  2009   • ENDOSCOPY  2017   • GASTROJEJUNOSTOMY  2016   • GASTROSTOMY      TEJINDER   • HYSTERECTOMY     • VENTRAL/INCISIONAL HERNIA REPAIR N/A 8/15/2017    Procedure: Lysis of adhesion and repair of recurrent incisional hernia with onlay mesh placement ;  Surgeon: Donna Ivy MD;  Location: [facility];  Service:        Social History     Socioeconomic  "History   • Marital status: Unknown   Tobacco Use   • Smoking status: Never   • Smokeless tobacco: Never   Substance and Sexual Activity   • Alcohol use: No   • Drug use: No   • Sexual activity: Defer       Family History   Family history unknown: Yes           ECG 12 Lead    Date/Time: 11/28/2022 3:58 PM  Performed by: Marquez Mendez MD  Authorized by: Marquez Mendez MD   Comparison: compared with previous ECG from 9/30/2022  Similar to previous ECG  Rhythm: sinus rhythm  Rate: normal  Conduction: incomplete right bundle branch block  ST Segments: ST segments normal  T Waves: T waves normal  QRS axis: normal  Other findings: non-specific ST-T wave changes    Clinical impression: abnormal EKG               Objective:     /78 (BP Location: Left arm, Patient Position: Sitting)   Pulse 65   Ht 63 cm (24.8\")   Wt 75.3 kg (166 lb)   .72 kg/m²  Body mass index is 189.72 kg/m².     Constitutional:       General: Not in acute distress.     Appearance: Well-developed. Not diaphoretic.      Comments: Sitting on a wheelchair   Eyes:      Pupils: Pupils are equal, round, and reactive to light.   HENT:      Head: Normocephalic and atraumatic.   Neck:      Thyroid: No thyromegaly.   Pulmonary:      Effort: Pulmonary effort is normal. No respiratory distress.      Breath sounds: Normal breath sounds. No wheezing. No rales.   Chest:      Chest wall: Not tender to palpatation.   Cardiovascular:      Normal rate. Regular rhythm.      No gallop.   Pulses:     Intact distal pulses.   Edema:     Peripheral edema absent.   Abdominal:      General: Bowel sounds are normal. There is no distension.      Palpations: Abdomen is soft.      Tenderness: There is no guarding.   Musculoskeletal: Normal range of motion.         General: No deformity.      Cervical back: Normal range of motion and neck supple. Skin:     General: Skin is warm and dry.      Findings: No rash.   Neurological:      Mental Status: Oriented " to person, place, and time and easily aroused.      Cranial Nerves: No cranial nerve deficit.      Deep Tendon Reflexes: Reflexes are normal and symmetric.   Psychiatric:         Judgment: Judgment normal.         Review Of Data: I have reviewed pertinent recent labs, images and documents and pertinent findings included in HPI or assessment below.    Lipid Panel    Lipid Panel 1/10/22 3/8/22 3/8/22 8/2/22     0532 1159    Total Cholesterol 120 118 131 136   Triglycerides 69 74 86 87   HDL Cholesterol 58 49 58 49   VLDL Cholesterol 14 15 17 17   LDL Cholesterol  48 54 56 70   LDL/HDL Ratio 0.83 1.11 0.96 1.42               Assessment/Plan:         1.  Chronic bilateral lower extremity swelling: Normal echocardiogram done in November 2021, normal proBNP  Controlled with compression stocking/Lasix   Continue same  2.  Prior history of hypoxemia that required ER visit: No known recurrence.  3.  Hypertension controlled on Coreg 6.25 mg p.o. bid  4.  Hypercholesterolemia on statin: Lipid panel at goal  5. Mentally challenged with superimposed dementia and wheelchair-bound at baseline  7.  Quadriparetic/wheelchair-bound      Ms. Camacho without any significant new complaints today.  Vital signs within normal range.  Normal EKG  Euvolemic on exam other than bilateral lower extremity swelling  Continue same  Follow-up in cardiology clinic in 1 year or sooner with any concerning symptoms  Diagnosis and plan of care discussed with patient and verbalized understanding.            Your medication list          Accurate as of November 28, 2022 12:11 PM. If you have any questions, ask your nurse or doctor.            CONTINUE taking these medications      Instructions Last Dose Given Next Dose Due   acetaminophen 500 MG tablet  Commonly known as: TYLENOL      Take 1,000 mg by mouth Every 6 (Six) Hours As Needed for Mild Pain (1-3) or Fever.       atorvastatin 20 MG tablet  Commonly known as: LIPITOR      Take 20 mg by mouth Every  Night.       b complex-C-E-zinc tablet      Take 1 tablet by mouth Daily.       brimonidine-timolol 0.2-0.5 % ophthalmic solution  Commonly known as: COMBIGAN      Administer 1 drop to both eyes Every 12 (Twelve) Hours.       budesonide 0.5 MG/2ML nebulizer solution  Commonly known as: PULMICORT      Take 0.5 mg by nebulization 2 (Two) Times a Day.       busPIRone 10 MG tablet  Commonly known as: BUSPAR      Take 10 mg by mouth 2 (Two) Times a Day.       calcium carbonate 600 MG tablet  Commonly known as: OS-KATY      Take 600 mg by mouth 3 (Three) Times a Day With Meals.       carvedilol 6.25 MG tablet  Commonly known as: COREG      Take 6.25 mg by mouth 2 (Two) Times a Day With Meals.       cetirizine 10 MG tablet  Commonly known as: zyrTEC      Take 10 mg by mouth Daily.       coenzyme Q10 100 MG capsule      Take 100 mg by mouth Daily.       denosumab 60 MG/ML solution syringe  Commonly known as: PROLIA      Inject 60 mg under the skin Take As Directed. Every 6 months, due December       dextromethorphan polistirex ER 30 MG/5ML Suspension Extended Release oral suspension  Commonly known as: DELSYM      Take 10 mL by mouth 2 (Two) Times a Day As Needed (cough).       diphenhydrAMINE 25 mg capsule  Commonly known as: BENADRYL      Take 25 mg by mouth Every 6 (Six) Hours As Needed for Itching.       docusate sodium 100 MG capsule  Commonly known as: COLACE      Take 100 mg by mouth 2 (Two) Times a Day.       donepezil 10 MG tablet  Commonly known as: ARICEPT      Take 10 mg by mouth Every Night.       fluticasone 50 MCG/ACT nasal spray  Commonly known as: FLONASE      2 sprays into each nostril Daily.       furosemide 40 MG tablet  Commonly known as: LASIX      Take 40 mg by mouth Daily.       glucosamine-chondroitin 500-400 MG capsule capsule      Take 3 capsules by mouth Daily.       guaiFENesin 600 MG 12 hr tablet  Commonly known as: MUCINEX      Take 1,200 mg by mouth 2 (Two) Times a Day As Needed for  Congestion.       ipratropium-albuterol 0.5-2.5 mg/3 ml nebulizer  Commonly known as: DUO-NEB      Take 3 mL by nebulization 4 (Four) Times a Day.       levETIRAcetam 500 MG tablet  Commonly known as: KEPPRA      Take 500 mg by mouth 2 (Two) Times a Day.       levothyroxine 50 MCG tablet  Commonly known as: SYNTHROID, LEVOTHROID      Take 50 mcg by mouth Daily.       magnesium hydroxide 400 MG/5ML suspension  Commonly known as: MILK OF MAGNESIA      Take 30 mL by mouth As Needed for Constipation (no bm x 3 days).       melatonin 5 MG tablet tablet      Take 5 mg by mouth.       metFORMIN 500 MG tablet  Commonly known as: GLUCOPHAGE      Take 500 mg by mouth 2 (Two) Times a Day With Meals.       montelukast 10 MG tablet  Commonly known as: SINGULAIR      Take 10 mg by mouth Every Night.       multivitamin with minerals tablet tablet      Take 1 tablet by mouth Daily.       OLANZapine 15 MG tablet  Commonly known as: zyPREXA      Take 10 mg by mouth Every Night. 10mg once daily at 4pm       omeprazole 40 MG capsule  Commonly known as: priLOSEC      Take 40 mg by mouth Daily.       ondansetron 4 MG tablet  Commonly known as: ZOFRAN      Take 4 mg by mouth Every 8 (Eight) Hours As Needed for Nausea or Vomiting.       pantoprazole 40 MG EC tablet  Commonly known as: PROTONIX      Take 40 mg by mouth 2 (Two) Times a Day.       PROBIOTIC DAILY PO      Take 1 capsule by mouth Daily.       promethazine 25 MG tablet  Commonly known as: PHENERGAN      Take 25 mg by mouth Every 6 (Six) Hours As Needed for Nausea or Vomiting.       promethazine 25 MG suppository  Commonly known as: PHENERGAN      Insert 25 mg into the rectum Every 6 (Six) Hours As Needed for Nausea or Vomiting.       Resource Arginaid pack      Take  by mouth 2 (Two) Times a Day.       sertraline 100 MG tablet  Commonly known as: ZOLOFT      Take 100 mg by mouth Daily.       VENTOLIN IN      Inhale 2 puffs Every 4 (Four) Hours As Needed  (wheezing/coughing/sob).       vitamin C 250 MG tablet  Commonly known as: ASCORBIC ACID      Take 1,000 mg by mouth Daily.       VITAMIN D2 PO      Take 50,000 Units by mouth Every 7 (Seven) Days.                  Marquez Mendez MD  11/28/22  12:11 EST

## 2022-11-30 PROBLEM — Z01.419 WELL WOMAN EXAM WITH ROUTINE GYNECOLOGICAL EXAM: Status: ACTIVE | Noted: 2022-01-01

## 2022-11-30 PROBLEM — N90.89 LABIAL LESION: Status: ACTIVE | Noted: 2022-01-01

## 2023-01-01 RX ORDER — LORAZEPAM 2 MG/ML
INJECTION INTRAMUSCULAR
Qty: 1 ML | Refills: 1 | Status: SHIPPED | OUTPATIENT
Start: 2023-01-01

## 2023-08-11 NOTE — ED PROVIDER NOTES
Subjective   History of Present Illness  72-year-old female with multiple medical problems and a history of moderate intellectual disability as well as dementia without behavioral disturbance resents from [facility] after reported fall this morning and generalized weakness.  They said that she had decreased level of consciousness at breakfast described as keeping her eyes closed and being slow to answer.  They deny her hitting her head but says she complained of some left hip pain.  She denies pain at this time.  She says she feels fine has no complaints.  Review of Systems   Unable to perform ROS: Dementia       Past Medical History:   Diagnosis Date   • Anemia due to blood loss    • Anxiety    • Arthritis    • Duodenal stricture    • Duodenal ulcer    • Dysphagia    • Gallstones    • H/O convulsions    • Hypercholesteremia    • Hypothyroidism    • Major depression    • Moderate intellectual disability    • OCD (obsessive compulsive disorder)    • Ocular histoplasmosis     w/macular scars   • Osteopenia    • Osteopenia    • Ptosis    • Pure hyperglyceridemia    • S/P bypass gastrojejunostomy    • Seasonal allergies    • Seizure disorder (HCC)    • Unspecified dementia without behavioral disturbance (HCC)    • Ventral hernia     sched repair   • Vitamin D deficiency        Allergies   Allergen Reactions   • Codeine Diarrhea and Nausea And Vomiting       Past Surgical History:   Procedure Laterality Date   • CATARACT EXTRACTION Bilateral 2016   • CHOLECYSTECTOMY OPEN  03/2017   • COLONOSCOPY  05/2009   • ENDOSCOPY  03/2017   • GASTROJEJUNOSTOMY  08/2016   • GASTROSTOMY      TEJINDER   • HYSTERECTOMY     • VENTRAL/INCISIONAL HERNIA REPAIR N/A 8/15/2017    Procedure: Lysis of adhesion and repair of recurrent incisional hernia with onlay mesh placement ;  Surgeon: Donna Ivy MD;  Location: [facility];  Service:        Family History   Family history unknown: Yes       Social History     Socioeconomic History  Total Pulses (Will Not Render If 0): 0   • Marital status: Unknown   Tobacco Use   • Smoking status: Never Smoker   • Smokeless tobacco: Never Used   Substance and Sexual Activity   • Alcohol use: No   • Drug use: No   • Sexual activity: Defer           Objective    ED Triage Vitals   Temp Heart Rate Resp BP SpO2   05/07/22 1051 05/07/22 1037 05/07/22 1037 05/07/22 1037 05/07/22 1037   97.3 °F (36.3 °C) 55 18 107/61 96 %      Temp src Heart Rate Source Patient Position BP Location FiO2 (%)   05/07/22 1051 -- 05/07/22 1037 05/07/22 1037 --   Temporal  Lying Right arm        Physical Exam  INITIAL VITAL SIGNS: Reviewed by me.  Pulse ox normal  GENERAL: Alert and interactive. No acute distress.  HEAD: Head is normocephalic.  EYES: EOMI. PERRL. No scleral icterus. No conjunctival injection.  ENT: Moist mucous membranes.   NECK: Supple. Full range of motion.  RESPIRATORY: No tachypnea. Clear breath sounds bilaterally. No wheezing. No rales. No rhonchi.  CV: Regular rate and rhythm. No murmurs. No rubs or gallops.  ABDOMEN: Soft, nondistended, nontender.  BACK:  No obvious deformity.  EXTREMITIES: No deformity. No clubbing or cyanosis. No edema.   SKIN: Warm and dry. No diaphoresis. No obvious rashes.   NEUROLOGIC: Alert and interactive.  Wiggles her feet on command, has good strength bilateral hands and arms.  Follows commands except does not open eyes all the way.  When I attempt to open them she squeezes them closed tight    Results for orders placed or performed during the hospital encounter of 05/07/22   Basic Metabolic Panel    Specimen: Arm, Left; Blood   Result Value Ref Range    Glucose 130 (H) 65 - 99 mg/dL    BUN 21 8 - 23 mg/dL    Creatinine 0.84 0.57 - 1.00 mg/dL    Sodium 144 136 - 145 mmol/L    Potassium 4.4 3.5 - 5.2 mmol/L    Chloride 103 98 - 107 mmol/L    CO2 30.0 (H) 22.0 - 29.0 mmol/L    Calcium 9.3 8.6 - 10.5 mg/dL    BUN/Creatinine Ratio 25.0 7.0 - 25.0    Anion Gap 11.0 5.0 - 15.0 mmol/L    eGFR 74.4 >60.0 mL/min/1.73   Urinalysis With  Treatment Number: 125 Microscopic If Indicated (No Culture) - Urine, Catheter    Specimen: Urine, Catheter   Result Value Ref Range    Color, UA Yellow Yellow, Straw    Appearance, UA Clear Clear    pH, UA <=5.0 4.5 - 8.0    Specific Gravity, UA 1.010 1.003 - 1.030    Glucose, UA Negative Negative    Ketones, UA Negative Negative    Bilirubin, UA Negative Negative    Blood, UA Small (1+) (A) Negative    Protein, UA Negative Negative    Leuk Esterase, UA Negative Negative    Nitrite, UA Negative Negative    Urobilinogen, UA 0.2 E.U./dL 0.2 - 1.0 E.U./dL   CBC Auto Differential    Specimen: Arm, Left; Blood   Result Value Ref Range    WBC 9.44 3.40 - 10.80 10*3/mm3    RBC 4.66 3.77 - 5.28 10*6/mm3    Hemoglobin 13.3 12.0 - 15.9 g/dL    Hematocrit 42.3 34.0 - 46.6 %    MCV 90.8 79.0 - 97.0 fL    MCH 28.5 26.6 - 33.0 pg    MCHC 31.4 (L) 31.5 - 35.7 g/dL    RDW 14.6 12.3 - 15.4 %    RDW-SD 48.0 37.0 - 54.0 fl    MPV 11.2 6.0 - 12.0 fL    Platelets 238 140 - 450 10*3/mm3    Neutrophil % 53.1 42.7 - 76.0 %    Lymphocyte % 35.8 19.6 - 45.3 %    Monocyte % 8.3 5.0 - 12.0 %    Eosinophil % 2.0 0.3 - 6.2 %    Basophil % 0.4 0.0 - 1.5 %    Immature Grans % 0.4 0.0 - 0.5 %    Neutrophils, Absolute 5.01 1.70 - 7.00 10*3/mm3    Lymphocytes, Absolute 3.38 (H) 0.70 - 3.10 10*3/mm3    Monocytes, Absolute 0.78 0.10 - 0.90 10*3/mm3    Eosinophils, Absolute 0.19 0.00 - 0.40 10*3/mm3    Basophils, Absolute 0.04 0.00 - 0.20 10*3/mm3    Immature Grans, Absolute 0.04 0.00 - 0.05 10*3/mm3    nRBC 0.0 0.0 - 0.2 /100 WBC   Urinalysis, Microscopic Only - Urine, Catheter    Specimen: Urine, Catheter   Result Value Ref Range    RBC, UA None Seen None Seen /HPF    WBC, UA None Seen None Seen /HPF    Bacteria, UA None Seen None Seen /HPF    Squamous Epithelial Cells, UA 0-2 None Seen, 0-2 /HPF    Hyaline Casts, UA None Seen None Seen /LPF    Methodology Manual Light Microscopy      CT Head Without Contrast    Result Date: 5/7/2022  CT Head WO HISTORY: History of mental  Total Square Area In Cm2 (Whole Numbers Only Please): 296 retardation. Institutionalized patient. Status post fall this morning with decreased LOC. TECHNIQUE: Axial unenhanced head CT. Radiation dose reduction techniques included automated exposure control or exposure modulation based on body size. Count of known CT and cardiac nuc med studies performed in previous 12 months: 0. Time of scan: 1128 hours COMPARISON: Noncontrast head CT 12/6/2017 FINDINGS: No intracranial hemorrhage, mass, or infarct. No hydrocephalus or extra-axial fluid collection. Predominate central cortical volume loss with ventriculomegaly. Cerebellar atrophy. Intracranial carotid and vertebral artery calcifications. The skull base, calvarium, and extracranial soft tissues are normal.     No acute intracranial abnormality. Predominantly central and cortical volume loss with ventriculomegaly as well as cerebellar atrophy possibly related to medical therapy. Signer Name: NAILA Thayer MD  Signed: 5/7/2022 11:54 AM  Workstation Name: [facility]  [facility]    XR Chest 1 View    Result Date: 5/7/2022  CR Chest 1 Vw INDICATION: Generalized weakness. Fell this morning. COMPARISON:  8/21/2017 FINDINGS: Portable AP view(s) of the chest.  Low lung volumes with deformity of the thorax secondary to severe thoracolumbar scoliosis. Cardiomegaly and aortic atherosclerotic changes. No infiltrates or effusions. No pneumothorax. No definite fracture.     No acute cardiopulmonary findings. Signer Name: NAILA Thayer MD  Signed: 5/7/2022 1:02 PM  Workstation Name: [facility]  [facility]    XR Hip With or Without Pelvis 2 - 3 View Left    Result Date: 5/7/2022  CR Hip Uni Comp Min 2 Vws LT INDICATION: Hip pain after fall. COMPARISON: None available. FINDINGS: AP pelvis and AP and frog-leg view(s) of the left hip.  No fracture or dislocation. No bone erosion or destruction. There is mild degenerative spurring in both hips. Femoral heads are normal without  evidence of osteonecrosis. No sacroiliac joint diastasis. Phlebolith noted in the pelvis.     Degenerative disease. No acute fracture or dislocation. Signer Name: Bertrand Niño MD  Signed: 5/7/2022 11:55 AM  Workstation Name: [email or web address]  [facility]      Procedures           ED Course                                                 MDM  72-year-old female with multiple medical problems including dementia presents to the emergency room from her nursing home for concern due to seeming generalized weakness and lethargy.  On exam she has normal vital signs, follows commands except for will not open her eyes but rather squeezes them shut when I try to take a look at them.  She denies any pain.  Has normal heart and lung sounds.  Basic work-up shows normal head CT, no evidence for infection, no evidence for UTI, no evidence of pneumonia, no acute findings of a hip x-ray which is what she was complaining of prior to arrival.  I do not find any reason to admit this 72-year-old female who is in no distress.  Will discharge back to [place]  Final diagnoses:   Generalized weakness   Accident due to mechanical fall without injury, initial encounter       ED Disposition  ED Disposition     ED Disposition   Discharge    Condition   Good    Comment   --             Khang Merino MD  [id number] [address]  [phone]    Call   To schedule follow-up appointment         Medication List      No changes were made to your prescriptions during this visit.          Bertrand Belle MD  05/07/22 6092     Total Energy In Joules: 266 Dose Setting #1 (Mj/Cm2): 900 Consent: Written consent obtained, risks reviewed including but not limited to crusting, scabbing, blistering, scarring, darker or lighter pigmentary change, incidental hair removal, bruising, and/or incomplete removal. Billin (Total area greater than 500 cm2) Detail Level: Zone Post-Care Instructions: I reviewed with the patient in detail post-care instructions. Patient should stay away from the sun and wear sun protection until treated areas are fully healed. Patient Id: [id number] Location #1: face, scalp, ears Comments: Drop patient dosage. He has not had treatment since August.

## (undated) DEVICE — SUT VIC 2/0 CT1 36IN

## (undated) DEVICE — Device

## (undated) DEVICE — SUT SILK 2/0 LIGAPAK LA55G

## (undated) DEVICE — SPONGE,DRAIN,NONWVN,4"X4",6PLY,STRL,LF: Brand: MEDLINE

## (undated) DEVICE — TOWEL,OR,DSP,ST,BLUE,STD,4/PK,20PK/CS: Brand: MEDLINE

## (undated) DEVICE — SUT SILK 2/0 SH 30IN K833H

## (undated) DEVICE — RESERVOIR,SUCTION,100CC,SILICONE: Brand: MEDLINE

## (undated) DEVICE — ULTRACLEAN ACCESSORY ELECTRODE 1" (2.54 CM) COATED BLADE: Brand: ULTRACLEAN

## (undated) DEVICE — SUT SILK 3/0 SH CR8 18IN C013D

## (undated) DEVICE — PROXIMATE RH ROTATING HEAD SKIN STAPLERS (35 WIDE) CONTAINS 35 STAINLESS STEEL STAPLES: Brand: PROXIMATE

## (undated) DEVICE — MEDIUM VISCERA RETAINER: Brand: VISCERA RETAINER, FISH

## (undated) DEVICE — BNDR ABD 4PANEL12IN 30TO45IN

## (undated) DEVICE — DRP Z/FRICTION 10X16IN

## (undated) DEVICE — GLV SURG SENSICARE W/ALOE PF LF 6.5 STRL

## (undated) DEVICE — SUT PROLN 0 CT1 30IN 8424H

## (undated) DEVICE — TP CLTH MEDIPORE SFT 2IN 10YD

## (undated) DEVICE — PK PROC MAJ 90

## (undated) DEVICE — DRSNG PAD ABD 8X10IN STRL

## (undated) DEVICE — APPL CHLORAPREP W/TINT 26ML ORNG

## (undated) DEVICE — SUT ETHLN 2/0 FS 18IN 664H

## (undated) DEVICE — DRN WND HUBLSS FLUT FULL PERF SIL10MM

## (undated) DEVICE — ST TISSOMAT SPRY

## (undated) DEVICE — SUT VIC 3/0 SH 27IN J416H